# Patient Record
Sex: MALE | Race: WHITE | NOT HISPANIC OR LATINO | Employment: OTHER | URBAN - NONMETROPOLITAN AREA
[De-identification: names, ages, dates, MRNs, and addresses within clinical notes are randomized per-mention and may not be internally consistent; named-entity substitution may affect disease eponyms.]

---

## 2017-01-03 ENCOUNTER — OFFICE VISIT (OUTPATIENT)
Dept: UROLOGY | Facility: CLINIC | Age: 82
End: 2017-01-03

## 2017-01-03 VITALS
DIASTOLIC BLOOD PRESSURE: 71 MMHG | TEMPERATURE: 98 F | RESPIRATION RATE: 16 BRPM | OXYGEN SATURATION: 94 % | SYSTOLIC BLOOD PRESSURE: 134 MMHG | HEART RATE: 71 BPM

## 2017-01-03 DIAGNOSIS — N40.0 ENLARGED PROSTATE: Primary | ICD-10-CM

## 2017-01-03 DIAGNOSIS — N40.0 BENIGN PROSTATIC HYPERPLASIA WITHOUT LOWER URINARY TRACT SYMPTOMS, UNSPECIFIED MORPHOLOGY: ICD-10-CM

## 2017-01-03 LAB
BILIRUB BLD-MCNC: NEGATIVE MG/DL
CLARITY, POC: CLEAR
COLOR UR: YELLOW
GLUCOSE UR STRIP-MCNC: NEGATIVE MG/DL
KETONES UR QL: NEGATIVE
LEUKOCYTE EST, POC: NEGATIVE
NITRITE UR-MCNC: NEGATIVE MG/ML
PH UR: 6.5 [PH] (ref 5–8)
PROT UR STRIP-MCNC: NEGATIVE MG/DL
RBC # UR STRIP: NEGATIVE /UL
SP GR UR: 1.01 (ref 1–1.03)
UROBILINOGEN UR QL: NORMAL

## 2017-01-03 PROCEDURE — 99213 OFFICE O/P EST LOW 20 MIN: CPT | Performed by: UROLOGY

## 2017-01-03 PROCEDURE — 81003 URINALYSIS AUTO W/O SCOPE: CPT | Performed by: UROLOGY

## 2017-01-03 RX ORDER — TAMSULOSIN HYDROCHLORIDE 0.4 MG/1
1 CAPSULE ORAL DAILY
Qty: 90 CAPSULE | Refills: 0 | Status: SHIPPED | OUTPATIENT
Start: 2017-01-03 | End: 2017-02-22

## 2017-01-03 NOTE — MR AVS SNAPSHOT
Andrew Hernandez   1/3/2017 1:30 PM   Office Visit    Dept Phone:  [phone]   Encounter #:  [id number]    Provider:  Harsh Huerta MD   Department:  [facility] UROLOGY                Your Full Care Plan              Your Updated Medication List          This list is accurate as of: 1/3/17  2:16 PM.  Always use your most recent med list.                atorvastatin 20 MG tablet   Commonly known as:  LIPITOR   Take 1 tablet by mouth Daily.       clopidogrel 75 MG tablet   Commonly known as:  PLAVIX   Take 1 tablet by mouth Daily.       levothyroxine 50 MCG tablet   Commonly known as:  SYNTHROID, LEVOTHROID   Take 1 tablet by mouth Daily.       lisinopril 5 MG tablet   Commonly known as:  PRINIVIL,ZESTRIL   Take 1 tablet by mouth 2 (two) times a day.       multivitamin tablet tablet       raNITIdine 150 MG tablet   Commonly known as:  ZANTAC   Take 1 tablet by mouth Daily As Needed for heartburn or indigestion.       sulfamethoxazole-trimethoprim 800-160 MG per tablet   Commonly known as:  BACTRIM DS   Take 1 tablet by mouth 2 (Two) Times a Day.       tamsulosin 0.4 MG capsule 24 hr capsule   Commonly known as:  FLOMAX   Take 1 capsule by mouth Daily.       TUMS 500 MG chewable tablet   Generic drug:  calcium carbonate               We Performed the Following     POC Urinalysis Dipstick, Automated       You Were Diagnosed With        Codes Comments    Enlarged prostate    -  Primary ICD-10-CM: N40.0  ICD-9-CM: 600.00       Instructions     None    Patient Instructions History      Upcoming Appointments     Visit Type Date Time Department    NEW PATIENT 1/3/2017  1:30 PM [facility]    FOLLOW UP 3/31/2017  9:30 AM [facility] CARD [facility]    FOLLOW UP 4/25/2017 10:00 AM [facility] PC [facility]      MyChart Signup     [facility] [employer] allows you to send messages to your doctor, view your test results, renew your prescriptions, schedule appointments, and more. To  sign up, go to [employer] and click on the Sign Up Now link in the New User? box. Enter your [employer] Activation Code exactly as it appears below along with the last four digits of your Social Security Number and your Date of Birth () to complete the sign-up process. If you do not sign up before the expiration date, you must request a new code.    [employer] Activation Code: [email or web address]  Expires: 2017  5:34 AM    If you have questions, you can email [email or web address] or call [phone] to talk to our [employer] staff. Remember, [employer] is NOT to be used for urgent needs. For medical emergencies, dial 911.               Other Info from Your Visit           Your Appointments     Mar 31, 2017  9:30 AM EDT   Follow Up with Ishmael Nieves MD   [facility] CARDIOLOGY (--)    [address]  [facility] [address] [phone]           Arrive 15 minutes prior to appointment.            2017 10:00 AM EDT   Follow Up with Melvi Klein MD   [facility] PRIMARY CARE (--)    [address]  [facility] [address] [phone]           Arrive 15 minutes prior to appointment.              Allergies     Rocephin [Ceftriaxone] Allergy Hives    Amoxicillin  Rash      Reason for Visit     Benign Prostatic Hypertrophy Patient is here for a yearly fup of bph, use to see .      Vital Signs     Blood Pressure Pulse Temperature Respirations Oxygen Saturation Smoking Status    134/71 71 98 °F (36.7 °C) (Temporal Artery ) 16 94% Former Smoker      Problems and Diagnoses Noted     Enlarged prostate    -  Primary      Results     POC Urinalysis Dipstick, Automated      Component Value Standard Range & Units    Color Yellow Yellow, Straw, Dark Yellow, Lety    Clarity, UA Clear Clear    Glucose, UA Negative Negative, 1000 mg/dL (3+) mg/dL    Bilirubin Negative Negative    Ketones, UA Negative Negative    Specific Gravity   1.010 1.005 - 1.030    Blood, UA Negative Negative    pH, Urine 6.5 5.0 - 8.0    Protein, POC Negative Negative mg/dL    Urobilinogen, UA Normal Normal    Leukocytes Negative Negative    Nitrite, UA Negative Negative

## 2017-01-03 NOTE — PROGRESS NOTES
Chief Complaint  Benign Prostatic Hypertrophy (Patient is here for a yearly fup of bph, use to see .)        ERICA Hernandez is a 82 y.o. male who returns today for annual checkup and requesting that I assume his urological care.  He has been followed by Dr. Hernandez in the past with a known enlarged prostate and bladder outlet obstruction and history of kidney stones.  Currently he is voiding adequately on Flomax but is known to carry a postvoid residual of about 250 mL.  He recently had a urinary tract infection but he states this the first one in a year.    Vitals:    01/03/17 1404   BP: 134/71   Pulse: 71   Resp: 16   Temp: 98 °F (36.7 °C)   SpO2: 94%       Past Medical History  Past Medical History   Diagnosis Date   • Fracture    • Kidney infection    • Renal calculi        Past Surgical History  Past Surgical History   Procedure Laterality Date   • Carotid stent Right 03/18/2016     TRANSCATH PLACEMENT OF CAROTID ARTERY STENT   • Patella fracture surgery Left 1986   • Kidney stone surgery Right 2011     Shafron - open       Medications  has a current medication list which includes the following prescription(s): atorvastatin, calcium carbonate, clopidogrel, levothyroxine, lisinopril, multivitamin, ranitidine, sulfamethoxazole-trimethoprim, and tamsulosin.      Allergies  Allergies   Allergen Reactions   • Rocephin [Ceftriaxone] Hives   • Amoxicillin Rash       Social History  Social History     Social History Narrative    7 children - 2 daughters both live in [place]    5 sons, lives with Harriet Hernandez, son is Abhay, 3 other sons live in [place].      Other sons live in [place],        Family History  He has no family history of bladder or kidney cancer  He has no family history of kidney stones      AUA Symptom Score:      Review of Systems  Review of Systems    Physical Exam  Physical Exam   Constitutional: He is oriented to person, place, and time. He appears well-developed and  well-nourished.   Neck: Normal range of motion.   Pulmonary/Chest: Effort normal. No respiratory distress.   Abdominal: Soft. He exhibits no distension and no mass. There is no tenderness. No hernia. Hernia confirmed negative in the right inguinal area and confirmed negative in the left inguinal area.   Genitourinary: Rectum normal, testes normal and penis normal. Prostate is enlarged.   Musculoskeletal: Normal range of motion.   Lymphadenopathy: No inguinal adenopathy noted on the right or left side.   Neurological: He is alert and oriented to person, place, and time.   Skin: Skin is warm and dry.   Psychiatric: He has a normal mood and affect. His behavior is normal.   Nursing note and vitals reviewed.      Labs Recent and today in the office:  Results for orders placed or performed in visit on 01/03/17   POC Urinalysis Dipstick, Automated   Result Value Ref Range    Color Yellow Yellow, Straw, Dark Yellow, Lety    Clarity, UA Clear Clear    Glucose, UA Negative Negative, 1000 mg/dL (3+) mg/dL    Bilirubin Negative Negative    Ketones, UA Negative Negative    Specific Gravity  1.010 1.005 - 1.030    Blood, UA Negative Negative    pH, Urine 6.5 5.0 - 8.0    Protein, POC Negative Negative mg/dL    Urobilinogen, UA Normal Normal    Leukocytes Negative Negative    Nitrite, UA Negative Negative         Assessment & Plan  The patient is informed that I recommend Proscar to shrink the gland since it is 60 g and associated with a 8 ounce postvoid residual.  He declines however and so we will discontinue his Flomax and he can return in 6 months and when necessary.

## 2017-01-13 ENCOUNTER — TELEPHONE (OUTPATIENT)
Dept: CARDIOLOGY | Facility: CLINIC | Age: 82
End: 2017-01-13

## 2017-01-13 RX ORDER — LABETALOL 100 MG/1
100 TABLET, FILM COATED ORAL 3 TIMES DAILY
Qty: 90 TABLET | Refills: 5 | Status: SHIPPED | OUTPATIENT
Start: 2017-01-13 | End: 2017-03-27 | Stop reason: SDUPTHER

## 2017-01-13 RX ORDER — LISINOPRIL 20 MG/1
20 TABLET ORAL 2 TIMES DAILY
Qty: 60 TABLET | Refills: 5 | Status: SHIPPED | OUTPATIENT
Start: 2017-01-13 | End: 2017-01-16 | Stop reason: ALTCHOICE

## 2017-01-13 NOTE — TELEPHONE ENCOUNTER
Patient called in today having HTN /70's increasing throughout the day. He C/O Headache and blurred vision BP in office 200/62. Spoke with Dr. Nieves he states increase Lisinopril 10 mg BID, and add Labetalol 100mg TID and see next week. Patient states understanding and appointment given.

## 2017-01-16 RX ORDER — LISINOPRIL 10 MG/1
10 TABLET ORAL 2 TIMES DAILY
Qty: 180 TABLET | Refills: 3 | OUTPATIENT
Start: 2017-01-16 | End: 2017-05-31 | Stop reason: SINTOL

## 2017-01-16 RX ORDER — LISINOPRIL 10 MG/1
10 TABLET ORAL 2 TIMES DAILY
Qty: 180 TABLET | Refills: 3 | Status: SHIPPED | OUTPATIENT
Start: 2017-01-16 | End: 2017-01-16 | Stop reason: SDUPTHER

## 2017-01-16 RX ORDER — LISINOPRIL 10 MG/1
10 TABLET ORAL DAILY
Qty: 90 TABLET | Refills: 3 | Status: SHIPPED | OUTPATIENT
Start: 2017-01-16 | End: 2017-01-16 | Stop reason: SDUPTHER

## 2017-02-15 ENCOUNTER — OFFICE VISIT (OUTPATIENT)
Dept: CARDIOLOGY | Facility: CLINIC | Age: 82
End: 2017-02-15

## 2017-02-15 VITALS
OXYGEN SATURATION: 98 % | HEIGHT: 72 IN | SYSTOLIC BLOOD PRESSURE: 170 MMHG | BODY MASS INDEX: 22.4 KG/M2 | WEIGHT: 165.4 LBS | HEART RATE: 70 BPM | DIASTOLIC BLOOD PRESSURE: 72 MMHG | RESPIRATION RATE: 16 BRPM

## 2017-02-15 DIAGNOSIS — I10 BENIGN ESSENTIAL HYPERTENSION: Primary | ICD-10-CM

## 2017-02-15 PROCEDURE — 99213 OFFICE O/P EST LOW 20 MIN: CPT | Performed by: INTERNAL MEDICINE

## 2017-02-15 NOTE — PROGRESS NOTES
Subjective:     Encounter Date:02/15/2017      Patient ID: Andrew Hernandez is a 82 y.o. male.    Chief Complaint: Essential hypertension follow-up after medication change.  HPI  This is an 82-year-old male patient who was previously experiencing symptomatic hypertension.  We had started him on labetalol 100 mg 3 times per day and this therapy was working quite well.  He indicates that his home blood pressure recordings were consistently running between 115 mmHg and 140 mmHg.  The patient recently had his labetalol prescription refilled and noticed that even though the strength was unchanged the pill had changed from a pink pill to a white pill.  His daughter-in-law confirmed with the pharmacist that he was getting labetalol 100 mg as prescribed.  It was assumed that this was due to a different generic .  However his blood pressure control has been very poor since refilling his prescription.  He reports strict compliance.  His blood pressure at home today was 195 systolic and was 170 mmHg systolic later today in clinic.  He has no chest pain or shortness of breath.  There is no orthopnea PND or lower extremity edema.  There is no dizziness palpitations or syncope.  He has had no headaches visual changes weakness or nausea.  The remainder of his past medical history, family history and social history remains unchanged.  The following portions of the patient's history were reviewed and updated as appropriate: allergies, current medications, past family history, past medical history, past social history, past surgical history and problem  Review of Systems   Constitution: Negative for chills, diaphoresis, fever, weakness, malaise/fatigue, night sweats, weight gain and weight loss.   HENT: Negative for ear discharge, hearing loss, hoarse voice and nosebleeds.    Eyes: Negative for discharge, double vision, pain and photophobia.   Cardiovascular: Negative for chest pain, claudication, cyanosis, dyspnea on  exertion, irregular heartbeat, leg swelling, near-syncope, orthopnea, palpitations, paroxysmal nocturnal dyspnea and syncope.   Respiratory: Negative for cough, hemoptysis, shortness of breath, sputum production and wheezing.    Endocrine: Negative for cold intolerance, heat intolerance, polydipsia, polyphagia and polyuria.   Hematologic/Lymphatic: Negative for adenopathy and bleeding problem. Does not bruise/bleed easily.   Skin: Negative for color change, flushing, itching and rash.   Musculoskeletal: Negative for muscle cramps, muscle weakness, myalgias and stiffness.   Gastrointestinal: Negative for abdominal pain, diarrhea, hematemesis, hematochezia, nausea and vomiting.   Genitourinary: Negative for dysuria, frequency and nocturia.   Neurological: Negative for focal weakness, loss of balance, numbness, paresthesias and seizures.   Psychiatric/Behavioral: Negative for altered mental status, hallucinations and suicidal ideas.   Allergic/Immunologic: Negative for HIV exposure, hives and persistent infections.       Current Outpatient Prescriptions:   •  atorvastatin (LIPITOR) 20 MG tablet, Take 1 tablet by mouth Daily., Disp: 90 tablet, Rfl: 1  •  calcium carbonate (TUMS) 500 MG chewable tablet, Chew. TAKE AS DIRECTED., Disp: , Rfl:   •  clopidogrel (PLAVIX) 75 MG tablet, Take 1 tablet by mouth Daily., Disp: 90 tablet, Rfl: 3  •  labetalol (NORMODYNE) 100 MG tablet, Take 1 tablet by mouth 3 (Three) Times a Day., Disp: 90 tablet, Rfl: 5  •  levothyroxine (SYNTHROID, LEVOTHROID) 50 MCG tablet, TAKE 1 TABLET BY MOUTH ONCE DAILY, Disp: 90 tablet, Rfl: 3  •  lisinopril (PRINIVIL,ZESTRIL) 10 MG tablet, Take 1 tablet by mouth 2 (Two) Times a Day., Disp: 180 tablet, Rfl: 3  •  multivitamin (THERAGRAN) tablet tablet, Take 1 tablet by mouth daily., Disp: , Rfl:   •  ranitidine (ZANTAC) 150 MG tablet, Take 1 tablet by mouth Daily As Needed for heartburn or indigestion., Disp: 90 tablet, Rfl: 1  •  tamsulosin (FLOMAX) 0.4 MG  "capsule 24 hr capsule, TAKE 1 CAPSULE BY MOUTH ONCE DAILY, Disp: 90 capsule, Rfl: 0     Objective:     Physical Exam   Constitutional: He is oriented to person, place, and time. He appears well-developed and well-nourished.   HENT:   Head: Normocephalic and atraumatic.   Eyes: Conjunctivae are normal. No scleral icterus.   Cardiovascular: Normal rate, regular rhythm, normal heart sounds and intact distal pulses.  Exam reveals no gallop and no friction rub.    No murmur heard.  Pulmonary/Chest: Effort normal and breath sounds normal. No respiratory distress.   Abdominal: Soft. Bowel sounds are normal. There is no tenderness.   Musculoskeletal: He exhibits no edema.   Neurological: He is alert and oriented to person, place, and time.   Skin: Skin is warm and dry.   Psychiatric: He has a normal mood and affect. His behavior is normal.     Blood pressure 170/72, pulse 70, resp. rate 16, height 71.5\" (181.6 cm), weight 165 lb 6.4 oz (75 kg), SpO2 98 %.   Lab Review:       Assessment:         1. Benign essential hypertension  He continues to have labile blood pressure.  He indicates that earlier in the month his blood pressure was running in the 115 to 140 systolic range.  He recently renewed his labetalol 100 mg 3 times per day prescription.  The dosage was confirmed to be the same however the pill changed in color from light pink to a white pill.  He was confirmed with the pharmacist that he did get the right medication.  I suspect this is due to a different  of a generic medication.  The patient reports strict compliance with his medication.  However his blood pressure at home today was 195 systolic.  His blood pressure in the office today was 170 systolic.  Surprisingly he remains asymptomatic despite these extremely elevated blood pressures.  He expresses concern that his blood pressure may be too low when he gets recordings less than 120.  His diastolic blood pressures are totally inaccurate due to " locomotor brachialis and extreme vascular stiffness.  Procedures     Plan:       I have recommended empirically increasing his labetalol to  200 mg for each dosing interval if his systolic blood pressure is greater than 150.  If his blood pressure is less than 100 he will take 100 mg during that 8 hour period.  If it is consistently requiring the higher dose multiple times during the day we will simply increasing him to 200 mg 3 times per day.  He may require the use of when necessary clonidine.  Hopefully if this is due to a different potency generic form of labetalol it will take 5-7 days for this to reach steady state.  We will therefore reassess in one week.

## 2017-02-21 ENCOUNTER — OFFICE VISIT (OUTPATIENT)
Dept: CARDIOLOGY | Facility: CLINIC | Age: 82
End: 2017-02-21

## 2017-02-21 VITALS
WEIGHT: 164.4 LBS | HEIGHT: 72 IN | SYSTOLIC BLOOD PRESSURE: 150 MMHG | RESPIRATION RATE: 16 BRPM | DIASTOLIC BLOOD PRESSURE: 52 MMHG | OXYGEN SATURATION: 99 % | BODY MASS INDEX: 22.27 KG/M2 | HEART RATE: 62 BPM

## 2017-02-21 DIAGNOSIS — I65.29 CAROTID ATHEROSCLEROSIS, UNSPECIFIED LATERALITY: ICD-10-CM

## 2017-02-21 DIAGNOSIS — I63.421 CEREBRAL INFARCTION DUE TO EMBOLISM OF RIGHT ANTERIOR CEREBRAL ARTERY (HCC): ICD-10-CM

## 2017-02-21 DIAGNOSIS — I10 BENIGN ESSENTIAL HYPERTENSION: Primary | ICD-10-CM

## 2017-02-21 PROCEDURE — 99213 OFFICE O/P EST LOW 20 MIN: CPT | Performed by: INTERNAL MEDICINE

## 2017-02-21 RX ORDER — LABETALOL 100 MG/1
100 TABLET, FILM COATED ORAL 3 TIMES DAILY PRN
Qty: 120 TABLET | Refills: 11 | Status: SHIPPED | OUTPATIENT
Start: 2017-02-21 | End: 2017-03-27 | Stop reason: ALTCHOICE

## 2017-02-21 RX ORDER — LABETALOL 100 MG/1
100 TABLET, FILM COATED ORAL 3 TIMES DAILY PRN
Qty: 120 TABLET | Refills: 11 | Status: SHIPPED | OUTPATIENT
Start: 2017-02-21 | End: 2017-02-21 | Stop reason: SDUPTHER

## 2017-02-21 NOTE — PROGRESS NOTES
Subjective:     Encounter Date:02/21/2017      Patient ID: Andrew Hernandez is a 82 y.o. male.    Chief Complaint: Hypertension and dizziness  HPI  This is an 82-year-old male patient who has been having relatively labile blood pressures.  The patient was instructed at last visit to take an extra 100 mg labetalol for systolic blood pressure greater than 150.  Over the last week he has had to do that 5 times.  He did have one episode of dizziness associated with a relatively low blood pressure.  Overall his systolic blood pressures are running around 110-115 mm of mercury.  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  It is been over one year since this process wasn't assessed.  He has had no further visual changes.  There is no focal numbness weakness or paralysis.  He has had no difficulty with speech or swallowing.  He has had no issues with gait and balance issues or incoordination.  He denies chest discomfort.  There is no exertional chest arm neck jaw shoulder or back discomfort.  There is no orthopnea PND or lower extremity edema.  There is no palpitations or syncope.  The remainder of his past medical history, family history and social history remains unchanged.  The following portions of the patient's history were reviewed and updated as appropriate: allergies, current medications, past family history, past medical history, past social history, past surgical history and problem  Review of Systems   Constitution: Negative for chills, diaphoresis, fever, weakness, malaise/fatigue, weight gain and weight loss.   HENT: Negative for ear discharge, hearing loss, hoarse voice and nosebleeds.    Eyes: Negative for discharge, double vision, pain and photophobia.   Cardiovascular: Negative for chest pain, claudication, cyanosis, dyspnea on exertion, irregular heartbeat, leg swelling, near-syncope, orthopnea,  palpitations, paroxysmal nocturnal dyspnea and syncope.   Respiratory: Negative for cough, hemoptysis, shortness of breath, sputum production and wheezing.    Endocrine: Negative for cold intolerance, heat intolerance, polydipsia, polyphagia and polyuria.   Hematologic/Lymphatic: Negative for adenopathy and bleeding problem. Does not bruise/bleed easily.   Skin: Negative for color change, flushing, itching and rash.   Musculoskeletal: Negative for muscle cramps, muscle weakness, myalgias and stiffness.   Gastrointestinal: Negative for abdominal pain, diarrhea, hematemesis, hematochezia, nausea and vomiting.   Genitourinary: Negative for dysuria, frequency and nocturia.   Neurological: Positive for dizziness. Negative for focal weakness, loss of balance, numbness, paresthesias and seizures.   Psychiatric/Behavioral: Negative for altered mental status, hallucinations and suicidal ideas.   Allergic/Immunologic: Negative for HIV exposure, hives and persistent infections.       Current Outpatient Prescriptions:   •  atorvastatin (LIPITOR) 20 MG tablet, Take 1 tablet by mouth Daily., Disp: 90 tablet, Rfl: 1  •  calcium carbonate (TUMS) 500 MG chewable tablet, Chew. TAKE AS DIRECTED., Disp: , Rfl:   •  clopidogrel (PLAVIX) 75 MG tablet, Take 1 tablet by mouth Daily., Disp: 90 tablet, Rfl: 3  •  levothyroxine (SYNTHROID, LEVOTHROID) 50 MCG tablet, TAKE 1 TABLET BY MOUTH ONCE DAILY, Disp: 90 tablet, Rfl: 3  •  lisinopril (PRINIVIL,ZESTRIL) 10 MG tablet, Take 1 tablet by mouth 2 (Two) Times a Day., Disp: 180 tablet, Rfl: 3  •  multivitamin (THERAGRAN) tablet tablet, Take 1 tablet by mouth daily., Disp: , Rfl:   •  ranitidine (ZANTAC) 150 MG tablet, Take 1 tablet by mouth Daily As Needed for heartburn or indigestion., Disp: 90 tablet, Rfl: 1  •  tamsulosin (FLOMAX) 0.4 MG capsule 24 hr capsule, TAKE 1 CAPSULE BY MOUTH ONCE DAILY, Disp: 90 capsule, Rfl: 0  •  labetalol (NORMODYNE) 100 MG tablet, Take 1 tablet by mouth 3 (Three)  "Times a Day. (Patient taking differently: Take 100 mg by mouth 3 (Three) Times a Day. Patient has been instructed to take 2 tablets TID if BP is over 150 systolic), Disp: 90 tablet, Rfl: 5     Objective:     Physical Exam   Constitutional: He is oriented to person, place, and time. He appears well-developed and well-nourished.   HENT:   Head: Normocephalic and atraumatic.   Eyes: Conjunctivae are normal. No scleral icterus.   Cardiovascular: Normal rate, regular rhythm, normal heart sounds and intact distal pulses.  Exam reveals no gallop and no friction rub.    No murmur heard.  Pulses:       Carotid pulses are 2+ on the right side with bruit, and 2+ on the left side with bruit.  Pulmonary/Chest: Effort normal and breath sounds normal. No respiratory distress.   Abdominal: Soft. Bowel sounds are normal. There is no tenderness.   Musculoskeletal: He exhibits no edema.   Neurological: He is alert and oriented to person, place, and time.   Skin: Skin is warm and dry.   Psychiatric: He has a normal mood and affect. His behavior is normal.     Blood pressure 150/52, pulse 62, resp. rate 16, height 71.5\" (181.6 cm), weight 164 lb 6.4 oz (74.6 kg), SpO2 99 %.   Lab Review:       Assessment:         1. Benign essential hypertension  His blood pressure appears to be doing much better.  In the last week he has had 5 episodes of blood pressures greater than 150 mmHg which required extra dosages of labetalol.    2. Carotid atherosclerosis, unspecified laterality  The patient has had previous carotid artery stenting.  He continues to have loud bilateral carotid bruits with worsening dizziness.  - Duplex Carotid Ultrasound CAR    3. 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  - Duplex Carotid Ultrasound CAR  Procedures     Plan:       I have recommended a bilateral carotid artery duplex Doppler.  The patient may be a " candidate for enrollment in the CREST 2 trial.  For the time being we will continue using labetalol 100 mg 3 times per day in conjunction with lisinopril 10 mg twice per day.  He is instructed to continue taking an extra 100 mg labetalol as needed for systolic blood pressures greater than 150.  I am reluctant to escalate his scheduled antihypertensive medication as the patient has had an episode of dizziness with relatively low blood pressure.

## 2017-02-22 ENCOUNTER — HOSPITAL ENCOUNTER (OUTPATIENT)
Dept: CARDIOLOGY | Facility: HOSPITAL | Age: 82
Discharge: HOME | End: 2017-02-22
Attending: INTERNAL MEDICINE | Admitting: INTERNAL MEDICINE

## 2017-02-22 DIAGNOSIS — N40.0 BENIGN PROSTATIC HYPERPLASIA WITHOUT LOWER URINARY TRACT SYMPTOMS, UNSPECIFIED MORPHOLOGY: ICD-10-CM

## 2017-02-22 LAB
BH CV ECHO MEAS - BSA(HAYCOCK): 1.9 M^2
BH CV ECHO MEAS - BSA: 1.9 M^2
BH CV ECHO MEAS - BZI_BMI: 22.9 KILOGRAMS/M^2
BH CV ECHO MEAS - BZI_METRIC_HEIGHT: 180.3 CM
BH CV ECHO MEAS - BZI_METRIC_WEIGHT: 74.4 KG
BH CV XLRA MEAS LEFT DIST CCA EDV: 20.4 CM/SEC
BH CV XLRA MEAS LEFT DIST CCA PSV: 144 CM/SEC
BH CV XLRA MEAS LEFT DIST ICA EDV: 26.7 CM/SEC
BH CV XLRA MEAS LEFT DIST ICA PSV: 130 CM/SEC
BH CV XLRA MEAS LEFT ICA/CCA RATIO: 1.9
BH CV XLRA MEAS LEFT MID ICA EDV: 40.9 CM/SEC
BH CV XLRA MEAS LEFT MID ICA PSV: 198 CM/SEC
BH CV XLRA MEAS LEFT PROX CCA EDV: 51.9 CM/SEC
BH CV XLRA MEAS LEFT PROX CCA PSV: 265 CM/SEC
BH CV XLRA MEAS LEFT PROX ECA EDV: 0 CM/SEC
BH CV XLRA MEAS LEFT PROX ECA PSV: 270 CM/SEC
BH CV XLRA MEAS LEFT PROX ICA EDV: 51.9 CM/SEC
BH CV XLRA MEAS LEFT PROX ICA PSV: 270 CM/SEC
BH CV XLRA MEAS LEFT PROX SCLA EDV: 0 CM/SEC
BH CV XLRA MEAS LEFT PROX SCLA PSV: 130 CM/SEC
BH CV XLRA MEAS LEFT VERTEBRAL A EDV: 9.9 CM/SEC
BH CV XLRA MEAS LEFT VERTEBRAL A PSV: 32 CM/SEC
BH CV XLRA MEAS RIGHT DIST CCA EDV: 2.8 CM/SEC
BH CV XLRA MEAS RIGHT DIST CCA PSV: 62.3 CM/SEC
BH CV XLRA MEAS RIGHT DIST ICA EDV: 9.6 CM/SEC
BH CV XLRA MEAS RIGHT DIST ICA PSV: 49.8 CM/SEC
BH CV XLRA MEAS RIGHT ICA/CCA RATIO: 8
BH CV XLRA MEAS RIGHT MID ICA EDV: 19.2 CM/SEC
BH CV XLRA MEAS RIGHT MID ICA PSV: 115 CM/SEC
BH CV XLRA MEAS RIGHT PROX CCA EDV: 9.4 CM/SEC
BH CV XLRA MEAS RIGHT PROX CCA PSV: 71.1 CM/SEC
BH CV XLRA MEAS RIGHT PROX ECA EDV: 0 CM/SEC
BH CV XLRA MEAS RIGHT PROX ECA PSV: 92.1 CM/SEC
BH CV XLRA MEAS RIGHT PROX ICA EDV: 110 CM/SEC
BH CV XLRA MEAS RIGHT PROX ICA PSV: 496 CM/SEC
BH CV XLRA MEAS RIGHT PROX SCLA EDV: 0 CM/SEC
BH CV XLRA MEAS RIGHT PROX SCLA PSV: 208 CM/SEC
BH CV XLRA MEAS RIGHT VERTEBRAL A EDV: 22 CM/SEC
BH CV XLRA MEAS RIGHT VERTEBRAL A PSV: 127 CM/SEC

## 2017-02-22 PROCEDURE — 93880 EXTRACRANIAL BILAT STUDY: CPT

## 2017-02-22 PROCEDURE — 93880 EXTRACRANIAL BILAT STUDY: CPT | Performed by: INTERNAL MEDICINE

## 2017-02-22 RX ORDER — TAMSULOSIN HYDROCHLORIDE 0.4 MG/1
1 CAPSULE ORAL DAILY
Qty: 90 CAPSULE | Refills: 1 | Status: SHIPPED | OUTPATIENT
Start: 2017-02-22 | End: 2017-03-24 | Stop reason: SDUPTHER

## 2017-02-27 ENCOUNTER — HOSPITAL ENCOUNTER (OUTPATIENT)
Facility: HOSPITAL | Age: 82
Setting detail: HOSPITAL OUTPATIENT SURGERY
End: 2017-02-27
Attending: RADIOLOGY | Admitting: RADIOLOGY

## 2017-02-27 ENCOUNTER — OFFICE VISIT (OUTPATIENT)
Dept: NEUROSURGERY | Facility: CLINIC | Age: 82
End: 2017-02-27

## 2017-02-27 VITALS
TEMPERATURE: 97.8 F | HEIGHT: 71 IN | DIASTOLIC BLOOD PRESSURE: 58 MMHG | SYSTOLIC BLOOD PRESSURE: 130 MMHG | BODY MASS INDEX: 23.1 KG/M2 | WEIGHT: 165 LBS

## 2017-02-27 DIAGNOSIS — I65.21 CAROTID STENOSIS, ASYMPTOMATIC, RIGHT: Primary | ICD-10-CM

## 2017-02-27 DIAGNOSIS — I65.21 CAROTID STENOSIS, ASYMPTOMATIC, RIGHT: ICD-10-CM

## 2017-02-27 PROCEDURE — 99214 OFFICE O/P EST MOD 30 MIN: CPT | Performed by: RADIOLOGY

## 2017-02-27 RX ORDER — SODIUM CHLORIDE 0.9 % (FLUSH) 0.9 %
1-10 SYRINGE (ML) INJECTION AS NEEDED
Status: CANCELLED | OUTPATIENT
Start: 2017-02-27

## 2017-02-27 RX ORDER — SODIUM CHLORIDE 9 MG/ML
50 INJECTION, SOLUTION INTRAVENOUS CONTINUOUS
Status: CANCELLED | OUTPATIENT
Start: 2017-02-27

## 2017-02-27 NOTE — PROGRESS NOTES
"DOS: 2017  NAME: Andrew Hernandez   : 1934  PCP: Melvi Klein MD  Referring MD: Melvi Klein MD    Chief Complaint:    Chief Complaint   Patient presents with   • Carotid Artery Disease     recurrent right ICA stenosis        History of Present Illness:  82 y.o. male presents with known to the neuro interventional service, having undergone prior angioplasty and stent placement for a symptomatic, high-grade right carotid stenosis on 3/18/2016.  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  He has otherwise had no new or recurrent strokelike symptoms.  Specifically, no unilateral weakness or numbness, no speech changes.  He's had a carotid duplex ultrasound demonstrating recurrent, high-grade right carotid stenosis (in-stent restenosis).  He presents for consultation regarding his recurrent carotid disease.    Past Medical History:  Past Medical History   Diagnosis Date   • Fracture    • Hyperlipidemia    • Hypertension    • Kidney infection    • Renal calculi    • Stroke 2016     right retinal artery occlusion       Past Surgical History:  Past Surgical History   Procedure Laterality Date   • Patella fracture surgery Left    • Kidney stone surgery Right      Shafron - open   • Carotid stent Right 2016     Carotid Wall Stent       Review of Systems:        Review of Systems   Eyes: Positive for visual disturbance.   Neurological: Positive for dizziness and light-headedness.   All other systems reviewed and are negative.       Medications    Current Outpatient Prescriptions:   •  atorvastatin (LIPITOR) 20 MG tablet, Take 1 tablet by mouth Daily., Disp: 90 tablet, Rfl: 1  •  calcium carbonate (TUMS) 500 MG chewable tablet, Chew. TAKE AS DIRECTED., Disp: , Rfl:   •  clopidogrel (PLAVIX) 75 MG tablet, Take 1 tablet by mouth Daily., Disp: 90 tablet, Rfl: 3  •  labetalol (NORMODYNE) 100 MG tablet, Take 1 tablet by mouth 3 (Three) Times a Day As " Needed (Patient has been instructed to take 2 tablets TID if BP is over 150 systolic.)., Disp: 120 tablet, Rfl: 11  •  levothyroxine (SYNTHROID, LEVOTHROID) 50 MCG tablet, TAKE 1 TABLET BY MOUTH ONCE DAILY, Disp: 90 tablet, Rfl: 3  •  lisinopril (PRINIVIL,ZESTRIL) 10 MG tablet, Take 1 tablet by mouth 2 (Two) Times a Day., Disp: 180 tablet, Rfl: 3  •  multivitamin (THERAGRAN) tablet tablet, Take 1 tablet by mouth daily., Disp: , Rfl:   •  ranitidine (ZANTAC) 150 MG tablet, Take 1 tablet by mouth Daily As Needed for heartburn or indigestion., Disp: 90 tablet, Rfl: 1  •  tamsulosin (FLOMAX) 0.4 MG capsule 24 hr capsule, TAKE 1 CAPSULE BY MOUTH ONCE DAILY, Disp: 90 capsule, Rfl: 1    Allergies:  Allergies   Allergen Reactions   • Rocephin [Ceftriaxone] Hives   • Amoxicillin Rash       Social Hx:  Social History     Social History   • Marital status:      Spouse name: N/A   • Number of children: N/A   • Years of education: N/A     Occupational History   • retired      Social History Main Topics   • Smoking status: Former Smoker     Packs/day: 1.00     Years: 55.00     Types: Cigarettes     Start date: 1950     Quit date: 2005   • Smokeless tobacco: Former User     Types: Chew     Quit date: 2005   • Alcohol use No      Comment: quit 1985   • Drug use: No   • Sexual activity: Not on file     Other Topics Concern   • Not on file     Social History Narrative    7 children - 2 daughters both live in [place]    5 sons, lives with Abhay & Roshni Hernandez, son is Abhay, 3 other sons live in [place].      Other sons live in [place],        Family Hx:  Family History   Problem Relation Age of Onset   • Heart disease Mother    • Hyperlipidemia Mother    • Hypertension Mother    • Prostate cancer Father    • Cancer Father    • Heart attack Brother    • Heart disease Brother    • Hyperlipidemia Brother    • Hypertension Brother        Review of Imaging:  Carotid duplex from 2/22/2017 demonstrates elevated velocities within  "the proximal aspects of the right internal carotid artery stent, with elevated velocities of 496/110 cm/s.  The stent is patent.    Physical Examination:  Vitals:    02/27/17 1427   BP: 130/58   Temp: 97.8 °F (36.6 °C)        General Appearance:   Well developed, well nourished, well groomed, alert, and cooperative.  Cardiovascular: Regular rate and rhythm.  Bilateral carotid bruits.  Peripheral Vasculature: Radial and pedal pulses are equal and symmetric. No signs of distal embolization.      Neurological examination:  Neurologic Exam     Mental Status   Oriented to person, place, and time.   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    CN III, IV, VI   Extraocular motions are normal.     CN V   Facial sensation intact.     CN VII   Facial expression full, symmetric.     CN VIII   CN VIII normal.     CN IX, X   Palate: symmetric    CN XI   CN XI normal.     CN XII   CN XII normal.     Motor Exam     Strength   Strength 5/5 throughout.     Sensory Exam   Light touch normal.     Gait, Coordination, and Reflexes     Gait  Gait: normal      Diagnoses/Plan:  82-year-old male with recurrent, high-grade (greater than 90%) right internal carotid stenosis (in-stent restenosis), likely representing recurrent disease versus intimal hyperplasia.  I had a lengthy discussion with Mr. Hernandez and family regarding stroke risk and possible treatment options, given the previous \"symptomatic\" nature of the lesion, along with rapid recurrence (likely intimal hyperplasia), they wish to pursue diagnostic carotid angiography for further evaluation with probable intervention (carotid stent).  I discussed the possibility of placing a \"drug-eluting\" a stent, and they were in favor of this treatment option.  He is on chronic dual antiplatelet therapy, and we will tentatively set him up to have a carotid " angiogram with probable stent placement in the next week or so.

## 2017-03-02 ENCOUNTER — TELEPHONE (OUTPATIENT)
Dept: NEUROSURGERY | Facility: CLINIC | Age: 82
End: 2017-03-02

## 2017-03-02 ENCOUNTER — HOSPITAL ENCOUNTER (INPATIENT)
Facility: HOSPITAL | Age: 82
LOS: 3 days | Discharge: HOME | End: 2017-03-05
Attending: EMERGENCY MEDICINE | Admitting: INTERNAL MEDICINE

## 2017-03-02 ENCOUNTER — LAB (OUTPATIENT)
Dept: LAB | Facility: HOSPITAL | Age: 82
End: 2017-03-02

## 2017-03-02 DIAGNOSIS — I65.29 CAROTID STENOSIS, SYMPTOMATIC W/O INFARCT, UNSPECIFIED LATERALITY: Primary | ICD-10-CM

## 2017-03-02 DIAGNOSIS — R42 DIZZINESS: ICD-10-CM

## 2017-03-02 DIAGNOSIS — E87.1 HYPONATREMIA: ICD-10-CM

## 2017-03-02 DIAGNOSIS — I65.21 CAROTID STENOSIS, ASYMPTOMATIC, RIGHT: ICD-10-CM

## 2017-03-02 PROBLEM — E87.6 HYPOKALEMIA: Status: ACTIVE | Noted: 2017-03-02

## 2017-03-02 LAB
ALBUMIN SERPL-MCNC: 4.3 G/DL (ref 3.2–4.8)
ALBUMIN/GLOB SERPL: 1.5 G/DL (ref 1.5–2.5)
ALP SERPL-CCNC: 92 U/L (ref 25–100)
ALT SERPL W P-5'-P-CCNC: 29 U/L (ref 7–40)
ANION GAP SERPL CALCULATED.3IONS-SCNC: 3 MMOL/L (ref 3–11)
AST SERPL-CCNC: 31 U/L (ref 0–33)
BASOPHILS # BLD AUTO: 0.03 10*3/MM3 (ref 0–0.2)
BASOPHILS NFR BLD AUTO: 0.4 % (ref 0–1)
BILIRUB SERPL-MCNC: 0.4 MG/DL (ref 0.3–1.2)
BILIRUB UR QL STRIP: NEGATIVE
BUN BLD-MCNC: 18 MG/DL (ref 9–23)
BUN/CREAT SERPL: 18 (ref 7–25)
CALCIUM SPEC-SCNC: 9.4 MG/DL (ref 8.7–10.4)
CHLORIDE SERPL-SCNC: 93 MMOL/L (ref 99–109)
CLARITY UR: CLEAR
CO2 SERPL-SCNC: 29 MMOL/L (ref 20–31)
COLOR UR: YELLOW
CREAT BLD-MCNC: 1 MG/DL (ref 0.6–1.3)
DEPRECATED RDW RBC AUTO: 43.1 FL (ref 37–54)
DEPRECATED RDW RBC AUTO: 43.4 FL (ref 37–54)
EOSINOPHIL # BLD AUTO: 0.39 10*3/MM3 (ref 0.1–0.3)
EOSINOPHIL NFR BLD AUTO: 5.4 % (ref 0–3)
ERYTHROCYTE [DISTWIDTH] IN BLOOD BY AUTOMATED COUNT: 13.4 % (ref 11.5–14.5)
ERYTHROCYTE [DISTWIDTH] IN BLOOD BY AUTOMATED COUNT: 13.6 % (ref 11.3–14.5)
GFR SERPL CREATININE-BSD FRML MDRD: 72 ML/MIN/1.73
GLOBULIN UR ELPH-MCNC: 2.9 GM/DL
GLUCOSE BLD-MCNC: 94 MG/DL (ref 70–100)
GLUCOSE UR STRIP-MCNC: NEGATIVE MG/DL
HCT VFR BLD AUTO: 36.4 % (ref 38.9–50.9)
HCT VFR BLD AUTO: 36.6 % (ref 42–52)
HGB BLD-MCNC: 12.6 G/DL (ref 14–18)
HGB BLD-MCNC: 12.7 G/DL (ref 13.1–17.5)
HGB UR QL STRIP.AUTO: NEGATIVE
IMM GRANULOCYTES # BLD: 0.03 10*3/MM3 (ref 0–0.03)
IMM GRANULOCYTES NFR BLD: 0.4 % (ref 0–0.6)
KETONES UR QL STRIP: NEGATIVE
LEUKOCYTE ESTERASE UR QL STRIP.AUTO: NEGATIVE
LYMPHOCYTES # BLD AUTO: 2.2 10*3/MM3 (ref 0.6–4.8)
LYMPHOCYTES NFR BLD AUTO: 30.3 % (ref 24–44)
MCH RBC QN AUTO: 30.1 PG (ref 27–31)
MCH RBC QN AUTO: 30.4 PG (ref 27–31)
MCHC RBC AUTO-ENTMCNC: 34.4 G/DL (ref 30–37)
MCHC RBC AUTO-ENTMCNC: 34.9 G/DL (ref 32–36)
MCV RBC AUTO: 87.1 FL (ref 80–99)
MCV RBC AUTO: 87.6 FL (ref 80–94)
MONOCYTES # BLD AUTO: 0.67 10*3/MM3 (ref 0–1)
MONOCYTES NFR BLD AUTO: 9.2 % (ref 0–12)
NEUTROPHILS # BLD AUTO: 3.93 10*3/MM3 (ref 1.5–8.3)
NEUTROPHILS NFR BLD AUTO: 54.3 % (ref 41–71)
NITRITE UR QL STRIP: NEGATIVE
PH UR STRIP.AUTO: 7 [PH] (ref 5–8)
PLATELET # BLD AUTO: 231 10*3/MM3 (ref 150–450)
PLATELET # BLD AUTO: 243 10*3/MM3 (ref 130–400)
PMV BLD AUTO: 10.4 FL (ref 6–12)
PMV BLD AUTO: 9.6 FL (ref 6–12)
POTASSIUM BLD-SCNC: 4.9 MMOL/L (ref 3.5–5.5)
PROT SERPL-MCNC: 7.2 G/DL (ref 5.7–8.2)
PROT UR QL STRIP: NEGATIVE
RBC # BLD AUTO: 4.18 10*6/MM3 (ref 4.2–5.76)
RBC # BLD AUTO: 4.18 10*6/MM3 (ref 4.7–6.1)
SODIUM BLD-SCNC: 125 MMOL/L (ref 132–146)
SP GR UR STRIP: <=1.005 (ref 1–1.03)
UROBILINOGEN UR QL STRIP: NORMAL
WBC NRBC COR # BLD: 6.84 10*3/MM3 (ref 4.8–10.8)
WBC NRBC COR # BLD: 7.25 10*3/MM3 (ref 3.5–10.8)

## 2017-03-02 PROCEDURE — 81003 URINALYSIS AUTO W/O SCOPE: CPT | Performed by: PHYSICIAN ASSISTANT

## 2017-03-02 PROCEDURE — 80053 COMPREHEN METABOLIC PANEL: CPT | Performed by: RADIOLOGY

## 2017-03-02 PROCEDURE — 99223 1ST HOSP IP/OBS HIGH 75: CPT | Performed by: INTERNAL MEDICINE

## 2017-03-02 PROCEDURE — 93005 ELECTROCARDIOGRAM TRACING: CPT

## 2017-03-02 PROCEDURE — 85027 COMPLETE CBC AUTOMATED: CPT | Performed by: RADIOLOGY

## 2017-03-02 PROCEDURE — 36415 COLL VENOUS BLD VENIPUNCTURE: CPT

## 2017-03-02 PROCEDURE — 99284 EMERGENCY DEPT VISIT MOD MDM: CPT

## 2017-03-02 PROCEDURE — 85025 COMPLETE CBC W/AUTO DIFF WBC: CPT | Performed by: PHYSICIAN ASSISTANT

## 2017-03-02 RX ORDER — LISINOPRIL 10 MG/1
10 TABLET ORAL EVERY 12 HOURS SCHEDULED
Status: DISCONTINUED | OUTPATIENT
Start: 2017-03-02 | End: 2017-03-05 | Stop reason: HOSPADM

## 2017-03-02 RX ORDER — ATORVASTATIN CALCIUM 20 MG/1
20 TABLET, FILM COATED ORAL NIGHTLY
Status: DISCONTINUED | OUTPATIENT
Start: 2017-03-02 | End: 2017-03-05 | Stop reason: HOSPADM

## 2017-03-02 RX ORDER — FAMOTIDINE 20 MG/1
20 TABLET, FILM COATED ORAL 2 TIMES DAILY
Status: DISCONTINUED | OUTPATIENT
Start: 2017-03-02 | End: 2017-03-05 | Stop reason: HOSPADM

## 2017-03-02 RX ORDER — SODIUM CHLORIDE 0.9 % (FLUSH) 0.9 %
1-10 SYRINGE (ML) INJECTION AS NEEDED
Status: DISCONTINUED | OUTPATIENT
Start: 2017-03-02 | End: 2017-03-04

## 2017-03-02 RX ORDER — SODIUM CHLORIDE 0.9 % (FLUSH) 0.9 %
10 SYRINGE (ML) INJECTION AS NEEDED
Status: DISCONTINUED | OUTPATIENT
Start: 2017-03-02 | End: 2017-03-05 | Stop reason: HOSPADM

## 2017-03-02 RX ORDER — SODIUM CHLORIDE 9 MG/ML
50 INJECTION, SOLUTION INTRAVENOUS CONTINUOUS
Status: DISCONTINUED | OUTPATIENT
Start: 2017-03-02 | End: 2017-03-04

## 2017-03-02 RX ORDER — SODIUM CHLORIDE 0.9 % (FLUSH) 0.9 %
1-10 SYRINGE (ML) INJECTION AS NEEDED
Status: DISCONTINUED | OUTPATIENT
Start: 2017-03-02 | End: 2017-03-05 | Stop reason: HOSPADM

## 2017-03-02 RX ORDER — LEVOTHYROXINE SODIUM 0.05 MG/1
50 TABLET ORAL
Status: DISCONTINUED | OUTPATIENT
Start: 2017-03-03 | End: 2017-03-05 | Stop reason: HOSPADM

## 2017-03-02 RX ORDER — TAMSULOSIN HYDROCHLORIDE 0.4 MG/1
0.4 CAPSULE ORAL NIGHTLY
Status: DISCONTINUED | OUTPATIENT
Start: 2017-03-02 | End: 2017-03-05 | Stop reason: HOSPADM

## 2017-03-02 RX ORDER — CLOPIDOGREL BISULFATE 75 MG/1
75 TABLET ORAL DAILY
Status: DISCONTINUED | OUTPATIENT
Start: 2017-03-03 | End: 2017-03-05 | Stop reason: HOSPADM

## 2017-03-02 RX ADMIN — SODIUM CHLORIDE 1000 ML: 9 INJECTION, SOLUTION INTRAVENOUS at 18:07

## 2017-03-02 RX ADMIN — SODIUM CHLORIDE 50 ML/HR: 9 INJECTION, SOLUTION INTRAVENOUS at 20:30

## 2017-03-02 NOTE — TELEPHONE ENCOUNTER
Karen in [place] called regarding Mr. Hernandez's critical labs drawn this morning with a potassium of 5.6 and sodium of 125. Spoke with Dr. Mendiola regarding these labs and he recommended going to ED. Spoke with patient's daughter who is aware and will be taking him to the ED. His cardiologist is Dr. Nieves in [place] has suggested bringing him to [facility] and not [place]. Daughter is aware.

## 2017-03-02 NOTE — ED PROVIDER NOTES
"Subjective   HPI Comments: 82-year-old male sent to the emergency department for \"abnormal labs\".  The patient is scheduled to have a carotid recent stenting this coming Monday by Dr. Pino Boo. His preoperative labs resulted today and showed his potassium to be elevated at 5.6 and his sodium to be low at 125.  The patient denies any symptoms other than slight dizziness.  He has had no recent vomiting or diarrhea.  Past medical history of hypertension, carotid stents, CVA, carotid stenosis.  His PCP is Dr. Johns in [facility].      History provided by:  Patient      Review of Systems   Constitutional: Negative for chills and fever.   HENT: Negative for congestion, ear pain, nosebleeds, rhinorrhea and sore throat.    Eyes: Negative for pain, discharge and visual disturbance.   Respiratory: Negative for shortness of breath and wheezing.    Cardiovascular: Negative for chest pain, palpitations and leg swelling.   Gastrointestinal: Negative for abdominal pain, blood in stool, diarrhea, nausea and vomiting.   Endocrine: Negative.    Genitourinary: Negative for dysuria, hematuria and urgency.   Musculoskeletal: Negative for arthralgias and back pain.   Skin: Negative for pallor and rash.   Allergic/Immunologic: Negative for immunocompromised state.   Neurological: Positive for dizziness (mild). Negative for speech difficulty, weakness and headaches.   Hematological: Negative for adenopathy. Does not bruise/bleed easily.   Psychiatric/Behavioral: Negative.        Past Medical History   Diagnosis Date   • Fracture    • Hyperlipidemia    • Hypertension    • Kidney infection    • Renal calculi    • Stroke 03/2016     right retinal artery occlusion       Allergies   Allergen Reactions   • Rocephin [Ceftriaxone] Hives   • Amoxicillin Rash       Past Surgical History   Procedure Laterality Date   • Patella fracture surgery Left 1986   • Kidney stone surgery Right 2011     Shafron - open   • Carotid stent Right " 03/18/2016     Carotid Wall Stent       Family History   Problem Relation Age of Onset   • Heart disease Mother    • Hyperlipidemia Mother    • Hypertension Mother    • Prostate cancer Father    • Cancer Father    • Heart attack Brother    • Heart disease Brother    • Hyperlipidemia Brother    • Hypertension Brother        Social History     Social History   • Marital status:      Spouse name: N/A   • Number of children: N/A   • Years of education: N/A     Occupational History   • retired      Social History Main Topics   • Smoking status: Former Smoker     Packs/day: 1.00     Years: 55.00     Types: Cigarettes     Start date: 1950     Quit date: 2005   • Smokeless tobacco: Former User     Types: Chew     Quit date: 2005   • Alcohol use No      Comment: quit 1985   • Drug use: No   • Sexual activity: Not Asked     Other Topics Concern   • None     Social History Narrative    7 children - 2 daughters both live in [place]    5 sons, lives with Abhay & Roshni Hernandez, son is Abhay, 3 other sons live in [place].      Other sons live in [place],            Objective   Physical Exam   Constitutional: He is oriented to person, place, and time. He appears well-developed and well-nourished. No distress.   HENT:   Head: Normocephalic and atraumatic.   Nose: Nose normal.   Mouth/Throat: Oropharynx is clear and moist.   Eyes: EOM are normal. Pupils are equal, round, and reactive to light. Left eye exhibits no discharge. No scleral icterus.   Neck: Normal range of motion. Neck supple.   Cardiovascular: Normal rate, regular rhythm and normal heart sounds.    No murmur heard.  Pulmonary/Chest: Effort normal and breath sounds normal. No respiratory distress. He has no wheezes. He has no rales. He exhibits no tenderness.   Abdominal: Soft. Bowel sounds are normal. There is no tenderness.   Musculoskeletal: Normal range of motion. He exhibits no edema or tenderness.   Neurological: He is alert and oriented to person,  place, and time.   Skin: Skin is warm and dry. No rash noted. He is not diaphoretic.   Psychiatric: He has a normal mood and affect.   Nursing note and vitals reviewed.      Procedures         ED Course  ED Course    6:53 PM  The patient is in no distress.  He has no specific complaints other than mild dizziness.  His potassium is normal here at 4.9.  He has a moderate hyponatremia at 125.  His H&H is 12/36.  Normal urinalysis.   I think he may benefit from a bag of fluids.  The family wants admission and is hopeful he could get his carotid stents tomorrow.  I spoke with Dr. Boo about this and he advised to admit to hospitalist and give him a bag of fluids and keep NPO after midnight.  Continue his Plavix and ASA.  Will discuss with hospitalist.      Spoke with Dr. Bean, who agrees to admit to tele.    Recent Results (from the past 24 hour(s))   CBC (No diff)    Collection Time: 03/02/17  9:33 AM   Result Value Ref Range    WBC 6.84 4.80 - 10.80 10*3/mm3    RBC 4.18 (L) 4.70 - 6.10 10*6/mm3    Hemoglobin 12.6 (L) 14.0 - 18.0 g/dL    Hematocrit 36.6 (L) 42.0 - 52.0 %    MCV 87.6 80.0 - 94.0 fL    MCH 30.1 27.0 - 31.0 pg    MCHC 34.4 30.0 - 37.0 g/dL    RDW 13.4 11.5 - 14.5 %    RDW-SD 43.1 37.0 - 54.0 fl    MPV 10.4 6.0 - 12.0 fL    Platelets 243 130 - 400 10*3/mm3   Basic metabolic panel    Collection Time: 03/02/17  9:33 AM   Result Value Ref Range    Glucose 89 74 - 98 mg/dL    BUN 14 7 - 20 mg/dL    Creatinine 1.10 0.60 - 1.30 mg/dL    Sodium 125 (C) 137 - 145 mmol/L    Potassium 5.6 (C) 3.5 - 5.1 mmol/L    Chloride 89 (L) 98 - 107 mmol/L    CO2 30.0 26.0 - 30.0 mmol/L    Calcium 9.5 8.4 - 10.2 mg/dL    eGFR Non African Amer 64 >60 mL/min/1.73    BUN/Creatinine Ratio 12.7 6.3 - 21.9    Anion Gap 11.6 mmol/L   Urinalysis With / Culture If Indicated    Collection Time: 03/02/17  4:33 PM   Result Value Ref Range    Color, UA Yellow Yellow, Straw    Appearance, UA Clear Clear    pH, UA 7.0 5.0 - 8.0    Specific  Gravity, UA <=1.005 1.001 - 1.030    Glucose, UA Negative Negative    Ketones, UA Negative Negative    Bilirubin, UA Negative Negative    Blood, UA Negative Negative    Protein, UA Negative Negative    Leuk Esterase, UA Negative Negative    Nitrite, UA Negative Negative    Urobilinogen, UA 0.2 E.U./dL 0.2 - 1.0 E.U./dL   Comprehensive Metabolic Panel    Collection Time: 03/02/17  4:41 PM   Result Value Ref Range    Glucose 94 70 - 100 mg/dL    BUN 18 9 - 23 mg/dL    Creatinine 1.00 0.60 - 1.30 mg/dL    Sodium 125 (L) 132 - 146 mmol/L    Potassium 4.9 3.5 - 5.5 mmol/L    Chloride 93 (L) 99 - 109 mmol/L    CO2 29.0 20.0 - 31.0 mmol/L    Calcium 9.4 8.7 - 10.4 mg/dL    Total Protein 7.2 5.7 - 8.2 g/dL    Albumin 4.30 3.20 - 4.80 g/dL    ALT (SGPT) 29 7 - 40 U/L    AST (SGOT) 31 0 - 33 U/L    Alkaline Phosphatase 92 25 - 100 U/L    Total Bilirubin 0.4 0.3 - 1.2 mg/dL    eGFR Non African Amer 72 >60 mL/min/1.73    Globulin 2.9 gm/dL    A/G Ratio 1.5 1.5 - 2.5 g/dL    BUN/Creatinine Ratio 18.0 7.0 - 25.0    Anion Gap 3.0 3.0 - 11.0 mmol/L   CBC Auto Differential    Collection Time: 03/02/17  4:41 PM   Result Value Ref Range    WBC 7.25 3.50 - 10.80 10*3/mm3    RBC 4.18 (L) 4.20 - 5.76 10*6/mm3    Hemoglobin 12.7 (L) 13.1 - 17.5 g/dL    Hematocrit 36.4 (L) 38.9 - 50.9 %    MCV 87.1 80.0 - 99.0 fL    MCH 30.4 27.0 - 31.0 pg    MCHC 34.9 32.0 - 36.0 g/dL    RDW 13.6 11.3 - 14.5 %    RDW-SD 43.4 37.0 - 54.0 fl    MPV 9.6 6.0 - 12.0 fL    Platelets 231 150 - 450 10*3/mm3    Neutrophil % 54.3 41.0 - 71.0 %    Lymphocyte % 30.3 24.0 - 44.0 %    Monocyte % 9.2 0.0 - 12.0 %    Eosinophil % 5.4 (H) 0.0 - 3.0 %    Basophil % 0.4 0.0 - 1.0 %    Immature Grans % 0.4 0.0 - 0.6 %    Neutrophils, Absolute 3.93 1.50 - 8.30 10*3/mm3    Lymphocytes, Absolute 2.20 0.60 - 4.80 10*3/mm3    Monocytes, Absolute 0.67 0.00 - 1.00 10*3/mm3    Eosinophils, Absolute 0.39 (H) 0.10 - 0.30 10*3/mm3    Basophils, Absolute 0.03 0.00 - 0.20 10*3/mm3     "Immature Grans, Absolute 0.03 0.00 - 0.03 10*3/mm3     Note: In addition to lab results from this visit, the labs listed above may include labs taken at another facility or during a different encounter within the last 24 hours. Please correlate lab times with ED admission and discharge times for further clarification of the services performed during this visit.    No orders to display     Vitals:    03/02/17 1428 03/02/17 1600 03/02/17 1800   BP: 165/55 164/59 145/54   Pulse: 72 65 64   Resp: 18     Temp: 97.6 °F (36.4 °C)     TempSrc: Oral     SpO2: 98% 97% 95%   Weight: 165 lb (74.8 kg)     Height: 71\" (180.3 cm)       Medications   sodium chloride 0.9 % flush 10 mL (not administered)   sodium chloride 0.9 % bolus 1,000 mL (1,000 mL Intravenous New Bag 3/2/17 1807)     ECG/EMG Results (last 24 hours)     Procedure Component Value Units Date/Time    ECG 12 Lead [[id number]] Collected:  03/02/17 1431     Updated:  03/02/17 1654                      [facility]    Final diagnoses:   Carotid stenosis, symptomatic w/o infarct, unspecified laterality   Hyponatremia   Dizziness            RADHA Loera  03/02/17 1851       RADHA Loera  03/02/17 1853    "

## 2017-03-03 DIAGNOSIS — K21.9 GASTROESOPHAGEAL REFLUX DISEASE, ESOPHAGITIS PRESENCE NOT SPECIFIED: ICD-10-CM

## 2017-03-03 LAB
ANION GAP SERPL CALCULATED.3IONS-SCNC: 5 MMOL/L (ref 3–11)
BUN BLD-MCNC: 16 MG/DL (ref 9–23)
BUN/CREAT SERPL: 17.8 (ref 7–25)
CALCIUM SPEC-SCNC: 8.8 MG/DL (ref 8.7–10.4)
CHLORIDE SERPL-SCNC: 98 MMOL/L (ref 99–109)
CO2 SERPL-SCNC: 27 MMOL/L (ref 20–31)
CORTIS SERPL-MCNC: 11.8 MCG/DL
CREAT BLD-MCNC: 0.9 MG/DL (ref 0.6–1.3)
GFR SERPL CREATININE-BSD FRML MDRD: 81 ML/MIN/1.73
GLUCOSE BLD-MCNC: 83 MG/DL (ref 70–100)
MAGNESIUM SERPL-MCNC: 1.9 MG/DL (ref 1.3–2.7)
OSMOLALITY SERPL: 274 MOSM/KG (ref 275–295)
OSMOLALITY UR: 428 MOSM/KG (ref 300–1100)
PHOSPHATE SERPL-MCNC: 3.9 MG/DL (ref 2.4–5.1)
POTASSIUM BLD-SCNC: 5.1 MMOL/L (ref 3.5–5.5)
SODIUM BLD-SCNC: 129 MMOL/L (ref 132–146)
SODIUM BLD-SCNC: 130 MMOL/L (ref 132–146)

## 2017-03-03 PROCEDURE — 83735 ASSAY OF MAGNESIUM: CPT | Performed by: INTERNAL MEDICINE

## 2017-03-03 PROCEDURE — 82533 TOTAL CORTISOL: CPT | Performed by: INTERNAL MEDICINE

## 2017-03-03 PROCEDURE — 84295 ASSAY OF SERUM SODIUM: CPT | Performed by: RADIOLOGY

## 2017-03-03 PROCEDURE — 99024 POSTOP FOLLOW-UP VISIT: CPT | Performed by: RADIOLOGY

## 2017-03-03 PROCEDURE — 84100 ASSAY OF PHOSPHORUS: CPT | Performed by: INTERNAL MEDICINE

## 2017-03-03 PROCEDURE — 80048 BASIC METABOLIC PNL TOTAL CA: CPT | Performed by: INTERNAL MEDICINE

## 2017-03-03 PROCEDURE — 83935 ASSAY OF URINE OSMOLALITY: CPT | Performed by: INTERNAL MEDICINE

## 2017-03-03 PROCEDURE — 99233 SBSQ HOSP IP/OBS HIGH 50: CPT | Performed by: INTERNAL MEDICINE

## 2017-03-03 PROCEDURE — 83930 ASSAY OF BLOOD OSMOLALITY: CPT | Performed by: INTERNAL MEDICINE

## 2017-03-03 RX ORDER — RANITIDINE 150 MG/1
150 TABLET ORAL DAILY PRN
Qty: 90 TABLET | Refills: 1 | Status: SHIPPED | OUTPATIENT
Start: 2017-03-03 | End: 2017-04-25 | Stop reason: SDUPTHER

## 2017-03-03 RX ORDER — HYDRALAZINE HYDROCHLORIDE 20 MG/ML
10 INJECTION INTRAMUSCULAR; INTRAVENOUS EVERY 6 HOURS PRN
Status: DISCONTINUED | OUTPATIENT
Start: 2017-03-03 | End: 2017-03-05 | Stop reason: HOSPADM

## 2017-03-03 RX ORDER — LABETALOL 100 MG/1
100 TABLET, FILM COATED ORAL EVERY 12 HOURS SCHEDULED
Status: DISCONTINUED | OUTPATIENT
Start: 2017-03-03 | End: 2017-03-05 | Stop reason: HOSPADM

## 2017-03-03 RX ADMIN — LISINOPRIL 10 MG: 10 TABLET ORAL at 13:19

## 2017-03-03 RX ADMIN — TAMSULOSIN HYDROCHLORIDE 0.4 MG: 0.4 CAPSULE ORAL at 21:03

## 2017-03-03 RX ADMIN — CLOPIDOGREL 75 MG: 75 TABLET, FILM COATED ORAL at 13:19

## 2017-03-03 RX ADMIN — LISINOPRIL 10 MG: 10 TABLET ORAL at 21:03

## 2017-03-03 RX ADMIN — FAMOTIDINE 20 MG: 20 TABLET ORAL at 08:09

## 2017-03-03 RX ADMIN — FAMOTIDINE 20 MG: 20 TABLET ORAL at 17:05

## 2017-03-03 RX ADMIN — SODIUM CHLORIDE 50 ML/HR: 9 INJECTION, SOLUTION INTRAVENOUS at 08:16

## 2017-03-03 RX ADMIN — LABETALOL HYDROCHLORIDE 100 MG: 100 TABLET, FILM COATED ORAL at 17:24

## 2017-03-03 RX ADMIN — LEVOTHYROXINE SODIUM 50 MCG: 50 TABLET ORAL at 05:25

## 2017-03-03 RX ADMIN — ATORVASTATIN CALCIUM 20 MG: 20 TABLET, FILM COATED ORAL at 21:03

## 2017-03-03 NOTE — H&P
[facility] Medicine Services  HISTORY AND PHYSICAL    Primary Care Physician: Melvi Klein MD    Subjective     Chief Complaint:  Dizziness and abnormal labs    History of Present Illness:   Mr. Hernandez is a pleasant 82 yoM with a hx of right carotid artery stenosis, s/p stent 3/18/2016, essential hypertension, controlled, CVA with no residual deficits. Patient is scheduled for a re-stenting of hid right carotin artery by Dr. Boo, he went to get pre-op labs which were found to jarred abnormal and was thus sent to the ED. He mentions that he has been experiencing some dizziness for the past 2 days. His labs were significant for hyponatremia and hyperkalemia. He otherwise denied any n/v, no fevers or chills, no soa, no CP or palpitations.    Review of Systems   Otherwise complete ROS performed and negative except as mentioned in the HPI.    Past Medical History   Diagnosis Date   • Fracture    • Hyperlipidemia    • Hypertension    • Kidney infection    • Renal calculi    • Stroke 03/2016     right retinal artery occlusion       Past Surgical History   Procedure Laterality Date   • Patella fracture surgery Left 1986   • Kidney stone surgery Right 2011     Shafron - open   • Carotid stent Right 03/18/2016     Carotid Wall Stent       Family History   Problem Relation Age of Onset   • Heart disease Mother    • Hyperlipidemia Mother    • Hypertension Mother    • Prostate cancer Father    • Cancer Father    • Heart attack Brother    • Heart disease Brother    • Hyperlipidemia Brother    • Hypertension Brother        Social History     Social History   • Marital status:      Spouse name: N/A   • Number of children: N/A   • Years of education: N/A     Occupational History   • retired      Social History Main Topics   • Smoking status: Former Smoker     Packs/day: 1.00     Years: 55.00     Types: Cigarettes     Start date: 1950     Quit date: 2005   • Smokeless tobacco: Former User      Types: Chew     Quit date: 2005   • Alcohol use No      Comment: quit 1985   • Drug use: No   • Sexual activity: Not on file     Other Topics Concern   • Not on file     Social History Narrative    7 children - 2 daughters both live in [place]    5 sons, lives with Harriet Hernandez, son is Abhay, 3 other sons live in [place].      Other sons live in [place],        Medications:  Prescriptions Prior to Admission   Medication Sig Dispense Refill Last Dose   • atorvastatin (LIPITOR) 20 MG tablet Take 1 tablet by mouth Daily. 90 tablet 1 Taking   • calcium carbonate (TUMS) 500 MG chewable tablet Chew. TAKE AS DIRECTED.   Taking   • clopidogrel (PLAVIX) 75 MG tablet Take 1 tablet by mouth Daily. 90 tablet 3 Taking   • labetalol (NORMODYNE) 100 MG tablet Take 1 tablet by mouth 3 (Three) Times a Day As Needed (Patient has been instructed to take 2 tablets TID if BP is over 150 systolic.). 120 tablet 11    • levothyroxine (SYNTHROID, LEVOTHROID) 50 MCG tablet TAKE 1 TABLET BY MOUTH ONCE DAILY 90 tablet 3 Taking   • lisinopril (PRINIVIL,ZESTRIL) 10 MG tablet Take 1 tablet by mouth 2 (Two) Times a Day. 180 tablet 3 Taking   • multivitamin (THERAGRAN) tablet tablet Take 1 tablet by mouth daily.   Taking   • raNITIdine (ZANTAC) 150 MG tablet Take 1 tablet by mouth Daily As Needed for heartburn or indigestion. 90 tablet 1 Taking   • tamsulosin (FLOMAX) 0.4 MG capsule 24 hr capsule TAKE 1 CAPSULE BY MOUTH ONCE DAILY 90 capsule 1        Allergies:  Allergies   Allergen Reactions   • Rocephin [Ceftriaxone] Hives   • Amoxicillin Rash       Objective     Physical Exam:  Vital Signs:     Physical Exam    Temp:  [96.3 °F (35.7 °C)-97.6 °F (36.4 °C)] 96.3 °F (35.7 °C)  Heart Rate:  [64-72] 70  Resp:  [18] 18  BP: (145-169)/(54-59) 169/58     Constitutional: no acute distress, awake, alert  Eyes: PERRLA, sclerae anicteric, no conjunctival injection  Neck: supple, no thyromegaly, trachea midline  Respiratory: Clear to  auscultation bilaterally, nonlabored respirations   Cardiovascular: RRR, no murmurs, rubs, or gallops, palpable pedal pulses bilaterally  Gastrointestinal: Positive bowel sounds, soft, nontender, nondistended  Musculoskeletal: No bilateral ankle edema, no clubbing or cyanosis to bilateral lower extremities  Psychiatric: oriented x 3, appropriate affect, cooperative  Neurologic: Strength symmetric in all extremities, Cranial Nerves grossly intact to confrontation     Results Reviewed:    Results from last 7 days  Lab Units 03/02/17  1641   WBC 10*3/mm3 7.25   HEMOGLOBIN g/dL 12.7*   PLATELETS 10*3/mm3 231       Results from last 7 days  Lab Units 03/02/17  1641   SODIUM mmol/L 125*   POTASSIUM mmol/L 4.9   TOTAL CO2 mmol/L 29.0   CREATININE mg/dL 1.00   GLUCOSE mg/dL 94   CALCIUM mg/dL 9.4     I have personally reviewed and interpreted available lab data, radiology studies and ECG obtained at time of admission.     Assessment / Plan     Assessment/Problem List:   Principal Problem:    Carotid stenosis, symptomatic w/o infarct  Active Problems:    Benign essential hypertension    Gastroesophageal reflux disease    Carotid atherosclerosis    Hypothyroidism    Hypokalemia    Hyponatremia    Plan:  - Patient scheduled for right carotid stent exchange with Dr. Boo  - NPO after midnight  - continue ASA/plavix  - get a morning cortisol  - monitor electrolytes. Get bmp, mg. Phos in a.m    Code Status:Full    Admission Status: Patient will be admitted to INPATIENT status due to the need for care which can only be reasonably provided in an hospital setting such as aggressive/expedited ancillary services and/or consultation services and the necessity for IV medications, close physician monitoring and/or the possible need for procedures.  In such, I feel patient’s risk for adverse outcomes and need for care warrant INPATIENT evaluation and predict the patient’s care encounter to likely last beyond 2 midnights.     [place]  MD [place] 03/02/17 8:36 PM

## 2017-03-03 NOTE — PROGRESS NOTES
Discharge Planning Assessment  [facility]     Patient Name: Andrew Hernandez  MRN: [id number]  Today's Date: 3/3/2017    Admit Date: 3/2/2017          Discharge Needs Assessment       03/03/17 1110    Living Environment    Lives With alone    Living Arrangements apartment    Provides Primary Care For no one    Quality Of Family Relationships supportive;helpful    Able to Return to Prior Living Arrangements yes    Discharge Needs Assessment    Concerns To Be Addressed no discharge needs identified;denies needs/concerns at this time    Readmission Within The Last 30 Days no previous admission in last 30 days    Equipment Currently Used at Home none    Discharge Disposition still a patient    Discharge Contact Information if Applicable Solomon Hernandez, [phone], son            Discharge Plan       03/03/17 1111    Case Management/Social Work Plan    Plan Home    Patient/Family In Agreement With Plan yes    Additional Comments Met with patient in the room to initiate discharge planning. Patient lives in an apartment over the garage at his son's home [place]. He is independent with ADLs and does not use any medical equipment or home health. He does not anticipate any discharge needs at this time, and his family will provide his ride home. CM will continue to follow.        Discharge Placement     No information found                Demographic Summary       03/03/17 1107    Referral Information    Referral Source admission list    Reason For Consult discharge planning    Record Reviewed history and physical    Contact Information    Permission Granted to Share Information With ;family/designee   sonSolomon    Primary Care Physician Information    Name Melvi Klein            Functional Status       03/03/17 1109    Functional Status Prior    Ambulation 0-->independent    Transferring 0-->independent    Toileting 0-->independent    Bathing 0-->independent    Dressing 0-->independent    Eating  0-->independent    Communication 0-->understands/communicates without difficulty    Swallowing 0-->swallows foods/liquids without difficulty    Employment/Financial    Employment/Finance Comments Medical coverage through Medicare A & B; pays for medications out of pocket; denies issues affording meds; uses pharmacy at [facility]     None            Abuse/Neglect     None            Legal     None            Substance Abuse     None            Patient Forms     None          Emiliana Steinberg

## 2017-03-03 NOTE — PLAN OF CARE
Problem: Cardiac Surgery (Adult)  Goal: Signs and Symptoms of Listed Potential Problems Will be Absent or Manageable (Cardiac Surgery)  Outcome: Ongoing (interventions implemented as appropriate)    03/03/17 0454   Cardiac Surgery   Problems Assessed (Cardiac Surgery) all   Problems Present (Cardiac Surgery) electrolyte imbalance

## 2017-03-03 NOTE — PROGRESS NOTES
"      HOSPITALIST DAILY PROGRESS NOTE    Chief Complaint: abnormal labs    Subjective   SUBJECTIVE/OVERNIGHT EVENTS     Pt doing well this am, no complaints today.    Review of Systems:  Gen-no fevers, no chills  CV-no chest pain, no palpitations  Resp-no cough, no dyspnea  GI-no N/V/D, no abd pain    Objective   OBJECTIVE   I have reviewed the vital signs.  Visit Vitals   • /45   • Pulse 85   • Temp 98.1 °F (36.7 °C) (Tympanic)   • Resp 22   • Ht 71\" (180.3 cm)   • Wt 165 lb (74.8 kg)   • SpO2 97%   • BMI 23.01 kg/m2       Physical Exam:  Gen-no acute distress  CV-RRR, S1 S2 normal, no m/r/g  Resp-CTAB, no wheezes  Abd-soft, NT, ND, +BS  Ext-no edema  Neuro-A&Ox3, no focal deficits  Psych-appropriate mood    Results:  I have reviewed the labs, culture data, radiology results, and diagnostic studies.      Results from last 7 days  Lab Units 03/02/17  1641 03/02/17  0933   WBC 10*3/mm3 7.25 6.84   HEMOGLOBIN g/dL 12.7* 12.6*   HEMATOCRIT % 36.4* 36.6*   PLATELETS 10*3/mm3 231 243       Results from last 7 days  Lab Units 03/03/17  0514   SODIUM mmol/L 130*   POTASSIUM mmol/L 5.1   CHLORIDE mmol/L 98*   TOTAL CO2 mmol/L 27.0   BUN mg/dL 16   CREATININE mg/dL 0.90   GLUCOSE mg/dL 83   CALCIUM mg/dL 8.8       Culture Data:  Cultures:           Radiology Results:  Imaging Results (last 24 hours)     ** No results found for the last 24 hours. **          I have reviewed the medications.      Assessment/Plan   ASSESSMENT/PLAN    Principal Problem:    Carotid stenosis, symptomatic w/o infarct  Active Problems:    Benign essential hypertension    Gastroesophageal reflux disease    Carotid atherosclerosis    Hypothyroidism    Hypokalemia    Hyponatremia       Plan:  - Patient scheduled for right carotid stent exchange with Dr. Mendiola today  - NPO for procedure  - continue ASA/plavix  - am cortisol wnl  - monitor electrolytes, Na+ is improving, hyperkalemia resolved      I expect patient to be discharged in: " TBD    Ashley Mccray MD  03/03/17  11:12 AM

## 2017-03-03 NOTE — PROGRESS NOTES
"NAME: RENE MARRERO  : 1934  PCP: Melvi Klein MD  Attending MD: Ashley Mccray MD    Date of Admission:  3/2/2017  Date of Service: 3/3/2017    History of Present Illness:  82 y.o. male presents with known to the neuro interventional service, having undergone prior angioplasty and stent placement for a symptomatic, high-grade right carotid stenosis on 3/18/2016. 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 He has otherwise had no new or recurrent strokelike symptoms. Specifically, no unilateral weakness or numbness, no speech changes.     I recently saw him in the neuro interventional clinic with a carotid duplex ultrasound demonstrating recurrent, high-grade right carotid stenosis (in-stent restenosis).  While his right-sided vision loss has not substantially improved, he's otherwise had no new or recurrent stroke-like symptoms.      He was tentatively planned to have his recurrent stenosis treated next week with angioplasty and stent placement (drug-eluting stent); however, PAT testing revealed low sodium and high potassium, and thus he presented to the emergency department for further evaluation and was subsequently admitted.       Past Medical History:  Past Medical History   Diagnosis Date   • Fracture    • Hyperlipidemia    • Hypertension    • Kidney infection    • Renal calculi    • Stroke 2016     right retinal artery occlusion       Past Surgical History:  Past Surgical History   Procedure Laterality Date   • Patella fracture surgery Left    • Kidney stone surgery Right      Shafron - open   • Carotid stent Right 2016     Carotid Wall Stent         Medications  Prescriptions Prior to Admission   Medication Sig Dispense Refill Last Dose   • atorvastatin (LIPITOR) 20 MG tablet Take 1 tablet by mouth Daily. 90 tablet 1 Taking   • calcium carbonate (TUMS) 500 MG chewable tablet Chew. TAKE AS DIRECTED.   Taking   • clopidogrel " (PLAVIX) 75 MG tablet Take 1 tablet by mouth Daily. 90 tablet 3 Taking   • labetalol (NORMODYNE) 100 MG tablet Take 1 tablet by mouth 3 (Three) Times a Day As Needed (Patient has been instructed to take 2 tablets TID if BP is over 150 systolic.). 120 tablet 11    • levothyroxine (SYNTHROID, LEVOTHROID) 50 MCG tablet TAKE 1 TABLET BY MOUTH ONCE DAILY 90 tablet 3 Taking   • lisinopril (PRINIVIL,ZESTRIL) 10 MG tablet Take 1 tablet by mouth 2 (Two) Times a Day. 180 tablet 3 Taking   • multivitamin (THERAGRAN) tablet tablet Take 1 tablet by mouth daily.   Taking   • tamsulosin (FLOMAX) 0.4 MG capsule 24 hr capsule TAKE 1 CAPSULE BY MOUTH ONCE DAILY 90 capsule 1        Allergies:  Allergies   Allergen Reactions   • Rocephin [Ceftriaxone] Hives   • Amoxicillin Rash       Social Hx:  Social History     Social History   • Marital status:      Spouse name: N/A   • Number of children: N/A   • Years of education: N/A     Occupational History   • retired      Social History Main Topics   • Smoking status: Former Smoker     Packs/day: 1.00     Years: 55.00     Types: Cigarettes     Start date: 1950     Quit date: 2005   • Smokeless tobacco: Former User     Types: Chew     Quit date: 2005   • Alcohol use No      Comment: quit 1985   • Drug use: No   • Sexual activity: Not on file     Other Topics Concern   • Not on file     Social History Narrative    7 children - 2 daughters both live in [place]    5 sons, lives with Abhay & Roshni Hernandez, son is Abhay, 3 other sons live in [place].      Other sons live in [place],        Family Hx:  Family History   Problem Relation Age of Onset   • Heart disease Mother    • Hyperlipidemia Mother    • Hypertension Mother    • Prostate cancer Father    • Cancer Father    • Heart attack Brother    • Heart disease Brother    • Hyperlipidemia Brother    • Hypertension Brother        Review of Imaging:  Carotid duplex from 2/22/2017 demonstrates elevated velocities within the proximal  aspects of the right internal carotid artery stent, with elevated velocities of 496/110 cm/s. The stent is patent.       Laboratory Result:  Lab Results   Component Value Date    WBC 7.25 03/02/2017    HGB 12.7 (L) 03/02/2017    HCT 36.4 (L) 03/02/2017    MCV 87.1 03/02/2017     03/02/2017     Lab Results   Component Value Date    GLUCOSE 83 03/03/2017    CALCIUM 8.8 03/03/2017     (L) 03/03/2017    K 5.1 03/03/2017    CO2 27.0 03/03/2017    CL 98 (L) 03/03/2017    BUN 16 03/03/2017    CREATININE 0.90 03/03/2017    EGFRIFNONA 81 03/03/2017    BCR 17.8 03/03/2017    ANIONGAP 5.0 03/03/2017     Lab Results   Component Value Date    HGBA1C 5.1 03/19/2016     No results found for: CHOL  Lab Results   Component Value Date    TRIG 35 03/18/2016     Lab Results   Component Value Date    HDL 56 03/18/2016     No results found for: LDLCALC  No results found for: LDL  No results found for: HDLLDLRATIO  No components found for: CHOLHDL    Physical Examination:  Vitals:    03/03/17 1319   BP: 170/62   Pulse:    Resp:    Temp:    SpO2:         General Appearance:   Well developed, well nourished, well groomed, alert, and cooperative.  Cardiovascular: Regular rate and rhythm. No carotid bruits    Neurological examination:  Alert and oriented ×3.  Speech is normal.  He is legally blind in the right eye, but I do not appreciate any left-sided visual deficits.  Extraocular muscles are full.  No facial droop.  No pronator drift.  Strength is symmetric in upper and lower extremities.    Diagnoses/Plan:    Mr. Hernandez is a 82-year-old male with recurrent, high-grade (greater than 90%) right internal carotid stenosis (in-stent restenosis), likely representing recurrent disease versus intimal hyperplasia.  He was tentatively scheduled to undergo carotid angioplasty and stent placement (drug-eluting stent) next week, but had preadmission testing revealing low sodium and elevated potassium.  He presented to the emergency  department and thus was admitted for further evaluation.  His sodium has somewhat corrected, and his potassium is within normal limits now.  I'm hopeful that his sodium will further correct today, and we can tentatively schedule him for angioplasty/stent placement tomorrow (3/4/17).  The family wishes to have the stent done during this hospitalization, and this certainly seems reasonable.

## 2017-03-04 ENCOUNTER — APPOINTMENT (OUTPATIENT)
Dept: CARDIOLOGY | Facility: HOSPITAL | Age: 82
End: 2017-03-04
Attending: RADIOLOGY

## 2017-03-04 PROBLEM — E87.5 HYPERKALEMIA: Status: ACTIVE | Noted: 2017-03-02

## 2017-03-04 LAB
ACT BLD: 245 SECONDS (ref 82–152)
ANION GAP SERPL CALCULATED.3IONS-SCNC: 2 MMOL/L (ref 3–11)
BH CV ECHO MEAS - BSA(HAYCOCK): 1.9 M^2
BH CV ECHO MEAS - BSA: 1.9 M^2
BH CV ECHO MEAS - BZI_BMI: 23 KILOGRAMS/M^2
BH CV ECHO MEAS - BZI_METRIC_HEIGHT: 180.3 CM
BH CV ECHO MEAS - BZI_METRIC_WEIGHT: 74.8 KG
BH CV XLRA MEAS RIGHT CCA RATIO VEL: 77.9 CM/SEC
BH CV XLRA MEAS RIGHT DIST CCA EDV: 8.2 CM/SEC
BH CV XLRA MEAS RIGHT DIST CCA PSV: 77.9 CM/SEC
BH CV XLRA MEAS RIGHT DIST ICA EDV: 29.5 CM/SEC
BH CV XLRA MEAS RIGHT DIST ICA PSV: 223 CM/SEC
BH CV XLRA MEAS RIGHT ICA RATIO VEL: 166 CM/SEC
BH CV XLRA MEAS RIGHT ICA/CCA RATIO: 2.1
BH CV XLRA MEAS RIGHT MID ICA EDV: 25.5 CM/SEC
BH CV XLRA MEAS RIGHT MID ICA PSV: 166 CM/SEC
BH CV XLRA MEAS RIGHT PROX CCA EDV: 8.7 CM/SEC
BH CV XLRA MEAS RIGHT PROX CCA PSV: 120 CM/SEC
BH CV XLRA MEAS RIGHT PROX ECA PSV: 308 CM/SEC
BH CV XLRA MEAS RIGHT PROX ICA EDV: 20.6 CM/SEC
BH CV XLRA MEAS RIGHT PROX ICA PSV: 151 CM/SEC
BH CV XLRA MEAS RIGHT PROX SCLA PSV: 245 CM/SEC
BH CV XLRA MEAS RIGHT VERTEBRAL A PSV: 161 CM/SEC
BUN BLD-MCNC: 9 MG/DL (ref 9–23)
BUN/CREAT SERPL: 11.3 (ref 7–25)
CALCIUM SPEC-SCNC: 8.5 MG/DL (ref 8.7–10.4)
CHLORIDE SERPL-SCNC: 99 MMOL/L (ref 99–109)
CO2 SERPL-SCNC: 28 MMOL/L (ref 20–31)
CREAT BLD-MCNC: 0.8 MG/DL (ref 0.6–1.3)
GFR SERPL CREATININE-BSD FRML MDRD: 93 ML/MIN/1.73
GLUCOSE BLD-MCNC: 98 MG/DL (ref 70–100)
POTASSIUM BLD-SCNC: 4.4 MMOL/L (ref 3.5–5.5)
SODIUM BLD-SCNC: 129 MMOL/L (ref 132–146)
SODIUM UR-SCNC: 90 MMOL/L (ref 30–90)
TSH SERPL DL<=0.05 MIU/L-ACNC: 3.04 MIU/ML (ref 0.35–5.35)

## 2017-03-04 PROCEDURE — 99233 SBSQ HOSP IP/OBS HIGH 50: CPT | Performed by: INTERNAL MEDICINE

## 2017-03-04 PROCEDURE — 85347 COAGULATION TIME ACTIVATED: CPT

## 2017-03-04 PROCEDURE — 84443 ASSAY THYROID STIM HORMONE: CPT | Performed by: INTERNAL MEDICINE

## 2017-03-04 PROCEDURE — C1760 CLOSURE DEV, VASC: HCPCS | Performed by: RADIOLOGY

## 2017-03-04 PROCEDURE — 37215 TRANSCATH STENT CCA W/EPS: CPT | Performed by: RADIOLOGY

## 2017-03-04 PROCEDURE — C1894 INTRO/SHEATH, NON-LASER: HCPCS | Performed by: RADIOLOGY

## 2017-03-04 PROCEDURE — 25010000002 MIDAZOLAM PER 1 MG: Performed by: RADIOLOGY

## 2017-03-04 PROCEDURE — 36226 PLACE CATH VERTEBRAL ART: CPT | Performed by: RADIOLOGY

## 2017-03-04 PROCEDURE — B31GZZZ FLUOROSCOPY OF BILATERAL VERTEBRAL ARTERIES: ICD-10-PCS | Performed by: RADIOLOGY

## 2017-03-04 PROCEDURE — C1769 GUIDE WIRE: HCPCS | Performed by: RADIOLOGY

## 2017-03-04 PROCEDURE — 25010000002 HEPARIN (PORCINE) PER 1000 UNITS: Performed by: RADIOLOGY

## 2017-03-04 PROCEDURE — 037K34Z DILATION OF RIGHT INTERNAL CAROTID ARTERY WITH DRUG-ELUTING INTRALUMINAL DEVICE, PERCUTANEOUS APPROACH: ICD-10-PCS | Performed by: RADIOLOGY

## 2017-03-04 PROCEDURE — 84300 ASSAY OF URINE SODIUM: CPT | Performed by: INTERNAL MEDICINE

## 2017-03-04 PROCEDURE — C1874 STENT, COATED/COV W/DEL SYS: HCPCS | Performed by: RADIOLOGY

## 2017-03-04 PROCEDURE — C1725 CATH, TRANSLUMIN NON-LASER: HCPCS | Performed by: RADIOLOGY

## 2017-03-04 PROCEDURE — B315ZZZ FLUOROSCOPY OF BILATERAL COMMON CAROTID ARTERIES: ICD-10-PCS | Performed by: RADIOLOGY

## 2017-03-04 PROCEDURE — 80048 BASIC METABOLIC PNL TOTAL CA: CPT | Performed by: INTERNAL MEDICINE

## 2017-03-04 PROCEDURE — C1884 EMBOLIZATION PROTECT SYST: HCPCS | Performed by: RADIOLOGY

## 2017-03-04 PROCEDURE — 36225 PLACE CATH SUBCLAVIAN ART: CPT | Performed by: RADIOLOGY

## 2017-03-04 PROCEDURE — 93882 EXTRACRANIAL UNI/LTD STUDY: CPT

## 2017-03-04 PROCEDURE — 36223 PLACE CATH CAROTID/INOM ART: CPT | Performed by: RADIOLOGY

## 2017-03-04 PROCEDURE — 25010000002 PHENYLEPHRINE PER 1 ML: Performed by: RADIOLOGY

## 2017-03-04 PROCEDURE — 0 IODIXANOL PER 1 ML: Performed by: RADIOLOGY

## 2017-03-04 PROCEDURE — B312ZZZ FLUOROSCOPY OF LEFT SUBCLAVIAN ARTERY: ICD-10-PCS | Performed by: RADIOLOGY

## 2017-03-04 PROCEDURE — 25010000002 FENTANYL CITRATE (PF) 100 MCG/2ML SOLUTION: Performed by: RADIOLOGY

## 2017-03-04 RX ORDER — ACETAMINOPHEN 325 MG/1
650 TABLET ORAL EVERY 4 HOURS PRN
Status: DISCONTINUED | OUTPATIENT
Start: 2017-03-04 | End: 2017-03-05 | Stop reason: HOSPADM

## 2017-03-04 RX ORDER — LIDOCAINE HYDROCHLORIDE 10 MG/ML
INJECTION, SOLUTION INFILTRATION; PERINEURAL AS NEEDED
Status: DISCONTINUED | OUTPATIENT
Start: 2017-03-04 | End: 2017-03-04 | Stop reason: HOSPADM

## 2017-03-04 RX ORDER — IODIXANOL 320 MG/ML
INJECTION, SOLUTION INTRAVASCULAR AS NEEDED
Status: DISCONTINUED | OUTPATIENT
Start: 2017-03-04 | End: 2017-03-04 | Stop reason: HOSPADM

## 2017-03-04 RX ORDER — PHENYLEPHRINE HCL IN 0.9% NACL 0.5 MG/5ML
.5-3 SYRINGE (ML) INTRAVENOUS
Status: DISCONTINUED | OUTPATIENT
Start: 2017-03-04 | End: 2017-03-05 | Stop reason: HOSPADM

## 2017-03-04 RX ORDER — MIDAZOLAM HYDROCHLORIDE 1 MG/ML
INJECTION INTRAMUSCULAR; INTRAVENOUS AS NEEDED
Status: DISCONTINUED | OUTPATIENT
Start: 2017-03-04 | End: 2017-03-04 | Stop reason: HOSPADM

## 2017-03-04 RX ORDER — HEPARIN SODIUM 1000 [USP'U]/ML
INJECTION, SOLUTION INTRAVENOUS; SUBCUTANEOUS AS NEEDED
Status: DISCONTINUED | OUTPATIENT
Start: 2017-03-04 | End: 2017-03-04 | Stop reason: HOSPADM

## 2017-03-04 RX ORDER — HYDROCODONE BITARTRATE AND ACETAMINOPHEN 5; 325 MG/1; MG/1
1 TABLET ORAL EVERY 4 HOURS PRN
Status: DISCONTINUED | OUTPATIENT
Start: 2017-03-04 | End: 2017-03-05 | Stop reason: HOSPADM

## 2017-03-04 RX ORDER — SODIUM CHLORIDE 9 MG/ML
50 INJECTION, SOLUTION INTRAVENOUS CONTINUOUS
Status: ACTIVE | OUTPATIENT
Start: 2017-03-04 | End: 2017-03-04

## 2017-03-04 RX ORDER — CLOPIDOGREL BISULFATE 75 MG/1
TABLET ORAL AS NEEDED
Status: DISCONTINUED | OUTPATIENT
Start: 2017-03-04 | End: 2017-03-04 | Stop reason: HOSPADM

## 2017-03-04 RX ORDER — SODIUM CHLORIDE 9 MG/ML
INJECTION, SOLUTION INTRAVENOUS CONTINUOUS PRN
Status: DISCONTINUED | OUTPATIENT
Start: 2017-03-04 | End: 2017-03-04 | Stop reason: HOSPADM

## 2017-03-04 RX ORDER — ONDANSETRON 2 MG/ML
4 INJECTION INTRAMUSCULAR; INTRAVENOUS EVERY 6 HOURS PRN
Status: DISCONTINUED | OUTPATIENT
Start: 2017-03-04 | End: 2017-03-05 | Stop reason: HOSPADM

## 2017-03-04 RX ORDER — SODIUM CHLORIDE 0.9 % (FLUSH) 0.9 %
1-10 SYRINGE (ML) INJECTION AS NEEDED
Status: DISCONTINUED | OUTPATIENT
Start: 2017-03-04 | End: 2017-03-05 | Stop reason: HOSPADM

## 2017-03-04 RX ORDER — ASPIRIN 325 MG
TABLET ORAL AS NEEDED
Status: DISCONTINUED | OUTPATIENT
Start: 2017-03-04 | End: 2017-03-04 | Stop reason: HOSPADM

## 2017-03-04 RX ORDER — FENTANYL CITRATE 50 UG/ML
INJECTION, SOLUTION INTRAMUSCULAR; INTRAVENOUS AS NEEDED
Status: DISCONTINUED | OUTPATIENT
Start: 2017-03-04 | End: 2017-03-04 | Stop reason: HOSPADM

## 2017-03-04 RX ORDER — DIPHENHYDRAMINE HCL 25 MG
25 CAPSULE ORAL NIGHTLY PRN
Status: DISCONTINUED | OUTPATIENT
Start: 2017-03-04 | End: 2017-03-05 | Stop reason: HOSPADM

## 2017-03-04 RX ADMIN — Medication 5 MG: at 20:07

## 2017-03-04 RX ADMIN — LISINOPRIL 10 MG: 10 TABLET ORAL at 10:17

## 2017-03-04 RX ADMIN — FAMOTIDINE 20 MG: 20 TABLET ORAL at 10:17

## 2017-03-04 RX ADMIN — ACETAMINOPHEN 650 MG: 325 TABLET, FILM COATED ORAL at 10:17

## 2017-03-04 RX ADMIN — LABETALOL HYDROCHLORIDE 100 MG: 100 TABLET, FILM COATED ORAL at 20:07

## 2017-03-04 RX ADMIN — FAMOTIDINE 20 MG: 20 TABLET ORAL at 17:10

## 2017-03-04 RX ADMIN — LISINOPRIL 10 MG: 10 TABLET ORAL at 20:06

## 2017-03-04 RX ADMIN — NICARDIPINE HYDROCHLORIDE 2.5 MG/HR: 25 INJECTION INTRAVENOUS at 10:00

## 2017-03-04 RX ADMIN — SODIUM CHLORIDE 50 ML/HR: 9 INJECTION, SOLUTION INTRAVENOUS at 10:00

## 2017-03-04 RX ADMIN — TAMSULOSIN HYDROCHLORIDE 0.4 MG: 0.4 CAPSULE ORAL at 20:07

## 2017-03-04 RX ADMIN — ATORVASTATIN CALCIUM 20 MG: 20 TABLET, FILM COATED ORAL at 20:06

## 2017-03-04 NOTE — PLAN OF CARE
Problem: Cardiac Surgery (Adult)  Goal: Signs and Symptoms of Listed Potential Problems Will be Absent or Manageable (Cardiac Surgery)  Outcome: Ongoing (interventions implemented as appropriate)    Problem: Patient Care Overview (Adult)  Goal: Plan of Care Review  Outcome: Ongoing (interventions implemented as appropriate)    03/04/17 0350   Coping/Psychosocial Response Interventions   Plan Of Care Reviewed With patient   Patient Care Overview   Progress improving   Outcome Evaluation   Outcome Summary/Follow up Plan vital signs stable, no complaints of pain, ambulating, npo since midnight

## 2017-03-04 NOTE — BRIEF OP NOTE
CAROTID STENT  Procedure Note    Andrew Hernandez  3/2/2017 - 3/4/2017    Pre-op Diagnosis:   Carotid stenosis, asymptomatic, right [I65.21]    Post-op Diagnosis:     Post-Op Diagnosis Codes:     * Carotid stenosis, asymptomatic, right [I65.21]    Procedure/CPT® Codes:  04188  48849  73694  49463      Procedure(s):  Right Carotid Stent    Surgeon(s):  Pino Mendiola MD    Anesthesia: Sedation    Staff:   Circulator: Vilma Coombs RN  Scrub Person: Ester Morfin  Documenter: Milli Santiago    Estimated Blood Loss: <20 cc's  Urine Voided: * No values recorded between 3/4/2017  8:13 AM and 3/4/2017  9:22 AM *    Specimens:                none      Drains:    none       Findings: There is a critical (greater than 95%) restenosis of the right internal carotid artery (in-stent restenosis).  Given the previous symptomatic nature of this lesion, this was treated with angioplasty and stent placement, with placement of an 8 x 40 mm Zilver drug-eluting stent.  This resulted in restoration of a near normal caliber lumen and markedly improved perfusion to the right cerebral hemisphere.    Complications: none apparent      Pino Mendiola MD     Date: 3/4/2017  Time: 9:31 AM

## 2017-03-04 NOTE — PROGRESS NOTES
"INTENSIVIST NOTE     Hospital Day: 2      Mr. Andrew Hernandez, 82 y.o. male is followed for:   Carotid stenosis, symptomatic w/o infarct       SUBJECTIVE     82-year-old male status post repeat right carotid stent placement today.  He basically has had in stent restenosis of the right carotid.  His original stent was one year ago.  He was admitted due to a low sodium and high potassium found incidentally in PAT.      Interval history:    Had a good result per Dr. Boo note this morning.  No new complaints.  He is awake and alert and resting postprocedure.    The patient's relevant past medical, surgical and social history were reviewed and updated in Epic as appropriate.       OBJECTIVE     Vital Sign Min/Max for last 24 hours  Temp  Min: 96.5 °F (35.8 °C)  Max: 98 °F (36.7 °C)   BP  Min: 124/59  Max: 202/90   Pulse  Min: 66  Max: 93   Resp  Min: 16  Max: 18   SpO2  Min: 95 %  Max: 99 %   Flow (L/min)  Min: 2  Max: 2   No Data Recorded      Intake/Output Summary (Last 24 hours) at 03/04/17 1122  Last data filed at 03/04/17 1030   Gross per 24 hour   Intake    490 ml   Output   2450 ml   Net  -1960 ml      Flowsheet Rows         First Filed Value    Admission Height  71\" (180.3 cm) Documented at 03/02/2017 1428    Admission Weight  165 lb (74.8 kg) Documented at 03/02/2017 1428         Last 3 weights    03/02/17  1428 03/02/17  1900   Weight: 165 lb (74.8 kg) 165 lb (74.8 kg)            Objective:  General Appearance:  In no acute distress.    Vital signs: (most recent): Blood pressure 124/59, pulse 89, temperature 97.7 °F (36.5 °C), temperature source Oral, resp. rate 18, height 71\" (180.3 cm), weight 165 lb (74.8 kg), SpO2 96 %.    HEENT: Normal HEENT exam.    Lungs:  Normal respiratory rate and normal effort.  He is not in respiratory distress.  Breath sounds clear to auscultation.  No wheezes, rales or rhonchi.    Heart: Normal rate.  Regular rhythm.  S1 normal and S2 normal.  No murmur, gallop or friction rub. "   Chest: Symmetric chest wall expansion.   Abdomen: Abdomen is soft and non-distended.  Bowel sounds are normal.   There is no abdominal tenderness.   There is no mass. There is no splenomegaly. There is no hepatomegaly.   Extremities: There is no deformity or dependent edema.    Neurological: Patient is alert and oriented to person, place and time.    Pupils:  Pupils are equal, round, and reactive to light.    Skin:  Warm and dry.              I reviewed the patient's new clinical results.  I reviewed the patient's new imaging results/reports including actual images and agree with reports.       Chest X-Ray:  None    INFUSIONS    niCARdipine 5-15 mg/hr Last Rate: 2.5 mg/hr (03/04/17 1000)   phenylephrine 0.5-3 mcg/kg/min Last Rate: Stopped (03/04/17 1030)   sodium chloride 50 mL/hr Last Rate: 50 mL/hr (03/04/17 1000)         Results from last 7 days  Lab Units 03/02/17  1641 03/02/17  0933   WBC 10*3/mm3 7.25 6.84   HEMOGLOBIN g/dL 12.7* 12.6*   HEMATOCRIT % 36.4* 36.6*   PLATELETS 10*3/mm3 231 243       Results from last 7 days  Lab Units 03/04/17  0858 03/03/17  1132 03/03/17  0514 03/02/17  1641   SODIUM mmol/L 129* 129* 130* 125*   POTASSIUM mmol/L 4.4  --  5.1 4.9   CHLORIDE mmol/L 99  --  98* 93*   TOTAL CO2 mmol/L 28.0  --  27.0 29.0   BUN mg/dL 9  --  16 18   GLUCOSE mg/dL 98  --  83 94   CREATININE mg/dL 0.80  --  0.90 1.00   MAGNESIUM mg/dL  --   --  1.9  --    CALCIUM mg/dL 8.5*  --  8.8 9.4                 Mech Ventilation:      I reviewed the patient's medications.    Assessment/Plan   ASSESSMENT      Hospital Problem List     * (Principal)Carotid stenosis, symptomatic w/o infarct    Overview Signed 3/4/2017 11:21 AM by Quincy Huerta MD     In stent restenosis of right carotid artery status post repeat stenting 3/4/17 by Dr. Boo         Hyperkalemia    Hyponatremia    Benign essential hypertension    Gastroesophageal reflux disease    Carotid atherosclerosis    Hypothyroidism             Good result of repeat right carotid artery stent this a.m.  In regards to his hyponatremia and hyperkalemia, the etiology is not completely clear to me.  Cortisol is acceptable and TSH is normal.  His hyperkalemia is basically resolving on its own.  He does take an ACE inhibitor.  He does drink 5 L of water a day as he was told that this would help him from the standpoint of his BPH and that it was generally healthy.          PLAN     In regards to his electrolyte issues, basically at this point I told him not to force water.  He should drink when he is thirsty.  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  If potassium is a problem in the future could consider discontinuing his ACE inhibitor.    Discussed all the above with his daughter in detail who works for Dr. Nieves          I discussed the patient's findings and my recommendations with patient, family and nursing staff         Quincy Huerta MD  Pulmonary and Critical Care Medicine  03/04/17 11:22 AM

## 2017-03-05 VITALS
TEMPERATURE: 98.6 F | DIASTOLIC BLOOD PRESSURE: 53 MMHG | WEIGHT: 165 LBS | BODY MASS INDEX: 23.1 KG/M2 | OXYGEN SATURATION: 96 % | SYSTOLIC BLOOD PRESSURE: 115 MMHG | RESPIRATION RATE: 16 BRPM | HEIGHT: 71 IN | HEART RATE: 75 BPM

## 2017-03-05 LAB
ANION GAP SERPL CALCULATED.3IONS-SCNC: 6 MMOL/L (ref 3–11)
BASOPHILS # BLD AUTO: 0.03 10*3/MM3 (ref 0–0.2)
BASOPHILS NFR BLD AUTO: 0.5 % (ref 0–1)
BUN BLD-MCNC: 10 MG/DL (ref 9–23)
BUN/CREAT SERPL: 10 (ref 7–25)
CALCIUM SPEC-SCNC: 9.1 MG/DL (ref 8.7–10.4)
CHLORIDE SERPL-SCNC: 100 MMOL/L (ref 99–109)
CO2 SERPL-SCNC: 24 MMOL/L (ref 20–31)
CREAT BLD-MCNC: 1 MG/DL (ref 0.6–1.3)
DEPRECATED RDW RBC AUTO: 45.1 FL (ref 37–54)
EOSINOPHIL # BLD AUTO: 0.31 10*3/MM3 (ref 0.1–0.3)
EOSINOPHIL NFR BLD AUTO: 5.2 % (ref 0–3)
ERYTHROCYTE [DISTWIDTH] IN BLOOD BY AUTOMATED COUNT: 13.9 % (ref 11.3–14.5)
GFR SERPL CREATININE-BSD FRML MDRD: 72 ML/MIN/1.73
GLUCOSE BLD-MCNC: 85 MG/DL (ref 70–100)
HCT VFR BLD AUTO: 34.8 % (ref 38.9–50.9)
HGB BLD-MCNC: 12 G/DL (ref 13.1–17.5)
IMM GRANULOCYTES # BLD: 0.01 10*3/MM3 (ref 0–0.03)
IMM GRANULOCYTES NFR BLD: 0.2 % (ref 0–0.6)
LYMPHOCYTES # BLD AUTO: 1.41 10*3/MM3 (ref 0.6–4.8)
LYMPHOCYTES NFR BLD AUTO: 23.9 % (ref 24–44)
MCH RBC QN AUTO: 30.4 PG (ref 27–31)
MCHC RBC AUTO-ENTMCNC: 34.5 G/DL (ref 32–36)
MCV RBC AUTO: 88.1 FL (ref 80–99)
MONOCYTES # BLD AUTO: 0.68 10*3/MM3 (ref 0–1)
MONOCYTES NFR BLD AUTO: 11.5 % (ref 0–12)
NEUTROPHILS # BLD AUTO: 3.47 10*3/MM3 (ref 1.5–8.3)
NEUTROPHILS NFR BLD AUTO: 58.7 % (ref 41–71)
PLATELET # BLD AUTO: 223 10*3/MM3 (ref 150–450)
PMV BLD AUTO: 9.6 FL (ref 6–12)
POTASSIUM BLD-SCNC: 4.6 MMOL/L (ref 3.5–5.5)
RBC # BLD AUTO: 3.95 10*6/MM3 (ref 4.2–5.76)
SODIUM BLD-SCNC: 130 MMOL/L (ref 132–146)
TSH SERPL DL<=0.05 MIU/L-ACNC: 3.33 MIU/ML (ref 0.35–5.35)
WBC NRBC COR # BLD: 5.91 10*3/MM3 (ref 3.5–10.8)

## 2017-03-05 PROCEDURE — 80048 BASIC METABOLIC PNL TOTAL CA: CPT | Performed by: RADIOLOGY

## 2017-03-05 PROCEDURE — 99024 POSTOP FOLLOW-UP VISIT: CPT | Performed by: RADIOLOGY

## 2017-03-05 PROCEDURE — 84443 ASSAY THYROID STIM HORMONE: CPT | Performed by: INTERNAL MEDICINE

## 2017-03-05 PROCEDURE — 85025 COMPLETE CBC W/AUTO DIFF WBC: CPT | Performed by: RADIOLOGY

## 2017-03-05 RX ORDER — ASPIRIN 81 MG/1
81 TABLET, CHEWABLE ORAL DAILY
Qty: 30 TABLET | Refills: 5 | Status: SHIPPED | OUTPATIENT
Start: 2017-03-05 | End: 2017-04-25

## 2017-03-05 RX ADMIN — LEVOTHYROXINE SODIUM 50 MCG: 50 TABLET ORAL at 05:31

## 2017-03-05 RX ADMIN — FAMOTIDINE 20 MG: 20 TABLET ORAL at 08:19

## 2017-03-05 RX ADMIN — LABETALOL HYDROCHLORIDE 100 MG: 100 TABLET, FILM COATED ORAL at 08:58

## 2017-03-05 RX ADMIN — CLOPIDOGREL 75 MG: 75 TABLET, FILM COATED ORAL at 08:19

## 2017-03-05 RX ADMIN — LISINOPRIL 10 MG: 10 TABLET ORAL at 08:19

## 2017-03-05 NOTE — PLAN OF CARE
Problem: Cardiac Surgery (Adult)  Goal: Signs and Symptoms of Listed Potential Problems Will be Absent or Manageable (Cardiac Surgery)  Outcome: Ongoing (interventions implemented as appropriate)    Problem: Patient Care Overview (Adult)  Goal: Plan of Care Review  Outcome: Ongoing (interventions implemented as appropriate)

## 2017-03-05 NOTE — DISCHARGE SUMMARY
"PATIENT:  RENE MARRERO  YOB: 1934  VISIT ID:  [id number]  PRIMARY CARE:  Melvi Klein MD  ADMITTING PHYSICIAN:  Quincy Huerta, *    DATE OF ADMISSION:  3/2/2017  DATE OF DISCHARGE:  03/05/17      ADMITTING DIAGNOSIS:  1.  Hyponatremia  2.  Hypernatremia  3.  Right carotid stenosis  4.  Dyslipidemia  5.  Hypertension    DISCHARGE DIAGNOSIS:  1.  Hyponatremia  2.  Hypernatremia  3.  Right carotid stenosis  4.  Dyslipidemia  5.  Hypertension    PROCEDURES:  1.  Carotid angiography with angioplasty/stent placement (with EPD) of recurrent, high-grade right carotid stenosis.  2.  Carotid duplex    BRIEF HOSPITAL COURSE:  82 y.o. male presents with known to the neuro interventional service, having undergone prior angioplasty and stent placement for a symptomatic, high-grade right carotid stenosis on 3/18/2016. He presented in 2016 with right-sided visual loss and retinal artery embolism secondary to the carotid stenosis.  He underwent intervention and March 2016 with angioplasty and stent placement, restoring a near normal caliber lumen and resulting in improved perfusion to the right cerebral hemisphere.  He did very well following his procedure, and had no new or recurrent strokelike symptoms.       I recently saw him in the neuro interventional clinic with a carotid duplex ultrasound demonstrating recurrent, high-grade right carotid stenosis (in-stent restenosis). While his right-sided vision loss has not substantially improved, he's otherwise had no new or recurrent stroke-like symptoms.      He was tentatively planned to have his recurrent stenosis treated next week with angioplasty and stent placement (drug-eluting stent); however, PAT testing revealed low sodium and high potassium, and thus he presented to the emergency department for further evaluation and was subsequently admitted.    His potassium corrected itself with hydration, and his sodium did \"respond\" to IV fluids (normal " "saline).  His sodium came up from 125-130 at the time of discharge.  I suspect that this is his \"baseline\" sodium.  He remained asymptomatic throughout his hospitalization, but as he was already scheduled undergo angioplasty and stent placement next week, the family very much wished to pursue treatment while he was already admitted.  Subsequently, he underwent angioplasty and stent placement for his recurrent, high-grade right internal carotid stenosis on 3/4/2017 with placement of an 8 x 40 mm Zilver drug-eluting stent.  This resulted in restoration of near normal caliber lumen within the right internal carotid artery and markedly improved perfusion to the right cerebral hemisphere.  Carotid duplex following the procedure demonstrated markedly improved velocities within the right internal carotid artery with a widely patent stent.  Peak systolic velocities were 223 cm/s at the distal margins of the stent (was 4 96 cm/s).  He did have significant progression of atherosclerotic disease involving the left vertebral artery origin noted, but the right vertebral artery is widely patent and \"codominant\" in nature, and thus this will be managed medically.      He made an uneventful recovery in the intensive care unit from his carotid stent, and was at his neurologic \"baseline\" at the time of discharge.  Strength was symmetric in the upper and lower extremities.  He was ambulating without assistance.  Speech was clear.  Tolerating good by mouth intake.    Lab Results   Component Value Date    WBC 5.91 03/05/2017    HGB 12.0 (L) 03/05/2017    HCT 34.8 (L) 03/05/2017    MCV 88.1 03/05/2017     03/05/2017     Lab Results   Component Value Date    GLUCOSE 85 03/05/2017    CALCIUM 9.1 03/05/2017     (L) 03/05/2017    K 4.6 03/05/2017    CO2 24.0 03/05/2017     03/05/2017    BUN 10 03/05/2017    CREATININE 1.00 03/05/2017    EGFRIFNONA 72 03/05/2017    BCR 10.0 03/05/2017    ANIONGAP 6.0 03/05/2017 "         DISCHARGE MEDICATIONS:   Andrew Hernandez   Home Medication Instructions VIOLA:[id number]    Printed on:03/05/17 1010   Medication Information                      aspirin 81 MG chewable tablet  Chew 1 tablet Daily.             atorvastatin (LIPITOR) 20 MG tablet  Take 1 tablet by mouth Daily.             calcium carbonate (TUMS) 500 MG chewable tablet  Chew. TAKE AS DIRECTED.             clopidogrel (PLAVIX) 75 MG tablet  Take 1 tablet by mouth Daily.             labetalol (NORMODYNE) 100 MG tablet  Take 1 tablet by mouth 3 (Three) Times a Day As Needed (Patient has been instructed to take 2 tablets TID if BP is over 150 systolic.).             levothyroxine (SYNTHROID, LEVOTHROID) 50 MCG tablet  TAKE 1 TABLET BY MOUTH ONCE DAILY             lisinopril (PRINIVIL,ZESTRIL) 10 MG tablet  Take 1 tablet by mouth 2 (Two) Times a Day.             multivitamin (THERAGRAN) tablet tablet  Take 1 tablet by mouth daily.             raNITIdine (ZANTAC) 150 MG tablet  Take 1 tablet by mouth Daily As Needed for heartburn or indigestion.             tamsulosin (FLOMAX) 0.4 MG capsule 24 hr capsule  TAKE 1 CAPSULE BY MOUTH ONCE DAILY                   ACTIVITY:  No strenuous activity or heavy lifting for 5-7 days.    DIET:  Cardiac diet    FOLLOW UP:    Follow-up Information     Follow up with Pino Mendiola MD Follow up in 3 week(s).    Specialty:  Radiology    Contact information:    [address]   [address]  [facility] [address] [phone]          Follow up with Melvi Klein MD .    Specialty:  Family Medicine    Contact information:    [address]  [facility] [address] [phone]

## 2017-03-06 ENCOUNTER — TRANSITIONAL CARE MANAGEMENT TELEPHONE ENCOUNTER (OUTPATIENT)
Dept: INTERNAL MEDICINE | Facility: CLINIC | Age: 82
End: 2017-03-06

## 2017-03-06 NOTE — OUTREACH NOTE
[facility] call completed.  Please refer to TCM call flowsheet for call documentation.  The patient declined [facility] hospital follow up with PCP at this time as he is going to follow up with Dr. Mendiola 3/27/17.

## 2017-03-15 LAB
ANION GAP SERPL CALCULATED.3IONS-SCNC: 11.6 MMOL/L
BUN BLD-MCNC: 14 MG/DL (ref 7–20)
BUN/CREAT SERPL: 12.7 (ref 6.3–21.9)
CALCIUM SPEC-SCNC: 9.5 MG/DL (ref 8.4–10.2)
CHLORIDE SERPL-SCNC: 89 MMOL/L (ref 98–107)
CO2 SERPL-SCNC: 30 MMOL/L (ref 26–30)
CREAT BLD-MCNC: 1.1 MG/DL (ref 0.6–1.3)
GFR SERPL CREATININE-BSD FRML MDRD: 64 ML/MIN/1.73
GLUCOSE BLD-MCNC: 89 MG/DL (ref 74–98)
POTASSIUM BLD-SCNC: 5.6 MMOL/L (ref 3.5–5.1)
SODIUM BLD-SCNC: 125 MMOL/L (ref 137–145)

## 2017-03-24 DIAGNOSIS — N40.0 BENIGN PROSTATIC HYPERPLASIA WITHOUT LOWER URINARY TRACT SYMPTOMS, UNSPECIFIED MORPHOLOGY: ICD-10-CM

## 2017-03-24 RX ORDER — TAMSULOSIN HYDROCHLORIDE 0.4 MG/1
1 CAPSULE ORAL DAILY
Qty: 90 CAPSULE | Refills: 3 | Status: SHIPPED | OUTPATIENT
Start: 2017-03-24 | End: 2018-02-21 | Stop reason: SDUPTHER

## 2017-03-27 ENCOUNTER — OFFICE VISIT (OUTPATIENT)
Dept: NEUROSURGERY | Facility: CLINIC | Age: 82
End: 2017-03-27

## 2017-03-27 VITALS
SYSTOLIC BLOOD PRESSURE: 120 MMHG | HEIGHT: 71 IN | BODY MASS INDEX: 22.68 KG/M2 | TEMPERATURE: 97.8 F | WEIGHT: 162 LBS | DIASTOLIC BLOOD PRESSURE: 60 MMHG

## 2017-03-27 DIAGNOSIS — I65.21 CAROTID STENOSIS, ASYMPTOMATIC, RIGHT: Primary | ICD-10-CM

## 2017-03-27 PROCEDURE — 99024 POSTOP FOLLOW-UP VISIT: CPT | Performed by: RADIOLOGY

## 2017-03-27 RX ORDER — LABETALOL 100 MG/1
100 TABLET, FILM COATED ORAL 3 TIMES DAILY
Qty: 180 TABLET | Refills: 3 | Status: SHIPPED | OUTPATIENT
Start: 2017-03-27 | End: 2017-10-23 | Stop reason: SDUPTHER

## 2017-03-27 RX ORDER — CLOPIDOGREL BISULFATE 75 MG/1
75 TABLET ORAL DAILY
Qty: 90 TABLET | Refills: 5 | Status: SHIPPED | OUTPATIENT
Start: 2017-03-27 | End: 2017-11-08 | Stop reason: SDUPTHER

## 2017-03-27 RX ORDER — ATORVASTATIN CALCIUM 20 MG/1
20 TABLET, FILM COATED ORAL NIGHTLY
Qty: 90 TABLET | Refills: 5 | Status: SHIPPED | OUTPATIENT
Start: 2017-03-27 | End: 2017-11-08 | Stop reason: SDUPTHER

## 2017-03-27 NOTE — PROGRESS NOTES
NAME: RENE MARRERO   DOS: 3/27/2017  : 1934  PCP: Melvi Klein MD    Chief Complaint:    Chief Complaint   Patient presents with   • Carotid Artery Disease     s/p right ICA stent 3/4/17       History of Present Illness:  82 y.o. male presents with known to the neuro interventional service, having undergone prior angioplasty and stent placement for a symptomatic, high-grade right carotid stenosis on 3/18/2016. 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 He underwent intervention and 2016 with angioplasty and stent placement, restoring a near normal caliber lumen and resulting in improved perfusion to the right cerebral hemisphere. He did very well following his procedure, and had no new or recurrent strokelike symptoms.       I recently saw him in the neuro interventional clinic with a carotid duplex ultrasound demonstrating recurrent, high-grade right carotid stenosis (in-stent restenosis). While his right-sided vision loss has not substantially improved, he's otherwise had no new or recurrent stroke-like symptoms.       He was tentatively planned to have his recurrent stenosis treated electively with angioplasty and stent placement (drug-eluting stent); however, PAT testing revealed low sodium and high potassium, and thus he presented to the emergency department for further evaluation and was subsequently admitted.  His electrolytes were treated, and he underwent angioplasty and stent placement for his recurrent, high-grade right internal carotid stenosis on 3/4/2017 with placement of an 8 x 40 mm Zilver drug-eluting stent. This resulted in restoration of near normal caliber lumen within the right internal carotid artery and markedly improved perfusion to the right cerebral hemisphere. Carotid duplex following the procedure demonstrated markedly improved velocities within the right internal carotid artery with a widely patent  "stent. Peak systolic velocities were 223 cm/s at the distal margins of the stent (was 496 cm/s). He did have significant progression of atherosclerotic disease involving the left vertebral artery origin noted, but the right vertebral artery is widely patent and \"codominant\" in nature, and thus this is being managed medically.      He made an uneventful recovery in the intensive care unit from his carotid stent and was discharged home at his neurologic baseline.  He presents today for routine follow-up following his stent placement.  He's done very well following his procedure, with no new or recurrent stroke-like symptoms.  Specifically, no unilateral weakness or numbness, no speech changes.  Baseline visual deficits in the right eye from prior \"stroke\" are unchanged.    Past Medical History:  Past Medical History:   Diagnosis Date   • Carotid stenosis     s/p right ICA stent x 2   • Fracture    • Hyperlipidemia    • Hypertension    • Kidney infection    • Renal calculi    • Stroke 03/2016    right retinal artery occlusion       Past Surgical History:  Past Surgical History:   Procedure Laterality Date   • CAROTID STENT Right 03/18/2016    Carotid Wall Stent   • CAROTID STENT Right 03/04/2017    Zilver BANDAR for recurrent right ICA stenosis   • KIDNEY STONE SURGERY Right 2011    [facility] - open   • PATELLA FRACTURE SURGERY Left 1986   • CT TCAT IV STENT CRV CRTD ART EMBOLIC PROTECJ Right 3/4/2017    Placement of Zilver BANDAR right ICA 3/4/17       Review of Systems:        Review of Systems   Constitutional: Negative for activity change, appetite change, chills, diaphoresis, fatigue, fever and unexpected weight change.   HENT: Negative for congestion, dental problem, drooling, ear discharge, ear pain, facial swelling, hearing loss, mouth sores, nosebleeds, postnasal drip, rhinorrhea, sinus pressure, sneezing, sore throat, tinnitus, trouble swallowing and voice change.    Eyes: Negative for photophobia, pain, discharge, " redness, itching and visual disturbance.   Respiratory: Negative for apnea, cough, choking, chest tightness, shortness of breath, wheezing and stridor.    Cardiovascular: Negative for chest pain, palpitations and leg swelling.   Gastrointestinal: Negative for abdominal distention, abdominal pain, anal bleeding, blood in stool, constipation, diarrhea, nausea, rectal pain and vomiting.   Endocrine: Negative for cold intolerance, heat intolerance, polydipsia, polyphagia and polyuria.   Genitourinary: Negative for decreased urine volume, difficulty urinating, dysuria, enuresis, flank pain, frequency, genital sores, hematuria and urgency.   Musculoskeletal: Negative for arthralgias, back pain, gait problem, joint swelling, myalgias, neck pain and neck stiffness.   Skin: Negative for color change, pallor, rash and wound.   Allergic/Immunologic: Negative for environmental allergies, food allergies and immunocompromised state.   Neurological: Negative for dizziness, tremors, seizures, syncope, facial asymmetry, speech difficulty, weakness, light-headedness, numbness and headaches.   Hematological: Negative for adenopathy. Does not bruise/bleed easily.   Psychiatric/Behavioral: Negative for agitation, behavioral problems, confusion, decreased concentration, dysphoric mood, hallucinations, self-injury, sleep disturbance and suicidal ideas. The patient is not nervous/anxious and is not hyperactive.         Medications    Current Outpatient Prescriptions:   •  aspirin 81 MG chewable tablet, Chew 1 tablet Daily., Disp: 30 tablet, Rfl: 5  •  atorvastatin (LIPITOR) 20 MG tablet, Take 1 tablet by mouth Daily., Disp: 90 tablet, Rfl: 1  •  atorvastatin (LIPITOR) 20 MG tablet, Take 1 tablet by mouth Daily., Disp: 90 tablet, Rfl: 3  •  calcium carbonate (TUMS) 500 MG chewable tablet, Chew. TAKE AS DIRECTED., Disp: , Rfl:   •  clopidogrel (PLAVIX) 75 MG tablet, Take 1 tablet by mouth Daily., Disp: 90 tablet, Rfl: 3  •  labetalol  (NORMODYNE) 100 MG tablet, Take 2 tablets by mouth three times a day if BP is over 150 systolic., Disp: 180 tablet, Rfl: 3  •  levothyroxine (SYNTHROID, LEVOTHROID) 50 MCG tablet, TAKE 1 TABLET BY MOUTH ONCE DAILY, Disp: 90 tablet, Rfl: 3  •  lisinopril (PRINIVIL,ZESTRIL) 10 MG tablet, Take 1 tablet by mouth 2 (Two) Times a Day., Disp: 180 tablet, Rfl: 3  •  multivitamin (THERAGRAN) tablet tablet, Take 1 tablet by mouth daily., Disp: , Rfl:   •  raNITIdine (ZANTAC) 150 MG tablet, Take 1 tablet by mouth Daily As Needed for heartburn or indigestion., Disp: 90 tablet, Rfl: 1  •  tamsulosin (FLOMAX) 0.4 MG capsule 24 hr capsule, TAKE 1 CAPSULE BY MOUTH ONCE DAILY, Disp: 90 capsule, Rfl: 3    Allergies:  Allergies   Allergen Reactions   • Rocephin [Ceftriaxone] Hives   • Amoxicillin Rash       Social Hx:  Social History   Substance Use Topics   • Smoking status: Former Smoker     Packs/day: 1.00     Years: 55.00     Types: Cigarettes     Start date: 1950     Quit date: 2005   • Smokeless tobacco: Former User     Types: Chew     Quit date: 2005   • Alcohol use No      Comment: quit 1985       Family Hx:  Family History   Problem Relation Age of Onset   • Heart disease Mother    • Hyperlipidemia Mother    • Hypertension Mother    • Prostate cancer Father    • Cancer Father    • Heart attack Brother    • Heart disease Brother    • Hyperlipidemia Brother    • Hypertension Brother        Review of Imaging:  Catheter angiogram from 3/4/2017 was reviewed along with its corresponding radiologic report.  Again noted is a high-grade (greater than 90%) stenosis within the proximal right internal carotid artery, at the site of prior stent placement (in-stent restenosis).  This responded well to angioplasty and stent placement with placement of a 8 x 40 mm Zilver drug-eluting stent, restoring a near normal caliber lumen and resulting in markedly improved perfusion to the right cerebral hemisphere.      Physical Examination:  Vitals:  "   03/27/17 1607   BP: 120/60   Temp: 97.8 °F (36.6 °C)        General Appearance:   Well developed, well nourished, well groomed, alert, and cooperative.  Cardiovascular: Regular rate and rhythm. No carotid bruits      Neurological examination:  Neurologic Exam     Mental Status   Oriented to person, place, and time.   Speech: speech is normal   Level of consciousness: alert    Cranial Nerves     CN II   Visual acuity: (Baseline diminished vision in the right eye from prior retinal artery embolism)    CN III, IV, VI   Pupils are equal, round, and reactive to light.  Extraocular motions are normal.     CN V   Facial sensation intact.     CN VII   Facial expression full, symmetric.     CN VIII   CN VIII normal.     CN IX, X   CN IX normal.   CN X normal.     CN XI   CN XI normal.     CN XII   CN XII normal.     Motor Exam     Strength   Strength 5/5 throughout.     Sensory Exam   Light touch normal.     Gait, Coordination, and Reflexes     Gait  Gait: normal      Diagnoses/Plan:    Mr. Hernandez is a 83 y.o. male known to the neuro interventional service, having initially been treated for a symptomatic, high-grade right internal carotid stenosis on 3/18/2016 with placement of bare metal stent.  He did very well following this procedure, but had carotid duplex demonstrating recurrent, high-grade stenosis (in-stent restenosis), and thus he had placement of a Zilver drug-eluting stent on 3/4/2017 restoring a near normal caliber lumen.  He's made an uneventful recovery following his procedure with no new neurologic deficit or complicating feature.  I do think it would be prudent for him to continue Plavix antiplatelet regimen indefinitely, not only for his right carotid stents, but for additional carotid/vertebral disease.    Given his fairly rapid recurrence following \"bare metal\" stent placement, I do plan on seeing him back in 2 months time with a carotid duplex to ensure stability and exclude any progression of disease " and might necessitate further treatment/intervention.  We will also obtain baseline labs, as he had prior issues with electrolytes imbalance.

## 2017-04-21 ENCOUNTER — OFFICE VISIT (OUTPATIENT)
Dept: CARDIOLOGY | Facility: CLINIC | Age: 82
End: 2017-04-21

## 2017-04-21 VITALS
WEIGHT: 161.2 LBS | RESPIRATION RATE: 16 BRPM | DIASTOLIC BLOOD PRESSURE: 50 MMHG | OXYGEN SATURATION: 99 % | HEIGHT: 71 IN | BODY MASS INDEX: 22.57 KG/M2 | SYSTOLIC BLOOD PRESSURE: 120 MMHG | HEART RATE: 70 BPM

## 2017-04-21 DIAGNOSIS — I10 BENIGN ESSENTIAL HYPERTENSION: Primary | ICD-10-CM

## 2017-04-21 DIAGNOSIS — I65.23 CAROTID ATHEROSCLEROSIS, BILATERAL: ICD-10-CM

## 2017-04-21 PROCEDURE — 99213 OFFICE O/P EST LOW 20 MIN: CPT | Performed by: INTERNAL MEDICINE

## 2017-04-21 NOTE — PROGRESS NOTES
Subjective:     Encounter Date:04/21/2017      Patient ID: Andrew Hernandez is a 83 y.o. male.    Chief Complaint: shortness of breath  HPI  This is an 83-year-old male patient who was recently discovered to have severe diffuse in-stent restenosis involving his right internal carotid artery which had previously been stented for a critical stenosis and amaurosis fugax.  The patient has now subsequently undergone restenting of the right internal carotid artery utilizing overlapping drug-eluting stents.  He has had no stroke or TIA symptoms.  Prior to this procedure the patient was experiencing accelerated hypertension.  His blood pressure has improved significantly after carotid artery revascularization.  He is now only requiring lisinopril for blood pressure control.  He is no longer using the labetalol.  He has no chest discomfort.  He has shortness of breath at the extremes of physical activity.  There is no orthopnea PND or lower extremity edema.  He has no dizziness palpitations or syncope.  The remainder of his past medical history, family history and social history remains unchanged compared to his last visit.  The following portions of the patient's history were reviewed and updated as appropriate: allergies, current medications, past family history, past medical history, past social history, past surgical history and problem  Review of Systems   Constitution: Negative for chills, diaphoresis, fever, weakness, malaise/fatigue, night sweats, weight gain and weight loss.   HENT: Negative for ear discharge, hearing loss, hoarse voice and nosebleeds.    Eyes: Negative for discharge, double vision, pain and photophobia.   Cardiovascular: Negative for chest pain, claudication, cyanosis, dyspnea on exertion, irregular heartbeat, leg swelling, near-syncope, orthopnea, palpitations, paroxysmal nocturnal dyspnea and syncope.   Respiratory: Negative for cough, hemoptysis, shortness of breath, sputum production and  wheezing.    Endocrine: Negative for cold intolerance, heat intolerance, polydipsia, polyphagia and polyuria.   Hematologic/Lymphatic: Negative for adenopathy and bleeding problem. Does not bruise/bleed easily.   Skin: Negative for color change, flushing, itching and rash.   Musculoskeletal: Negative for muscle cramps, muscle weakness, myalgias and stiffness.   Gastrointestinal: Negative for abdominal pain, diarrhea, hematemesis, hematochezia, nausea and vomiting.   Genitourinary: Negative for dysuria, frequency and nocturia.   Neurological: Negative for dizziness, focal weakness, loss of balance, numbness, paresthesias and seizures.   Psychiatric/Behavioral: Negative for altered mental status, hallucinations and suicidal ideas.   Allergic/Immunologic: Negative for HIV exposure, hives and persistent infections.       Current Outpatient Prescriptions:   •  aspirin 81 MG chewable tablet, Chew 1 tablet Daily., Disp: 30 tablet, Rfl: 5  •  atorvastatin (LIPITOR) 20 MG tablet, Take 1 tablet by mouth Daily., Disp: 90 tablet, Rfl: 1  •  atorvastatin (LIPITOR) 20 MG tablet, Take 1 tablet by mouth Every Night., Disp: 90 tablet, Rfl: 5  •  calcium carbonate (TUMS) 500 MG chewable tablet, Chew. TAKE AS DIRECTED., Disp: , Rfl:   •  clopidogrel (PLAVIX) 75 MG tablet, Take 1 tablet by mouth Daily., Disp: 90 tablet, Rfl: 5  •  labetalol (NORMODYNE) 100 MG tablet, Take 2 tablets by mouth three times a day if BP is over 150 systolic., Disp: 180 tablet, Rfl: 3  •  levothyroxine (SYNTHROID, LEVOTHROID) 50 MCG tablet, TAKE 1 TABLET BY MOUTH ONCE DAILY, Disp: 90 tablet, Rfl: 3  •  lisinopril (PRINIVIL,ZESTRIL) 10 MG tablet, Take 1 tablet by mouth 2 (Two) Times a Day., Disp: 180 tablet, Rfl: 3  •  multivitamin (THERAGRAN) tablet tablet, Take 1 tablet by mouth daily., Disp: , Rfl:   •  raNITIdine (ZANTAC) 150 MG tablet, Take 1 tablet by mouth Daily As Needed for heartburn or indigestion., Disp: 90 tablet, Rfl: 1  •  tamsulosin (FLOMAX) 0.4  "MG capsule 24 hr capsule, TAKE 1 CAPSULE BY MOUTH ONCE DAILY, Disp: 90 capsule, Rfl: 3     Objective:     Physical Exam   Constitutional: He is oriented to person, place, and time. He appears well-developed and well-nourished.   HENT:   Head: Normocephalic and atraumatic.   Eyes: Conjunctivae are normal. No scleral icterus.   Cardiovascular: Normal rate, regular rhythm, normal heart sounds and intact distal pulses.  Exam reveals no gallop and no friction rub.    No murmur heard.  Pulses:       Carotid pulses are 2+ on the right side, and 2+ on the left side with bruit.       Radial pulses are 2+ on the right side, and 2+ on the left side.   Pulmonary/Chest: Effort normal and breath sounds normal. No respiratory distress.   Abdominal: Soft. Bowel sounds are normal. There is no tenderness.   Musculoskeletal: He exhibits no edema.   Neurological: He is alert and oriented to person, place, and time.   Skin: Skin is warm and dry.   Psychiatric: He has a normal mood and affect. His behavior is normal.     Blood pressure 120/50, pulse 70, resp. rate 16, height 71\" (180.3 cm), weight 161 lb 3.2 oz (73.1 kg), SpO2 99 %.   Lab Review:       Assessment:         1. Benign essential hypertension   his blood pressure control has been excellent after re-stenting of his right carotid artery.    2. Carotid atherosclerosis, bilateral   he had developed severe in-stent restenosis involving his right internal carotid artery stent.  This was treated with 2 overlapping drug-eluting stents by Dr. Mendiola.  He is doing well without signs or symptoms of recurrent TIA or stroke  Procedures     Plan:        the patient is to follow-up with the interventional radiologist  Later this month.  No additional cardiovascular testing is indicted.  He is instructed to continue taking  His blood pressure medication as he is currently doing so.         "

## 2017-04-25 ENCOUNTER — OFFICE VISIT (OUTPATIENT)
Dept: INTERNAL MEDICINE | Facility: CLINIC | Age: 82
End: 2017-04-25

## 2017-04-25 VITALS
OXYGEN SATURATION: 99 % | WEIGHT: 161.2 LBS | SYSTOLIC BLOOD PRESSURE: 130 MMHG | HEIGHT: 71 IN | RESPIRATION RATE: 12 BRPM | DIASTOLIC BLOOD PRESSURE: 60 MMHG | HEART RATE: 65 BPM | BODY MASS INDEX: 22.57 KG/M2

## 2017-04-25 DIAGNOSIS — K21.9 GASTROESOPHAGEAL REFLUX DISEASE, ESOPHAGITIS PRESENCE NOT SPECIFIED: ICD-10-CM

## 2017-04-25 DIAGNOSIS — E03.8 OTHER SPECIFIED HYPOTHYROIDISM: ICD-10-CM

## 2017-04-25 DIAGNOSIS — E78.00 HYPERCHOLESTEROLEMIA: Primary | ICD-10-CM

## 2017-04-25 PROCEDURE — 99213 OFFICE O/P EST LOW 20 MIN: CPT | Performed by: FAMILY MEDICINE

## 2017-04-25 RX ORDER — LEVOTHYROXINE SODIUM 0.05 MG/1
50 TABLET ORAL DAILY
Qty: 90 TABLET | Refills: 3 | Status: SHIPPED | OUTPATIENT
Start: 2017-04-25 | End: 2017-10-26 | Stop reason: SDUPTHER

## 2017-04-25 RX ORDER — RANITIDINE 150 MG/1
150 TABLET ORAL DAILY PRN
Qty: 90 TABLET | Refills: 1 | Status: SHIPPED | OUTPATIENT
Start: 2017-04-25 | End: 2017-10-26 | Stop reason: SDUPTHER

## 2017-05-22 ENCOUNTER — LAB (OUTPATIENT)
Dept: LAB | Facility: HOSPITAL | Age: 82
End: 2017-05-22

## 2017-05-22 ENCOUNTER — HOSPITAL ENCOUNTER (OUTPATIENT)
Dept: CARDIOLOGY | Facility: HOSPITAL | Age: 82
Discharge: HOME | End: 2017-05-22
Attending: RADIOLOGY | Admitting: RADIOLOGY

## 2017-05-22 ENCOUNTER — APPOINTMENT (OUTPATIENT)
Dept: CARDIOLOGY | Facility: HOSPITAL | Age: 82
End: 2017-05-22
Attending: RADIOLOGY

## 2017-05-22 ENCOUNTER — OFFICE VISIT (OUTPATIENT)
Dept: NEUROSURGERY | Facility: CLINIC | Age: 82
End: 2017-05-22

## 2017-05-22 VITALS
TEMPERATURE: 96.6 F | WEIGHT: 162 LBS | SYSTOLIC BLOOD PRESSURE: 138 MMHG | HEIGHT: 71 IN | DIASTOLIC BLOOD PRESSURE: 58 MMHG | BODY MASS INDEX: 22.68 KG/M2

## 2017-05-22 VITALS — BODY MASS INDEX: 22.54 KG/M2 | HEIGHT: 71 IN | WEIGHT: 161 LBS

## 2017-05-22 DIAGNOSIS — I65.21 CAROTID STENOSIS, ASYMPTOMATIC, RIGHT: Primary | ICD-10-CM

## 2017-05-22 DIAGNOSIS — I65.21 CAROTID STENOSIS, ASYMPTOMATIC, RIGHT: ICD-10-CM

## 2017-05-22 DIAGNOSIS — E78.00 HYPERCHOLESTEROLEMIA: ICD-10-CM

## 2017-05-22 LAB
ANION GAP SERPL CALCULATED.3IONS-SCNC: 4 MMOL/L (ref 3–11)
ARTICHOKE IGE QN: 51 MG/DL (ref 0–130)
BASOPHILS # BLD AUTO: 0.02 10*3/MM3 (ref 0–0.2)
BASOPHILS NFR BLD AUTO: 0.3 % (ref 0–1)
BUN BLD-MCNC: 15 MG/DL (ref 9–23)
BUN/CREAT SERPL: 15 (ref 7–25)
CALCIUM SPEC-SCNC: 9.9 MG/DL (ref 8.7–10.4)
CHLORIDE SERPL-SCNC: 97 MMOL/L (ref 99–109)
CHOLEST SERPL-MCNC: 131 MG/DL (ref 0–200)
CO2 SERPL-SCNC: 28 MMOL/L (ref 20–31)
CREAT BLD-MCNC: 1 MG/DL (ref 0.6–1.3)
DEPRECATED RDW RBC AUTO: 45.7 FL (ref 37–54)
EOSINOPHIL # BLD AUTO: 0.21 10*3/MM3 (ref 0.1–0.3)
EOSINOPHIL NFR BLD AUTO: 3.4 % (ref 0–3)
ERYTHROCYTE [DISTWIDTH] IN BLOOD BY AUTOMATED COUNT: 13.8 % (ref 11.3–14.5)
GFR SERPL CREATININE-BSD FRML MDRD: 71 ML/MIN/1.73
GLUCOSE BLD-MCNC: 95 MG/DL (ref 70–100)
HCT VFR BLD AUTO: 34.8 % (ref 38.9–50.9)
HDLC SERPL-MCNC: 69 MG/DL (ref 40–60)
HGB BLD-MCNC: 11.7 G/DL (ref 13.1–17.5)
IMM GRANULOCYTES # BLD: 0.01 10*3/MM3 (ref 0–0.03)
IMM GRANULOCYTES NFR BLD: 0.2 % (ref 0–0.6)
LYMPHOCYTES # BLD AUTO: 1.37 10*3/MM3 (ref 0.6–4.8)
LYMPHOCYTES NFR BLD AUTO: 22.2 % (ref 24–44)
MCH RBC QN AUTO: 30.6 PG (ref 27–31)
MCHC RBC AUTO-ENTMCNC: 33.6 G/DL (ref 32–36)
MCV RBC AUTO: 91.1 FL (ref 80–99)
MONOCYTES # BLD AUTO: 0.58 10*3/MM3 (ref 0–1)
MONOCYTES NFR BLD AUTO: 9.4 % (ref 0–12)
NEUTROPHILS # BLD AUTO: 3.99 10*3/MM3 (ref 1.5–8.3)
NEUTROPHILS NFR BLD AUTO: 64.5 % (ref 41–71)
PLATELET # BLD AUTO: 229 10*3/MM3 (ref 150–450)
PMV BLD AUTO: 10 FL (ref 6–12)
POTASSIUM BLD-SCNC: 4.9 MMOL/L (ref 3.5–5.5)
RBC # BLD AUTO: 3.82 10*6/MM3 (ref 4.2–5.76)
SODIUM BLD-SCNC: 129 MMOL/L (ref 132–146)
TRIGL SERPL-MCNC: 52 MG/DL (ref 0–150)
WBC NRBC COR # BLD: 6.18 10*3/MM3 (ref 3.5–10.8)

## 2017-05-22 PROCEDURE — 99024 POSTOP FOLLOW-UP VISIT: CPT | Performed by: RADIOLOGY

## 2017-05-22 PROCEDURE — 85025 COMPLETE CBC W/AUTO DIFF WBC: CPT | Performed by: RADIOLOGY

## 2017-05-22 PROCEDURE — 36415 COLL VENOUS BLD VENIPUNCTURE: CPT

## 2017-05-22 PROCEDURE — 80061 LIPID PANEL: CPT | Performed by: RADIOLOGY

## 2017-05-22 PROCEDURE — 93880 EXTRACRANIAL BILAT STUDY: CPT | Performed by: INTERNAL MEDICINE

## 2017-05-22 PROCEDURE — 93880 EXTRACRANIAL BILAT STUDY: CPT

## 2017-05-22 PROCEDURE — 80048 BASIC METABOLIC PNL TOTAL CA: CPT | Performed by: RADIOLOGY

## 2017-05-23 LAB
BH CV ECHO MEAS - BSA(HAYCOCK): 1.9 M^2
BH CV ECHO MEAS - BSA: 1.9 M^2
BH CV ECHO MEAS - BZI_BMI: 22.5 KILOGRAMS/M^2
BH CV ECHO MEAS - BZI_METRIC_HEIGHT: 180.3 CM
BH CV ECHO MEAS - BZI_METRIC_WEIGHT: 73 KG
BH CV XLRA MEAS LEFT DIST CCA EDV: 14 CM/SEC
BH CV XLRA MEAS LEFT DIST CCA PSV: 121 CM/SEC
BH CV XLRA MEAS LEFT DIST ICA EDV: 27.5 CM/SEC
BH CV XLRA MEAS LEFT DIST ICA PSV: 127 CM/SEC
BH CV XLRA MEAS LEFT ICA/CCA RATIO: 1.9
BH CV XLRA MEAS LEFT MID ICA EDV: 17.3 CM/SEC
BH CV XLRA MEAS LEFT MID ICA PSV: 146 CM/SEC
BH CV XLRA MEAS LEFT PROX CCA EDV: 16.6 CM/SEC
BH CV XLRA MEAS LEFT PROX CCA PSV: 107 CM/SEC
BH CV XLRA MEAS LEFT PROX ECA EDV: 0 CM/SEC
BH CV XLRA MEAS LEFT PROX ECA PSV: 277 CM/SEC
BH CV XLRA MEAS LEFT PROX ICA EDV: 36.1 CM/SEC
BH CV XLRA MEAS LEFT PROX ICA PSV: 226 CM/SEC
BH CV XLRA MEAS LEFT PROX SCLA EDV: 0 CM/SEC
BH CV XLRA MEAS LEFT PROX SCLA PSV: 136 CM/SEC
BH CV XLRA MEAS LEFT VERTEBRAL A EDV: 9.3 CM/SEC
BH CV XLRA MEAS LEFT VERTEBRAL A PSV: 43.2 CM/SEC
BH CV XLRA MEAS RIGHT DIST CCA EDV: 14.3 CM/SEC
BH CV XLRA MEAS RIGHT DIST CCA PSV: 83.3 CM/SEC
BH CV XLRA MEAS RIGHT DIST ICA EDV: 26.2 CM/SEC
BH CV XLRA MEAS RIGHT DIST ICA PSV: 148 CM/SEC
BH CV XLRA MEAS RIGHT ICA/CCA RATIO: 2
BH CV XLRA MEAS RIGHT MID ICA EDV: 26.2 CM/SEC
BH CV XLRA MEAS RIGHT MID ICA PSV: 161 CM/SEC
BH CV XLRA MEAS RIGHT PROX CCA EDV: 17.5 CM/SEC
BH CV XLRA MEAS RIGHT PROX CCA PSV: 103 CM/SEC
BH CV XLRA MEAS RIGHT PROX ECA EDV: 0 CM/SEC
BH CV XLRA MEAS RIGHT PROX ECA PSV: 68.1 CM/SEC
BH CV XLRA MEAS RIGHT PROX ICA EDV: 35.8 CM/SEC
BH CV XLRA MEAS RIGHT PROX ICA PSV: 170 CM/SEC
BH CV XLRA MEAS RIGHT PROX SCLA EDV: 0 CM/SEC
BH CV XLRA MEAS RIGHT PROX SCLA PSV: 230 CM/SEC
BH CV XLRA MEAS RIGHT VERTEBRAL A EDV: 10.5 CM/SEC
BH CV XLRA MEAS RIGHT VERTEBRAL A PSV: 96.9 CM/SEC

## 2017-05-31 ENCOUNTER — CONSULT (OUTPATIENT)
Dept: CARDIOLOGY | Facility: CLINIC | Age: 82
End: 2017-05-31

## 2017-05-31 VITALS
SYSTOLIC BLOOD PRESSURE: 160 MMHG | DIASTOLIC BLOOD PRESSURE: 68 MMHG | HEART RATE: 64 BPM | HEIGHT: 71 IN | BODY MASS INDEX: 22.76 KG/M2 | WEIGHT: 162.6 LBS

## 2017-05-31 DIAGNOSIS — I25.10 CVD (CARDIOVASCULAR DISEASE): ICD-10-CM

## 2017-05-31 DIAGNOSIS — R60.9 EDEMA, UNSPECIFIED TYPE: ICD-10-CM

## 2017-05-31 DIAGNOSIS — I10 ESSENTIAL HYPERTENSION: Primary | ICD-10-CM

## 2017-05-31 DIAGNOSIS — E78.2 MIXED HYPERLIPIDEMIA: ICD-10-CM

## 2017-05-31 PROCEDURE — 99214 OFFICE O/P EST MOD 30 MIN: CPT | Performed by: INTERNAL MEDICINE

## 2017-05-31 RX ORDER — AMLODIPINE BESYLATE 5 MG/1
5 TABLET ORAL DAILY
Qty: 30 TABLET | Refills: 11 | Status: SHIPPED | OUTPATIENT
Start: 2017-05-31 | End: 2017-10-13 | Stop reason: SDUPTHER

## 2017-06-09 ENCOUNTER — LAB (OUTPATIENT)
Dept: LAB | Facility: HOSPITAL | Age: 82
End: 2017-06-09

## 2017-06-09 DIAGNOSIS — R60.9 EDEMA, UNSPECIFIED TYPE: ICD-10-CM

## 2017-06-09 DIAGNOSIS — I10 ESSENTIAL HYPERTENSION: ICD-10-CM

## 2017-06-09 LAB
ANION GAP SERPL CALCULATED.3IONS-SCNC: 5 MMOL/L (ref 3–11)
BNP SERPL-MCNC: 80 PG/ML (ref 0–100)
BUN BLD-MCNC: 12 MG/DL (ref 9–23)
BUN/CREAT SERPL: 12 (ref 7–25)
CALCIUM SPEC-SCNC: 9.8 MG/DL (ref 8.7–10.4)
CHLORIDE SERPL-SCNC: 96 MMOL/L (ref 99–109)
CO2 SERPL-SCNC: 30 MMOL/L (ref 20–31)
CREAT BLD-MCNC: 1 MG/DL (ref 0.6–1.3)
GFR SERPL CREATININE-BSD FRML MDRD: 71 ML/MIN/1.73
GLUCOSE BLD-MCNC: 101 MG/DL (ref 70–100)
POTASSIUM BLD-SCNC: 4.9 MMOL/L (ref 3.5–5.5)
SODIUM BLD-SCNC: 131 MMOL/L (ref 132–146)

## 2017-06-09 PROCEDURE — 83880 ASSAY OF NATRIURETIC PEPTIDE: CPT | Performed by: INTERNAL MEDICINE

## 2017-06-09 PROCEDURE — 80048 BASIC METABOLIC PNL TOTAL CA: CPT | Performed by: INTERNAL MEDICINE

## 2017-06-09 PROCEDURE — 36415 COLL VENOUS BLD VENIPUNCTURE: CPT

## 2017-06-26 ENCOUNTER — OFFICE VISIT (OUTPATIENT)
Dept: CARDIOLOGY | Facility: CLINIC | Age: 82
End: 2017-06-26

## 2017-06-26 VITALS
OXYGEN SATURATION: 98 % | HEART RATE: 68 BPM | DIASTOLIC BLOOD PRESSURE: 68 MMHG | BODY MASS INDEX: 22.68 KG/M2 | WEIGHT: 162 LBS | HEIGHT: 71 IN | SYSTOLIC BLOOD PRESSURE: 170 MMHG

## 2017-06-26 DIAGNOSIS — I65.23 BILATERAL CAROTID ARTERY STENOSIS: ICD-10-CM

## 2017-06-26 DIAGNOSIS — I10 ESSENTIAL HYPERTENSION: Primary | ICD-10-CM

## 2017-06-26 DIAGNOSIS — I25.10 CORONARY ARTERY DISEASE INVOLVING NATIVE CORONARY ARTERY OF NATIVE HEART WITHOUT ANGINA PECTORIS: ICD-10-CM

## 2017-06-26 PROCEDURE — 99213 OFFICE O/P EST LOW 20 MIN: CPT | Performed by: INTERNAL MEDICINE

## 2017-06-26 RX ORDER — LABETALOL 100 MG/1
100 TABLET, FILM COATED ORAL 3 TIMES DAILY
COMMUNITY
End: 2017-09-02 | Stop reason: SDUPTHER

## 2017-06-26 NOTE — PROGRESS NOTES
[facility]  Consultation H&P  Andrew Hernandez  1934  [phone]    VISIT DATE:  05/31/2017    PCP: Melvi Klein MD  [address] [place], [place] [id number]    IDENTIFICATION: A 83 y.o. 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   3/17 PRITESH restented- Given   Residual left nonobstructive internal carotid artery stenosis 50-69%    Hypothyroidism    Gastroesophageal reflux disease    Hypercholesterolemia   5/17 131/52/69/51    Hyponatremia  3-5/17 125-129    Allergies  Allergies   Allergen Reactions   • Rocephin [Ceftriaxone] Hives   • Amoxicillin Rash       Current Medications    Current Outpatient Prescriptions:   •  amLODIPine (NORVASC) 5 MG tablet, Take 1 tablet by mouth Daily., Disp: 30 tablet, Rfl: 11  •  atorvastatin (LIPITOR) 20 MG tablet, Take 1 tablet by mouth Every Night., Disp: 90 tablet, Rfl: 5  •  calcium carbonate (TUMS) 500 MG chewable tablet, Chew. TAKE AS DIRECTED., Disp: , Rfl:   •  clopidogrel (PLAVIX) 75 MG tablet, Take 1 tablet by mouth Daily., Disp: 90 tablet, Rfl: 5  •  labetalol (NORMODYNE) 100 MG tablet, Take 100 mg by mouth 3 (Three) Times a Day., Disp: , Rfl:   •  levothyroxine (SYNTHROID, LEVOTHROID) 50 MCG tablet, TAKE 1 TABLET BY MOUTH ONCE DAILY, Disp: 90 tablet, Rfl: 3  •  multivitamin (THERAGRAN) tablet tablet, Take 1 tablet by mouth daily., Disp: , Rfl:   •  raNITIdine (ZANTAC) 150 MG tablet, Take 1 tablet by mouth Daily As Needed for Heartburn or Indigestion., Disp: 90 tablet, Rfl: 1  •  tamsulosin (FLOMAX) 0.4 MG capsule 24 hr capsule, TAKE 1 CAPSULE BY MOUTH ONCE DAILY, Disp: 90  "capsule, Rfl: 3     History of Present Illness   HPI    Pt denies any chest pain, dyspnea at rest, dyspnea on exertion, orthopnea, PND, palpitations, lower extremity edema, or claudication. Pt denies history of CHF, DVT, PE, MI, CVA, TIA, or rheumatic fever. Pt denies family history of sudden cardiac death, MI, or aortic aneurysms.   He has improvement in his labile bps with adjustment off acei and addition of amlodipine. Some headaches w high sbp, interestingly notices his hand veins are prominent when his sbp is 110 or less.      ROS  ROS    SOCIAL HX  Social History     Social History   • Marital status:      Spouse name: N/A   • Number of children: N/A   • Years of education: N/A     Occupational History   • retired      Social History Main Topics   • Smoking status: Former Smoker     Packs/day: 1.00     Years: 55.00     Types: Cigarettes     Start date: 1950     Quit date: 2005   • Smokeless tobacco: Former User     Types: Chew     Quit date: 2005   • Alcohol use No      Comment: quit 1985   • Drug use: No   • Sexual activity: Not on file     Other Topics Concern   • Not on file     Social History Narrative    7 children - 2 daughters both live in [place]    5 sons, lives with Harriet Hernandez, son is Abhay, 3 other sons live in [place].      Other sons live in [place],        FAMILY HX  Family History   Problem Relation Age of Onset   • Heart disease Mother    • Hyperlipidemia Mother    • Hypertension Mother    • Prostate cancer Father    • Cancer Father    • Heart attack Brother    • Heart disease Brother    • Hyperlipidemia Brother    • Hypertension Brother        Vitals:    06/26/17 1502   BP: 170/68   BP Location: Left arm   Patient Position: Sitting   Pulse: 68   SpO2: 98%   Weight: 162 lb (73.5 kg)   Height: 71\" (180.3 cm)       PHYSICAL EXAMINATION:  Physical Exam   Constitutional: He appears well-developed and well-nourished.   Neck: Normal range of motion. Neck supple. No " hepatojugular reflux and no JVD present. Carotid bruit is not present. No tracheal deviation present. No thyromegaly present.   Cardiovascular: Normal rate, regular rhythm, S1 normal, S2 normal and intact distal pulses.  PMI is not displaced.  Exam reveals no gallop, no distant heart sounds, no friction rub, no midsystolic click and no opening snap.    Murmur heard.  Pulses:       Carotid pulses are on the right side with bruit, and on the left side with bruit.       Radial pulses are 2+ on the right side, and 2+ on the left side.        Dorsalis pedis pulses are 2+ on the right side, and 2+ on the left side.        Posterior tibial pulses are 2+ on the right side, and 2+ on the left side.   Pulmonary/Chest: Effort normal and breath sounds normal. He has no wheezes. He has no rales.   Abdominal: Soft. Bowel sounds are normal. He exhibits no mass. There is no tenderness. There is no guarding.       Diagnostic Data:  Procedures  Lab Results   Component Value Date    CHLPL 214 (H) 03/18/2016    TRIG 52 05/22/2017    HDL 69 (H) 05/22/2017    LDLDIRECT 51 05/22/2017     Lab Results   Component Value Date    GLUCOSE 101 (H) 06/09/2017    BUN 12 06/09/2017    CREATININE 1.00 06/09/2017     (L) 06/09/2017    K 4.9 06/09/2017    CL 96 (L) 06/09/2017    CO2 30.0 06/09/2017     Lab Results   Component Value Date    HGBA1C 5.1 03/19/2016     Lab Results   Component Value Date    WBC 6.18 05/22/2017    HGB 11.7 (L) 05/22/2017    HCT 34.8 (L) 05/22/2017     05/22/2017       ASSESSMENT:   Diagnosis Plan   1. Essential hypertension     2. Coronary artery disease involving native coronary artery of native heart without angina pectoris     3. Bilateral carotid artery stenosis           PLAN:     hyponatremia  Some improvement w dc of lisinopril.     initiate additional amlodipine 5 w sbp above 150.  Hl on statin    cvd asx post PRITESH restenting    Oziel Mcnair MD, [facility]

## 2017-07-03 ENCOUNTER — OFFICE VISIT (OUTPATIENT)
Dept: UROLOGY | Facility: CLINIC | Age: 82
End: 2017-07-03

## 2017-07-03 VITALS
DIASTOLIC BLOOD PRESSURE: 65 MMHG | WEIGHT: 162 LBS | SYSTOLIC BLOOD PRESSURE: 117 MMHG | HEART RATE: 67 BPM | TEMPERATURE: 98.4 F | BODY MASS INDEX: 22.68 KG/M2 | HEIGHT: 71 IN | OXYGEN SATURATION: 95 %

## 2017-07-03 DIAGNOSIS — N40.0 ENLARGED PROSTATE: Primary | ICD-10-CM

## 2017-07-03 PROCEDURE — 99213 OFFICE O/P EST LOW 20 MIN: CPT | Performed by: UROLOGY

## 2017-07-03 PROCEDURE — 81003 URINALYSIS AUTO W/O SCOPE: CPT | Performed by: UROLOGY

## 2017-07-03 NOTE — PROGRESS NOTES
Chief Complaint  Benign Prostatic Hypertrophy (6 months fup for enlarged prostate.)        ERICA Hernandez is a 83 y.o. male who returns today for follow-up of his known markedly enlarged prostate voiding okay as long as he takes his daily Flomax.  I suggested before that we put him on Proscar to shrink the gland and avoid problems in the future but he declines.  He states he's already taking a lot of medication.  He has a history of kidney stones but no symptoms recently.    Vitals:    07/03/17 1347   BP: 117/65   Pulse: 67   Temp: 98.4 °F (36.9 °C)   SpO2: 95%       Past Medical History  Past Medical History:   Diagnosis Date   • Carotid stenosis     s/p right ICA stent x 2   • Fracture    • Hyperlipidemia    • Hypertension    • Kidney infection    • Renal calculi    • Stroke 03/2016    right retinal artery occlusion       Past Surgical History  Past Surgical History:   Procedure Laterality Date   • CAROTID STENT Right 03/18/2016    Carotid Wall Stent   • CAROTID STENT Right 03/04/2017    Zilver BANDAR for recurrent right ICA stenosis   • KIDNEY STONE SURGERY Right 2011    Shafron - open   • PATELLA FRACTURE SURGERY Left 1986   • [place] TCAT IV STENT CRV CRTD ART EMBOLIC PROTECJ Right 3/4/2017    Placement of Zilver BANDAR right ICA 3/4/17       Medications  has a current medication list which includes the following prescription(s): amlodipine, atorvastatin, calcium carbonate, clopidogrel, labetalol, labetalol, levothyroxine, multivitamin, ranitidine, and tamsulosin.      Allergies  Allergies   Allergen Reactions   • Rocephin [Ceftriaxone] Hives   • Amoxicillin Rash       Social History  Social History     Social History Narrative    7 children - 2 daughters both live in [place]    5 sons, lives with Harriet Hernandez, son is Abhay, 3 other sons live in [place].      Other sons live in [place],        Family History  He has no family history of bladder or kidney cancer  He has no family history of kidney stones      AUA  Symptom Score:      Review of Systems  Review of Systems    Physical Exam  Physical Exam   Constitutional: He is oriented to person, place, and time. He appears well-developed and well-nourished.   HENT:   Head: Normocephalic and atraumatic.   Neck: Normal range of motion.   Pulmonary/Chest: Effort normal. No respiratory distress.   Abdominal: Soft. He exhibits no distension and no mass. There is no tenderness. No hernia.   Genitourinary: Rectum normal. Prostate is enlarged.   Musculoskeletal: Normal range of motion.   Lymphadenopathy:     He has no cervical adenopathy.   Neurological: He is alert and oriented to person, place, and time.   Skin: Skin is warm and dry.   Psychiatric: He has a normal mood and affect. His behavior is normal.   Vitals reviewed.      Labs Recent and today in the office:  Results for orders placed or performed in visit on 07/03/17   POC Urinalysis Dipstick, Automated   Result Value Ref Range    Color Yellow Yellow, Straw, Dark Yellow, Lety    Clarity, UA Clear Clear    Glucose, UA Negative Negative, 1000 mg/dL (3+) mg/dL    Bilirubin Negative Negative    Ketones, UA Negative Negative    Specific Gravity  1.015 1.005 - 1.030    Blood, UA Negative Negative    pH, Urine 6.5 5.0 - 8.0    Protein, POC Negative Negative mg/dL    Urobilinogen, UA Normal Normal    Leukocytes Negative Negative    Nitrite, UA Negative Negative         Assessment & Plan  Prostate is huge on digital rectal exam but no suspicious changes.  He declines my suggestion of Proscar again so I do suggested he return on an annual basis and keep taking his daily Flomax.

## 2017-09-05 RX ORDER — LABETALOL 100 MG/1
TABLET, FILM COATED ORAL
Qty: 180 TABLET | Refills: 3 | Status: SHIPPED | OUTPATIENT
Start: 2017-09-05 | End: 2018-04-24 | Stop reason: SDUPTHER

## 2017-10-13 RX ORDER — AMLODIPINE BESYLATE 5 MG/1
5 TABLET ORAL 2 TIMES DAILY
Qty: 60 TABLET | Refills: 11 | Status: SHIPPED | OUTPATIENT
Start: 2017-10-13 | End: 2018-04-24 | Stop reason: SDUPTHER

## 2017-10-18 ENCOUNTER — APPOINTMENT (OUTPATIENT)
Dept: CARDIOLOGY | Facility: HOSPITAL | Age: 82
End: 2017-10-18
Attending: RADIOLOGY

## 2017-10-23 ENCOUNTER — HOSPITAL ENCOUNTER (OUTPATIENT)
Dept: CARDIOLOGY | Facility: HOSPITAL | Age: 82
Discharge: HOME | End: 2017-10-23
Attending: RADIOLOGY | Admitting: RADIOLOGY

## 2017-10-23 VITALS — BODY MASS INDEX: 22.68 KG/M2 | HEIGHT: 71 IN | WEIGHT: 162 LBS

## 2017-10-23 DIAGNOSIS — I65.21 CAROTID STENOSIS, ASYMPTOMATIC, RIGHT: ICD-10-CM

## 2017-10-23 LAB
BH CV ECHO MEAS - BSA(HAYCOCK): 1.9 M^2
BH CV ECHO MEAS - BSA: 1.9 M^2
BH CV ECHO MEAS - BZI_BMI: 22.6 KILOGRAMS/M^2
BH CV ECHO MEAS - BZI_METRIC_HEIGHT: 180.3 CM
BH CV ECHO MEAS - BZI_METRIC_WEIGHT: 73.5 KG
BH CV XLRA MEAS LEFT CCA RATIO VEL: 141 CM/SEC
BH CV XLRA MEAS LEFT DIST CCA EDV: 16.8 CM/SEC
BH CV XLRA MEAS LEFT DIST CCA PSV: 141 CM/SEC
BH CV XLRA MEAS LEFT DIST ICA EDV: 22.6 CM/SEC
BH CV XLRA MEAS LEFT DIST ICA PSV: 99.2 CM/SEC
BH CV XLRA MEAS LEFT ICA RATIO VEL: 264 CM/SEC
BH CV XLRA MEAS LEFT ICA/CCA RATIO: 1.9
BH CV XLRA MEAS LEFT MID ICA EDV: 21.4 CM/SEC
BH CV XLRA MEAS LEFT MID ICA PSV: 121 CM/SEC
BH CV XLRA MEAS LEFT PROX CCA EDV: 13.5 CM/SEC
BH CV XLRA MEAS LEFT PROX CCA PSV: 129 CM/SEC
BH CV XLRA MEAS LEFT PROX ECA PSV: 244 CM/SEC
BH CV XLRA MEAS LEFT PROX ICA EDV: 44 CM/SEC
BH CV XLRA MEAS LEFT PROX ICA PSV: 264 CM/SEC
BH CV XLRA MEAS LEFT PROX SCLA PSV: 193 CM/SEC
BH CV XLRA MEAS LEFT VERTEBRAL A PSV: 31.8 CM/SEC
BH CV XLRA MEAS RIGHT CCA RATIO VEL: 94.3 CM/SEC
BH CV XLRA MEAS RIGHT DIST CCA EDV: 20.4 CM/SEC
BH CV XLRA MEAS RIGHT DIST CCA PSV: 94.3 CM/SEC
BH CV XLRA MEAS RIGHT DIST ICA EDV: 26.4 CM/SEC
BH CV XLRA MEAS RIGHT DIST ICA PSV: 148 CM/SEC
BH CV XLRA MEAS RIGHT ICA RATIO VEL: 171 CM/SEC
BH CV XLRA MEAS RIGHT ICA/CCA RATIO: 1.8
BH CV XLRA MEAS RIGHT MID ICA EDV: 32.7 CM/SEC
BH CV XLRA MEAS RIGHT MID ICA PSV: 163 CM/SEC
BH CV XLRA MEAS RIGHT PROX CCA EDV: 18.7 CM/SEC
BH CV XLRA MEAS RIGHT PROX CCA PSV: 100 CM/SEC
BH CV XLRA MEAS RIGHT PROX ECA PSV: 88 CM/SEC
BH CV XLRA MEAS RIGHT PROX ICA EDV: 28.9 CM/SEC
BH CV XLRA MEAS RIGHT PROX ICA PSV: 171 CM/SEC
BH CV XLRA MEAS RIGHT PROX SCLA PSV: 278 CM/SEC
BH CV XLRA MEAS RIGHT VERTEBRAL A PSV: 93.5 CM/SEC
LEFT ARM BP: NORMAL MMHG

## 2017-10-23 PROCEDURE — 93880 EXTRACRANIAL BILAT STUDY: CPT | Performed by: INTERNAL MEDICINE

## 2017-10-23 PROCEDURE — 93880 EXTRACRANIAL BILAT STUDY: CPT

## 2017-10-23 RX ORDER — LABETALOL 100 MG/1
100 TABLET, FILM COATED ORAL 3 TIMES DAILY
Qty: 180 TABLET | Refills: 3 | Status: SHIPPED | OUTPATIENT
Start: 2017-10-23 | End: 2018-01-11

## 2017-10-26 ENCOUNTER — OFFICE VISIT (OUTPATIENT)
Dept: INTERNAL MEDICINE | Facility: CLINIC | Age: 82
End: 2017-10-26

## 2017-10-26 VITALS
OXYGEN SATURATION: 98 % | HEART RATE: 69 BPM | HEIGHT: 71 IN | SYSTOLIC BLOOD PRESSURE: 110 MMHG | DIASTOLIC BLOOD PRESSURE: 70 MMHG | BODY MASS INDEX: 23.44 KG/M2 | WEIGHT: 167.4 LBS | RESPIRATION RATE: 12 BRPM

## 2017-10-26 DIAGNOSIS — D64.9 NORMOCYTIC ANEMIA: ICD-10-CM

## 2017-10-26 DIAGNOSIS — I63.421 CEREBRAL INFARCTION DUE TO EMBOLISM OF RIGHT ANTERIOR CEREBRAL ARTERY (HCC): ICD-10-CM

## 2017-10-26 DIAGNOSIS — I34.0 MITRAL VALVE INSUFFICIENCY, UNSPECIFIED ETIOLOGY: ICD-10-CM

## 2017-10-26 DIAGNOSIS — E87.1 HYPONATREMIA: ICD-10-CM

## 2017-10-26 DIAGNOSIS — E03.8 OTHER SPECIFIED HYPOTHYROIDISM: ICD-10-CM

## 2017-10-26 DIAGNOSIS — I10 BENIGN ESSENTIAL HYPERTENSION: Primary | ICD-10-CM

## 2017-10-26 DIAGNOSIS — E55.9 VITAMIN D DEFICIENCY: ICD-10-CM

## 2017-10-26 DIAGNOSIS — K21.9 GASTROESOPHAGEAL REFLUX DISEASE, ESOPHAGITIS PRESENCE NOT SPECIFIED: ICD-10-CM

## 2017-10-26 DIAGNOSIS — N40.0 BENIGN PROSTATIC HYPERPLASIA WITHOUT LOWER URINARY TRACT SYMPTOMS: ICD-10-CM

## 2017-10-26 DIAGNOSIS — E87.5 HYPERKALEMIA: ICD-10-CM

## 2017-10-26 DIAGNOSIS — I35.1 NONRHEUMATIC AORTIC VALVE INSUFFICIENCY: ICD-10-CM

## 2017-10-26 DIAGNOSIS — H34.11 CENTRAL RETINAL ARTERY OCCLUSION OF RIGHT EYE: ICD-10-CM

## 2017-10-26 DIAGNOSIS — K21.9 GASTROESOPHAGEAL REFLUX DISEASE WITHOUT ESOPHAGITIS: ICD-10-CM

## 2017-10-26 DIAGNOSIS — E78.00 HYPERCHOLESTEROLEMIA: ICD-10-CM

## 2017-10-26 LAB
25(OH)D3+25(OH)D2 SERPL-MCNC: 60.4 NG/ML
ALBUMIN SERPL-MCNC: 4.3 G/DL (ref 3.5–5)
ALBUMIN/GLOB SERPL: 1.7 G/DL (ref 1–2)
ALP SERPL-CCNC: 83 U/L (ref 38–126)
ALT SERPL-CCNC: 34 U/L (ref 13–69)
AST SERPL-CCNC: 28 U/L (ref 15–46)
BILIRUB SERPL-MCNC: 0.5 MG/DL (ref 0.2–1.3)
BUN SERPL-MCNC: 19 MG/DL (ref 7–20)
BUN/CREAT SERPL: 19 (ref 6.3–21.9)
CALCIUM SERPL-MCNC: 9.8 MG/DL (ref 8.4–10.2)
CHLORIDE SERPL-SCNC: 96 MMOL/L (ref 98–107)
CO2 SERPL-SCNC: 27 MMOL/L (ref 26–30)
CREAT SERPL-MCNC: 1 MG/DL (ref 0.6–1.3)
ERYTHROCYTE [DISTWIDTH] IN BLOOD BY AUTOMATED COUNT: 13 % (ref 11.5–14.5)
GFR SERPLBLD CREATININE-BSD FMLA CKD-EPI: 71 ML/MIN/1.73
GFR SERPLBLD CREATININE-BSD FMLA CKD-EPI: 86 ML/MIN/1.73
GLOBULIN SER CALC-MCNC: 2.6 GM/DL
GLUCOSE SERPL-MCNC: 91 MG/DL (ref 74–98)
HCT VFR BLD AUTO: 38.6 % (ref 42–52)
HGB BLD-MCNC: 13.2 G/DL (ref 14–18)
IRON SATN MFR SERPL: 23 % (ref 11–46)
IRON SERPL-MCNC: 82 MCG/DL (ref 37–181)
MCH RBC QN AUTO: 30.2 PG (ref 27–31)
MCHC RBC AUTO-ENTMCNC: 34.2 G/DL (ref 30–37)
MCV RBC AUTO: 88.3 FL (ref 80–94)
PLATELET # BLD AUTO: 213 10*3/MM3 (ref 130–400)
POTASSIUM SERPL-SCNC: 5 MMOL/L (ref 3.5–5.1)
PROT SERPL-MCNC: 6.9 G/DL (ref 6.3–8.2)
RBC # BLD AUTO: 4.37 10*6/MM3 (ref 4.7–6.1)
SODIUM SERPL-SCNC: 134 MMOL/L (ref 137–145)
TIBC SERPL-MCNC: 351 MCG/DL (ref 261–497)
UIBC SERPL-MCNC: 269 MCG/DL
VIT B12 SERPL-MCNC: 681 PG/ML (ref 239–931)
WBC # BLD AUTO: 7.34 10*3/MM3 (ref 4.8–10.8)

## 2017-10-26 PROCEDURE — G0439 PPPS, SUBSEQ VISIT: HCPCS | Performed by: FAMILY MEDICINE

## 2017-10-26 RX ORDER — RANITIDINE 150 MG/1
150 TABLET ORAL DAILY PRN
Qty: 90 TABLET | Refills: 1 | Status: SHIPPED | OUTPATIENT
Start: 2017-10-26 | End: 2018-10-29 | Stop reason: SDUPTHER

## 2017-10-26 RX ORDER — LEVOTHYROXINE SODIUM 0.05 MG/1
50 TABLET ORAL DAILY
Qty: 90 TABLET | Refills: 3 | Status: SHIPPED | OUTPATIENT
Start: 2017-10-26 | End: 2019-01-03

## 2017-11-08 ENCOUNTER — OFFICE VISIT (OUTPATIENT)
Dept: NEUROSURGERY | Facility: CLINIC | Age: 82
End: 2017-11-08

## 2017-11-08 VITALS
DIASTOLIC BLOOD PRESSURE: 60 MMHG | TEMPERATURE: 97.8 F | HEIGHT: 71 IN | WEIGHT: 167 LBS | BODY MASS INDEX: 23.38 KG/M2 | SYSTOLIC BLOOD PRESSURE: 120 MMHG

## 2017-11-08 DIAGNOSIS — I65.23 CAROTID STENOSIS, BILATERAL: Primary | ICD-10-CM

## 2017-11-08 DIAGNOSIS — I65.21 CAROTID STENOSIS, ASYMPTOMATIC, RIGHT: ICD-10-CM

## 2017-11-08 PROCEDURE — 99213 OFFICE O/P EST LOW 20 MIN: CPT | Performed by: RADIOLOGY

## 2017-11-08 RX ORDER — ATORVASTATIN CALCIUM 20 MG/1
20 TABLET, FILM COATED ORAL NIGHTLY
Qty: 90 TABLET | Refills: 5 | Status: SHIPPED | OUTPATIENT
Start: 2017-11-08 | End: 2019-01-03

## 2017-11-08 RX ORDER — CLOPIDOGREL BISULFATE 75 MG/1
75 TABLET ORAL DAILY
Qty: 90 TABLET | Refills: 5 | Status: SHIPPED | OUTPATIENT
Start: 2017-11-08 | End: 2018-11-26 | Stop reason: SDUPTHER

## 2017-11-08 NOTE — PROGRESS NOTES
NAME: RENE MARRERO   DOS: 2017  : 1934  PCP: Melvi Klein MD    Chief Complaint:    Chief Complaint   Patient presents with   • Carotid Stenosis, Right     s/p Zilver Carotid stent placement 3/4/17       History of Present Illness:  83 y.o. male known to the neuro interventional service, having undergone prior angioplasty and stent placement for a symptomatic, high-grade right carotid stenosis on 3/18/2016. 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 He underwent intervention and 2016 with angioplasty and stent placement, restoring a near normal caliber lumen and resulting in improved perfusion to the right cerebral hemisphere. He did very well following his procedure, and had no new or recurrent strokelike symptoms.       Upon routine follow-up in the neuro interventional clinic, carotid duplex ultrasound demonstrated a recurrent, high-grade right carotid stenosis (in-stent restenosis).  Given the symptomatic nature and rapid recurrence of disease, he was tentatively planned to have his recurrent stenosis treated electively with angioplasty and stent placement (drug-eluting stent); however, PAT testing revealed low sodium and high potassium, and thus he presented to the emergency department for further evaluation and was subsequently admitted. His electrolytes were treated, and he underwent angioplasty and stent placement for his recurrent, high-grade right internal carotid stenosis on 3/4/2017 with placement of an 8 x 40 mm Zilver drug-eluting stent. This resulted in restoration of near normal caliber lumen within the right internal carotid artery and markedly improved perfusion to the right cerebral hemisphere. Carotid duplex following the procedure demonstrated markedly improved velocities within the right internal carotid artery with a widely patent stent.  He did have significant progression of atherosclerotic disease  "involving the left vertebral artery origin noted, but the right vertebral artery is widely patent and \"codominant\" in nature, and thus this is being managed medically.     He has continued his do very well since I've seen him last.  While his vision loss in his right has not significantly improved, he has no new or recurrent strokelike symptoms.  Specifically, no new vision changes, no unilateral weakness or numbness.  He's tolerating Plavix antiplatelet regimen and statin therapy very well.        Past Medical History:  Past Medical History:   Diagnosis Date   • Carotid stenosis     s/p right ICA stent x 2   • Fracture    • Hyperlipidemia    • Hypertension    • Kidney infection    • Renal calculi    • Stroke 03/2016    right retinal artery occlusion       Past Surgical History:  Past Surgical History:   Procedure Laterality Date   • CAROTID STENT Right 03/18/2016    Carotid Wall Stent   • CAROTID STENT Right 03/04/2017    Zilver BANDAR for recurrent right ICA stenosis   • KIDNEY STONE SURGERY Right 2011    Shafron - open   • PATELLA FRACTURE SURGERY Left 1986   • [place] TCAT IV STENT CRV CRTD ART EMBOLIC PROTECJ Right 3/4/2017    Placement of Zilver BANDAR right ICA 3/4/17       Review of Systems:        Review of Systems   Constitutional: Negative for activity change, appetite change, chills, diaphoresis, fatigue, fever and unexpected weight change.   HENT: Negative for congestion, dental problem, drooling, ear discharge, ear pain, facial swelling, hearing loss, mouth sores, nosebleeds, postnasal drip, rhinorrhea, sinus pressure, sneezing, sore throat, tinnitus, trouble swallowing and voice change.    Eyes: Negative for photophobia, pain, discharge, redness, itching and visual disturbance.   Respiratory: Positive for shortness of breath. Negative for apnea, cough, choking, chest tightness, wheezing and stridor.    Cardiovascular: Negative for chest pain, palpitations and leg swelling.   Gastrointestinal: Negative for " abdominal distention, abdominal pain, anal bleeding, blood in stool, constipation, diarrhea, nausea, rectal pain and vomiting.   Endocrine: Negative for cold intolerance, heat intolerance, polydipsia, polyphagia and polyuria.   Genitourinary: Negative for decreased urine volume, difficulty urinating, dysuria, enuresis, flank pain, frequency, genital sores, hematuria and urgency.   Musculoskeletal: Negative for arthralgias, back pain, gait problem, joint swelling, myalgias, neck pain and neck stiffness.   Skin: Negative for color change, pallor, rash and wound.   Allergic/Immunologic: Negative for environmental allergies, food allergies and immunocompromised state.   Neurological: Negative for dizziness, tremors, seizures, syncope, facial asymmetry, speech difficulty, weakness, light-headedness, numbness and headaches.   Hematological: Negative for adenopathy. Does not bruise/bleed easily.   Psychiatric/Behavioral: Negative for agitation, behavioral problems, confusion, decreased concentration, dysphoric mood, hallucinations, self-injury, sleep disturbance and suicidal ideas. The patient is not nervous/anxious and is not hyperactive.    All other systems reviewed and are negative.       Medications    Current Outpatient Prescriptions:   •  amLODIPine (NORVASC) 5 MG tablet, Take 1 tablet by mouth 2 (Two) Times a Day. Only take evening dose if systolic blood pressure is over 150., Disp: 60 tablet, Rfl: 11  •  atorvastatin (LIPITOR) 20 MG tablet, Take 1 tablet by mouth Every Night., Disp: 90 tablet, Rfl: 5  •  calcium carbonate (TUMS) 500 MG chewable tablet, Chew. TAKE AS DIRECTED., Disp: , Rfl:   •  clopidogrel (PLAVIX) 75 MG tablet, Take 1 tablet by mouth Daily., Disp: 90 tablet, Rfl: 5  •  labetalol (NORMODYNE) 100 MG tablet, Take 2 tablets by mouth three times a day if BP is over 150 systolic., Disp: 180 tablet, Rfl: 3  •  labetalol (NORMODYNE) 100 MG tablet, Take 2 tablets by mouth three times a day if BP is over  150 systolic., Disp: 180 tablet, Rfl: 3  •  levothyroxine (SYNTHROID, LEVOTHROID) 50 MCG tablet, TAKE 1 TABLET BY MOUTH ONCE DAILY, Disp: 90 tablet, Rfl: 3  •  multivitamin (THERAGRAN) tablet tablet, Take 1 tablet by mouth daily., Disp: , Rfl:   •  raNITIdine (ZANTAC) 150 MG tablet, Take 1 tablet by mouth Daily As Needed for Heartburn or Indigestion., Disp: 90 tablet, Rfl: 1  •  tamsulosin (FLOMAX) 0.4 MG capsule 24 hr capsule, TAKE 1 CAPSULE BY MOUTH ONCE DAILY, Disp: 90 capsule, Rfl: 3    Allergies:  Allergies   Allergen Reactions   • Rocephin [Ceftriaxone] Hives   • Amoxicillin Rash       Social Hx:  Social History   Substance Use Topics   • Smoking status: Former Smoker     Packs/day: 1.00     Years: 55.00     Types: Cigarettes     Start date: 1950     Quit date: 2005   • Smokeless tobacco: Former User     Types: Chew     Quit date: 2005   • Alcohol use No      Comment: quit 1985       Family Hx:  Family History   Problem Relation Age of Onset   • Heart disease Mother    • Hyperlipidemia Mother    • Hypertension Mother    • Prostate cancer Father    • Cancer Father    • Heart attack Brother    • Heart disease Brother    • Hyperlipidemia Brother    • Hypertension Brother        Review of Imaging:  Carotid duplex from 10/23/2017 at [facility] was reviewed along with its corresponding radiologic report.  Comparison is made to multiple prior studies of [facility], the most recent being on 5/22/2017.  The right carotid stent remains widely patent, without evidence of recurrent hemodynamically significant stenosis.  Peak velocities within the right carotid vasculature are 171/29 cm/s (was 170/36 cm/s).  Atherosclerotic plaque at the left carotid bifurcation appears stable, with peak velocities in the left internal carotid artery of 264/44 cm/s (was 226/36 cm/s), this correlated to an approximately 50% (or less) stenosis on prior catheter angiogram from 2016.  Antegrade flow is present  within the bilateral vertebral arteries.    Physical Examination:  Vitals:    11/08/17 1214   BP: 120/60   Temp:         General Appearance:   Well developed, well nourished, well groomed, alert, and cooperative.  Cardiovascular: Regular rate and rhythm. No carotid bruits      Neurological examination:  Neurologic Exam     Mental Status   Oriented to person, place, and time.   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     CN V   Facial sensation intact.     CN VII   Facial expression full, symmetric.     CN VIII   CN VIII normal.     CN IX, X   CN IX normal.     CN XI   CN XI normal.     CN XII   CN XII normal.     Motor Exam     Strength   Strength 5/5 throughout.     Sensory Exam   Light touch normal.     Gait, Coordination, and Reflexes     Gait  Gait: normal      Diagnoses/Plan:    Mr. Hernandez is a 83 y.o. male known to the neuro interventional service, having initially been treated for a symptomatic, high-grade right internal carotid stenosis on 3/18/2016 with placement of bare metal stent. He did very well following this procedure, but had carotid duplex demonstrating recurrent, high-grade stenosis (in-stent restenosis), and thus he had placement of a Zilver drug-eluting stent on 3/4/2017 restoring a near normal caliber lumen. He's made an uneventful recovery following his procedure with no new neurologic deficit or complicating feature.   rotted duplex on 10/23/2017 demonstrate a widely patent right carotid stent, without recurrent hemodynamically significant stenosis.  Additional plaque appears essentially stable within the remainder of his cervical vasculature.   I do think it would be prudent for him to continue Plavix antiplatelet regimen indefinitely, not only for his right carotid stents, but for  additional carotid/vertebral disease.      I plan on seeing him back in 12 months time with a carotid duplex, to ensure stability and exclude any progression of disease and might necessitate further treatment/intervention.

## 2018-01-11 ENCOUNTER — OFFICE VISIT (OUTPATIENT)
Dept: CARDIOLOGY | Facility: CLINIC | Age: 83
End: 2018-01-11

## 2018-01-11 VITALS
SYSTOLIC BLOOD PRESSURE: 142 MMHG | HEART RATE: 76 BPM | HEIGHT: 71 IN | WEIGHT: 170.2 LBS | BODY MASS INDEX: 23.83 KG/M2 | DIASTOLIC BLOOD PRESSURE: 58 MMHG

## 2018-01-11 DIAGNOSIS — I67.9 CVD (CEREBROVASCULAR DISEASE): ICD-10-CM

## 2018-01-11 DIAGNOSIS — I10 ESSENTIAL HYPERTENSION: Primary | ICD-10-CM

## 2018-01-11 DIAGNOSIS — E78.2 MIXED HYPERLIPIDEMIA: ICD-10-CM

## 2018-01-11 PROCEDURE — 99213 OFFICE O/P EST LOW 20 MIN: CPT | Performed by: INTERNAL MEDICINE

## 2018-01-11 NOTE — PROGRESS NOTES
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   3/17 PRITESH restented- Given   Residual left nonobstructive internal carotid artery stenosis 50-69%    Hypothyroidism    Gastroesophageal reflux disease    Hypercholesterolemia   5/17 131/52/69/51    Hyponatremia  10/17 134    Allergies  Allergies   Allergen Reactions   • Rocephin [Ceftriaxone] Hives   • Amoxicillin Rash       Current Medications    Current Outpatient Prescriptions:   •  amLODIPine (NORVASC) 5 MG tablet, Take 1 tablet by mouth 2 (Two) Times a Day. Only take evening dose if systolic blood pressure is over 150., Disp: 60 tablet, Rfl: 11  •  atorvastatin (LIPITOR) 20 MG tablet, Take 1 tablet by mouth Every Night., Disp: 90 tablet, Rfl: 5  •  calcium carbonate (TUMS) 500 MG chewable tablet, Chew. TAKE AS DIRECTED., Disp: , Rfl:   •  clopidogrel (PLAVIX) 75 MG tablet, Take 1 tablet by mouth Daily., Disp: 90 tablet, Rfl: 5  •  labetalol (NORMODYNE) 100 MG tablet, Take 2 tablets by mouth three times a day if BP is over 150 systolic., Disp: 180 tablet, Rfl: 3  •  levothyroxine (SYNTHROID, LEVOTHROID) 50 MCG tablet, TAKE 1 TABLET BY MOUTH ONCE DAILY, Disp: 90 tablet, Rfl: 3  •  multivitamin (THERAGRAN) tablet tablet, Take 1 tablet by mouth daily., Disp: , Rfl:   •  raNITIdine (ZANTAC) 150 MG tablet, Take 1 tablet by mouth Daily As Needed for  "Heartburn or Indigestion., Disp: 90 tablet, Rfl: 1  •  tamsulosin (FLOMAX) 0.4 MG capsule 24 hr capsule, TAKE 1 CAPSULE BY MOUTH ONCE DAILY, Disp: 90 capsule, Rfl: 3     History of Present Illness   HPI    Pt denies any chest pain, dyspnea at rest, dyspnea on exertion, orthopnea, PND, palpitations, lower extremity edema, or claudication. Pt denies history of CHF, DVT, PE, MI, CVA, TIA, or rheumatic fever. Pt denies family history of sudden cardiac death, MI, or aortic aneurysms.   He has improvement in his labile bps with adjustment off acei and addition of amlodipine. Has been avoiding high sodium soups given these spike his BP.    ROS  ROS    SOCIAL HX  Social History     Social History   • Marital status:      Spouse name: N/A   • Number of children: N/A   • Years of education: N/A     Occupational History   • retired      Social History Main Topics   • Smoking status: Former Smoker     Packs/day: 1.00     Years: 55.00     Types: Cigarettes     Start date: 1950     Quit date: 2005   • Smokeless tobacco: Former User     Types: Chew     Quit date: 2005   • Alcohol use No      Comment: quit 1985   • Drug use: No   • Sexual activity: Defer     Other Topics Concern   • Not on file     Social History Narrative    7 children - 2 daughters both live in [place]    5 sons, lives with Harriet Hernandez, son is Abhay, 3 other sons live in [place].      Other sons live in [place],        FAMILY HX  Family History   Problem Relation Age of Onset   • Heart disease Mother    • Hyperlipidemia Mother    • Hypertension Mother    • Prostate cancer Father    • Cancer Father    • Heart attack Brother    • Heart disease Brother    • Hyperlipidemia Brother    • Hypertension Brother        Vitals:    01/11/18 1013   BP: 142/58   BP Location: Right arm   Patient Position: Sitting   Pulse: 76   Weight: 77.2 kg (170 lb 3.2 oz)   Height: 180.3 cm (71\")       PHYSICAL EXAMINATION:  Physical Exam   Constitutional: He appears " well-developed and well-nourished.   Neck: Normal range of motion. Neck supple. No hepatojugular reflux and no JVD present. Carotid bruit is not present. No tracheal deviation present. No thyromegaly present.   Cardiovascular: Normal rate, regular rhythm, S1 normal, S2 normal and intact distal pulses.  PMI is not displaced.  Exam reveals no gallop, no distant heart sounds, no friction rub, no midsystolic click and no opening snap.    Murmur heard.  Pulses:       Carotid pulses are on the right side with bruit, and on the left side with bruit.       Radial pulses are 2+ on the right side, and 2+ on the left side.        Dorsalis pedis pulses are 2+ on the right side, and 2+ on the left side.        Posterior tibial pulses are 2+ on the right side, and 2+ on the left side.   Pulmonary/Chest: Effort normal and breath sounds normal. He has no wheezes. He has no rales.   Abdominal: Soft. Bowel sounds are normal. He exhibits no mass. There is no tenderness. There is no guarding.       Diagnostic Data:  Procedures  Lab Results   Component Value Date    CHLPL 214 (H) 03/18/2016    TRIG 52 05/22/2017    HDL 69 (H) 05/22/2017    LDLDIRECT 51 05/22/2017     Lab Results   Component Value Date    GLUCOSE 101 (H) 06/09/2017    BUN 19 10/26/2017    CREATININE 1.00 10/26/2017     (L) 10/26/2017    K 5.0 10/26/2017    CL 96 (L) 10/26/2017    CO2 27.0 10/26/2017     Lab Results   Component Value Date    HGBA1C 5.1 03/19/2016     Lab Results   Component Value Date    WBC 7.34 10/26/2017    HGB 13.2 (L) 10/26/2017    HCT 38.6 (L) 10/26/2017     10/26/2017       ASSESSMENT:   Diagnosis Plan   1. Essential hypertension     2. Mixed hyperlipidemia     3. CVD (cerebrovascular disease)           PLAN:         initiated additional amlodipine 5 w sbp above 150 with good response    Hl on statin    cvd asx post PRITESH restenting    Oziel Mcnair MD, [facility]

## 2018-02-21 DIAGNOSIS — N40.0 BENIGN PROSTATIC HYPERPLASIA WITHOUT LOWER URINARY TRACT SYMPTOMS: ICD-10-CM

## 2018-02-22 RX ORDER — TAMSULOSIN HYDROCHLORIDE 0.4 MG/1
CAPSULE ORAL
Qty: 90 CAPSULE | Refills: 3 | Status: SHIPPED | OUTPATIENT
Start: 2018-02-22 | End: 2019-03-13 | Stop reason: SDUPTHER

## 2018-04-05 ENCOUNTER — OFFICE VISIT (OUTPATIENT)
Dept: INTERNAL MEDICINE | Facility: CLINIC | Age: 83
End: 2018-04-05

## 2018-04-05 VITALS
OXYGEN SATURATION: 98 % | SYSTOLIC BLOOD PRESSURE: 140 MMHG | DIASTOLIC BLOOD PRESSURE: 52 MMHG | HEART RATE: 80 BPM | TEMPERATURE: 97.7 F

## 2018-04-05 DIAGNOSIS — N30.00 ACUTE CYSTITIS WITHOUT HEMATURIA: Primary | ICD-10-CM

## 2018-04-05 LAB
BILIRUB BLD-MCNC: NEGATIVE MG/DL
CLARITY, POC: ABNORMAL
COLOR UR: YELLOW
GLUCOSE UR STRIP-MCNC: NEGATIVE MG/DL
KETONES UR QL: NEGATIVE
LEUKOCYTE EST, POC: ABNORMAL
NITRITE UR-MCNC: NEGATIVE MG/ML
PH UR: 8 [PH] (ref 5–8)
PROT UR STRIP-MCNC: ABNORMAL MG/DL
RBC # UR STRIP: ABNORMAL /UL
SP GR UR: 1.01 (ref 1–1.03)
UROBILINOGEN UR QL: NORMAL

## 2018-04-05 PROCEDURE — 81003 URINALYSIS AUTO W/O SCOPE: CPT | Performed by: INTERNAL MEDICINE

## 2018-04-05 PROCEDURE — 99213 OFFICE O/P EST LOW 20 MIN: CPT | Performed by: INTERNAL MEDICINE

## 2018-04-05 RX ORDER — ONDANSETRON 4 MG/1
4 TABLET, ORALLY DISINTEGRATING ORAL EVERY 8 HOURS PRN
Qty: 10 TABLET | Refills: 0 | Status: SHIPPED | OUTPATIENT
Start: 2018-04-05 | End: 2018-10-29

## 2018-04-05 RX ORDER — SULFAMETHOXAZOLE AND TRIMETHOPRIM 800; 160 MG/1; MG/1
1 TABLET ORAL 2 TIMES DAILY WITH MEALS
Qty: 14 TABLET | Refills: 0 | Status: SHIPPED | OUTPATIENT
Start: 2018-04-05 | End: 2018-04-24

## 2018-04-05 NOTE — PROGRESS NOTES
Chief Complaint   Patient presents with   • Urinary Tract Infection     Pt states he's been chilling, aching, burning with urinartion, and trouble with urination X last couple of days.     Subjective   Andrew Hernandez is a 84 y.o. male.     Urinary Tract Infection    This is a new problem. The current episode started in the past 7 days. The problem occurs every urination. The problem has been gradually worsening. The quality of the pain is described as burning. The pain is at a severity of 5/10. The pain is moderate. There has been no fever. There is a history of pyelonephritis. Associated symptoms include chills, frequency, hesitancy, nausea and urgency. Pertinent negatives include no flank pain, hematuria, sweats or vomiting. He has tried acetaminophen for the symptoms. The treatment provided mild relief. His past medical history is significant for kidney stones. has had two UTI since kidney stone in 2011        The following portions of the patient's history were reviewed and updated as appropriate: allergies, current medications, past family history, past medical history, past social history, past surgical history and problem list.    Review of Systems   Constitutional: Positive for chills.   Gastrointestinal: Positive for nausea. Negative for vomiting.   Genitourinary: Positive for frequency, hesitancy and urgency. Negative for flank pain and hematuria.       Objective   /52   Pulse 80   Temp 97.7 °F (36.5 °C)   SpO2 98%   There is no height or weight on file to calculate BMI.  Physical Exam   Constitutional: He is oriented to person, place, and time. He appears well-developed and well-nourished.   HENT:   Head: Normocephalic and atraumatic.   Eyes: EOM are normal. Pupils are equal, round, and reactive to light.   Neck: Normal range of motion. Neck supple.   Cardiovascular: Normal rate, regular rhythm and normal heart sounds.    No murmur heard.  Pulmonary/Chest: Effort normal and breath sounds normal.  No respiratory distress.   Abdominal: Soft. Bowel sounds are normal. He exhibits no distension and no mass. There is no rebound and no guarding.   Mild suprapubic tenderness, no CVA tenderness   Neurological: He is alert and oriented to person, place, and time. No cranial nerve deficit.   Psychiatric: He has a normal mood and affect. Judgment normal.   Nursing note and vitals reviewed.      Assessment/Plan   Andrew Hernandez is here today and the following problems have been addressed:      Andrew was seen today for urinary tract infection.    Diagnoses and all orders for this visit:    Acute cystitis without hematuria    Other orders  -     sulfamethoxazole-trimethoprim (BACTRIM DS,SEPTRA DS) 800-160 MG per tablet; Take 1 tablet by mouth 2 (Two) Times a Day With Meals.  -     ondansetron ODT (ZOFRAN-ODT) 4 MG disintegrating tablet; Take 1 tablet by mouth Every 8 (Eight) Hours As Needed for Nausea or Vomiting.    UA with 500 LE, 250 blood, UC sent  Given bactrim DS BID for one week  Increase fluids and rest  See PCP as scheduled and have repeat UA then later this month    RTC if sxs worsen or persist    Please note that portions of this note were completed with a voice recognition program.  Efforts were made to edit dictation, but occasionally words are mistranscribed.

## 2018-04-09 LAB
BACTERIA UR CULT: ABNORMAL
BACTERIA UR CULT: ABNORMAL
OTHER ANTIBIOTIC SUSC ISLT: ABNORMAL

## 2018-04-24 ENCOUNTER — OFFICE VISIT (OUTPATIENT)
Dept: INTERNAL MEDICINE | Facility: CLINIC | Age: 83
End: 2018-04-24

## 2018-04-24 VITALS
SYSTOLIC BLOOD PRESSURE: 138 MMHG | HEIGHT: 71 IN | HEART RATE: 72 BPM | TEMPERATURE: 98.2 F | BODY MASS INDEX: 24.13 KG/M2 | DIASTOLIC BLOOD PRESSURE: 60 MMHG | WEIGHT: 172.38 LBS | OXYGEN SATURATION: 98 %

## 2018-04-24 DIAGNOSIS — I10 BENIGN ESSENTIAL HYPERTENSION: Primary | ICD-10-CM

## 2018-04-24 DIAGNOSIS — D64.9 NORMOCYTIC ANEMIA: ICD-10-CM

## 2018-04-24 DIAGNOSIS — R39.14 BENIGN PROSTATIC HYPERPLASIA WITH INCOMPLETE BLADDER EMPTYING: ICD-10-CM

## 2018-04-24 DIAGNOSIS — E03.4 HYPOTHYROIDISM DUE TO ACQUIRED ATROPHY OF THYROID: ICD-10-CM

## 2018-04-24 DIAGNOSIS — N40.1 BENIGN PROSTATIC HYPERPLASIA WITH INCOMPLETE BLADDER EMPTYING: ICD-10-CM

## 2018-04-24 PROCEDURE — 99214 OFFICE O/P EST MOD 30 MIN: CPT | Performed by: FAMILY MEDICINE

## 2018-04-24 RX ORDER — LABETALOL 100 MG/1
100 TABLET, FILM COATED ORAL 3 TIMES DAILY
Qty: 180 TABLET | Refills: 1 | Status: SHIPPED | OUTPATIENT
Start: 2018-04-24 | End: 2018-06-27 | Stop reason: SDUPTHER

## 2018-04-24 RX ORDER — AMLODIPINE BESYLATE 5 MG/1
TABLET ORAL
Qty: 180 TABLET | Refills: 1 | Status: SHIPPED | OUTPATIENT
Start: 2018-04-24 | End: 2018-11-26 | Stop reason: SDUPTHER

## 2018-04-25 LAB
APPEARANCE UR: CLEAR
BACTERIA #/AREA URNS HPF: ABNORMAL /HPF
BILIRUB UR QL STRIP: NEGATIVE
BUN SERPL-MCNC: 15 MG/DL (ref 7–20)
BUN/CREAT SERPL: 16.7 (ref 6.3–21.9)
CALCIUM SERPL-MCNC: 9.7 MG/DL (ref 8.4–10.2)
CHLORIDE SERPL-SCNC: 92 MMOL/L (ref 98–107)
CO2 SERPL-SCNC: 28 MMOL/L (ref 26–30)
COLOR UR: YELLOW
CREAT SERPL-MCNC: 0.9 MG/DL (ref 0.6–1.3)
EPI CELLS #/AREA URNS HPF: ABNORMAL /HPF
ERYTHROCYTE [DISTWIDTH] IN BLOOD BY AUTOMATED COUNT: 13.5 % (ref 11.5–14.5)
GFR SERPLBLD CREATININE-BSD FMLA CKD-EPI: 80 ML/MIN/1.73
GFR SERPLBLD CREATININE-BSD FMLA CKD-EPI: 97 ML/MIN/1.73
GLUCOSE SERPL-MCNC: 98 MG/DL (ref 74–98)
GLUCOSE UR QL: NEGATIVE
HCT VFR BLD AUTO: 38.8 % (ref 42–52)
HGB BLD-MCNC: 13 G/DL (ref 14–18)
HGB UR QL STRIP: NEGATIVE
KETONES UR QL STRIP: NEGATIVE
LEUKOCYTE ESTERASE UR QL STRIP: (no result)
MCH RBC QN AUTO: 29.7 PG (ref 27–31)
MCHC RBC AUTO-ENTMCNC: 33.5 G/DL (ref 30–37)
MCV RBC AUTO: 88.6 FL (ref 80–94)
NITRITE UR QL STRIP: NEGATIVE
PH UR STRIP: 7.5 [PH] (ref 5–8)
PHOSPHATE SERPL-MCNC: 3.9 MG/DL (ref 2.5–4.5)
PLATELET # BLD AUTO: 292 10*3/MM3 (ref 130–400)
POTASSIUM SERPL-SCNC: 5.2 MMOL/L (ref 3.5–5.1)
PROT UR QL STRIP: NEGATIVE
RBC # BLD AUTO: 4.38 10*6/MM3 (ref 4.7–6.1)
RBC #/AREA URNS HPF: ABNORMAL /HPF
SODIUM SERPL-SCNC: 132 MMOL/L (ref 137–145)
SP GR UR: 1.02 (ref 1–1.03)
T4 SERPL-MCNC: 9.6 UG/DL (ref 4.5–12)
TSH SERPL DL<=0.005 MIU/L-ACNC: 4.04 MIU/ML (ref 0.47–4.68)
UROBILINOGEN UR STRIP-MCNC: (no result) MG/DL
WBC # BLD AUTO: 6.65 10*3/MM3 (ref 4.8–10.8)
WBC #/AREA URNS HPF: ABNORMAL /HPF

## 2018-06-27 DIAGNOSIS — I10 BENIGN ESSENTIAL HYPERTENSION: ICD-10-CM

## 2018-06-27 RX ORDER — LABETALOL 100 MG/1
100 TABLET, FILM COATED ORAL 3 TIMES DAILY
Qty: 180 TABLET | Refills: 1 | Status: SHIPPED | OUTPATIENT
Start: 2018-06-27 | End: 2018-07-16 | Stop reason: SDUPTHER

## 2018-07-03 ENCOUNTER — OFFICE VISIT (OUTPATIENT)
Dept: UROLOGY | Facility: CLINIC | Age: 83
End: 2018-07-03

## 2018-07-03 VITALS
SYSTOLIC BLOOD PRESSURE: 110 MMHG | HEART RATE: 72 BPM | OXYGEN SATURATION: 97 % | BODY MASS INDEX: 24.08 KG/M2 | HEIGHT: 71 IN | WEIGHT: 172 LBS | DIASTOLIC BLOOD PRESSURE: 70 MMHG | TEMPERATURE: 98.1 F

## 2018-07-03 DIAGNOSIS — N40.0 BENIGN PROSTATIC HYPERPLASIA WITHOUT LOWER URINARY TRACT SYMPTOMS: Primary | ICD-10-CM

## 2018-07-03 DIAGNOSIS — N40.0 ENLARGED PROSTATE: ICD-10-CM

## 2018-07-03 DIAGNOSIS — N47.6 BALANOPOSTHITIS: ICD-10-CM

## 2018-07-03 LAB
BILIRUB BLD-MCNC: NEGATIVE MG/DL
CLARITY, POC: CLEAR
COLOR UR: YELLOW
GLUCOSE UR STRIP-MCNC: NEGATIVE MG/DL
KETONES UR QL: NEGATIVE
LEUKOCYTE EST, POC: ABNORMAL
NITRITE UR-MCNC: NEGATIVE MG/ML
PH UR: 7 [PH] (ref 5–8)
PROT UR STRIP-MCNC: ABNORMAL MG/DL
RBC # UR STRIP: NEGATIVE /UL
SP GR UR: 1.01 (ref 1–1.03)
UROBILINOGEN UR QL: NORMAL

## 2018-07-03 PROCEDURE — 81003 URINALYSIS AUTO W/O SCOPE: CPT | Performed by: UROLOGY

## 2018-07-03 PROCEDURE — 99213 OFFICE O/P EST LOW 20 MIN: CPT | Performed by: UROLOGY

## 2018-07-03 NOTE — PROGRESS NOTES
Chief Complaint  Benign Prostatic Hypertrophy (yearly fup.)        ERICA Hernandez is a 84 y.o. male who returns today for annual checkup with a known markedly enlarged prostate but little difficulty voiding on Flomax.  I suggested Proscar for several years that might shrink the gland but he's always declined stating he doesn't like to take pills.  He supposedly has had one urinary tract infection during the past year and there are white cells in his urine today.    Vitals:    07/03/18 0908   BP: 110/70   Pulse: 72   Temp: 98.1 °F (36.7 °C)   SpO2: 97%       Past Medical History  Past Medical History:   Diagnosis Date   • Carotid stenosis     s/p right ICA stent x 2   • Fracture    • Hyperlipidemia    • Hypertension    • Kidney infection    • Renal calculi    • Stroke (CMS/[facility]) 03/2016    right retinal artery occlusion       Past Surgical History  Past Surgical History:   Procedure Laterality Date   • CAROTID STENT Right 03/18/2016    Carotid Wall Stent   • CAROTID STENT Right 03/04/2017    Zilver BANDAR for recurrent right ICA stenosis   • KIDNEY STONE SURGERY Right 2011    Shafron - open   • PATELLA FRACTURE SURGERY Left 1986   • GA TCAT IV STENT CRV CRTD ART EMBOLIC PROTECJ Right 3/4/2017    Placement of Zilver BANDAR right ICA 3/4/17       Medications  has a current medication list which includes the following prescription(s): amlodipine, atorvastatin, calcium carbonate, clopidogrel, labetalol, levothyroxine, multivitamin, ondansetron odt, ranitidine, and tamsulosin.      Allergies  Allergies   Allergen Reactions   • Rocephin [Ceftriaxone] Hives   • Amoxicillin Rash       Social History  Social History     Social History Narrative    7 children - 2 daughters both live in [place]    5 sons, lives with Harriet Hernandez, son is Abhay, 3 other sons live in [place].      Other sons live in [place],        Family History  He has no family history of bladder or kidney cancer  He has no family history of kidney  stones      AUA Symptom Score:      Review of Systems  Review of Systems    Physical Exam  Physical Exam   Constitutional: He is oriented to person, place, and time. He appears well-developed and well-nourished.   HENT:   Head: Normocephalic and atraumatic.   Neck: Normal range of motion.   Pulmonary/Chest: Effort normal. No respiratory distress.   Abdominal: Soft. He exhibits no distension and no mass. There is no tenderness. No hernia.   Genitourinary: Rectum normal and testes normal.       Uncircumcised. Phimosis present. Discharge found.         Musculoskeletal: Normal range of motion.   Lymphadenopathy:     He has no cervical adenopathy.   Neurological: He is alert and oriented to person, place, and time.   Skin: Skin is warm and dry.   Psychiatric: He has a normal mood and affect. His behavior is normal.   Vitals reviewed.      Labs Recent and today in the office:  Results for orders placed or performed in visit on 07/03/18   POC Urinalysis Dipstick, Automated   Result Value Ref Range    Color Yellow Yellow, Straw, Dark Yellow, Lety    Clarity, UA Clear Clear    Specific Gravity  1.015 1.005 - 1.030    pH, Urine 7.0 5.0 - 8.0    Leukocytes Large (3+) (A) Negative    Nitrite, UA Negative Negative    Protein, POC Trace (A) Negative mg/dL    Glucose, UA Negative Negative, 1000 mg/dL (3+) mg/dL    Ketones, UA Negative Negative    Urobilinogen, UA Normal Normal    Bilirubin Negative Negative    Blood, UA Negative Negative         Assessment & Plan  #1 BPH with outlet obstruction: He's currently voiding adequately on Flomax and declines my offer of Proscar    #2 history of UTI: There are white cells in the urine today but he has an active low-grade balanitis and he declines offer of topical cream and/or antibiotics.  He states he can always tell when he is really infected with dysuria.    #3 phimosis/balanitis: He declines offer of Mycolog cream and dorsal slit.

## 2018-07-16 ENCOUNTER — OFFICE VISIT (OUTPATIENT)
Dept: CARDIOLOGY | Facility: CLINIC | Age: 83
End: 2018-07-16

## 2018-07-16 VITALS
HEIGHT: 71 IN | SYSTOLIC BLOOD PRESSURE: 110 MMHG | WEIGHT: 170.4 LBS | BODY MASS INDEX: 23.85 KG/M2 | HEART RATE: 67 BPM | DIASTOLIC BLOOD PRESSURE: 68 MMHG

## 2018-07-16 DIAGNOSIS — I67.9 CVD (CEREBROVASCULAR DISEASE): Primary | ICD-10-CM

## 2018-07-16 DIAGNOSIS — I10 BENIGN ESSENTIAL HYPERTENSION: ICD-10-CM

## 2018-07-16 DIAGNOSIS — E78.2 MIXED HYPERLIPIDEMIA: ICD-10-CM

## 2018-07-16 PROCEDURE — 99213 OFFICE O/P EST LOW 20 MIN: CPT | Performed by: INTERNAL MEDICINE

## 2018-07-16 RX ORDER — LABETALOL 100 MG/1
100 TABLET, FILM COATED ORAL 3 TIMES DAILY
Qty: 180 TABLET | Refills: 1 | Status: SHIPPED | OUTPATIENT
Start: 2018-07-16 | End: 2018-11-26 | Stop reason: SDUPTHER

## 2018-07-16 NOTE — PROGRESS NOTES
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   3/17 PRITESH restented- Given   Residual left nonobstructive internal carotid artery stenosis 50-69%    Hypothyroidism    Gastroesophageal reflux disease    Hypercholesterolemia   5/17 131/52/69/51    Hyponatremia  10/17 134    Allergies  Allergies   Allergen Reactions   • Rocephin [Ceftriaxone] Hives   • Amoxicillin Rash       Current Medications    Current Outpatient Prescriptions:   •  amLODIPine (NORVASC) 5 MG tablet, Take 1 tablet by mouth 2 (Two) Times a Day. Only take evening dose if systolic blood pressure is over 150., Disp: 180 tablet, Rfl: 1  •  atorvastatin (LIPITOR) 20 MG tablet, Take 1 tablet by mouth Every Night., Disp: 90 tablet, Rfl: 5  •  calcium carbonate (TUMS) 500 MG chewable tablet, Chew. TAKE AS DIRECTED., Disp: , Rfl:   •  clopidogrel (PLAVIX) 75 MG tablet, Take 1 tablet by mouth Daily., Disp: 90 tablet, Rfl: 5  •  labetalol (NORMODYNE) 100 MG tablet, Take 1 tablet by mouth 3 (Three) Times a Day., Disp: 180 tablet, Rfl: 1  •  levothyroxine (SYNTHROID, LEVOTHROID) 50 MCG tablet, TAKE 1 TABLET BY MOUTH ONCE DAILY, Disp: 90 tablet, Rfl: 3  •  multivitamin (THERAGRAN) tablet tablet, Take 1 tablet by mouth daily., Disp: , Rfl:   •  ondansetron ODT (ZOFRAN-ODT) 4 MG disintegrating tablet, Take 1 tablet by mouth Every 8 (Eight) Hours As Needed for Nausea or  Vomiting., Disp: 10 tablet, Rfl: 0  •  raNITIdine (ZANTAC) 150 MG tablet, Take 1 tablet by mouth Daily As Needed for Heartburn or Indigestion., Disp: 90 tablet, Rfl: 1  •  tamsulosin (FLOMAX) 0.4 MG capsule 24 hr capsule, TAKE 1 CAPSULE BY MOUTH ONCE DAILY, Disp: 90 capsule, Rfl: 3     History of Present Illness   HPI    Pt denies any chest pain, dyspnea at rest, dyspnea on exertion, orthopnea, PND, palpitations, lower extremity edema, or claudication. Pt denies history of CHF, DVT, PE, MI, CVA, TIA, or rheumatic fever.   He has improvement in his labile bps with adjustment off acei and addition of amlodipine. Has been avoiding high sodium soups given these spike his BP.  Lost his son this summer to lung Ca    ROS  ROS    SOCIAL HX  Social History     Social History   • Marital status:      Spouse name: N/A   • Number of children: N/A   • Years of education: N/A     Occupational History   • retired      Social History Main Topics   • Smoking status: Former Smoker     Packs/day: 1.00     Years: 55.00     Types: Cigarettes     Start date: 1950     Quit date: 2005   • Smokeless tobacco: Former User     Types: Chew     Quit date: 2005   • Alcohol use No      Comment: quit 1985   • Drug use: No   • Sexual activity: Defer     Other Topics Concern   • Not on file     Social History Narrative    7 children - 2 daughters both live in [place]    5 sons, lives with Harriet Hernandez, son is Abhay, 3 other sons live in [place].      Other sons live in [place],        FAMILY HX  Family History   Problem Relation Age of Onset   • Heart disease Mother    • Hyperlipidemia Mother    • Hypertension Mother    • Prostate cancer Father    • Cancer Father    • Heart attack Brother    • Heart disease Brother    • Hyperlipidemia Brother    • Hypertension Brother        Vitals:    07/16/18 1029   BP: 110/68   BP Location: Right arm   Patient Position: Sitting   Pulse: 67   Weight: 77.3 kg (170 lb 6.4 oz)   Height: 180.3 cm  "(71\")       PHYSICAL EXAMINATION:  Physical Exam   Constitutional: He appears well-developed and well-nourished.   Neck: Normal range of motion. Neck supple. No hepatojugular reflux and no JVD present. Carotid bruit is not present. No tracheal deviation present. No thyromegaly present.   Cardiovascular: Normal rate, regular rhythm, S1 normal, S2 normal and intact distal pulses.  PMI is not displaced.  Exam reveals no gallop, no distant heart sounds, no friction rub, no midsystolic click and no opening snap.    Murmur heard.  Pulses:       Carotid pulses are on the right side with bruit, and on the left side with bruit.       Radial pulses are 2+ on the right side, and 2+ on the left side.        Dorsalis pedis pulses are 2+ on the right side, and 2+ on the left side.        Posterior tibial pulses are 2+ on the right side, and 2+ on the left side.   Pulmonary/Chest: Effort normal and breath sounds normal. He has no wheezes. He has no rales.   Abdominal: Soft. Bowel sounds are normal. He exhibits no mass. There is no tenderness. There is no guarding.       Diagnostic Data:  Procedures  Lab Results   Component Value Date    CHLPL 214 (H) 03/18/2016    TRIG 52 05/22/2017    HDL 69 (H) 05/22/2017     Lab Results   Component Value Date    GLUCOSE 101 (H) 06/09/2017    BUN 15 04/24/2018    CREATININE 0.90 04/24/2018     (L) 04/24/2018    K 5.2 (H) 04/24/2018    CL 92 (L) 04/24/2018    CO2 28.0 04/24/2018     Lab Results   Component Value Date    HGBA1C 5.1 03/19/2016     Lab Results   Component Value Date    WBC 6.65 04/24/2018    HGB 13.0 (L) 04/24/2018    HCT 38.8 (L) 04/24/2018     04/24/2018       ASSESSMENT:   Diagnosis Plan   1. CVD (cerebrovascular disease)  Duplex Carotid Ultrasound CAR   2. Benign essential hypertension  labetalol (NORMODYNE) 100 MG tablet    Duplex Carotid Ultrasound CAR   3. Mixed hyperlipidemia           PLAN:         initiated additional amlodipine 5 w sbp above 150 with good " response    Hl on statin    cvd asx post PRITESH restenting for CUS this fall.    Oziel Mcnair MD, FACC

## 2018-07-24 DIAGNOSIS — I65.29 CAROTID ATHEROSCLEROSIS, UNSPECIFIED LATERALITY: Primary | ICD-10-CM

## 2018-08-02 DIAGNOSIS — I65.23 BILATERAL CAROTID ARTERY STENOSIS: Primary | ICD-10-CM

## 2018-08-31 ENCOUNTER — TELEPHONE (OUTPATIENT)
Dept: INTERNAL MEDICINE | Facility: CLINIC | Age: 83
End: 2018-08-31

## 2018-08-31 RX ORDER — IBUPROFEN 200 MG
200 TABLET ORAL EVERY 6 HOURS PRN
COMMUNITY
End: 2018-10-29

## 2018-08-31 NOTE — TELEPHONE ENCOUNTER
Patients daughter in law called and wanted to know if he could see Dr. Vermeesch.(Former [facility] patient)  He seen Dr. Vermeesch in April. I told her Dr. Vermeesch is not accepting new patients at this time but she wanted me to ask since she seen him once before.

## 2018-09-04 ENCOUNTER — HOSPITAL ENCOUNTER (OUTPATIENT)
Facility: HOSPITAL | Age: 83
Setting detail: HOSPITAL OUTPATIENT SURGERY
Discharge: HOME | End: 2018-09-04
Attending: OPHTHALMOLOGY | Admitting: OPHTHALMOLOGY

## 2018-09-04 ENCOUNTER — ANESTHESIA EVENT (OUTPATIENT)
Dept: PERIOP | Facility: HOSPITAL | Age: 83
End: 2018-09-04

## 2018-09-04 ENCOUNTER — ANESTHESIA (OUTPATIENT)
Dept: PERIOP | Facility: HOSPITAL | Age: 83
End: 2018-09-04

## 2018-09-04 VITALS
RESPIRATION RATE: 18 BRPM | SYSTOLIC BLOOD PRESSURE: 160 MMHG | TEMPERATURE: 98.1 F | BODY MASS INDEX: 21.67 KG/M2 | DIASTOLIC BLOOD PRESSURE: 60 MMHG | OXYGEN SATURATION: 95 % | WEIGHT: 160 LBS | HEIGHT: 72 IN | HEART RATE: 73 BPM

## 2018-09-04 PROBLEM — H21.81 TAMSULOSIN-ASSOCIATED FLOPPY IRIS: Status: RESOLVED | Noted: 2018-09-04 | Resolved: 2018-09-04

## 2018-09-04 PROBLEM — T44.6X5A TAMSULOSIN-ASSOCIATED FLOPPY IRIS: Status: RESOLVED | Noted: 2018-09-04 | Resolved: 2018-09-04

## 2018-09-04 PROBLEM — H26.9 CATARACT, LEFT EYE: Status: RESOLVED | Noted: 2018-09-04 | Resolved: 2018-09-04

## 2018-09-04 PROBLEM — H26.9 CATARACT, LEFT EYE: Status: ACTIVE | Noted: 2018-09-04

## 2018-09-04 PROBLEM — T44.6X5A TAMSULOSIN-ASSOCIATED FLOPPY IRIS: Status: ACTIVE | Noted: 2018-09-04

## 2018-09-04 PROBLEM — H21.81 TAMSULOSIN-ASSOCIATED FLOPPY IRIS: Status: ACTIVE | Noted: 2018-09-04

## 2018-09-04 PROCEDURE — 25010000002 EPINEPHRINE PER 0.1 MG: Performed by: OPHTHALMOLOGY

## 2018-09-04 PROCEDURE — V2632 POST CHMBR INTRAOCULAR LENS: HCPCS | Performed by: OPHTHALMOLOGY

## 2018-09-04 PROCEDURE — 25010000002 MIDAZOLAM PER 1 MG: Performed by: NURSE ANESTHETIST, CERTIFIED REGISTERED

## 2018-09-04 DEVICE — LENS ACRYSOF IQ 6X13MM SN60WF 20.5: Type: IMPLANTABLE DEVICE | Site: POSTERIOR CHAMBER | Status: FUNCTIONAL

## 2018-09-04 RX ORDER — PREDNISOLONE ACETATE 10 MG/ML
SUSPENSION/ DROPS OPHTHALMIC AS NEEDED
Status: DISCONTINUED | OUTPATIENT
Start: 2018-09-04 | End: 2018-09-04 | Stop reason: HOSPADM

## 2018-09-04 RX ORDER — TETRACAINE HYDROCHLORIDE 5 MG/ML
1 SOLUTION OPHTHALMIC SEE ADMIN INSTRUCTIONS
Status: COMPLETED | OUTPATIENT
Start: 2018-09-04 | End: 2018-09-04

## 2018-09-04 RX ORDER — TETRACAINE HYDROCHLORIDE 5 MG/ML
SOLUTION OPHTHALMIC AS NEEDED
Status: DISCONTINUED | OUTPATIENT
Start: 2018-09-04 | End: 2018-09-04 | Stop reason: HOSPADM

## 2018-09-04 RX ORDER — CYCLOPENTOLATE HYDROCHLORIDE 20 MG/ML
1 SOLUTION/ DROPS OPHTHALMIC
Status: COMPLETED | OUTPATIENT
Start: 2018-09-04 | End: 2018-09-04

## 2018-09-04 RX ORDER — PHENYLEPHRINE HYDROCHLORIDE 100 MG/ML
1 SOLUTION/ DROPS OPHTHALMIC
Status: COMPLETED | OUTPATIENT
Start: 2018-09-04 | End: 2018-09-04

## 2018-09-04 RX ORDER — SODIUM CHLORIDE, SODIUM LACTATE, POTASSIUM CHLORIDE, CALCIUM CHLORIDE 600; 310; 30; 20 MG/100ML; MG/100ML; MG/100ML; MG/100ML
1000 INJECTION, SOLUTION INTRAVENOUS CONTINUOUS
Status: DISCONTINUED | OUTPATIENT
Start: 2018-09-04 | End: 2018-09-04 | Stop reason: HOSPADM

## 2018-09-04 RX ORDER — PREDNISOLONE ACETATE 10 MG/ML
1 SUSPENSION/ DROPS OPHTHALMIC
Status: DISCONTINUED | OUTPATIENT
Start: 2018-09-04 | End: 2018-09-04 | Stop reason: HOSPADM

## 2018-09-04 RX ORDER — ENALAPRILAT 2.5 MG/2ML
1.25 INJECTION INTRAVENOUS ONCE AS NEEDED
Status: DISCONTINUED | OUTPATIENT
Start: 2018-09-04 | End: 2018-09-04 | Stop reason: HOSPADM

## 2018-09-04 RX ORDER — LIDOCAINE HYDROCHLORIDE 10 MG/ML
INJECTION, SOLUTION EPIDURAL; INFILTRATION; INTRACAUDAL; PERINEURAL AS NEEDED
Status: DISCONTINUED | OUTPATIENT
Start: 2018-09-04 | End: 2018-09-04 | Stop reason: HOSPADM

## 2018-09-04 RX ORDER — PILOCARPINE HYDROCHLORIDE 10 MG/ML
SOLUTION/ DROPS OPHTHALMIC AS NEEDED
Status: DISCONTINUED | OUTPATIENT
Start: 2018-09-04 | End: 2018-09-04 | Stop reason: HOSPADM

## 2018-09-04 RX ORDER — SODIUM CHLORIDE 0.9 % (FLUSH) 0.9 %
3 SYRINGE (ML) INJECTION AS NEEDED
Status: DISCONTINUED | OUTPATIENT
Start: 2018-09-04 | End: 2018-09-04 | Stop reason: HOSPADM

## 2018-09-04 RX ORDER — BALANCED SALT SOLUTION 6.4; .75; .48; .3; 3.9; 1.7 MG/ML; MG/ML; MG/ML; MG/ML; MG/ML; MG/ML
SOLUTION OPHTHALMIC AS NEEDED
Status: DISCONTINUED | OUTPATIENT
Start: 2018-09-04 | End: 2018-09-04 | Stop reason: HOSPADM

## 2018-09-04 RX ORDER — MIDAZOLAM HYDROCHLORIDE 1 MG/ML
INJECTION INTRAMUSCULAR; INTRAVENOUS AS NEEDED
Status: DISCONTINUED | OUTPATIENT
Start: 2018-09-04 | End: 2018-09-04 | Stop reason: SURG

## 2018-09-04 RX ORDER — SODIUM CHLORIDE 0.9 % (FLUSH) 0.9 %
1-10 SYRINGE (ML) INJECTION AS NEEDED
Status: DISCONTINUED | OUTPATIENT
Start: 2018-09-04 | End: 2018-09-04 | Stop reason: HOSPADM

## 2018-09-04 RX ADMIN — PHENYLEPHRINE HYDROCHLORIDE 1 DROP: 100 SOLUTION/ DROPS OPHTHALMIC at 10:40

## 2018-09-04 RX ADMIN — CYCLOPENTOLATE HYDROCHLORIDE 1 DROP: 20 SOLUTION/ DROPS OPHTHALMIC at 10:35

## 2018-09-04 RX ADMIN — TETRACAINE HYDROCHLORIDE 1 DROP: 5 SOLUTION OPHTHALMIC at 10:33

## 2018-09-04 RX ADMIN — PHENYLEPHRINE HYDROCHLORIDE 1 DROP: 100 SOLUTION/ DROPS OPHTHALMIC at 10:35

## 2018-09-04 RX ADMIN — MIDAZOLAM HYDROCHLORIDE 0.25 MG: 1 INJECTION, SOLUTION INTRAMUSCULAR; INTRAVENOUS at 11:48

## 2018-09-04 RX ADMIN — TETRACAINE HYDROCHLORIDE 1 DROP: 5 SOLUTION OPHTHALMIC at 10:34

## 2018-09-04 RX ADMIN — CYCLOPENTOLATE HYDROCHLORIDE 1 DROP: 20 SOLUTION/ DROPS OPHTHALMIC at 10:40

## 2018-09-04 RX ADMIN — MIDAZOLAM HYDROCHLORIDE 0.5 MG: 1 INJECTION, SOLUTION INTRAMUSCULAR; INTRAVENOUS at 11:30

## 2018-09-04 RX ADMIN — CYCLOPENTOLATE HYDROCHLORIDE 1 DROP: 20 SOLUTION/ DROPS OPHTHALMIC at 10:45

## 2018-09-04 RX ADMIN — PHENYLEPHRINE HYDROCHLORIDE 1 DROP: 100 SOLUTION/ DROPS OPHTHALMIC at 10:45

## 2018-09-04 RX ADMIN — SODIUM CHLORIDE, POTASSIUM CHLORIDE, SODIUM LACTATE AND CALCIUM CHLORIDE 1000 ML: 600; 310; 30; 20 INJECTION, SOLUTION INTRAVENOUS at 10:42

## 2018-09-04 NOTE — ANESTHESIA PREPROCEDURE EVALUATION
Anesthesia Evaluation     Patient summary reviewed and Nursing notes reviewed   NPO Solid Status: > 8 hours  NPO Liquid Status: > 8 hours           Airway   Mallampati: II  TM distance: >3 FB  Neck ROM: full  No difficulty expected  Dental    (+) lower dentures and upper dentures    Pulmonary - normal exam   (+) a smoker Former,   Cardiovascular - normal exam  Exercise tolerance: good (4-7 METS)    ECG reviewed  PT is on anticoagulation therapy  Patient on routine beta blocker and Beta blocker given within 24 hours of surgery    (+) hypertension well controlled 2 medications or greater, valvular problems/murmurs MR, CAD, PVD, hyperlipidemia,  carotid artery disease right carotid    ROS comment: plavix    Neuro/Psych  (+) CVA,     GI/Hepatic/Renal/Endo    (+)  GERD well controlled,      Musculoskeletal     Abdominal  - normal exam   Substance History   (+) alcohol use,      OB/GYN negative ob/gyn ROS         Other   (+) arthritis                     Anesthesia Plan    ASA 3     MAC   (Pt advised that intravenous sedation will be used as primary anesthetic technique, along with topical anesthesia. Every effort will be made to make sure patient is comfortable.     The patient was told that they may experience recall for the procedure. Pt verbalized understanding and agrees to plan of care.)  intravenous induction   Anesthetic plan, all risks, benefits, and alternatives have been provided, discussed and informed consent has been obtained with: patient.

## 2018-09-04 NOTE — OP NOTE
OPERATIVE NOTE    DATE OF PROCEDURE: 9/4/2018  PATIENT NAME: Andrew Hernandez  PATIENT MRN: [id number]  YOB: 1934     PREOPERATIVE DIAGNOSIS: Left nuclear sclerotic cataract.     POSTOPERATIVE DIAGNOSIS: Left nuclear sclerotic cataract.     PROCEDURE PERFORMED: Phacoemulsification with implantation of a 20.5 diopter foldable posterior chamber intraocular lens with use of a Malyugin ring, Left eye.     SURGEON: Paola Welch MD      INDICATIONS: The patient is an 84 y.o. male with a Left nuclear sclerotic cataract with a preoperative visual acuity of 20/80. The patient desires better vision and is brought to the operating room at this time for elective cataract extraction with planned IOL.      DESCRIPTION OF PROCEDURE: The patient was brought to the operating room in the fasting state. Once all the vital signs were established to be within normal limits and supplemental oxygen was given, the area of the operative eye was prepped and draped in the usual fashion. A wire lid speculum was inserted into the cul-de-sac. Additional topical anesthetic drops were instilled. A fornix-based conjunctival flap was performed from the 11-o'clock to 1-o'clock position. A stab incision was made in the peripheral cornea with the 0.8mm stab blade. Next 0.3 mL of 1% unpreserved Xylocaine was injected in the anterior chamber. The anterior chamber was then entered with a 2.4 mm keratome blade and then under viscoelastic, a Malyugin ring was placed without difficulty.  Under additional viscoelastic,  a capsulotomy was performed using the disposable cystotome in a continuous curvilinear fashion. The anterior capsule was then removed and the phacoemulsification handpiece was then introduced into the anterior segment and the nucleus was emulsified without difficulty. The CDE was 8.46. Once this was accomplished, the irrigation and aspiration handpiece was then used to aspirate the residual cortex. The posterior  capsule was cleaned of any residual material and then polished with the irrigation and aspiration handpiece and the viscoelastic was used to inflate the capsular bag. The Manny AcrySof implant was then loaded into the microcartridge.  The injector was then introduced into the capsular bag and the implant was slowly implanted without difficulty. The lens was then rotated to the appropriate axis.  The Malyugin ring was then carefully removed and the viscoelastic was aspirated.  The conjunctiva was then repositioned. Topical Betoptic-S, pilocarpine, and Pred-Forte drops were applied. Polysporin ointment was then instilled. There were no anesthetic or surgical difficulties. The patient tolerated the procedure very well and was returned to same day surgery in satisfactory condition.       Implant Name Type Inv. Item Serial No.  Lot No. LRB No. Used   LENS ACRYSOF IQ [id number] 20.5 - [id number] - [id number] Implant LENS ACRYSOF IQ [id number] 20.5 [id number] MANNY   Left 1           Paola Welch MD  9/4/2018

## 2018-09-04 NOTE — ANESTHESIA POSTPROCEDURE EVALUATION
Patient: Andrew Hernandez    Procedure Summary     Date:  09/04/18 Room / Location:  [facility] OR 1 /  [facility]    Anesthesia Start:  1129 Anesthesia Stop:  1205    Procedure:  CATARACT PHACO EXTRACTION WITH INTRAOCULAR LENS IMPLANT LEFT, COMPLICATED WITH MALYUGIAN RING (Left Eye) Diagnosis:       Age-related nuclear cataract of left eye      (Age-related nuclear cataract of right eye [H25.11])    Surgeon:  Paola Welch MD Provider:  Felipe Lamb CRNA    Anesthesia Type:  MAC ASA Status:  3          Anesthesia Type: MAC  Last vitals  BP   160/60 (09/04/18 1240)   Temp   98.1 °F (36.7 °C) (09/04/18 1240)   Pulse   73 (09/04/18 1240)   Resp   18 (09/04/18 1240)     SpO2   95 % (09/04/18 1240)     Post Anesthesia Care and Evaluation    Patient location during evaluation: PHASE II  Patient participation: complete - patient participated  Level of consciousness: awake and alert  Pain score: 0  Pain management: adequate  Airway patency: patent  Anesthetic complications: No anesthetic complications  PONV Status: none  Cardiovascular status: acceptable  Respiratory status: acceptable  Hydration status: acceptable

## 2018-10-29 ENCOUNTER — OFFICE VISIT (OUTPATIENT)
Dept: INTERNAL MEDICINE | Facility: CLINIC | Age: 83
End: 2018-10-29

## 2018-10-29 VITALS
HEART RATE: 70 BPM | HEIGHT: 72 IN | SYSTOLIC BLOOD PRESSURE: 128 MMHG | WEIGHT: 171 LBS | OXYGEN SATURATION: 98 % | TEMPERATURE: 97.5 F | BODY MASS INDEX: 23.16 KG/M2 | DIASTOLIC BLOOD PRESSURE: 58 MMHG

## 2018-10-29 DIAGNOSIS — I10 BENIGN ESSENTIAL HYPERTENSION: ICD-10-CM

## 2018-10-29 DIAGNOSIS — K21.9 GASTROESOPHAGEAL REFLUX DISEASE, ESOPHAGITIS PRESENCE NOT SPECIFIED: ICD-10-CM

## 2018-10-29 DIAGNOSIS — R39.14 BENIGN PROSTATIC HYPERPLASIA WITH INCOMPLETE BLADDER EMPTYING: ICD-10-CM

## 2018-10-29 DIAGNOSIS — N40.1 BENIGN PROSTATIC HYPERPLASIA WITH INCOMPLETE BLADDER EMPTYING: ICD-10-CM

## 2018-10-29 DIAGNOSIS — K21.9 GASTROESOPHAGEAL REFLUX DISEASE WITHOUT ESOPHAGITIS: ICD-10-CM

## 2018-10-29 DIAGNOSIS — Z00.00 ENCOUNTER FOR MEDICARE ANNUAL WELLNESS EXAM: Primary | ICD-10-CM

## 2018-10-29 DIAGNOSIS — E03.4 HYPOTHYROIDISM DUE TO ACQUIRED ATROPHY OF THYROID: ICD-10-CM

## 2018-10-29 PROCEDURE — G0439 PPPS, SUBSEQ VISIT: HCPCS | Performed by: INTERNAL MEDICINE

## 2018-10-29 RX ORDER — RANITIDINE 150 MG/1
150 TABLET ORAL DAILY PRN
Qty: 90 TABLET | Refills: 1 | Status: SHIPPED | OUTPATIENT
Start: 2018-10-29 | End: 2019-11-05

## 2018-10-29 NOTE — PROGRESS NOTES
QUICK REFERENCE INFORMATION:  The ABCs of the Annual Wellness Visit    Subsequent Medicare Wellness Visit    HEALTH RISK ASSESSMENT    1934    Recent Hospitalizations:  No hospitalization(s) within the last year..        Current Medical Providers:  Patient Care Team:  Vermeesch, Marilyn K, MD as PCP - General (Internal Medicine)  Melvi Klein MD as PCP - Family Medicine  Breeding, Ishmael HENSLEY MD as PCP - Claims Attributed  Dirk De Leon MD as Consulting Physician (Ophthalmology)  Oziel Mcnair MD as Consulting Physician (Cardiology)  Harsh Huerta MD as Consulting Physician (Urology)        Smoking Status:  History   Smoking Status   • Former Smoker   • Packs/day: 1.00   • Years: 55.00   • Types: Cigarettes   • Start date: 1950   • Quit date: 2005   Smokeless Tobacco   • Former User   • Types: Chew   • Quit date: 2005       Alcohol Consumption:  History   Alcohol Use   • Yes     Comment: quit 1985       Depression Screen:   PHQ-2/PHQ-9 Depression Screening 10/29/2018   Little interest or pleasure in doing things 0   Feeling down, depressed, or hopeless 0   Trouble falling or staying asleep, or sleeping too much -   Feeling tired or having little energy -   Poor appetite or overeating -   Feeling bad about yourself - or that you are a failure or have let yourself or your family down -   Trouble concentrating on things, such as reading the newspaper or watching television -   Moving or speaking so slowly that other people could have noticed. Or the opposite - being so fidgety or restless that you have been moving around a lot more than usual -   Thoughts that you would be better off dead, or of hurting yourself in some way -   Total Score 0   If you checked off any problems, how difficult have these problems made it for you to do your work, take care of things at home, or get along with other people? -       Health Habits and Functional and Cognitive Screening:  Functional & Cognitive Status 10/29/2018    Do you have difficulty preparing food and eating? No   Do you have difficulty bathing yourself, getting dressed or grooming yourself? No   Do you have difficulty using the toilet? No   Do you have difficulty moving around from place to place? No   Do you have trouble with steps or getting out of a bed or a chair? No   In the past year have you fallen or experienced a near fall? No   Current Diet Well Balanced Diet   Dental Exam Not up to date   Eye Exam Up to date   Exercise (times per week) 5 times per week   Current Exercise Activities Include Walking   Do you need help using the phone?  No   Are you deaf or do you have serious difficulty hearing?  No   Do you need help with transportation? Yes   Do you need help shopping? No   Do you need help preparing meals?  No   Do you need help with housework?  No   Do you need help with laundry? No   Do you need help taking your medications? No   Do you need help managing money? No   Do you ever drive or ride in a car without wearing a seat belt? No   Have you felt unusual stress, anger or loneliness in the last month? No   Who do you live with? Alone   If you need help, do you have trouble finding someone available to you? No   Do you have difficulty concentrating, remembering or making decisions? No           Does the patient have evidence of cognitive impairment? No    Aspirin use counseling: On clopidrogel as an alternative (due to ASA contraindication)      Recent Lab Results:  CMP:  Lab Results   Component Value Date    GLU 98 04/24/2018    BUN 15 04/24/2018    CREATININE 0.90 04/24/2018    EGFRIFNONA 80 04/24/2018    EGFRIFAFRI 97 04/24/2018    BCR 16.7 04/24/2018     (L) 04/24/2018    K 5.2 (H) 04/24/2018    CO2 28.0 04/24/2018    CALCIUM 9.7 04/24/2018    PROTENTOTREF 6.9 10/26/2017    ALBUMIN 4.30 10/26/2017    LABGLOBREF 2.6 10/26/2017    LABIL2 1.7 10/26/2017    BILITOT 0.5 10/26/2017    ALKPHOS 83 10/26/2017    AST 28 10/26/2017    ALT 34 10/26/2017      Lipid Panel:  Lab Results   Component Value Date    CHOL 131 05/22/2017    TRIG 52 05/22/2017    HDL 69 (H) 05/22/2017     HbA1c:  Lab Results   Component Value Date    HGBA1C 5.1 03/19/2016       Visual Acuity:  No exam data present    Age-appropriate Screening Schedule:  Refer to the list below for future screening recommendations based on patient's age, sex and/or medical conditions. Orders for these recommended tests are listed in the plan section. The patient has been provided with a written plan.    Health Maintenance   Topic Date Due   • LIPID PANEL  05/22/2018   • INFLUENZA VACCINE  Addressed   • PNEUMOCOCCAL VACCINES (65+ LOW/MEDIUM RISK)  Completed   • TDAP/TD VACCINES  Excluded   • ZOSTER VACCINE  Excluded        Subjective   History of Present Illness    Andrew Hernandez is a 84 y.o. male who presents for an Subsequent Wellness Visit.  Past medical history of hypertension, carotid stenosis, hyperlipidemia, CVA involving central retinal artery of right eye, mitral regurgitation, GERD, hypothyroidism, BPH.  His home BP is less than 160-60, if over 150 he takes an extra norvasc, he does this once or twice a week.  GERD is controlled with zantac prn, uses TUMs several days a week prn.  Takes thyroid med by itself every AM.  He may awaken once to urinate. He sees Dr Huerta.     The following portions of the patient's history were reviewed and updated as appropriate: allergies, current medications, past family history, past medical history, past social history, past surgical history and problem list.    Outpatient Medications Prior to Visit   Medication Sig Dispense Refill   • amLODIPine (NORVASC) 5 MG tablet Take 1 tablet by mouth 2 (Two) Times a Day. Only take evening dose if systolic blood pressure is over 150. 180 tablet 1   • atorvastatin (LIPITOR) 20 MG tablet Take 1 tablet by mouth Every Night. 90 tablet 5   • calcium carbonate (TUMS) 500 MG chewable tablet Chew. TAKE AS DIRECTED.     • clopidogrel  (PLAVIX) 75 MG tablet Take 1 tablet by mouth Daily. 90 tablet 5   • labetalol (NORMODYNE) 100 MG tablet Take 1 tablet by mouth 3 (Three) Times a Day. 180 tablet 1   • levothyroxine (SYNTHROID, LEVOTHROID) 50 MCG tablet TAKE 1 TABLET BY MOUTH ONCE DAILY 90 tablet 3   • moxifloxacin (VIGAMOX) 0.5 % ophthalmic solution Instill 1 drop into left eye three times a day as directed for 7 days 3 mL 1   • multivitamin (THERAGRAN) tablet tablet Take 1 tablet by mouth daily.     • raNITIdine (ZANTAC) 150 MG tablet Take 1 tablet by mouth Daily As Needed for Heartburn or Indigestion. 90 tablet 1   • tamsulosin (FLOMAX) 0.4 MG capsule 24 hr capsule TAKE 1 CAPSULE BY MOUTH ONCE DAILY 90 capsule 3   • atropine 1 % ophthalmic solution Instill 1 drop in left eye three times a day as directed 2 mL 1   • ibuprofen (ADVIL,MOTRIN) 200 MG tablet Take 200 mg by mouth Every 6 (Six) Hours As Needed for Mild Pain .     • ondansetron ODT (ZOFRAN-ODT) 4 MG disintegrating tablet Take 1 tablet by mouth Every 8 (Eight) Hours As Needed for Nausea or Vomiting. 10 tablet 0     No facility-administered medications prior to visit.        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    Review of Systems   Constitutional: Negative.     HENT: Negative.    Eyes: Negative.    Respiratory: Negative.    Cardiovascular: Negative.    Gastrointestinal: Negative.    Endocrine: Negative.    Genitourinary: Negative.    Musculoskeletal: Positive for neck pain.   Skin: Negative.    Allergic/Immunologic: Positive for environmental allergies.   Neurological: Positive for dizziness.   Hematological: Bruises/bleeds easily.   Psychiatric/Behavioral: Negative.        Compared to one year ago, the patient feels his physical health is the same.  Compared to one year ago, the patient feels his mental health is the same.    Objective     Physical Exam   Constitutional: He is oriented to person, place, and time. He appears well-developed and well-nourished.   Very pleasant elderly gentleman in no apparent distress   HENT:   Head: Normocephalic and atraumatic.   Right Ear: External ear normal.   Left Ear: External ear normal.   Mouth/Throat: Oropharynx is clear and moist.   Eyes: Pupils are equal, round, and reactive to light. Conjunctivae and EOM are normal.   Neck: Normal range of motion. Neck supple. No thyromegaly present.   Cardiovascular: Normal rate, regular rhythm, normal heart sounds and intact distal pulses.    No murmur heard.  Faint right carotid bruit noted   Pulmonary/Chest: Effort normal and breath sounds normal. No respiratory distress. He has no wheezes.   Abdominal: Soft. Bowel sounds are normal. He exhibits no distension. There is no tenderness.   Musculoskeletal: Normal range of motion. He exhibits edema.   +1 edema in right lower extremity at ankle   Lymphadenopathy:     He has no cervical adenopathy.   Neurological: He is alert and oriented to person, place, and time. No cranial nerve deficit. Coordination normal.   Psychiatric: He has a normal mood and affect. His behavior is normal. Judgment and thought content normal.   Nursing note and vitals reviewed.      Vitals:    10/29/18 0839   BP: 128/58   Pulse: 70   Temp: 97.5 °F (36.4 °C)   SpO2: 98%  "  Weight: 77.6 kg (171 lb)   Height: 182.9 cm (72\")   PainSc: 0-No pain       Patient's Body mass index is 23.19 kg/m². BMI is within normal parameters. No follow-up required.      Assessment/Plan   Patient Self-Management and Personalized Health Advice  The patient has been provided with information about: the relationship between weight and GERD, designing advance directives and supplements and preventive services including:   · Advance directive, Diabetes screening, see lab orders, Exercise counseling provided, he declines flu, hep A and shingles vaccines.    Visit Diagnoses:  No diagnosis found.    No orders of the defined types were placed in this encounter.      Outpatient Encounter Prescriptions as of 10/29/2018   Medication Sig Dispense Refill   • Acetaminophen (TYLENOL PO) Take  by mouth.     • amLODIPine (NORVASC) 5 MG tablet Take 1 tablet by mouth 2 (Two) Times a Day. Only take evening dose if systolic blood pressure is over 150. 180 tablet 1   • atorvastatin (LIPITOR) 20 MG tablet Take 1 tablet by mouth Every Night. 90 tablet 5   • calcium carbonate (TUMS) 500 MG chewable tablet Chew. TAKE AS DIRECTED.     • clopidogrel (PLAVIX) 75 MG tablet Take 1 tablet by mouth Daily. 90 tablet 5   • labetalol (NORMODYNE) 100 MG tablet Take 1 tablet by mouth 3 (Three) Times a Day. 180 tablet 1   • levothyroxine (SYNTHROID, LEVOTHROID) 50 MCG tablet TAKE 1 TABLET BY MOUTH ONCE DAILY 90 tablet 3   • moxifloxacin (VIGAMOX) 0.5 % ophthalmic solution Instill 1 drop into left eye three times a day as directed for 7 days 3 mL 1   • multivitamin (THERAGRAN) tablet tablet Take 1 tablet by mouth daily.     • raNITIdine (ZANTAC) 150 MG tablet Take 1 tablet by mouth Daily As Needed for Heartburn or Indigestion. 90 tablet 1   • tamsulosin (FLOMAX) 0.4 MG capsule 24 hr capsule TAKE 1 CAPSULE BY MOUTH ONCE DAILY 90 capsule 3   • [DISCONTINUED] atropine 1 % ophthalmic solution Instill 1 drop in left eye three times a day as directed " 2 mL 1   • [DISCONTINUED] ibuprofen (ADVIL,MOTRIN) 200 MG tablet Take 200 mg by mouth Every 6 (Six) Hours As Needed for Mild Pain .     • [DISCONTINUED] ondansetron ODT (ZOFRAN-ODT) 4 MG disintegrating tablet Take 1 tablet by mouth Every 8 (Eight) Hours As Needed for Nausea or Vomiting. 10 tablet 0     No facility-administered encounter medications on file as of 10/29/2018.        Reviewed use of high risk medication in the elderly: yes  Reviewed for potential of harmful drug interactions in the elderly: not applicable     Encourage patient to discuss taking aspirin in conjunction with Plavix due to history of CVA and current right carotid bruit with his neurosurgeon Dr. Boo  Advance directives info given  Vit D 1000 u daily  Labs as noted  He declines all vaccines  No medication changes  Encourage patient to take Zantac daily to help prevent overuse of Tums  He has right side carotid bruit, he will see Dr Mendiola and have US next mo    RTC 6 mo     Follow Up:  No Follow-up on file.     An After Visit Summary and PPPS with all of these plans were given to the patient.

## 2018-11-01 ENCOUNTER — APPOINTMENT (OUTPATIENT)
Dept: LAB | Facility: HOSPITAL | Age: 83
End: 2018-11-01

## 2018-11-01 DIAGNOSIS — D64.9 ANEMIA, UNSPECIFIED TYPE: Primary | ICD-10-CM

## 2018-11-01 LAB
ALBUMIN SERPL-MCNC: 4.6 G/DL (ref 3.5–5)
ALBUMIN/GLOB SERPL: 1.8 G/DL (ref 1–2)
ALP SERPL-CCNC: 71 U/L (ref 38–126)
ALT SERPL W P-5'-P-CCNC: 37 U/L (ref 13–69)
ANION GAP SERPL CALCULATED.3IONS-SCNC: 13.8 MMOL/L (ref 10–20)
AST SERPL-CCNC: 35 U/L (ref 15–46)
BASOPHILS # BLD AUTO: 0.04 10*3/MM3 (ref 0–0.2)
BASOPHILS NFR BLD AUTO: 0.7 % (ref 0–2.5)
BILIRUB SERPL-MCNC: 0.5 MG/DL (ref 0.2–1.3)
BUN BLD-MCNC: 15 MG/DL (ref 7–20)
BUN/CREAT SERPL: 15 (ref 6.3–21.9)
CALCIUM SPEC-SCNC: 9.3 MG/DL (ref 8.4–10.2)
CHLORIDE SERPL-SCNC: 93 MMOL/L (ref 98–107)
CHOLEST SERPL-MCNC: 129 MG/DL (ref 0–199)
CO2 SERPL-SCNC: 28 MMOL/L (ref 26–30)
CREAT BLD-MCNC: 1 MG/DL (ref 0.6–1.3)
DEPRECATED RDW RBC AUTO: 42.3 FL (ref 37–54)
EOSINOPHIL # BLD AUTO: 0.18 10*3/MM3 (ref 0–0.7)
EOSINOPHIL NFR BLD AUTO: 3.3 % (ref 0–7)
ERYTHROCYTE [DISTWIDTH] IN BLOOD BY AUTOMATED COUNT: 12.9 % (ref 11.5–14.5)
GFR SERPL CREATININE-BSD FRML MDRD: 71 ML/MIN/1.73
GLOBULIN UR ELPH-MCNC: 2.6 GM/DL
GLUCOSE BLD-MCNC: 102 MG/DL (ref 74–98)
HCT VFR BLD AUTO: 37.1 % (ref 42–52)
HDLC SERPL QL: 1.65
HDLC SERPL-MCNC: 78 MG/DL (ref 40–60)
HGB BLD-MCNC: 12.8 G/DL (ref 14–18)
IMM GRANULOCYTES # BLD: 0.01 10*3/MM3 (ref 0–0.06)
IMM GRANULOCYTES NFR BLD: 0.2 % (ref 0–0.6)
LDLC SERPL CALC-MCNC: 37 MG/DL (ref 0–99)
LYMPHOCYTES # BLD AUTO: 1.45 10*3/MM3 (ref 0.6–3.4)
LYMPHOCYTES NFR BLD AUTO: 26.8 % (ref 10–50)
MCH RBC QN AUTO: 30.7 PG (ref 27–31)
MCHC RBC AUTO-ENTMCNC: 34.5 G/DL (ref 30–37)
MCV RBC AUTO: 89 FL (ref 80–94)
MONOCYTES # BLD AUTO: 0.59 10*3/MM3 (ref 0–0.9)
MONOCYTES NFR BLD AUTO: 10.9 % (ref 0–12)
NEUTROPHILS # BLD AUTO: 3.14 10*3/MM3 (ref 2–6.9)
NEUTROPHILS NFR BLD AUTO: 58.1 % (ref 37–80)
NRBC BLD MANUAL-RTO: 0 /100 WBC (ref 0–0)
PLATELET # BLD AUTO: 221 10*3/MM3 (ref 130–400)
PMV BLD AUTO: 9.5 FL (ref 6–12)
POTASSIUM BLD-SCNC: 4.8 MMOL/L (ref 3.5–5.1)
PROT SERPL-MCNC: 7.2 G/DL (ref 6.3–8.2)
RBC # BLD AUTO: 4.17 10*6/MM3 (ref 4.7–6.1)
SODIUM BLD-SCNC: 130 MMOL/L (ref 137–145)
TRIGL SERPL-MCNC: 72 MG/DL
VLDLC SERPL-MCNC: 14.4 MG/DL
WBC NRBC COR # BLD: 5.41 10*3/MM3 (ref 4.8–10.8)

## 2018-11-01 PROCEDURE — 80053 COMPREHEN METABOLIC PANEL: CPT | Performed by: INTERNAL MEDICINE

## 2018-11-01 PROCEDURE — 85025 COMPLETE CBC W/AUTO DIFF WBC: CPT | Performed by: INTERNAL MEDICINE

## 2018-11-01 PROCEDURE — 80061 LIPID PANEL: CPT | Performed by: INTERNAL MEDICINE

## 2018-11-01 PROCEDURE — 36415 COLL VENOUS BLD VENIPUNCTURE: CPT | Performed by: INTERNAL MEDICINE

## 2018-11-19 ENCOUNTER — HOSPITAL ENCOUNTER (OUTPATIENT)
Dept: CARDIOLOGY | Facility: HOSPITAL | Age: 83
Discharge: HOME | End: 2018-11-19
Attending: INTERNAL MEDICINE | Admitting: INTERNAL MEDICINE

## 2018-11-19 ENCOUNTER — OFFICE VISIT (OUTPATIENT)
Dept: CARDIOLOGY | Facility: CLINIC | Age: 83
End: 2018-11-19

## 2018-11-19 VITALS
DIASTOLIC BLOOD PRESSURE: 58 MMHG | BODY MASS INDEX: 23.77 KG/M2 | SYSTOLIC BLOOD PRESSURE: 130 MMHG | WEIGHT: 169.75 LBS | HEIGHT: 71 IN

## 2018-11-19 DIAGNOSIS — I10 ESSENTIAL HYPERTENSION: Primary | ICD-10-CM

## 2018-11-19 DIAGNOSIS — I65.23 BILATERAL CAROTID ARTERY STENOSIS: ICD-10-CM

## 2018-11-19 DIAGNOSIS — I67.9 CVD (CEREBROVASCULAR DISEASE): ICD-10-CM

## 2018-11-19 DIAGNOSIS — E78.2 MIXED HYPERLIPIDEMIA: ICD-10-CM

## 2018-11-19 LAB
BH CV ECHO MEAS - BSA(HAYCOCK): 2 M^2
BH CV ECHO MEAS - BSA: 2 M^2
BH CV ECHO MEAS - BZI_BMI: 23.7 KILOGRAMS/M^2
BH CV ECHO MEAS - BZI_METRIC_HEIGHT: 180.3 CM
BH CV ECHO MEAS - BZI_METRIC_WEIGHT: 77.1 KG
BH CV MID RIGHT ICA HIDDEN LRR: 1 CM
BH CV RIGHT CCA HIDDEN LRR: 1 CM/S
BH CV XLRA MEAS LEFT DIST CCA EDV: 13.3 CM/SEC
BH CV XLRA MEAS LEFT DIST CCA PSV: 128 CM/SEC
BH CV XLRA MEAS LEFT DIST ICA EDV: 22.3 CM/SEC
BH CV XLRA MEAS LEFT DIST ICA PSV: 103 CM/SEC
BH CV XLRA MEAS LEFT ICA/CCA RATIO: 1
BH CV XLRA MEAS LEFT MID CCA EDV: 10.5 CM/SEC
BH CV XLRA MEAS LEFT MID CCA PSV: 110 CM/SEC
BH CV XLRA MEAS LEFT MID ICA EDV: 18.9 CM/SEC
BH CV XLRA MEAS LEFT MID ICA PSV: 121 CM/SEC
BH CV XLRA MEAS LEFT PROX CCA EDV: 11.2 CM/SEC
BH CV XLRA MEAS LEFT PROX CCA PSV: 114 CM/SEC
BH CV XLRA MEAS LEFT PROX ECA EDV: 0 CM/SEC
BH CV XLRA MEAS LEFT PROX ECA PSV: 292 CM/SEC
BH CV XLRA MEAS LEFT PROX ICA EDV: 21.8 CM/SEC
BH CV XLRA MEAS LEFT PROX ICA PSV: 133 CM/SEC
BH CV XLRA MEAS LEFT PROX SCLA EDV: 0 CM/SEC
BH CV XLRA MEAS LEFT PROX SCLA PSV: 133 CM/SEC
BH CV XLRA MEAS LEFT VERTEBRAL A EDV: 7.5 CM/SEC
BH CV XLRA MEAS LEFT VERTEBRAL A PSV: 30.4 CM/SEC
BH CV XLRA MEAS RIGHT DIST CCA EDV: 11.3 CM/SEC
BH CV XLRA MEAS RIGHT DIST CCA PSV: 95.3 CM/SEC
BH CV XLRA MEAS RIGHT DIST ICA EDV: 18.9 CM/SEC
BH CV XLRA MEAS RIGHT DIST ICA PSV: 131 CM/SEC
BH CV XLRA MEAS RIGHT ICA/CCA RATIO: 2
BH CV XLRA MEAS RIGHT MID CCA EDV: 10.9 CM/SEC
BH CV XLRA MEAS RIGHT MID CCA PSV: 86 CM/SEC
BH CV XLRA MEAS RIGHT MID ICA EDV: 24.7 CM/SEC
BH CV XLRA MEAS RIGHT MID ICA PSV: 193 CM/SEC
BH CV XLRA MEAS RIGHT PROX CCA EDV: 11.9 CM/SEC
BH CV XLRA MEAS RIGHT PROX CCA PSV: 101 CM/SEC
BH CV XLRA MEAS RIGHT PROX ECA EDV: 0 CM/SEC
BH CV XLRA MEAS RIGHT PROX ECA PSV: 72.2 CM/SEC
BH CV XLRA MEAS RIGHT PROX ICA EDV: 28.1 CM/SEC
BH CV XLRA MEAS RIGHT PROX ICA PSV: 162 CM/SEC
BH CV XLRA MEAS RIGHT PROX SCLA EDV: 0 CM/SEC
BH CV XLRA MEAS RIGHT PROX SCLA PSV: 117 CM/SEC
BH CV XLRA MEAS RIGHT VERTEBRAL A EDV: 13.3 CM/SEC
BH CV XLRA MEAS RIGHT VERTEBRAL A PSV: 101 CM/SEC
BH CVPROX RIGHT ICA HIDDEN LRR: 1 CM

## 2018-11-19 PROCEDURE — 99214 OFFICE O/P EST MOD 30 MIN: CPT | Performed by: INTERNAL MEDICINE

## 2018-11-19 PROCEDURE — 93880 EXTRACRANIAL BILAT STUDY: CPT | Performed by: INTERNAL MEDICINE

## 2018-11-19 PROCEDURE — 93880 EXTRACRANIAL BILAT STUDY: CPT

## 2018-11-19 NOTE — PROGRESS NOTES
[facility] at [facility]  Consultation H&P  Andrew Hernandez  1934  [phone]    VISIT DATE:  11/19/2018      PCP: Vermeesch, Marilyn K, MD  [address] [place], [place] [address]    IDENTIFICATION: A 84 y.o. 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   3/17 PRITESH restented- Given  11/18 CUS PRITESH patent   Residual left nonobstructive internal carotid artery stenosis 50-69%    Hypothyroidism    Gastroesophageal reflux disease    Hypercholesterolemia   5/17 131/52/69/51    Hyponatremia  10/17 134, 11/18 130    Allergies  Allergies   Allergen Reactions   • Amoxicillin Rash   • Rocephin [Ceftriaxone] Hives       Current Medications    Current Outpatient Medications:   •  Acetaminophen (TYLENOL PO), Take  by mouth., Disp: , Rfl:   •  amLODIPine (NORVASC) 5 MG tablet, Take 1 tablet by mouth 2 (Two) Times a Day. Only take evening dose if systolic blood pressure is over 150., Disp: 180 tablet, Rfl: 1  •  atorvastatin (LIPITOR) 20 MG tablet, Take 1 tablet by mouth Every Night., Disp: 90 tablet, Rfl: 5  •  calcium carbonate (TUMS) 500 MG chewable tablet, Chew. TAKE AS DIRECTED., Disp: , Rfl:   •  clopidogrel (PLAVIX) 75 MG tablet, Take 1 tablet by mouth Daily., Disp: 90 tablet, Rfl: 5  •  labetalol (NORMODYNE) 100 MG tablet, Take 1 tablet by mouth 3 (Three) Times a Day., Disp: 180 tablet, Rfl: 1  •  levothyroxine (SYNTHROID, LEVOTHROID) 50 MCG tablet, TAKE 1 TABLET BY MOUTH ONCE DAILY, Disp: 90 tablet, Rfl: 3  •  moxifloxacin (VIGAMOX) 0.5 % ophthalmic solution, Instill 1 drop into left eye three times a day as directed for 7 days, Disp: 3 mL, Rfl: 1  •  multivitamin (THERAGRAN) tablet tablet,  Take 1 tablet by mouth daily., Disp: , Rfl:   •  raNITIdine (ZANTAC) 150 MG tablet, Take 1 tablet by mouth Daily As Needed for Heartburn or Indigestion., Disp: 90 tablet, Rfl: 1  •  tamsulosin (FLOMAX) 0.4 MG capsule 24 hr capsule, TAKE 1 CAPSULE BY MOUTH ONCE DAILY, Disp: 90 capsule, Rfl: 3     History of Present Illness   HPI    Pt denies any chest pain, dyspnea at rest, dyspnea on exertion, orthopnea, PND, palpitations, lower extremity edema, or claudication. Pt denies history of CHF, DVT, PE, MI, CVA, TIA, or rheumatic fever.   He has improvement in his labile bps with adjustment off acei and addition of amlodipine. Has been avoiding high sodium soups given these spike his BP.  Lost his son last summer to lung Ca    ROS  ROS    SOCIAL HX  Social History     Socioeconomic History   • Marital status:      Spouse name: Not on file   • Number of children: Not on file   • Years of education: Not on file   • Highest education level: Not on file   Social Needs   • Financial resource strain: Not on file   • Food insecurity - worry: Not on file   • Food insecurity - inability: Not on file   • Transportation needs - medical: Not on file   • Transportation needs - non-medical: Not on file   Occupational History   • Occupation: retired   Tobacco Use   • Smoking status: Former Smoker     Packs/day: 1.00     Years: 55.00     Pack years: 55.00     Types: Cigarettes     Start date:      Last attempt to quit:      Years since quittin.8   • Smokeless tobacco: Former User     Types: Chew     Quit date:    Substance and Sexual Activity   • Alcohol use: Yes     Comment: quit    • Drug use: No   • Sexual activity: Defer   Other Topics Concern   • Not on file   Social History Narrative    7 children - 2 daughters both live in [place]    5 sons, lives with Abhay & Roshni Hernandez, son is Abhay, 3 other sons live in [place].      Other sons live in [place],        FAMILY HX  Family History   Problem  Relation Age of Onset   • Heart disease Mother    • Hyperlipidemia Mother    • Hypertension Mother    • Prostate cancer Father    • Cancer Father    • Heart attack Brother    • Heart disease Brother    • Hyperlipidemia Brother    • Hypertension Brother        134/78 78    PHYSICAL EXAMINATION:  Physical Exam   Constitutional: He appears well-developed and well-nourished.   Neck: Normal range of motion. Neck supple. No hepatojugular reflux and no JVD present. Carotid bruit is not present. No tracheal deviation present. No thyromegaly present.   Cardiovascular: Normal rate, regular rhythm, S1 normal, S2 normal and intact distal pulses. PMI is not displaced. Exam reveals no gallop, no distant heart sounds, no friction rub, no midsystolic click and no opening snap.   Murmur heard.  Pulses:       Carotid pulses are on the right side with bruit, and on the left side with bruit.       Radial pulses are 2+ on the right side, and 2+ on the left side.        Dorsalis pedis pulses are 2+ on the right side, and 2+ on the left side.        Posterior tibial pulses are 2+ on the right side, and 2+ on the left side.   Pulmonary/Chest: Effort normal and breath sounds normal. He has no wheezes. He has no rales.   Abdominal: Soft. Bowel sounds are normal. He exhibits no mass. There is no tenderness. There is no guarding.       Diagnostic Data:  Procedures  Lab Results   Component Value Date    CHLPL 214 (H) 03/18/2016    TRIG 72 11/01/2018    HDL 78 (H) 11/01/2018     Lab Results   Component Value Date    GLUCOSE 102 (H) 11/01/2018    BUN 15 11/01/2018    CREATININE 1.00 11/01/2018     (L) 11/01/2018    K 4.8 11/01/2018    CL 93 (L) 11/01/2018    CO2 28.0 11/01/2018     Lab Results   Component Value Date    HGBA1C 5.1 03/19/2016     Lab Results   Component Value Date    WBC 5.41 11/01/2018    HGB 12.8 (L) 11/01/2018    HCT 37.1 (L) 11/01/2018     11/01/2018       ASSESSMENT:   Diagnosis Plan   1. Essential hypertension      2. Mixed hyperlipidemia     3. CVD (cerebrovascular disease)           PLAN:         initiated additional amlodipine 5 w sbp above 150 with good response    Hl on statin    cvd asx post PRITESH restenting  CUS today acceptable.    Oziel Mcnair MD, FACC

## 2018-11-20 ENCOUNTER — TELEPHONE (OUTPATIENT)
Dept: NEUROSURGERY | Facility: CLINIC | Age: 83
End: 2018-11-20

## 2018-11-20 NOTE — TELEPHONE ENCOUNTER
Pt's daughter in law called and cancelled his appointment for tomorrow.  He had his ultrasound as planned yesterday and also saw Dr. Mcnair.  During that visit they discussed the ultrasound results.  Dr. Mcnair, per family member, said that his carotids are stable and there is not yet enough plaque to be concerned with.  They would like to wait for another appointment with Dr. Mendiola until the patient needs to have the procedure but will be monitored by Tabby until then.    Please ask Dr. Mendiola if this is ok.

## 2018-11-26 DIAGNOSIS — I65.21 CAROTID STENOSIS, ASYMPTOMATIC, RIGHT: ICD-10-CM

## 2018-11-26 DIAGNOSIS — I10 BENIGN ESSENTIAL HYPERTENSION: ICD-10-CM

## 2018-11-26 RX ORDER — LABETALOL 100 MG/1
100 TABLET, FILM COATED ORAL 3 TIMES DAILY
Qty: 270 TABLET | Refills: 3 | Status: SHIPPED | OUTPATIENT
Start: 2018-11-26 | End: 2019-12-09 | Stop reason: SDUPTHER

## 2018-11-26 RX ORDER — NITROGLYCERIN 0.4 MG/1
TABLET SUBLINGUAL
Qty: 100 TABLET | Refills: 11 | Status: SHIPPED | OUTPATIENT
Start: 2018-11-26 | End: 2019-08-21 | Stop reason: SDUPTHER

## 2018-11-26 RX ORDER — CLOPIDOGREL BISULFATE 75 MG/1
75 TABLET ORAL DAILY
Qty: 90 TABLET | Refills: 5 | Status: SHIPPED | OUTPATIENT
Start: 2018-11-26 | End: 2020-01-02 | Stop reason: SDUPTHER

## 2018-11-26 RX ORDER — AMLODIPINE BESYLATE 5 MG/1
TABLET ORAL
Qty: 180 TABLET | Refills: 3 | Status: SHIPPED | OUTPATIENT
Start: 2018-11-26 | End: 2019-12-09

## 2019-01-03 DIAGNOSIS — I65.21 CAROTID STENOSIS, ASYMPTOMATIC, RIGHT: ICD-10-CM

## 2019-01-03 DIAGNOSIS — E03.8 OTHER SPECIFIED HYPOTHYROIDISM: ICD-10-CM

## 2019-01-03 RX ORDER — LEVOTHYROXINE SODIUM 0.05 MG/1
50 TABLET ORAL DAILY
Qty: 90 TABLET | Refills: 3 | Status: SHIPPED | OUTPATIENT
Start: 2019-01-03 | End: 2019-11-05 | Stop reason: SDUPTHER

## 2019-01-03 RX ORDER — ATORVASTATIN CALCIUM 20 MG/1
20 TABLET, FILM COATED ORAL NIGHTLY
Qty: 90 TABLET | Refills: 5 | Status: SHIPPED | OUTPATIENT
Start: 2019-01-03 | End: 2020-03-27

## 2019-01-03 NOTE — TELEPHONE ENCOUNTER
Provider:  Maury  Caller: kendall  Time of call:  n/a   Phone #:  n/a  Last visit:  11/8/17   Next visit:    none    Reason for call:         Automated refill request.

## 2019-03-13 DIAGNOSIS — N40.0 BENIGN PROSTATIC HYPERPLASIA WITHOUT LOWER URINARY TRACT SYMPTOMS: ICD-10-CM

## 2019-03-13 RX ORDER — TAMSULOSIN HYDROCHLORIDE 0.4 MG/1
CAPSULE ORAL
Qty: 90 CAPSULE | Refills: 3 | Status: CANCELLED | OUTPATIENT
Start: 2019-03-13

## 2019-03-13 RX ORDER — TAMSULOSIN HYDROCHLORIDE 0.4 MG/1
CAPSULE ORAL
Qty: 90 CAPSULE | Refills: 3 | Status: SHIPPED | OUTPATIENT
Start: 2019-03-13 | End: 2020-03-09 | Stop reason: SDUPTHER

## 2019-04-29 ENCOUNTER — OFFICE VISIT (OUTPATIENT)
Dept: INTERNAL MEDICINE | Facility: CLINIC | Age: 84
End: 2019-04-29

## 2019-04-29 VITALS
OXYGEN SATURATION: 98 % | HEART RATE: 70 BPM | WEIGHT: 167 LBS | TEMPERATURE: 97.6 F | SYSTOLIC BLOOD PRESSURE: 120 MMHG | BODY MASS INDEX: 23.38 KG/M2 | DIASTOLIC BLOOD PRESSURE: 70 MMHG | HEIGHT: 71 IN

## 2019-04-29 DIAGNOSIS — E03.4 HYPOTHYROIDISM DUE TO ACQUIRED ATROPHY OF THYROID: ICD-10-CM

## 2019-04-29 DIAGNOSIS — E78.00 HYPERCHOLESTEROLEMIA: ICD-10-CM

## 2019-04-29 DIAGNOSIS — I10 BENIGN ESSENTIAL HYPERTENSION: Primary | ICD-10-CM

## 2019-04-29 DIAGNOSIS — K21.9 GASTROESOPHAGEAL REFLUX DISEASE WITHOUT ESOPHAGITIS: ICD-10-CM

## 2019-04-29 LAB
BASOPHILS # BLD AUTO: 0.04 10*3/MM3 (ref 0–0.2)
BASOPHILS NFR BLD AUTO: 0.7 % (ref 0–1.5)
EOSINOPHIL # BLD AUTO: 0.36 10*3/MM3 (ref 0–0.4)
EOSINOPHIL NFR BLD AUTO: 6.2 % (ref 0.3–6.2)
ERYTHROCYTE [DISTWIDTH] IN BLOOD BY AUTOMATED COUNT: 13.2 % (ref 12.3–15.4)
HCT VFR BLD AUTO: 37.8 % (ref 37.5–51)
HGB BLD-MCNC: 13.2 G/DL (ref 13–17.7)
IMM GRANULOCYTES # BLD AUTO: 0.02 10*3/MM3 (ref 0–0.05)
IMM GRANULOCYTES NFR BLD AUTO: 0.3 % (ref 0–0.5)
LYMPHOCYTES # BLD AUTO: 1.4 10*3/MM3 (ref 0.7–3.1)
LYMPHOCYTES NFR BLD AUTO: 24.2 % (ref 19.6–45.3)
MCH RBC QN AUTO: 30.6 PG (ref 26.6–33)
MCHC RBC AUTO-ENTMCNC: 34.9 G/DL (ref 31.5–35.7)
MCV RBC AUTO: 87.7 FL (ref 79–97)
MONOCYTES # BLD AUTO: 0.65 10*3/MM3 (ref 0.1–0.9)
MONOCYTES NFR BLD AUTO: 11.2 % (ref 5–12)
NEUTROPHILS # BLD AUTO: 3.32 10*3/MM3 (ref 1.7–7)
NEUTROPHILS NFR BLD AUTO: 57.4 % (ref 42.7–76)
NRBC BLD AUTO-RTO: 0 /100 WBC (ref 0–0.2)
PLATELET # BLD AUTO: 227 10*3/MM3 (ref 140–450)
RBC # BLD AUTO: 4.31 10*6/MM3 (ref 4.14–5.8)
T4 FREE SERPL-MCNC: 1.42 NG/DL (ref 0.78–2.19)
TSH SERPL DL<=0.005 MIU/L-ACNC: 3.44 MIU/ML (ref 0.47–4.68)
WBC # BLD AUTO: 5.79 10*3/MM3 (ref 3.4–10.8)

## 2019-04-29 PROCEDURE — 99214 OFFICE O/P EST MOD 30 MIN: CPT | Performed by: INTERNAL MEDICINE

## 2019-04-29 NOTE — PROGRESS NOTES
Chief Complaint   Patient presents with   • Hypertension     Subjective   Andrew Hernandez is a 85 y.o. male.     Here today for follow up of hypertension, carotid stenosis, hyperlipidemia, CVA involving central retinal artery of right eye, mitral regurgitation, GERD, hypothyroidism, BPH.  HTN/HLD/CVA- his BP is well controlled today.  His home reads are this same.  No CP, SOA edema or palpitations.  He goes up and down stairs to his apartment several times a day and carries up a case of water twice a week.  He mows 3 acres of grass a week.  He still drives  GERD-  He takes zantac a few times a month, but he takes TUMS almost daily  Hypothyroid- he takes thyroid med an hour before all other meds  BPH- he awakens 1-2 times a night to urinate.  No problems with urination, remains on flomax  HCM- he did not get Hep A or shingles vaccine, does not want these, did get pneumovax series  He has no complaints today         The following portions of the patient's history were reviewed and updated as appropriate: allergies, current medications, past family history, past medical history, past social history, past surgical history and problem list.    Review of Systems   Constitutional: Negative for activity change, appetite change and unexpected weight change.   HENT: Negative for trouble swallowing and voice change.    Eyes: Negative for visual disturbance.   Respiratory: Negative for shortness of breath.    Cardiovascular: Negative for chest pain, palpitations and leg swelling.   Gastrointestinal: Negative for abdominal pain.   Endocrine: Negative for cold intolerance and heat intolerance.   Musculoskeletal: Negative for arthralgias.   Neurological: Negative for headaches.   Psychiatric/Behavioral: Negative for dysphoric mood and sleep disturbance. The patient is not nervous/anxious.    All other systems reviewed and are negative.      Objective   /70 Comment: Manual  Pulse 70   Temp 97.6 °F (36.4 °C) (Temporal)   Ht  "180 cm (70.87\")   Wt 75.8 kg (167 lb)   SpO2 98%   BMI 23.38 kg/m²   Body mass index is 23.38 kg/m².  Physical Exam   Constitutional: He is oriented to person, place, and time. He appears well-developed and well-nourished. No distress.   Very pleasant gentleman who appears younger than his stated age, no distress today   HENT:   Head: Normocephalic and atraumatic.   Right Ear: External ear normal.   Left Ear: External ear normal.   Mouth/Throat: Oropharynx is clear and moist.   Eyes: Conjunctivae and EOM are normal. Pupils are equal, round, and reactive to light.   Neck: Normal range of motion. Neck supple. No thyromegaly present.   Cardiovascular: Normal rate, regular rhythm and intact distal pulses.   Murmur heard.  Bilateral carotid bruits  Systolic murmur grade 1/6 heard over precordium   Pulmonary/Chest: Effort normal and breath sounds normal. No respiratory distress. He has no wheezes.   Abdominal: Soft. Bowel sounds are normal. He exhibits no distension. There is no tenderness.   Musculoskeletal: Normal range of motion. He exhibits no edema.   Lymphadenopathy:     He has no cervical adenopathy.   Neurological: He is alert and oriented to person, place, and time. No cranial nerve deficit. Coordination normal.   Psychiatric: He has a normal mood and affect. His behavior is normal. Judgment and thought content normal.   Nursing note and vitals reviewed.      Assessment/Plan   Andrew Hernandez is here today and the following problems have been addressed:      Andrew was seen today for hypertension.    Diagnoses and all orders for this visit:    Benign essential hypertension  -     CBC & Differential    Gastroesophageal reflux disease without esophagitis    Hypothyroidism due to acquired atrophy of thyroid  -     T4, Free  -     TSH    Hypercholesterolemia        Follow heart healthy/low salt diet  Avoid processed foods  Monitor blood pressure as discussed  Exercise as tolerated up to 150 minutes per week  Take " all medications as prescribed  Labs as noted  Pt declines to check stools for blood  Recommend regular zantac daily, he prefers to use TUMS  Continue current synthroid dose, adjust if needed    Return in about 6 months (around 10/29/2019) for Next scheduled follow up.      Marilyn K. Vermeesch, MD      Please note that portions of this note were completed with a voice recognition program.  Efforts were made to edit dictation, but occasionally words are mistranscribed.

## 2019-07-05 ENCOUNTER — OFFICE VISIT (OUTPATIENT)
Dept: UROLOGY | Facility: CLINIC | Age: 84
End: 2019-07-05

## 2019-07-05 VITALS
BODY MASS INDEX: 23.38 KG/M2 | OXYGEN SATURATION: 98 % | HEART RATE: 78 BPM | WEIGHT: 167 LBS | TEMPERATURE: 97.9 F | HEIGHT: 71 IN | DIASTOLIC BLOOD PRESSURE: 70 MMHG | SYSTOLIC BLOOD PRESSURE: 140 MMHG

## 2019-07-05 DIAGNOSIS — N47.6 BALANOPOSTHITIS: ICD-10-CM

## 2019-07-05 DIAGNOSIS — N40.0 BENIGN PROSTATIC HYPERPLASIA WITHOUT LOWER URINARY TRACT SYMPTOMS: Primary | ICD-10-CM

## 2019-07-05 LAB
BILIRUB BLD-MCNC: NEGATIVE MG/DL
CLARITY, POC: CLEAR
COLOR UR: YELLOW
GLUCOSE UR STRIP-MCNC: NEGATIVE MG/DL
KETONES UR QL: NEGATIVE
LEUKOCYTE EST, POC: NEGATIVE
NITRITE UR-MCNC: NEGATIVE MG/ML
PH UR: 7.5 [PH] (ref 5–8)
PROT UR STRIP-MCNC: NEGATIVE MG/DL
RBC # UR STRIP: NEGATIVE /UL
SP GR UR: 1.01 (ref 1–1.03)
UROBILINOGEN UR QL: NORMAL

## 2019-07-05 PROCEDURE — 81003 URINALYSIS AUTO W/O SCOPE: CPT | Performed by: UROLOGY

## 2019-07-05 PROCEDURE — 99213 OFFICE O/P EST LOW 20 MIN: CPT | Performed by: UROLOGY

## 2019-07-05 NOTE — PROGRESS NOTES
Chief Complaint  Benign Prostatic Hypertrophy (Yearly)        ERICA Hernandez is a 85 y.o. male who returns today for annual checkup denying any problems voiding on Flomax.  He has had occasional urinary tract infection but he has a tight phimosis and chronic balanitis and frequently a contaminated urine sample.  He states he can always tell when he has 1 by the dysuria and has not had it recently.  He denies my offer of Mycolog cream for the foreskin and dorsal slit circumcision.    Vitals:    07/05/19 0905   BP: 140/70   Pulse: 78   Temp: 97.9 °F (36.6 °C)   SpO2: 98%       Past Medical History  Past Medical History:   Diagnosis Date   • Arthritis    • Carotid stenosis     s/p right ICA stent x 2   • Coronary artery disease    • Disease of thyroid gland    • Enlarged prostate    • Fracture    • Full dentures    • GERD (gastroesophageal reflux disease)    • Hyperlipidemia    • Hypertension    • Kidney infection    • Renal calculi    • Stroke (CMS/[facility]) 03/2016    right retinal artery occlusion   • Wears glasses        Past Surgical History  Past Surgical History:   Procedure Laterality Date   • CAROTID ENDARTERECTOMY      X2-3/17, 9/17   • CAROTID STENT Right 03/18/2016    Carotid Wall Stent   • CAROTID STENT Right 03/04/2017    Zilver BANDAR for recurrent right ICA stenosis   • CATARACT EXTRACTION W/ INTRAOCULAR LENS IMPLANT Left 9/4/2018    Procedure: CATARACT PHACO EXTRACTION WITH INTRAOCULAR LENS IMPLANT LEFT, COMPLICATED WITH MALYUGIAN RING;  Surgeon: Paola Welch MD;  Location: [facility];  Service: Ophthalmology   • KIDNEY STONE SURGERY Right 2011    Shafron - open   • PATELLA FRACTURE SURGERY Left 1986   • SC TCAT IV STENT CRV CRTD ART EMBOLIC PROTECJ Right 3/4/2017    Placement of Zilver BANDAR right ICA 3/4/17       Medications  has a current medication list which includes the following prescription(s): acetaminophen, amlodipine, atorvastatin, calcium carbonate, clopidogrel, labetalol, levothyroxine,  multivitamin, nitroglycerin, ranitidine, and tamsulosin.      Allergies  Allergies   Allergen Reactions   • Amoxicillin Rash   • Rocephin [Ceftriaxone] Hives       Social History  Social History     Social History Narrative    7 children - 2 daughters both live in [place]    5 sons, lives with Abhay & Roshni Hernandez, son is Abhay, 3 other sons live in [place].      Other sons live in [place],        Family History  He has no family history of bladder or kidney cancer  He has no family history of kidney stones      AUA Symptom Score:      Review of Systems  Review of Systems   Constitutional: Negative.    Genitourinary: Negative.    All other systems reviewed and are negative.      Physical Exam  Physical Exam    Labs Recent and today in the office:  Results for orders placed or performed in visit on 04/29/19   T4, Free   Result Value Ref Range    Free T4 1.42 0.78 - 2.19 ng/dL   TSH   Result Value Ref Range    TSH 3.440 0.470 - 4.680 mIU/mL   CBC & Differential   Result Value Ref Range    WBC 5.79 3.40 - 10.80 10*3/mm3    RBC 4.31 4.14 - 5.80 10*6/mm3    Hemoglobin 13.2 13.0 - 17.7 g/dL    Hematocrit 37.8 37.5 - 51.0 %    MCV 87.7 79.0 - 97.0 fL    MCH 30.6 26.6 - 33.0 pg    MCHC 34.9 31.5 - 35.7 g/dL    RDW 13.2 12.3 - 15.4 %    Platelets 227 140 - 450 10*3/mm3    Neutrophil Rel % 57.4 42.7 - 76.0 %    Lymphocyte Rel % 24.2 19.6 - 45.3 %    Monocyte Rel % 11.2 5.0 - 12.0 %    Eosinophil Rel % 6.2 0.3 - 6.2 %    Basophil Rel % 0.7 0.0 - 1.5 %    Neutrophils Absolute 3.32 1.70 - 7.00 10*3/mm3    Lymphocytes Absolute 1.40 0.70 - 3.10 10*3/mm3    Monocytes Absolute 0.65 0.10 - 0.90 10*3/mm3    Eosinophils Absolute 0.36 0.00 - 0.40 10*3/mm3    Basophils Absolute 0.04 0.00 - 0.20 10*3/mm3    Immature Granulocyte Rel % 0.3 0.0 - 0.5 %    Immature Grans Absolute 0.02 0.00 - 0.05 10*3/mm3    nRBC 0.0 0.0 - 0.2 /100 WBC         Assessment & Plan  BPH with outlet obstruction: Currently voiding adequately on  Flomax    Phimosis with balanitis: Declines offer of topical cream and dorsal slit circumcision but will let us know if it become symptomatic.

## 2019-08-20 NOTE — PROGRESS NOTES
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   3/17 PRITESH restented- Given  11/18 CUS PRITESH patent   Residual left nonobstructive internal carotid artery stenosis 50-69%    Hypothyroidism    Gastroesophageal reflux disease    Hypercholesterolemia  11/18 129/72/78/37L    Hyponatremia  10/17 134, 11/18 130        Chief Complaint   Patient presents with   • Hypertension     follow up   • Carotid stenosis       Allergies  Allergies   Allergen Reactions   • Amoxicillin Rash   • Rocephin [Ceftriaxone] Hives       Current Medications    Current Outpatient Medications:   •  Acetaminophen (TYLENOL PO), Take  by mouth., Disp: , Rfl:   •  amLODIPine (NORVASC) 5 MG tablet, Take 1 tablet by mouth 2 (Two) Times a Day. Only take evening dose if systolic blood pressure is over 150., Disp: 180 tablet, Rfl: 3  •  atorvastatin (LIPITOR) 20 MG tablet, Take 1 tablet by mouth Every Night., Disp: 90 tablet, Rfl: 5  •  calcium carbonate (TUMS) 500 MG chewable tablet, Chew. TAKE AS DIRECTED., Disp: , Rfl:   •  clopidogrel (PLAVIX) 75 MG tablet, Take 1 tablet by mouth Daily., Disp: 90 tablet, Rfl: 5  •  labetalol (NORMODYNE) 100 MG tablet, Take 1 tablet by mouth 3 (Three) Times a Day., Disp: 270 tablet, Rfl: 3  •  levothyroxine (SYNTHROID, LEVOTHROID) 50 MCG tablet, TAKE 1 TABLET BY MOUTH ONCE DAILY, Disp: 90 tablet, Rfl: 3  •  multivitamin (THERAGRAN) tablet tablet, Take 1 tablet by mouth daily., Disp: , Rfl:   •  raNITIdine (ZANTAC) 150 MG tablet, Take 1 tablet by mouth Daily As Needed  "for Heartburn or Indigestion., Disp: 90 tablet, Rfl: 1  •  tamsulosin (FLOMAX) 0.4 MG capsule 24 hr capsule, TAKE 1 CAPSULE BY MOUTH ONCE DAILY, Disp: 90 capsule, Rfl: 3  •  nitroglycerin (NITROSTAT) 0.4 MG SL tablet, Place 1 tablet under the tongue Every 5 (Five) Minutes As Needed for Chest Pain for up to 3 doses. Take no more than 3 doses in 15 minutes., Disp: 100 tablet, Rfl: 11      History of Present Illness   Andrew Hernandez is a 85 y.o. year old male here for follow up.  He is accompanied by his daughter-in-law.  He brings his blood pressure diary with him with some lability of systolics of 108 up to 170.  Typically on average it appears 140 range.  He remains active with no overt regular exercise regimen.  He has had no chest tightness shortness of breath or presyncope.  He does note that occasionally he would have nocturnal leg cramping.  He has had no TIA symptomatology.  He thinks that he is scheduled to have a lab work later this year.        OBJECTIVE:  Vitals:    08/21/19 0907 08/21/19 0908   BP: 138/56 164/60   BP Location: Right arm Right arm   Patient Position: Sitting Sitting   Pulse: 64    Weight: 74.8 kg (164 lb 12.8 oz)    Height: 180.3 cm (71\")      Physical Exam   Constitutional: He appears well-developed and well-nourished.   Neck: Normal range of motion. Neck supple. No hepatojugular reflux and no JVD present. Carotid bruit is not present. No tracheal deviation present. No thyromegaly present.   Cardiovascular: Normal rate, regular rhythm, S1 normal, S2 normal, intact distal pulses and normal pulses. PMI is not displaced. Exam reveals no gallop, no distant heart sounds, no friction rub, no midsystolic click and no opening snap.   No murmur heard.  Pulses:       Carotid pulses are on the right side with bruit, and on the left side with bruit.       Radial pulses are 2+ on the right side, and 2+ on the left side.        Dorsalis pedis pulses are 2+ on the right side, and 2+ on the left side.     "    Posterior tibial pulses are 2+ on the right side, and 2+ on the left side.   Pulmonary/Chest: Effort normal and breath sounds normal. He has no wheezes. He has no rales.   Abdominal: Soft. Bowel sounds are normal. He exhibits no mass. There is no tenderness. There is no guarding.       Diagnostic Data:  Procedures      ASSESSMENT:   Diagnosis Plan   1. Essential hypertension     2. CVD (cerebrovascular disease)     3. Mixed hyperlipidemia         PLAN:  Hypertension labile and complex regimen of tamsulosin amlodipine with as needed additional dosing labetalol modestly controlled will allow some permissive hypertension to avoid presyncope.  He will remain with as needed dosing of amlodipine at night in addition to his morning dose he had taken his 3 times with hypotension thereafter.    Cerebrovascular disease for follow-up carotid duplex this following spring post carotid stenting    Dyslipidemia controlled on statin therapy        Vermeesch, Marilyn K, MD, thank you for referring Mr. Hernandez for evaluation.  I have forwarded my electronically generated recommendations to you for review.  Please do not hesitate to call with any questions.      Oziel Mcnair MD, [facility]

## 2019-08-21 ENCOUNTER — OFFICE VISIT (OUTPATIENT)
Dept: CARDIOLOGY | Facility: CLINIC | Age: 84
End: 2019-08-21

## 2019-08-21 VITALS
HEART RATE: 64 BPM | SYSTOLIC BLOOD PRESSURE: 164 MMHG | DIASTOLIC BLOOD PRESSURE: 60 MMHG | WEIGHT: 164.8 LBS | BODY MASS INDEX: 23.07 KG/M2 | HEIGHT: 71 IN

## 2019-08-21 DIAGNOSIS — I10 ESSENTIAL HYPERTENSION: Primary | ICD-10-CM

## 2019-08-21 DIAGNOSIS — E78.2 MIXED HYPERLIPIDEMIA: ICD-10-CM

## 2019-08-21 DIAGNOSIS — I65.9 OCCLUSION AND STENOSIS OF PRECEREBRAL ARTERY: ICD-10-CM

## 2019-08-21 DIAGNOSIS — I67.9 CVD (CEREBROVASCULAR DISEASE): ICD-10-CM

## 2019-08-21 PROCEDURE — 99214 OFFICE O/P EST MOD 30 MIN: CPT | Performed by: INTERNAL MEDICINE

## 2019-08-21 RX ORDER — NITROGLYCERIN 0.4 MG/1
TABLET SUBLINGUAL
Qty: 100 TABLET | Refills: 11 | Status: SHIPPED | OUTPATIENT
Start: 2019-08-21 | End: 2021-01-01

## 2019-11-05 ENCOUNTER — OFFICE VISIT (OUTPATIENT)
Dept: INTERNAL MEDICINE | Facility: CLINIC | Age: 84
End: 2019-11-05

## 2019-11-05 VITALS
BODY MASS INDEX: 23.1 KG/M2 | OXYGEN SATURATION: 98 % | DIASTOLIC BLOOD PRESSURE: 62 MMHG | HEART RATE: 72 BPM | HEIGHT: 71 IN | SYSTOLIC BLOOD PRESSURE: 138 MMHG | WEIGHT: 165 LBS | TEMPERATURE: 97.9 F

## 2019-11-05 DIAGNOSIS — E87.1 HYPONATREMIA: ICD-10-CM

## 2019-11-05 DIAGNOSIS — E03.8 OTHER SPECIFIED HYPOTHYROIDISM: ICD-10-CM

## 2019-11-05 DIAGNOSIS — R39.14 BENIGN PROSTATIC HYPERPLASIA WITH INCOMPLETE BLADDER EMPTYING: ICD-10-CM

## 2019-11-05 DIAGNOSIS — I65.23 ATHEROSCLEROSIS OF BOTH CAROTID ARTERIES: ICD-10-CM

## 2019-11-05 DIAGNOSIS — N40.1 BENIGN PROSTATIC HYPERPLASIA WITH INCOMPLETE BLADDER EMPTYING: ICD-10-CM

## 2019-11-05 DIAGNOSIS — E03.4 HYPOTHYROIDISM DUE TO ACQUIRED ATROPHY OF THYROID: ICD-10-CM

## 2019-11-05 DIAGNOSIS — E78.00 HYPERCHOLESTEROLEMIA: ICD-10-CM

## 2019-11-05 DIAGNOSIS — I10 BENIGN ESSENTIAL HYPERTENSION: Primary | ICD-10-CM

## 2019-11-05 PROCEDURE — 99214 OFFICE O/P EST MOD 30 MIN: CPT | Performed by: INTERNAL MEDICINE

## 2019-11-05 RX ORDER — LEVOTHYROXINE SODIUM 0.05 MG/1
50 TABLET ORAL DAILY
Qty: 90 TABLET | Refills: 3 | Status: SHIPPED | OUTPATIENT
Start: 2019-11-05 | End: 2021-01-04 | Stop reason: SDUPTHER

## 2019-11-05 NOTE — PROGRESS NOTES
Chief Complaint   Patient presents with   • Follow-up     6 months for HTN, hypothyroidism, and GERD. Pt is currently takes Zantac.      Subjective   Andrew Hernandez is a 85 y.o. male.     Here today for follow up of hypertension, carotid stenosis, hyperlipidemia, CVA involving central retinal artery of right eye, mitral regurgitation, GERD, hypothyroidism, BPH.  HTN/HLD/CVA- his BP is well controlled today.  No CP, SOA edema or palpitations. His home BP runs a little higher if he eats salt, but on avg 130s/65.  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  If BP is higher than 150 in PM he takes a second dose of amlodipine, this may occur once a mo or less.   GERD-  He takes zantac on occasion for GERD, but he says TUMS works just as well and he uses this more often.  He will use TUMS since zantac is off the market  Hypothyroid- he takes thyroid med an hour before all other meds.  He denies any problems with voice change, constipation, depression or difficulty swallowing  BPH- he awakens 1-2 times a night to urinate.  No problems with urination, remains on flomax  HCM- he did not get Hep A or shingles vaccine, does not want these, did get pneumovax series.  He declines flu shot today.   He is doing well and has no complaints today.          The following portions of the patient's history were reviewed and updated as appropriate: allergies, current medications, past family history, past medical history, past social history, past surgical history and problem list.    Review of Systems   Constitutional: Negative for activity change, appetite change and unexpected weight change.   HENT: Negative for trouble swallowing and voice change.    Eyes: Negative for visual disturbance.        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    Cardiovascular: Negative for chest pain, palpitations and leg swelling.   Gastrointestinal: Negative for abdominal pain and  "constipation.   Endocrine: Negative for cold intolerance and heat intolerance.   Neurological: Negative for headaches.   Psychiatric/Behavioral: Negative for dysphoric mood and sleep disturbance.       Objective   /62   Pulse 72   Temp 97.9 °F (36.6 °C)   Ht 180.3 cm (71\")   Wt 74.8 kg (165 lb)   SpO2 98%   BMI 23.01 kg/m²   Body mass index is 23.01 kg/m².  Physical Exam   Constitutional: He is oriented to person, place, and time. He appears well-developed and well-nourished. No distress.   Very pleasant elderly man, appears stated age, NAD   HENT:   Head: Normocephalic and atraumatic.   Right Ear: External ear normal.   Left Ear: External ear normal.   Mouth/Throat: Oropharynx is clear and moist.   Moderate wax in canals, TMs normal   Eyes: Conjunctivae and EOM are normal. Pupils are equal, round, and reactive to light.   Neck: Normal range of motion. Neck supple. No thyromegaly present.   Cardiovascular: Normal rate, regular rhythm, normal heart sounds and intact distal pulses.   No murmur heard.  Faint right carotid bruit   Pulmonary/Chest: Effort normal and breath sounds normal. No respiratory distress. He has no wheezes.   Abdominal: Soft. Bowel sounds are normal. He exhibits no distension. There is no tenderness.   Musculoskeletal: Normal range of motion. He exhibits no edema.   Lymphadenopathy:     He has no cervical adenopathy.   Neurological: He is alert and oriented to person, place, and time. No cranial nerve deficit. Coordination normal.   Psychiatric: He has a normal mood and affect. His behavior is normal. Judgment and thought content normal.   Nursing note and vitals reviewed.      Assessment/Plan   Andrew Hernandez is here today and the following problems have been addressed:      Andrew was seen today for follow-up.    Diagnoses and all orders for this visit:    Benign essential hypertension  -     CBC & Differential  -     Comprehensive Metabolic Panel    Other specified hypothyroidism  -  "    levothyroxine (SYNTHROID, LEVOTHROID) 50 MCG tablet; Take 1 tablet by mouth Daily.    Hypercholesterolemia  -     Comprehensive Metabolic Panel  -     Lipid Panel With / Chol / HDL Ratio    Atherosclerosis of both carotid arteries    Hypothyroidism due to acquired atrophy of thyroid    Benign prostatic hyperplasia with incomplete bladder emptying    Hyponatremia      Labs as noted  Follow heart healthy/low salt diet  Avoid processed foods  Monitor blood pressure as discussed  Exercise as tolerated   Take all medications as prescribed  Stop zantac, use TUMS prn for GERD  Continue flomax for BPH  Continue current dose of levothyroxine  Will check US of carotids on next office visit-I note this was ordered in August per Dr Mcnair but not done yet?  Pt declines flu vaccine    Return in about 6 months (around 5/5/2020) for Medicare Wellness.      Marilyn K. Vermeesch, MD      Please note that portions of this note were completed with a voice recognition program.  Efforts were made to edit dictation, but occasionally words are mistranscribed.

## 2019-11-06 LAB
ALBUMIN SERPL-MCNC: 4.8 G/DL (ref 3.5–5.2)
ALBUMIN/GLOB SERPL: 2.4 G/DL
ALP SERPL-CCNC: 86 U/L (ref 39–117)
ALT SERPL-CCNC: 17 U/L (ref 1–41)
AST SERPL-CCNC: 24 U/L (ref 1–40)
BASOPHILS # BLD AUTO: 0.05 10*3/MM3 (ref 0–0.2)
BASOPHILS NFR BLD AUTO: 0.9 % (ref 0–1.5)
BILIRUB SERPL-MCNC: 0.5 MG/DL (ref 0.2–1.2)
BUN SERPL-MCNC: 13 MG/DL (ref 8–23)
BUN/CREAT SERPL: 13.1 (ref 7–25)
CALCIUM SERPL-MCNC: 9.4 MG/DL (ref 8.6–10.5)
CHLORIDE SERPL-SCNC: 88 MMOL/L (ref 98–107)
CHOLEST SERPL-MCNC: 151 MG/DL (ref 0–200)
CHOLEST/HDLC SERPL: 1.8 {RATIO}
CO2 SERPL-SCNC: 28.2 MMOL/L (ref 22–29)
CREAT SERPL-MCNC: 0.99 MG/DL (ref 0.76–1.27)
EOSINOPHIL # BLD AUTO: 0.31 10*3/MM3 (ref 0–0.4)
EOSINOPHIL NFR BLD AUTO: 5.7 % (ref 0.3–6.2)
ERYTHROCYTE [DISTWIDTH] IN BLOOD BY AUTOMATED COUNT: 12.9 % (ref 12.3–15.4)
GLOBULIN SER CALC-MCNC: 2 GM/DL
GLUCOSE SERPL-MCNC: 99 MG/DL (ref 65–99)
HCT VFR BLD AUTO: 37 % (ref 37.5–51)
HDLC SERPL-MCNC: 84 MG/DL (ref 40–60)
HGB BLD-MCNC: 13 G/DL (ref 13–17.7)
IMM GRANULOCYTES # BLD AUTO: 0.03 10*3/MM3 (ref 0–0.05)
IMM GRANULOCYTES NFR BLD AUTO: 0.6 % (ref 0–0.5)
LDLC SERPL CALC-MCNC: 57 MG/DL (ref 0–100)
LYMPHOCYTES # BLD AUTO: 1.55 10*3/MM3 (ref 0.7–3.1)
LYMPHOCYTES NFR BLD AUTO: 28.5 % (ref 19.6–45.3)
MCH RBC QN AUTO: 30.8 PG (ref 26.6–33)
MCHC RBC AUTO-ENTMCNC: 35.1 G/DL (ref 31.5–35.7)
MCV RBC AUTO: 87.7 FL (ref 79–97)
MONOCYTES # BLD AUTO: 0.64 10*3/MM3 (ref 0.1–0.9)
MONOCYTES NFR BLD AUTO: 11.8 % (ref 5–12)
NEUTROPHILS # BLD AUTO: 2.86 10*3/MM3 (ref 1.7–7)
NEUTROPHILS NFR BLD AUTO: 52.5 % (ref 42.7–76)
NRBC BLD AUTO-RTO: 0 /100 WBC (ref 0–0.2)
PLATELET # BLD AUTO: 233 10*3/MM3 (ref 140–450)
POTASSIUM SERPL-SCNC: 5.1 MMOL/L (ref 3.5–5.2)
PROT SERPL-MCNC: 6.8 G/DL (ref 6–8.5)
RBC # BLD AUTO: 4.22 10*6/MM3 (ref 4.14–5.8)
SODIUM SERPL-SCNC: 128 MMOL/L (ref 136–145)
TRIGL SERPL-MCNC: 51 MG/DL (ref 0–150)
VLDLC SERPL CALC-MCNC: 10.2 MG/DL
WBC # BLD AUTO: 5.44 10*3/MM3 (ref 3.4–10.8)

## 2019-11-07 NOTE — PROGRESS NOTES
Please call patient with lab results.  His cholesterol panel is excellent with a high HDL which is protective for his heart.  Kidney and liver function are normal, however his sodium and chloride remain low.  He is not on any medications that should cause this.  I would like him to eat a regular diet and he can salt his foods as much as he would like.  His complete blood count reveals borderline anemia.  Please tell him to eat plenty of dark leafy green vegetables and occasional lean red meats or liver to help with his iron levels.

## 2019-12-09 DIAGNOSIS — I10 BENIGN ESSENTIAL HYPERTENSION: ICD-10-CM

## 2019-12-09 RX ORDER — AMLODIPINE BESYLATE 5 MG/1
TABLET ORAL
Qty: 180 TABLET | Refills: 3 | Status: SHIPPED | OUTPATIENT
Start: 2019-12-09 | End: 2020-08-10 | Stop reason: DRUGHIGH

## 2019-12-09 RX ORDER — AMLODIPINE BESYLATE 5 MG/1
TABLET ORAL
Qty: 180 TABLET | Refills: 3 | Status: SHIPPED | OUTPATIENT
Start: 2019-12-09 | End: 2019-12-09 | Stop reason: SDUPTHER

## 2019-12-09 RX ORDER — LABETALOL 100 MG/1
100 TABLET, FILM COATED ORAL 3 TIMES DAILY
Qty: 270 TABLET | Refills: 3 | Status: SHIPPED | OUTPATIENT
Start: 2019-12-09 | End: 2020-08-10 | Stop reason: DRUGHIGH

## 2020-01-02 DIAGNOSIS — I65.21 CAROTID STENOSIS, ASYMPTOMATIC, RIGHT: ICD-10-CM

## 2020-01-02 RX ORDER — CLOPIDOGREL BISULFATE 75 MG/1
75 TABLET ORAL DAILY
Qty: 90 TABLET | Refills: 3 | Status: SHIPPED | OUTPATIENT
Start: 2020-01-02 | End: 2020-08-10 | Stop reason: DRUGHIGH

## 2020-03-09 DIAGNOSIS — N40.0 BENIGN PROSTATIC HYPERPLASIA WITHOUT LOWER URINARY TRACT SYMPTOMS: ICD-10-CM

## 2020-03-09 RX ORDER — TAMSULOSIN HYDROCHLORIDE 0.4 MG/1
1 CAPSULE ORAL DAILY
Qty: 90 CAPSULE | Refills: 3 | Status: SHIPPED | OUTPATIENT
Start: 2020-03-09 | End: 2020-07-06 | Stop reason: SDUPTHER

## 2020-03-27 DIAGNOSIS — I65.21 CAROTID STENOSIS, ASYMPTOMATIC, RIGHT: ICD-10-CM

## 2020-03-27 RX ORDER — ATORVASTATIN CALCIUM 20 MG/1
20 TABLET, FILM COATED ORAL NIGHTLY
Qty: 90 TABLET | Refills: 5 | Status: SHIPPED | OUTPATIENT
Start: 2020-03-27 | End: 2020-05-11

## 2020-03-30 DIAGNOSIS — I65.21 CAROTID STENOSIS, ASYMPTOMATIC, RIGHT: ICD-10-CM

## 2020-03-30 RX ORDER — ATORVASTATIN CALCIUM 20 MG/1
20 TABLET, FILM COATED ORAL NIGHTLY
Qty: 90 TABLET | Refills: 5 | Status: SHIPPED | OUTPATIENT
Start: 2020-03-30 | End: 2021-06-25

## 2020-05-05 ENCOUNTER — DOCUMENTATION (OUTPATIENT)
Dept: CARDIOLOGY | Facility: CLINIC | Age: 85
End: 2020-05-05

## 2020-05-11 ENCOUNTER — HOSPITAL ENCOUNTER (OUTPATIENT)
Dept: CARDIOLOGY | Facility: HOSPITAL | Age: 85
Discharge: HOME | End: 2020-05-11
Admitting: INTERNAL MEDICINE

## 2020-05-11 ENCOUNTER — OFFICE VISIT (OUTPATIENT)
Dept: CARDIOLOGY | Facility: CLINIC | Age: 85
End: 2020-05-11

## 2020-05-11 VITALS
BODY MASS INDEX: 24.08 KG/M2 | WEIGHT: 172 LBS | SYSTOLIC BLOOD PRESSURE: 146 MMHG | HEART RATE: 78 BPM | HEIGHT: 71 IN | OXYGEN SATURATION: 97 % | DIASTOLIC BLOOD PRESSURE: 60 MMHG

## 2020-05-11 DIAGNOSIS — I65.9 OCCLUSION AND STENOSIS OF PRECEREBRAL ARTERY: ICD-10-CM

## 2020-05-11 DIAGNOSIS — R55 SYNCOPE AND COLLAPSE: ICD-10-CM

## 2020-05-11 DIAGNOSIS — I67.9 CVD (CEREBROVASCULAR DISEASE): ICD-10-CM

## 2020-05-11 DIAGNOSIS — R94.39 ABNORMAL STRESS ECHO: ICD-10-CM

## 2020-05-11 DIAGNOSIS — E78.2 MIXED HYPERLIPIDEMIA: ICD-10-CM

## 2020-05-11 DIAGNOSIS — R00.2 PALPITATIONS: Primary | ICD-10-CM

## 2020-05-11 DIAGNOSIS — R94.31 ABNORMAL ECG: Primary | ICD-10-CM

## 2020-05-11 DIAGNOSIS — I65.21 SYMPTOMATIC STENOSIS OF RIGHT CAROTID ARTERY WITHOUT INFARCTION: ICD-10-CM

## 2020-05-11 DIAGNOSIS — I34.0 MITRAL VALVE INSUFFICIENCY, UNSPECIFIED ETIOLOGY: ICD-10-CM

## 2020-05-11 DIAGNOSIS — I10 ESSENTIAL HYPERTENSION: ICD-10-CM

## 2020-05-11 LAB
BH CV MID RIGHT ICA HIDDEN LRR: 1 CM
BH CV RIGHT CCA HIDDEN LRR: 1 CM/S
BH CV VAS CAROTID RIGHT DISTAL STENT EDV: 20 CM/S
BH CV VAS CAROTID RIGHT DISTAL STENT PSV: 106 CM/S
BH CV VAS CAROTID RIGHT MID STENT EDV: 122 CM/S
BH CV VAS CAROTID RIGHT MID STENT PSV: 164 CM/S
BH CV VAS CAROTID RIGHT STENT NATIVE VESSEL PROXIMAL EDV: 12 CM/S
BH CV VAS CAROTID RIGHT STENT NATIVE VESSEL PROXIMAL PS: 70 CM/S
BH CV XLRA MEAS LEFT DIST CCA EDV: 0 CM/SEC
BH CV XLRA MEAS LEFT DIST CCA PSV: 174 CM/SEC
BH CV XLRA MEAS LEFT DIST ICA EDV: 19.1 CM/SEC
BH CV XLRA MEAS LEFT DIST ICA PSV: 99.9 CM/SEC
BH CV XLRA MEAS LEFT ICA/CCA RATIO: 1.1
BH CV XLRA MEAS LEFT MID CCA EDV: 13.4 CM/SEC
BH CV XLRA MEAS LEFT MID CCA PSV: 120 CM/SEC
BH CV XLRA MEAS LEFT MID ICA EDV: 19.1 CM/SEC
BH CV XLRA MEAS LEFT MID ICA PSV: 110 CM/SEC
BH CV XLRA MEAS LEFT PROX CCA EDV: 0 CM/SEC
BH CV XLRA MEAS LEFT PROX CCA PSV: 109 CM/SEC
BH CV XLRA MEAS LEFT PROX ECA EDV: 0 CM/SEC
BH CV XLRA MEAS LEFT PROX ECA PSV: 148 CM/SEC
BH CV XLRA MEAS LEFT PROX ICA EDV: 16.8 CM/SEC
BH CV XLRA MEAS LEFT PROX ICA PSV: 189 CM/SEC
BH CV XLRA MEAS LEFT PROX SCLA EDV: 0 CM/SEC
BH CV XLRA MEAS LEFT PROX SCLA PSV: 126 CM/SEC
BH CV XLRA MEAS LEFT VERTEBRAL A EDV: 8.3 CM/SEC
BH CV XLRA MEAS LEFT VERTEBRAL A PSV: 29.1 CM/SEC
BH CV XLRA MEAS RIGHT DIST CCA EDV: 12.1 CM/SEC
BH CV XLRA MEAS RIGHT DIST CCA PSV: 69.5 CM/SEC
BH CV XLRA MEAS RIGHT DIST ICA EDV: 20.2 CM/SEC
BH CV XLRA MEAS RIGHT DIST ICA PSV: 106 CM/SEC
BH CV XLRA MEAS RIGHT ICA/CCA RATIO: 2.3
BH CV XLRA MEAS RIGHT MID CCA EDV: 14.1 CM/SEC
BH CV XLRA MEAS RIGHT MID CCA PSV: 81.7 CM/SEC
BH CV XLRA MEAS RIGHT MID ICA EDV: 22.4 CM/SEC
BH CV XLRA MEAS RIGHT MID ICA PSV: 164 CM/SEC
BH CV XLRA MEAS RIGHT PROX CCA EDV: 11 CM/SEC
BH CV XLRA MEAS RIGHT PROX CCA PSV: 80.9 CM/SEC
BH CV XLRA MEAS RIGHT PROX ECA EDV: 0 CM/SEC
BH CV XLRA MEAS RIGHT PROX ECA PSV: 91.9 CM/SEC
BH CV XLRA MEAS RIGHT PROX ICA EDV: 21.3 CM/SEC
BH CV XLRA MEAS RIGHT PROX ICA PSV: 161 CM/SEC
BH CV XLRA MEAS RIGHT PROX SCLA EDV: 0 CM/SEC
BH CV XLRA MEAS RIGHT PROX SCLA PSV: 108 CM/SEC
BH CV XLRA MEAS RIGHT VERTEBRAL A EDV: 0 CM/SEC
BH CV XLRA MEAS RIGHT VERTEBRAL A PSV: 71.5 CM/SEC
BH CVPROX RIGHT ICA HIDDEN LRR: 1 CM

## 2020-05-11 PROCEDURE — 99214 OFFICE O/P EST MOD 30 MIN: CPT | Performed by: INTERNAL MEDICINE

## 2020-05-11 PROCEDURE — 93880 EXTRACRANIAL BILAT STUDY: CPT

## 2020-05-11 PROCEDURE — 93880 EXTRACRANIAL BILAT STUDY: CPT | Performed by: INTERNAL MEDICINE

## 2020-05-11 NOTE — PROGRESS NOTES
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   3/17 PRITESH restented- Given  11/18 CUS PRITESH patent   Residual left nonobstructive internal carotid artery stenosis 50-69%    Hypothyroidism    Gastroesophageal reflux disease    Hypercholesterolemia  11/18 129/72/78/37L    Hyponatremia  10/17 134, 11/18 130        Chief Complaint   Patient presents with   • Hypertension       Allergies  Allergies   Allergen Reactions   • Amoxicillin Rash   • Rocephin [Ceftriaxone] Hives       Current Medications    Current Outpatient Medications:   •  Acetaminophen (TYLENOL PO), Take  by mouth As Needed., Disp: , Rfl:   •  amLODIPine (NORVASC) 5 MG tablet, Take 1 tablet by mouth 2 (Two) Times a Day. Only take evening dose if systolic blood pressure is over 150. (Patient taking differently: Daily. Take 1 tablet by mouth 2 (Two) Times a Day. Only take evening dose if systolic blood pressure is over 150.), Disp: 180 tablet, Rfl: 3  •  atorvastatin (LIPITOR) 20 MG tablet, Take 1 tablet by mouth Every Night., Disp: 90 tablet, Rfl: 5  •  calcium carbonate (TUMS) 500 MG chewable tablet, Chew. TAKE AS DIRECTED., Disp: , Rfl:   •  clopidogrel (PLAVIX) 75 MG tablet, Take 1 tablet by mouth Daily., Disp: 90 tablet, Rfl: 3  •  labetalol (NORMODYNE) 100 MG tablet, Take 1 tablet by mouth 3 (Three) Times a Day., Disp: 270 tablet, Rfl: 3  •  levothyroxine (SYNTHROID, LEVOTHROID) 50 MCG tablet, Take 1 tablet by mouth Daily., Disp: 90 tablet, Rfl: 3  •  multivitamin (THERAGRAN) tablet tablet, Take 1  "tablet by mouth daily., Disp: , Rfl:   •  nitroglycerin (NITROSTAT) 0.4 MG SL tablet, Place 1 tablet under the tongue Every 5 (Five) Minutes As Needed for Chest Pain for up to 3 doses. Take no more than 3 doses in 15 minutes., Disp: 100 tablet, Rfl: 11  •  Omeprazole (PRILOSEC PO), Take 1 tablet by mouth Daily., Disp: , Rfl:   •  tamsulosin (FLOMAX) 0.4 MG capsule 24 hr capsule, Take 1 capsule by mouth Daily., Disp: 90 capsule, Rfl: 3      History of Present Illness   Andrew Hernandez is a 86 y.o. year old male here for follow up.  He is accompanied by his daughter-in-law.  He brings his blood pressure diary with him with some lability of systolics of 108 up to 170.  Unfortunately is been having some nocturnal tachypalpitations and states it is irregular.  He states that he cannot necessarily tell that his heart is going faster out of rhythm.  He has been compliant with his medications. he has had no TIA symptomatology        OBJECTIVE:  Vitals:    05/11/20 0921   BP: 146/60   BP Location: Left arm   Patient Position: Sitting   Pulse: 78   SpO2: 97%   Weight: 78 kg (172 lb)   Height: 180.3 cm (71\")     Physical Exam   Constitutional: He appears well-developed and well-nourished.   Neck: Normal range of motion. Neck supple. No hepatojugular reflux and no JVD present. Carotid bruit is not present. No tracheal deviation present. No thyromegaly present.   Cardiovascular: Normal rate, regular rhythm, S1 normal, S2 normal, intact distal pulses and normal pulses. PMI is not displaced. Exam reveals no gallop, no distant heart sounds, no friction rub, no midsystolic click and no opening snap.   No murmur heard.  Pulses:       Carotid pulses are on the right side with bruit, and on the left side with bruit.       Radial pulses are 2+ on the right side, and 2+ on the left side.        Dorsalis pedis pulses are 2+ on the right side, and 2+ on the left side.        Posterior tibial pulses are 2+ on the right side, and 2+ on the " left side.   Pulmonary/Chest: Effort normal and breath sounds normal. He has no wheezes. He has no rales.   Abdominal: Soft. Bowel sounds are normal. He exhibits no mass. There is no tenderness. There is no guarding.       Diagnostic Data:  Procedures      ASSESSMENT:   Diagnosis Plan   1. Palpitations     2. Essential hypertension     3. CVD (cerebrovascular disease)     4. Mixed hyperlipidemia         PLAN:  Palpitations with potentially nocturnal atrial fibrillation E patch will be obtained  Will consider [id number] echocardiogram is been 3 years since his prior with mild LV dysfunction and moderate mitral regurgitation    Hypertension labile and complex regimen     Cerebrovascular disease for follow-up carotid duplex day without significant change post carotid stenting    Dyslipidemia controlled on statin therapy        Vermeesch, Marilyn K, MD, thank you for referring Mr. Hernandez for evaluation.  I have forwarded my electronically generated recommendations to you for review.  Please do not hesitate to call with any questions.      Oziel Mcnair MD, FACC

## 2020-06-04 ENCOUNTER — TELEPHONE (OUTPATIENT)
Dept: CARDIOLOGY | Facility: CLINIC | Age: 85
End: 2020-06-04

## 2020-07-02 ENCOUNTER — OFFICE VISIT (OUTPATIENT)
Dept: INTERNAL MEDICINE | Facility: CLINIC | Age: 85
End: 2020-07-02

## 2020-07-02 VITALS
WEIGHT: 169 LBS | HEART RATE: 77 BPM | SYSTOLIC BLOOD PRESSURE: 140 MMHG | BODY MASS INDEX: 23.66 KG/M2 | DIASTOLIC BLOOD PRESSURE: 68 MMHG | TEMPERATURE: 97.3 F | OXYGEN SATURATION: 96 % | HEIGHT: 71 IN

## 2020-07-02 DIAGNOSIS — E87.1 HYPONATREMIA: ICD-10-CM

## 2020-07-02 DIAGNOSIS — K21.9 GASTROESOPHAGEAL REFLUX DISEASE WITHOUT ESOPHAGITIS: ICD-10-CM

## 2020-07-02 DIAGNOSIS — E03.4 HYPOTHYROIDISM DUE TO ACQUIRED ATROPHY OF THYROID: ICD-10-CM

## 2020-07-02 DIAGNOSIS — Z00.00 ENCOUNTER FOR MEDICARE ANNUAL WELLNESS EXAM: Primary | ICD-10-CM

## 2020-07-02 DIAGNOSIS — I10 BENIGN ESSENTIAL HYPERTENSION: ICD-10-CM

## 2020-07-02 DIAGNOSIS — E78.00 HYPERCHOLESTEROLEMIA: ICD-10-CM

## 2020-07-02 LAB
ALBUMIN SERPL-MCNC: 4.5 G/DL (ref 3.5–5.2)
ALBUMIN/GLOB SERPL: 2 G/DL
ALP SERPL-CCNC: 76 U/L (ref 39–117)
ALT SERPL-CCNC: 23 U/L (ref 1–41)
AST SERPL-CCNC: 32 U/L (ref 1–40)
BASOPHILS # BLD AUTO: 0.05 10*3/MM3 (ref 0–0.2)
BASOPHILS NFR BLD AUTO: 0.7 % (ref 0–1.5)
BILIRUB SERPL-MCNC: 0.5 MG/DL (ref 0.2–1.2)
BUN SERPL-MCNC: 15 MG/DL (ref 8–23)
BUN/CREAT SERPL: 13.2 (ref 7–25)
CALCIUM SERPL-MCNC: 9.1 MG/DL (ref 8.6–10.5)
CHLORIDE SERPL-SCNC: 93 MMOL/L (ref 98–107)
CO2 SERPL-SCNC: 25.6 MMOL/L (ref 22–29)
CREAT SERPL-MCNC: 1.14 MG/DL (ref 0.76–1.27)
EOSINOPHIL # BLD AUTO: 0.45 10*3/MM3 (ref 0–0.4)
EOSINOPHIL NFR BLD AUTO: 6.6 % (ref 0.3–6.2)
ERYTHROCYTE [DISTWIDTH] IN BLOOD BY AUTOMATED COUNT: 13.1 % (ref 12.3–15.4)
GLOBULIN SER CALC-MCNC: 2.2 GM/DL
GLUCOSE SERPL-MCNC: 109 MG/DL (ref 65–99)
HCT VFR BLD AUTO: 38 % (ref 37.5–51)
HGB BLD-MCNC: 13.2 G/DL (ref 13–17.7)
IMM GRANULOCYTES # BLD AUTO: 0.02 10*3/MM3 (ref 0–0.05)
IMM GRANULOCYTES NFR BLD AUTO: 0.3 % (ref 0–0.5)
LYMPHOCYTES # BLD AUTO: 1.46 10*3/MM3 (ref 0.7–3.1)
LYMPHOCYTES NFR BLD AUTO: 21.4 % (ref 19.6–45.3)
MCH RBC QN AUTO: 30 PG (ref 26.6–33)
MCHC RBC AUTO-ENTMCNC: 34.7 G/DL (ref 31.5–35.7)
MCV RBC AUTO: 86.4 FL (ref 79–97)
MONOCYTES # BLD AUTO: 0.7 10*3/MM3 (ref 0.1–0.9)
MONOCYTES NFR BLD AUTO: 10.2 % (ref 5–12)
NEUTROPHILS # BLD AUTO: 4.15 10*3/MM3 (ref 1.7–7)
NEUTROPHILS NFR BLD AUTO: 60.8 % (ref 42.7–76)
NRBC BLD AUTO-RTO: 0 /100 WBC (ref 0–0.2)
PLATELET # BLD AUTO: 224 10*3/MM3 (ref 140–450)
POTASSIUM SERPL-SCNC: 4.8 MMOL/L (ref 3.5–5.2)
PROT SERPL-MCNC: 6.7 G/DL (ref 6–8.5)
RBC # BLD AUTO: 4.4 10*6/MM3 (ref 4.14–5.8)
SODIUM SERPL-SCNC: 128 MMOL/L (ref 136–145)
T4 FREE SERPL-MCNC: 1.49 NG/DL (ref 0.93–1.7)
TSH SERPL DL<=0.005 MIU/L-ACNC: 4.52 UIU/ML (ref 0.27–4.2)
WBC # BLD AUTO: 6.83 10*3/MM3 (ref 3.4–10.8)

## 2020-07-02 PROCEDURE — G0439 PPPS, SUBSEQ VISIT: HCPCS | Performed by: INTERNAL MEDICINE

## 2020-07-02 RX ORDER — ASPIRIN 81 MG/1
81 TABLET ORAL DAILY
Qty: 30 TABLET | Refills: 0
Start: 2020-07-02 | End: 2021-01-01 | Stop reason: ALTCHOICE

## 2020-07-02 NOTE — PROGRESS NOTES
The ABCs of the Annual Wellness Visit  Subsequent Medicare Wellness Visit    Chief Complaint   Patient presents with   • Medicare Wellness-subsequent       Subjective   History of Present Illness:  Andrew Hernandez is a 86 y.o. male who presents for a Subsequent Medicare Wellness Visit.  PMH of HTN, HLD, CVA, carotid stenosis, GERD, hypothyroidism, BPH.  Since our last visit he had an episode of palpitations and needed a monitor.  It showed episode of Afib, he discussed with Dr Mcnair and decided against eliquis.  He is on plavix only.  He is checking his BP and HR, BP runs 120-140/60s with HR 60-70s, rare up to 90s.  He libby any CP, SOA or edema.  He has some BEJARANO.  He is taking omeprazole OTC and has no GERD.  He takes his thyroid med daily before all other meds.  He awakes a few times a night to urinate and is able to go back to sleep, sees Dr Huerta next week.     HEALTH RISK ASSESSMENT    Recent Hospitalizations:  No hospitalization(s) within the last year.    Current Medical Providers:  Patient Care Team:  Vermeesch, Marilyn K, MD as PCP - General (Internal Medicine)  Oziel Mcnair MD as PCP - Claims Attributed  Dirk De Leon MD as Consulting Physician (Ophthalmology)  Oziel Mcnair MD as Consulting Physician (Cardiology)  Harsh Huerta MD as Consulting Physician (Urology)    Smoking Status:  Social History     Tobacco Use   Smoking Status Former Smoker   • Packs/day: 1.00   • Years: 55.00   • Pack years: 55.00   • Types: Cigarettes   • Start date: 1950   • Last attempt to quit: 2005   • Years since quitting: 15.5   Smokeless Tobacco Former User   • Types: Chew   • Quit date: 2005       Alcohol Consumption:  Social History     Substance and Sexual Activity   Alcohol Use Yes    Comment: quit 1985       Depression Screen:   PHQ-2/PHQ-9 Depression Screening 7/2/2020   Little interest or pleasure in doing things 0   Feeling down, depressed, or hopeless 0   Trouble falling or staying asleep, or sleeping  too much -   Feeling tired or having little energy -   Poor appetite or overeating -   Feeling bad about yourself - or that you are a failure or have let yourself or your family down -   Trouble concentrating on things, such as reading the newspaper or watching television -   Moving or speaking so slowly that other people could have noticed. Or the opposite - being so fidgety or restless that you have been moving around a lot more than usual -   Thoughts that you would be better off dead, or of hurting yourself in some way -   Total Score 0   If you checked off any problems, how difficult have these problems made it for you to do your work, take care of things at home, or get along with other people? -       Fall Risk Screen:  DAWSNO Fall Risk Assessment has not been completed.    Health Habits and Functional and Cognitive Screening:  Functional & Cognitive Status 7/2/2020   Do you have difficulty preparing food and eating? No   Do you have difficulty bathing yourself, getting dressed or grooming yourself? No   Do you have difficulty using the toilet? No   Do you have difficulty moving around from place to place? No   Do you have trouble with steps or getting out of a bed or a chair? No   Current Diet Well Balanced Diet   Dental Exam Not up to date   Eye Exam Up to date   Exercise (times per week) 0 times per week   Current Exercise Activities Include None   Do you need help using the phone?  No   Are you deaf or do you have serious difficulty hearing?  No   Do you need help with transportation? No   Do you need help shopping? -   Do you need help preparing meals?  No   Do you need help with housework?  No   Do you need help with laundry? No   Do you need help taking your medications? No   Do you need help managing money? No   Do you ever drive or ride in a car without wearing a seat belt? No   Have you felt unusual stress, anger or loneliness in the last month? No   Who do you live with? Alone   If you need help,  do you have trouble finding someone available to you? No   Do you have difficulty concentrating, remembering or making decisions? No         Does the patient have evidence of cognitive impairment? No    Asprin use counseling:Taking ASA appropriately as indicated    Age-appropriate Screening Schedule:  Refer to the list below for future screening recommendations based on patient's age, sex and/or medical conditions. Orders for these recommended tests are listed in the plan section. The patient has been provided with a written plan.    Health Maintenance   Topic Date Due   • INFLUENZA VACCINE  08/01/2020   • LIPID PANEL  11/06/2020   • TDAP/TD VACCINES  Discontinued   • ZOSTER VACCINE  Discontinued          The following portions of the patient's history were reviewed and updated as appropriate: allergies, current medications, past family history, past medical history, past social history, past surgical history and problem list.    Outpatient Medications Prior to Visit   Medication Sig Dispense Refill   • Acetaminophen (TYLENOL PO) Take  by mouth As Needed.     • amLODIPine (NORVASC) 5 MG tablet Take 1 tablet by mouth 2 (Two) Times a Day. Only take evening dose if systolic blood pressure is over 150. (Patient taking differently: Daily. Take 1 tablet by mouth 2 (Two) Times a Day. Only take evening dose if systolic blood pressure is over 150.) 180 tablet 3   • atorvastatin (LIPITOR) 20 MG tablet Take 1 tablet by mouth Every Night. 90 tablet 5   • calcium carbonate (TUMS) 500 MG chewable tablet Chew. TAKE AS DIRECTED.     • clopidogrel (PLAVIX) 75 MG tablet Take 1 tablet by mouth Daily. 90 tablet 3   • labetalol (NORMODYNE) 100 MG tablet Take 1 tablet by mouth 3 (Three) Times a Day. 270 tablet 3   • levothyroxine (SYNTHROID, LEVOTHROID) 50 MCG tablet Take 1 tablet by mouth Daily. 90 tablet 3   • multivitamin (THERAGRAN) tablet tablet Take 1 tablet by mouth daily.     • nitroglycerin (NITROSTAT) 0.4 MG SL tablet Place 1  tablet under the tongue Every 5 (Five) Minutes As Needed for Chest Pain for up to 3 doses. Take no more than 3 doses in 15 minutes. 100 tablet 11   • Omeprazole (PRILOSEC PO) Take 1 tablet by mouth Daily.     • tamsulosin (FLOMAX) 0.4 MG capsule 24 hr capsule Take 1 capsule by mouth Daily. 90 capsule 3     No facility-administered medications prior to visit.        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 Patient does not have an advance directive, declines further assistance. He has a POA, they know his wishes.      Review of Systems   Constitutional: Negative.    HENT: Negative.    Eyes: Negative.    Respiratory: Negative.    Cardiovascular: Positive for palpitations.   Gastrointestinal:        Occasional GERD   Endocrine: Positive for heat intolerance.   Genitourinary: Negative.    Musculoskeletal: Negative.    Skin: Negative.    Allergic/Immunologic: Negative.    Neurological: Negative.    Hematological: Bruises/bleeds easily.   Psychiatric/Behavioral: Negative.        Compared to one year ago, the patient feels his physical health is the same.  Compared to one year ago, the patient feels his mental health is the same.    Reviewed chart for potential of high risk medication in the elderly: yes  Reviewed chart for potential of harmful drug  "interactions in the elderly:yes    Objective         Vitals:    07/02/20 0848   BP: 140/68   Pulse: 77   Temp: 97.3 °F (36.3 °C)   SpO2: 96%   Weight: 76.7 kg (169 lb)   Height: 180.3 cm (71\")   PainSc: 0-No pain       Body mass index is 23.57 kg/m².  Discussed the patient's BMI with him. The BMI is in the acceptable range.    Physical Exam   Constitutional: He is oriented to person, place, and time. He appears well-developed and well-nourished. No distress.   Very pleasant elderly male, wearing a mask, NAD today   HENT:   Head: Normocephalic and atraumatic.   Right Ear: External ear normal.   Left Ear: External ear normal.   TMs normal, no wax in canals   Eyes: Pupils are equal, round, and reactive to light. Conjunctivae and EOM are normal. Right eye exhibits no discharge. Left eye exhibits no discharge.   Arcus senelis noted   Neck: Normal range of motion. Neck supple. No thyromegaly present.   Cardiovascular: Normal rate, regular rhythm, normal heart sounds and intact distal pulses.   No murmur heard.  Bilateral carotid bruits R>L   Pulmonary/Chest: Effort normal and breath sounds normal. No respiratory distress. He has no wheezes.   Abdominal: Soft. Bowel sounds are normal. He exhibits no distension. There is no tenderness.   Musculoskeletal: Normal range of motion. He exhibits no edema.   Lymphadenopathy:     He has no cervical adenopathy.   Neurological: He is alert and oriented to person, place, and time. No cranial nerve deficit. Coordination normal.   Skin: Skin is warm and dry. No rash noted.   Psychiatric: He has a normal mood and affect. His behavior is normal. Judgment and thought content normal.   Nursing note and vitals reviewed.            Assessment/Plan   Medicare Risks and Personalized Health Plan  CMS Preventative Services Quick Reference  Advance Directive Discussion  Cardiovascular risk  Diabetic Lab Screening   Inactivity/Sedentary    The above risks/problems have been discussed with the " patient.  Pertinent information has been shared with the patient in the After Visit Summary.  Follow up plans and orders are seen below in the Assessment/Plan Section.    Diagnoses and all orders for this visit:    1. Encounter for Medicare annual wellness exam (Primary)  -     CBC & Differential  -     Comprehensive Metabolic Panel  -     TSH  -     T4, Free    2. Hyponatremia  -     Comprehensive Metabolic Panel    3. Hypothyroidism due to acquired atrophy of thyroid  -     TSH  -     T4, Free    4. Benign essential hypertension  -     CBC & Differential  -     Comprehensive Metabolic Panel    5. Hypercholesterolemia  -     Comprehensive Metabolic Panel    6. Gastroesophageal reflux disease without esophagitis  -     CBC & Differential    Other orders  -     aspirin (aspirin) 81 MG EC tablet; Take 1 tablet by mouth Daily.  Dispense: 30 tablet; Refill: 0    Follow heart healthy/low salt diet  Avoid processed foods  Monitor blood pressure as discussed  Exercise as tolerated up to 30 minutes 5 days per week  Take all medications as prescribed  Labs as noted  A copy of POA was placed in chart today  Continue OTC omeprazole, GERD is controlled overall  He is on asa and plavix for Afib, declined eliquis per cardiologist  Check TFTs and adjust med if needed  He declines all vaccines    Follow Up:  Return in about 6 months (around 1/2/2021) for Next scheduled follow up.     An After Visit Summary and PPPS were given to the patient.

## 2020-07-06 ENCOUNTER — OFFICE VISIT (OUTPATIENT)
Dept: UROLOGY | Facility: CLINIC | Age: 85
End: 2020-07-06

## 2020-07-06 VITALS
RESPIRATION RATE: 16 BRPM | HEART RATE: 69 BPM | TEMPERATURE: 98.2 F | OXYGEN SATURATION: 98 % | DIASTOLIC BLOOD PRESSURE: 82 MMHG | SYSTOLIC BLOOD PRESSURE: 124 MMHG

## 2020-07-06 DIAGNOSIS — N40.1 BENIGN PROSTATIC HYPERPLASIA WITH LOWER URINARY TRACT SYMPTOMS, SYMPTOM DETAILS UNSPECIFIED: Primary | ICD-10-CM

## 2020-07-06 DIAGNOSIS — N40.0 BENIGN PROSTATIC HYPERPLASIA WITHOUT LOWER URINARY TRACT SYMPTOMS: ICD-10-CM

## 2020-07-06 PROCEDURE — 99213 OFFICE O/P EST LOW 20 MIN: CPT | Performed by: UROLOGY

## 2020-07-06 PROCEDURE — 81003 URINALYSIS AUTO W/O SCOPE: CPT | Performed by: UROLOGY

## 2020-07-06 RX ORDER — TAMSULOSIN HYDROCHLORIDE 0.4 MG/1
1 CAPSULE ORAL DAILY
Qty: 90 CAPSULE | Refills: 3 | Status: SHIPPED | OUTPATIENT
Start: 2020-07-06 | End: 2021-07-06 | Stop reason: SDUPTHER

## 2020-07-06 NOTE — PROGRESS NOTES
Chief Complaint  Benign Prostatic Hypertrophy        ERICA Hernandez is a 86 y.o. male who returns today for an annual checkup generally doing well.  He is known to have an enlarged prostate with mild symptoms of outlet obstruction but with no progression in his symptoms for several years on Flomax.  He does have phimosis that precludes retraction of the foreskin and has the occasional urinary tract infection.  He has had no symptoms recently and his urine today is clear.  He is a candidate for palliative therapy only at this point and is asymptomatic.  He declines my offer of a digital rectal exam or genital exam today.    Vitals:    07/06/20 0902   BP: 124/82   Pulse: 69   Resp: 16   Temp: 98.2 °F (36.8 °C)   SpO2: 98%       Past Medical History  Past Medical History:   Diagnosis Date   • Arthritis    • Carotid stenosis     s/p right ICA stent x 2   • Coronary artery disease    • Disease of thyroid gland    • Enlarged prostate    • Fracture    • Full dentures    • GERD (gastroesophageal reflux disease)    • Hyperlipidemia    • Hypertension    • Kidney infection    • Renal calculi    • Stroke (CMS/HCC) 03/2016    right retinal artery occlusion   • Wears glasses        Past Surgical History  Past Surgical History:   Procedure Laterality Date   • CAROTID ENDARTERECTOMY      X2-3/17, 9/17   • CAROTID STENT Right 03/18/2016    Carotid Wall Stent   • CAROTID STENT Right 03/04/2017    Zilver BANDAR for recurrent right ICA stenosis   • CATARACT EXTRACTION W/ INTRAOCULAR LENS IMPLANT Left 9/4/2018    Procedure: CATARACT PHACO EXTRACTION WITH INTRAOCULAR LENS IMPLANT LEFT, COMPLICATED WITH MALYUGIAN RING;  Surgeon: Paola Welch MD;  Location: [facility];  Service: Ophthalmology   • KIDNEY STONE SURGERY Right 2011    Shafron - open   • PATELLA FRACTURE SURGERY Left 1986   • FL TCAT IV STENT CRV CRTD ART EMBOLIC PROTECJ Right 3/4/2017    Placement of Zilver BANDAR right ICA 3/4/17       Medications  has a current medication  list which includes the following prescription(s): acetaminophen, amlodipine, aspirin, atorvastatin, calcium carbonate, clopidogrel, labetalol, levothyroxine, multivitamin, omeprazole, tamsulosin, and nitroglycerin.      Allergies  Allergies   Allergen Reactions   • Amoxicillin Rash   • Rocephin [Ceftriaxone] Hives       Social History  Social History     Socioeconomic History   • Marital status:      Spouse name: Not on file   • Number of children: Not on file   • Years of education: Not on file   • Highest education level: Not on file   Occupational History   • Occupation: retired   Tobacco Use   • Smoking status: Former Smoker     Packs/day: 1.00     Years: 55.00     Pack years: 55.00     Types: Cigarettes     Start date: 1950     Last attempt to quit: 2005     Years since quitting: 15.5   • Smokeless tobacco: Former User     Types: Chew     Quit date: 2005   Substance and Sexual Activity   • Alcohol use: Yes     Comment: quit 1985   • Drug use: No   • Sexual activity: Defer   Social History Narrative    7 children - 2 daughters both live in [place]    5 sons, lives with Abhay & Roshni Hernandez, son is Abhay, 3 other sons live in [place].      Other sons live in [place],        Family History  He has no family history of bladder or kidney cancer  He has no family history of kidney stones      AUA Symptom Score:      Review of Systems  Review of Systems   Constitutional: Negative for activity change, appetite change, chills, fatigue, fever, unexpected weight gain and unexpected weight loss.   Respiratory: Negative for apnea, cough, chest tightness, shortness of breath, wheezing and stridor.    Cardiovascular: Negative for chest pain, palpitations and leg swelling.   Gastrointestinal: Negative for abdominal distention, abdominal pain, anal bleeding, blood in stool, constipation, diarrhea, nausea, rectal pain, vomiting, GERD and indigestion.   Genitourinary: Negative for decreased libido, decreased urine  volume, difficulty urinating, discharge, dysuria, flank pain, frequency, genital sores, hematuria, nocturia, penile pain, erectile dysfunction, penile swelling, scrotal swelling, testicular pain, urgency and urinary incontinence.   Musculoskeletal: Negative for back pain and joint swelling.   Neurological: Negative for tremors, seizures, speech difficulty, weakness and numbness.   Psychiatric/Behavioral: Negative for agitation, decreased concentration, sleep disturbance, depressed mood and stress. The patient is not nervous/anxious.        Physical Exam  Physical Exam   Constitutional: He is oriented to person, place, and time. He appears well-developed and well-nourished.   HENT:   Head: Normocephalic and atraumatic.   Neck: Normal range of motion.   Pulmonary/Chest: Effort normal. No respiratory distress.   Abdominal: Soft. He exhibits no distension and no mass. There is no tenderness. No hernia.   Musculoskeletal: Normal range of motion.   Lymphadenopathy:     He has no cervical adenopathy.   Neurological: He is alert and oriented to person, place, and time.   Skin: Skin is warm and dry.   Psychiatric: He has a normal mood and affect. His behavior is normal.   Vitals reviewed.      Labs Recent and today in the office:  Results for orders placed or performed in visit on 07/06/20   POC Urinalysis Dipstick, Automated   Result Value Ref Range    Color Yellow Yellow, Straw, Dark Yellow, Lety    Clarity, UA Clear Clear    Specific Gravity  1.010 1.005 - 1.030    pH, Urine 7.5 5.0 - 8.0    Leukocytes Negative Negative    Nitrite, UA Negative Negative    Protein, POC Negative Negative mg/dL    Glucose, UA Negative Negative, 1000 mg/dL (3+) mg/dL    Ketones, UA Negative Negative    Urobilinogen, UA Normal Normal    Bilirubin Negative Negative    Blood, UA Negative Negative         Assessment & Plan  BPH with outlet obstruction: Currently asymptomatic on Flomax and is a candidate for palliative therapy only.  He will  return on an annual basis and as needed.

## 2020-07-13 NOTE — PROGRESS NOTES
Please call patient with lab results.  Complete blood count reveals elevated eosinophils consistent with allergies.  His blood sugar is slightly elevated and I would like him to avoid excessive amounts of sweets.  His sodium and chloride remain low at as they have been in the past, it is okay if he would like to salt his foods to help this problem.  His thyroid functions reveal a normal free T4 but slightly elevated TSH.  Please tell him to make sure he is taking his medication with water only 1/2-hour before other medications and food.  At this time there is no need to adjust his medication.

## 2020-08-10 ENCOUNTER — OFFICE VISIT (OUTPATIENT)
Dept: CARDIOLOGY | Facility: CLINIC | Age: 85
End: 2020-08-10

## 2020-08-10 VITALS
HEART RATE: 121 BPM | BODY MASS INDEX: 23.1 KG/M2 | OXYGEN SATURATION: 97 % | WEIGHT: 165 LBS | HEIGHT: 71 IN | DIASTOLIC BLOOD PRESSURE: 68 MMHG | SYSTOLIC BLOOD PRESSURE: 140 MMHG

## 2020-08-10 DIAGNOSIS — I48.0 PAROXYSMAL ATRIAL FIBRILLATION (HCC): Primary | ICD-10-CM

## 2020-08-10 DIAGNOSIS — I10 BENIGN ESSENTIAL HYPERTENSION: ICD-10-CM

## 2020-08-10 DIAGNOSIS — I10 ESSENTIAL HYPERTENSION: ICD-10-CM

## 2020-08-10 DIAGNOSIS — E78.2 MIXED HYPERLIPIDEMIA: ICD-10-CM

## 2020-08-10 DIAGNOSIS — I67.9 CVD (CEREBROVASCULAR DISEASE): ICD-10-CM

## 2020-08-10 PROCEDURE — 93000 ELECTROCARDIOGRAM COMPLETE: CPT | Performed by: INTERNAL MEDICINE

## 2020-08-10 PROCEDURE — 99214 OFFICE O/P EST MOD 30 MIN: CPT | Performed by: INTERNAL MEDICINE

## 2020-08-10 RX ORDER — AMLODIPINE BESYLATE 2.5 MG/1
2.5 TABLET ORAL DAILY
Qty: 90 TABLET | Refills: 3 | Status: SHIPPED | OUTPATIENT
Start: 2020-08-10 | End: 2020-09-21 | Stop reason: SDUPTHER

## 2020-08-10 RX ORDER — LABETALOL 200 MG/1
200 TABLET, FILM COATED ORAL 3 TIMES DAILY
Qty: 270 TABLET | Refills: 3 | Status: SHIPPED | OUTPATIENT
Start: 2020-08-10 | End: 2021-03-22 | Stop reason: SDUPTHER

## 2020-08-10 NOTE — PROGRESS NOTES
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   3/17 PRITESH restented- Given  11/18 CUS PRITESH patent   Residual left nonobstructive internal carotid artery stenosis 50-69%    Hypothyroidism    Gastroesophageal reflux disease    Hypercholesterolemia  11/18 129/72/78/37L    Hyponatremia  10/17 134, 11/18 130        Chief Complaint   Patient presents with   • Palpitations   • Hypertension       Allergies  Allergies   Allergen Reactions   • Amoxicillin Rash   • Rocephin [Ceftriaxone] Hives       Current Medications    Current Outpatient Medications:   •  Acetaminophen (TYLENOL PO), Take  by mouth As Needed., Disp: , Rfl:   •  amLODIPine (NORVASC) 5 MG tablet, Take 1 tablet by mouth 2 (Two) Times a Day. Only take evening dose if systolic blood pressure is over 150. (Patient taking differently: Daily. Take 1 tablet by mouth 2 (Two) Times a Day. Only take evening dose if systolic blood pressure is over 150.), Disp: 180 tablet, Rfl: 3  •  aspirin (aspirin) 81 MG EC tablet, Take 1 tablet by mouth Daily., Disp: 30 tablet, Rfl: 0  •  atorvastatin (LIPITOR) 20 MG tablet, Take 1 tablet by mouth Every Night., Disp: 90 tablet, Rfl: 5  •  calcium carbonate (TUMS) 500 MG chewable tablet, Chew. TAKE AS DIRECTED., Disp: , Rfl:   •  clopidogrel (PLAVIX) 75 MG tablet, Take 1 tablet by mouth Daily., Disp: 90 tablet, Rfl: 3  •  labetalol (NORMODYNE) 100 MG tablet, Take 1 tablet by mouth 3 (Three) Times a Day. (Patient taking differently: Take 100 mg  "by mouth 3 (Three) Times a Day. Daughter gives extra tablet when patient is in afib.), Disp: 270 tablet, Rfl: 3  •  levothyroxine (SYNTHROID, LEVOTHROID) 50 MCG tablet, Take 1 tablet by mouth Daily., Disp: 90 tablet, Rfl: 3  •  multivitamin (THERAGRAN) tablet tablet, Take 1 tablet by mouth daily., Disp: , Rfl:   •  nitroglycerin (NITROSTAT) 0.4 MG SL tablet, Place 1 tablet under the tongue Every 5 (Five) Minutes As Needed for Chest Pain for up to 3 doses. Take no more than 3 doses in 15 minutes., Disp: 100 tablet, Rfl: 11  •  Omeprazole (PRILOSEC PO), Take 1 tablet by mouth Daily., Disp: , Rfl:   •  tamsulosin (FLOMAX) 0.4 MG capsule 24 hr capsule, Take 1 capsule by mouth Daily., Disp: 90 capsule, Rfl: 3      History of Present Illness   Andrew Hernandez is a 86 y.o. year old male here for follow up.  States that he feels like his heart went out of rhythm approximate 930 this morning.  Typically will take an additional dose of labetalol and this resolves in short order.  He has had some mild bruising with activity      OBJECTIVE:  Vitals:    08/10/20 1048   BP: 140/68   BP Location: Right arm   Patient Position: Sitting   Pulse: (!) 121   SpO2: 97%   Weight: 74.8 kg (165 lb)   Height: 180.3 cm (71\")     Physical Exam   Constitutional: He appears well-developed and well-nourished.   Neck: Normal range of motion. Neck supple. No hepatojugular reflux and no JVD present. Carotid bruit is not present. No tracheal deviation present. No thyromegaly present.   Cardiovascular: S1 normal, S2 normal, intact distal pulses and normal pulses. An irregularly irregular rhythm present. Tachycardia present. PMI is not displaced. Exam reveals no gallop, no distant heart sounds, no friction rub, no midsystolic click and no opening snap.   No murmur heard.  Pulses:       Carotid pulses are on the right side with bruit, and on the left side with bruit.       Radial pulses are 2+ on the right side, and 2+ on the left side.        Dorsalis " pedis pulses are 2+ on the right side, and 2+ on the left side.        Posterior tibial pulses are 2+ on the right side, and 2+ on the left side.   Pulmonary/Chest: Effort normal and breath sounds normal. He has no wheezes. He has no rales.   Abdominal: Soft. Bowel sounds are normal. He exhibits no mass. There is no tenderness. There is no guarding.       Diagnostic Data:    ECG 12 Lead  Date/Time: 8/10/2020 12:02 PM  Performed by: Oziel Mcnair MD  Authorized by: Oziel Mcnair MD   Comparison: compared with previous ECG from 6/17/2020  Rhythm: atrial fibrillation  BPM: 121    Clinical impression: abnormal EKG              ASSESSMENT:   Diagnosis Plan   1. Paroxysmal atrial fibrillation (CMS/HCC)     2. Essential hypertension     3. Mixed hyperlipidemia     4. CVD (cerebrovascular disease)         PLAN:  Atrial fibrillation commit to labetalol 200 3 times daily decrease amlodipine to 2.5 daily and exchange Plavix for Eliquis 5 twice daily    Hypertension labile and complex regimen     Cerebrovascular disease for follow-up carotid duplex day without significant change post carotid stenting    Dyslipidemia controlled on statin therapy    Chronic hyponatremia        Vermeesch, Marilyn K, MD, thank you for referring Mr. Hernandez for evaluation.  I have forwarded my electronically generated recommendations to you for review.  Please do not hesitate to call with any questions.      Oziel Mcnair MD, [facility]

## 2020-09-21 ENCOUNTER — OFFICE VISIT (OUTPATIENT)
Dept: CARDIOLOGY | Facility: CLINIC | Age: 85
End: 2020-09-21

## 2020-09-21 VITALS
OXYGEN SATURATION: 98 % | HEART RATE: 75 BPM | BODY MASS INDEX: 23.38 KG/M2 | SYSTOLIC BLOOD PRESSURE: 154 MMHG | DIASTOLIC BLOOD PRESSURE: 62 MMHG | WEIGHT: 167 LBS | HEIGHT: 71 IN

## 2020-09-21 DIAGNOSIS — I10 ESSENTIAL HYPERTENSION: ICD-10-CM

## 2020-09-21 DIAGNOSIS — G45.9 TIA (TRANSIENT ISCHEMIC ATTACK): ICD-10-CM

## 2020-09-21 DIAGNOSIS — I48.91 ATRIAL FIBRILLATION WITH RVR (HCC): Primary | ICD-10-CM

## 2020-09-21 PROCEDURE — 99214 OFFICE O/P EST MOD 30 MIN: CPT | Performed by: INTERNAL MEDICINE

## 2020-09-21 RX ORDER — AMLODIPINE BESYLATE 2.5 MG/1
2.5 TABLET ORAL 2 TIMES DAILY PRN
Qty: 180 TABLET | Refills: 3 | Status: SHIPPED | OUTPATIENT
Start: 2020-09-21 | End: 2021-01-01 | Stop reason: HOSPADM

## 2020-09-21 NOTE — PROGRESS NOTES
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   3/17 PRITESH restented- Given  5/20 CUS PRITESH patent   Residual left nonobstructive internal carotid artery stenosis 50-69%    Hypothyroidism    Gastroesophageal reflux disease    Hypercholesterolemia  11/18 129/72/78/37L    Hyponatremia  10/17 134, 11/18 130        Chief Complaint   Patient presents with   • Atrial Fibrillation       Allergies  Allergies   Allergen Reactions   • Amoxicillin Rash   • Rocephin [Ceftriaxone] Hives       Current Medications    Current Outpatient Medications:   •  Acetaminophen (TYLENOL PO), Take  by mouth As Needed., Disp: , Rfl:   •  apixaban (ELIQUIS) 5 MG tablet tablet, Take 1 tablet by mouth Every 12 (Twelve) Hours., Disp: 60 tablet, Rfl: 11  •  aspirin (aspirin) 81 MG EC tablet, Take 1 tablet by mouth Daily., Disp: 30 tablet, Rfl: 0  •  atorvastatin (LIPITOR) 20 MG tablet, Take 1 tablet by mouth Every Night., Disp: 90 tablet, Rfl: 5  •  calcium carbonate (TUMS) 500 MG chewable tablet, Chew. TAKE AS DIRECTED., Disp: , Rfl:   •  labetalol (NORMODYNE) 200 MG tablet, Take 1 tablet by mouth 3 (Three) Times a Day., Disp: 270 tablet, Rfl: 3  •  levothyroxine (SYNTHROID, LEVOTHROID) 50 MCG tablet, Take 1 tablet by mouth Daily., Disp: 90 tablet, Rfl: 3  •  multivitamin (THERAGRAN) tablet tablet, Take 1 tablet by mouth daily., Disp: , Rfl:   •  nitroglycerin (NITROSTAT) 0.4 MG SL tablet, Place 1 tablet under the tongue Every 5 (Five) Minutes As Needed for  "Chest Pain for up to 3 doses. Take no more than 3 doses in 15 minutes., Disp: 100 tablet, Rfl: 11  •  Omeprazole (PRILOSEC PO), Take 1 tablet by mouth Daily., Disp: , Rfl:   •  tamsulosin (FLOMAX) 0.4 MG capsule 24 hr capsule, Take 1 capsule by mouth Daily., Disp: 90 capsule, Rfl: 3  •  amLODIPine (NORVASC) 2.5 MG tablet, Take 1 tablet by mouth 2 (Two) Times a Day As Needed (take 1 pill by mouth daily and take an extra tab as needed for high BP)., Disp: 180 tablet, Rfl: 3      History of Present Illness   Andrew Hernandez is a 86 y.o. year old male here for follow up.   No issues with chest discomfort blood pressures been well controlled on current regimen no TIA symptoms    OBJECTIVE:  Vitals:    09/21/20 0930   BP: 154/62   BP Location: Right arm   Patient Position: Sitting   Pulse: 75   SpO2: 98%   Weight: 75.8 kg (167 lb)   Height: 180.3 cm (71\")     Physical Exam   Constitutional: He appears well-developed and well-nourished.   Neck: Normal range of motion. Neck supple. No hepatojugular reflux and no JVD present. Carotid bruit is not present. No tracheal deviation present. No thyromegaly present.   Cardiovascular: S1 normal, S2 normal, intact distal pulses and normal pulses. An irregularly irregular rhythm present. Tachycardia present. PMI is not displaced. Exam reveals no gallop, no distant heart sounds, no friction rub, no midsystolic click and no opening snap.   No murmur heard.  Pulses:       Carotid pulses are on the right side with bruit and on the left side with bruit.       Radial pulses are 2+ on the right side and 2+ on the left side.        Dorsalis pedis pulses are 2+ on the right side and 2+ on the left side.        Posterior tibial pulses are 2+ on the right side and 2+ on the left side.   Pulmonary/Chest: Effort normal and breath sounds normal. He has no wheezes. He has no rales.   Abdominal: Soft. Bowel sounds are normal. He exhibits no mass. There is no abdominal tenderness. There is no guarding. "       Diagnostic Data:  Procedures      ASSESSMENT:   Diagnosis Plan   1. Atrial fibrillation with RVR (CMS/HCC)     2. Essential hypertension     3. TIA (transient ischemic attack)         PLAN:  Atrial fibrillation commit to labetalol 200 3 times daily decrease amlodipine to 2.5 daily and exchange Plavix for Eliquis 5 twice daily    Hypertension labile and complex regimen     Cerebrovascular disease for follow-up carotid duplex day without significant change post carotid stenting    Dyslipidemia controlled on statin therapy    Chronic hyponatremia        Vermeesch, Marilyn K, MD, thank you for referring Mr. Hernandez for evaluation.  I have forwarded my electronically generated recommendations to you for review.  Please do not hesitate to call with any questions.      Oziel Mcnair MD, [facility]

## 2021-01-01 ENCOUNTER — LAB (OUTPATIENT)
Dept: LAB | Facility: HOSPITAL | Age: 86
End: 2021-01-01

## 2021-01-01 ENCOUNTER — DOCUMENTATION (OUTPATIENT)
Dept: CARDIOLOGY | Facility: CLINIC | Age: 86
End: 2021-01-01

## 2021-01-01 ENCOUNTER — OFFICE VISIT (OUTPATIENT)
Dept: INTERNAL MEDICINE | Facility: CLINIC | Age: 86
End: 2021-01-01

## 2021-01-01 ENCOUNTER — HOSPITAL ENCOUNTER (INPATIENT)
Facility: HOSPITAL | Age: 86
LOS: 9 days | Discharge: HOME HEALTH | End: 2021-10-16
Attending: EMERGENCY MEDICINE | Admitting: FAMILY MEDICINE

## 2021-01-01 ENCOUNTER — TELEPHONE (OUTPATIENT)
Dept: OTHER | Facility: HOSPITAL | Age: 86
End: 2021-01-01

## 2021-01-01 ENCOUNTER — PATIENT MESSAGE (OUTPATIENT)
Dept: INTERNAL MEDICINE | Facility: CLINIC | Age: 86
End: 2021-01-01

## 2021-01-01 ENCOUNTER — TELEMEDICINE (OUTPATIENT)
Dept: INTERNAL MEDICINE | Facility: CLINIC | Age: 86
End: 2021-01-01

## 2021-01-01 ENCOUNTER — PATIENT OUTREACH (OUTPATIENT)
Dept: CASE MANAGEMENT | Facility: OTHER | Age: 86
End: 2021-01-01

## 2021-01-01 ENCOUNTER — TRANSITIONAL CARE MANAGEMENT TELEPHONE ENCOUNTER (OUTPATIENT)
Dept: CALL CENTER | Facility: HOSPITAL | Age: 86
End: 2021-01-01

## 2021-01-01 ENCOUNTER — APPOINTMENT (OUTPATIENT)
Dept: GENERAL RADIOLOGY | Facility: HOSPITAL | Age: 86
End: 2021-01-01

## 2021-01-01 ENCOUNTER — CLINICAL SUPPORT (OUTPATIENT)
Dept: INTERNAL MEDICINE | Facility: CLINIC | Age: 86
End: 2021-01-01

## 2021-01-01 ENCOUNTER — READMISSION MANAGEMENT (OUTPATIENT)
Dept: CALL CENTER | Facility: HOSPITAL | Age: 86
End: 2021-01-01

## 2021-01-01 ENCOUNTER — TRANSCRIBE ORDERS (OUTPATIENT)
Dept: LAB | Facility: HOSPITAL | Age: 86
End: 2021-01-01

## 2021-01-01 ENCOUNTER — TELEPHONE (OUTPATIENT)
Dept: CARDIOLOGY | Facility: CLINIC | Age: 86
End: 2021-01-01

## 2021-01-01 ENCOUNTER — HOSPITAL ENCOUNTER (INPATIENT)
Facility: HOSPITAL | Age: 86
LOS: 6 days | Discharge: HOME | End: 2021-09-26
Attending: EMERGENCY MEDICINE | Admitting: STUDENT IN AN ORGANIZED HEALTH CARE EDUCATION/TRAINING PROGRAM

## 2021-01-01 ENCOUNTER — OFFICE VISIT (OUTPATIENT)
Dept: CARDIOLOGY | Facility: CLINIC | Age: 86
End: 2021-01-01

## 2021-01-01 ENCOUNTER — LAB REQUISITION (OUTPATIENT)
Dept: LAB | Facility: HOSPITAL | Age: 86
End: 2021-01-01

## 2021-01-01 ENCOUNTER — APPOINTMENT (OUTPATIENT)
Dept: CT IMAGING | Facility: HOSPITAL | Age: 86
End: 2021-01-01

## 2021-01-01 ENCOUNTER — HOSPITAL ENCOUNTER (OUTPATIENT)
Facility: HOSPITAL | Age: 86
Discharge: HOME | End: 2021-12-14
Attending: STUDENT IN AN ORGANIZED HEALTH CARE EDUCATION/TRAINING PROGRAM | Admitting: STUDENT IN AN ORGANIZED HEALTH CARE EDUCATION/TRAINING PROGRAM

## 2021-01-01 ENCOUNTER — TELEPHONE (OUTPATIENT)
Dept: INTERNAL MEDICINE | Facility: CLINIC | Age: 86
End: 2021-01-01

## 2021-01-01 ENCOUNTER — APPOINTMENT (OUTPATIENT)
Dept: CARDIOLOGY | Facility: HOSPITAL | Age: 86
End: 2021-01-01

## 2021-01-01 ENCOUNTER — HOSPITAL ENCOUNTER (INPATIENT)
Facility: HOSPITAL | Age: 86
LOS: 3 days | Discharge: HOME | End: 2021-09-05
Attending: EMERGENCY MEDICINE | Admitting: STUDENT IN AN ORGANIZED HEALTH CARE EDUCATION/TRAINING PROGRAM

## 2021-01-01 ENCOUNTER — TELEPHONE (OUTPATIENT)
Dept: TELEMETRY | Facility: HOSPITAL | Age: 86
End: 2021-01-01

## 2021-01-01 ENCOUNTER — HOSPITAL ENCOUNTER (EMERGENCY)
Facility: HOSPITAL | Age: 86
Discharge: HOME | End: 2021-11-12
Attending: EMERGENCY MEDICINE | Admitting: EMERGENCY MEDICINE

## 2021-01-01 ENCOUNTER — HOSPITAL ENCOUNTER (INPATIENT)
Facility: HOSPITAL | Age: 86
LOS: 4 days | Discharge: ANOTHER HOSPITAL | End: 2021-12-13
Attending: EMERGENCY MEDICINE | Admitting: STUDENT IN AN ORGANIZED HEALTH CARE EDUCATION/TRAINING PROGRAM

## 2021-01-01 VITALS
TEMPERATURE: 97.3 F | SYSTOLIC BLOOD PRESSURE: 112 MMHG | HEIGHT: 71 IN | HEART RATE: 70 BPM | WEIGHT: 145.31 LBS | BODY MASS INDEX: 20.34 KG/M2 | OXYGEN SATURATION: 99 % | DIASTOLIC BLOOD PRESSURE: 52 MMHG

## 2021-01-01 VITALS
TEMPERATURE: 97 F | OXYGEN SATURATION: 94 % | SYSTOLIC BLOOD PRESSURE: 122 MMHG | DIASTOLIC BLOOD PRESSURE: 60 MMHG | HEART RATE: 78 BPM

## 2021-01-01 VITALS
DIASTOLIC BLOOD PRESSURE: 46 MMHG | OXYGEN SATURATION: 95 % | SYSTOLIC BLOOD PRESSURE: 122 MMHG | HEIGHT: 71 IN | RESPIRATION RATE: 18 BRPM | WEIGHT: 162.7 LBS | HEART RATE: 67 BPM | TEMPERATURE: 98 F | BODY MASS INDEX: 22.78 KG/M2

## 2021-01-01 VITALS
BODY MASS INDEX: 19.72 KG/M2 | OXYGEN SATURATION: 98 % | HEIGHT: 71 IN | DIASTOLIC BLOOD PRESSURE: 51 MMHG | SYSTOLIC BLOOD PRESSURE: 137 MMHG | WEIGHT: 140.87 LBS | RESPIRATION RATE: 14 BRPM | HEART RATE: 92 BPM | TEMPERATURE: 97.9 F

## 2021-01-01 VITALS
RESPIRATION RATE: 18 BRPM | SYSTOLIC BLOOD PRESSURE: 125 MMHG | WEIGHT: 145.94 LBS | BODY MASS INDEX: 20.43 KG/M2 | TEMPERATURE: 97.8 F | DIASTOLIC BLOOD PRESSURE: 53 MMHG | HEART RATE: 81 BPM | OXYGEN SATURATION: 99 % | HEIGHT: 71 IN

## 2021-01-01 VITALS
WEIGHT: 161.38 LBS | OXYGEN SATURATION: 94 % | DIASTOLIC BLOOD PRESSURE: 36 MMHG | RESPIRATION RATE: 18 BRPM | BODY MASS INDEX: 22.59 KG/M2 | SYSTOLIC BLOOD PRESSURE: 127 MMHG | HEIGHT: 71 IN | TEMPERATURE: 97.8 F | HEART RATE: 67 BPM

## 2021-01-01 VITALS
WEIGHT: 163 LBS | DIASTOLIC BLOOD PRESSURE: 58 MMHG | HEIGHT: 71 IN | OXYGEN SATURATION: 98 % | SYSTOLIC BLOOD PRESSURE: 120 MMHG | HEART RATE: 73 BPM | BODY MASS INDEX: 22.82 KG/M2

## 2021-01-01 VITALS
SYSTOLIC BLOOD PRESSURE: 98 MMHG | HEIGHT: 71 IN | DIASTOLIC BLOOD PRESSURE: 58 MMHG | WEIGHT: 153 LBS | BODY MASS INDEX: 21.42 KG/M2 | OXYGEN SATURATION: 95 % | HEART RATE: 75 BPM

## 2021-01-01 VITALS
TEMPERATURE: 98.1 F | DIASTOLIC BLOOD PRESSURE: 50 MMHG | SYSTOLIC BLOOD PRESSURE: 131 MMHG | OXYGEN SATURATION: 93 % | HEART RATE: 56 BPM

## 2021-01-01 VITALS
BODY MASS INDEX: 22.86 KG/M2 | OXYGEN SATURATION: 98 % | DIASTOLIC BLOOD PRESSURE: 60 MMHG | SYSTOLIC BLOOD PRESSURE: 118 MMHG | WEIGHT: 163.3 LBS | HEIGHT: 71 IN | HEART RATE: 74 BPM

## 2021-01-01 VITALS
WEIGHT: 146 LBS | HEART RATE: 75 BPM | DIASTOLIC BLOOD PRESSURE: 50 MMHG | SYSTOLIC BLOOD PRESSURE: 150 MMHG | OXYGEN SATURATION: 100 % | BODY MASS INDEX: 20.44 KG/M2 | HEIGHT: 71 IN | TEMPERATURE: 97.9 F | RESPIRATION RATE: 18 BRPM

## 2021-01-01 VITALS
HEART RATE: 70 BPM | OXYGEN SATURATION: 90 % | TEMPERATURE: 100.7 F | DIASTOLIC BLOOD PRESSURE: 40 MMHG | SYSTOLIC BLOOD PRESSURE: 102 MMHG

## 2021-01-01 VITALS
RESPIRATION RATE: 20 BRPM | OXYGEN SATURATION: 98 % | BODY MASS INDEX: 22.96 KG/M2 | SYSTOLIC BLOOD PRESSURE: 130 MMHG | WEIGHT: 164.02 LBS | DIASTOLIC BLOOD PRESSURE: 53 MMHG | HEIGHT: 71 IN | TEMPERATURE: 97.9 F | HEART RATE: 71 BPM

## 2021-01-01 VITALS
SYSTOLIC BLOOD PRESSURE: 132 MMHG | TEMPERATURE: 97 F | BODY MASS INDEX: 22.44 KG/M2 | OXYGEN SATURATION: 92 % | WEIGHT: 160.25 LBS | HEIGHT: 71 IN | DIASTOLIC BLOOD PRESSURE: 80 MMHG | HEART RATE: 77 BPM

## 2021-01-01 VITALS
HEIGHT: 71 IN | WEIGHT: 152 LBS | HEART RATE: 78 BPM | BODY MASS INDEX: 21.28 KG/M2 | TEMPERATURE: 97.3 F | OXYGEN SATURATION: 95 % | SYSTOLIC BLOOD PRESSURE: 102 MMHG | DIASTOLIC BLOOD PRESSURE: 52 MMHG

## 2021-01-01 DIAGNOSIS — E87.1 HYPONATREMIA: Primary | ICD-10-CM

## 2021-01-01 DIAGNOSIS — R39.9 URINARY SYMPTOM OR SIGN: Primary | ICD-10-CM

## 2021-01-01 DIAGNOSIS — E87.1 HYPONATREMIA: ICD-10-CM

## 2021-01-01 DIAGNOSIS — R00.1 SYMPTOMATIC BRADYCARDIA: Primary | ICD-10-CM

## 2021-01-01 DIAGNOSIS — I50.9 ACUTE ON CHRONIC CONGESTIVE HEART FAILURE, UNSPECIFIED HEART FAILURE TYPE (HCC): Primary | ICD-10-CM

## 2021-01-01 DIAGNOSIS — Z78.9 IMPAIRED MOBILITY AND ADLS: ICD-10-CM

## 2021-01-01 DIAGNOSIS — K21.9 GASTROESOPHAGEAL REFLUX DISEASE WITHOUT ESOPHAGITIS: Chronic | ICD-10-CM

## 2021-01-01 DIAGNOSIS — E78.2 MIXED HYPERLIPIDEMIA: ICD-10-CM

## 2021-01-01 DIAGNOSIS — E03.9 ACQUIRED HYPOTHYROIDISM: Chronic | ICD-10-CM

## 2021-01-01 DIAGNOSIS — E78.00 HYPERCHOLESTEROLEMIA: ICD-10-CM

## 2021-01-01 DIAGNOSIS — I11.0 HYPERTENSIVE HEART DISEASE WITH HEART FAILURE (HCC): ICD-10-CM

## 2021-01-01 DIAGNOSIS — I25.10 CORONARY ARTERY DISEASE INVOLVING NATIVE CORONARY ARTERY OF NATIVE HEART WITHOUT ANGINA PECTORIS: Primary | ICD-10-CM

## 2021-01-01 DIAGNOSIS — I50.33 ACUTE ON CHRONIC DIASTOLIC (CONGESTIVE) HEART FAILURE (HCC): Primary | ICD-10-CM

## 2021-01-01 DIAGNOSIS — I10 ESSENTIAL HYPERTENSION: ICD-10-CM

## 2021-01-01 DIAGNOSIS — R31.9 URINARY TRACT INFECTION WITH HEMATURIA, SITE UNSPECIFIED: ICD-10-CM

## 2021-01-01 DIAGNOSIS — N30.00 ACUTE CYSTITIS WITHOUT HEMATURIA: ICD-10-CM

## 2021-01-01 DIAGNOSIS — I48.20 CHRONIC ATRIAL FIBRILLATION WITH RVR (HCC): ICD-10-CM

## 2021-01-01 DIAGNOSIS — I10 BENIGN ESSENTIAL HYPERTENSION: Primary | ICD-10-CM

## 2021-01-01 DIAGNOSIS — R53.1 WEAKNESS: ICD-10-CM

## 2021-01-01 DIAGNOSIS — Z20.822 COVID-19 RULED OUT: ICD-10-CM

## 2021-01-01 DIAGNOSIS — D64.9 ANEMIA, UNSPECIFIED TYPE: ICD-10-CM

## 2021-01-01 DIAGNOSIS — R39.9 URINARY TRACT INFECTION SYMPTOMS: ICD-10-CM

## 2021-01-01 DIAGNOSIS — I65.29 CAROTID ATHEROSCLEROSIS, UNSPECIFIED LATERALITY: ICD-10-CM

## 2021-01-01 DIAGNOSIS — E87.70 HYPERVOLEMIA, UNSPECIFIED HYPERVOLEMIA TYPE: ICD-10-CM

## 2021-01-01 DIAGNOSIS — R39.9 URINARY TRACT INFECTION SYMPTOMS: Primary | ICD-10-CM

## 2021-01-01 DIAGNOSIS — Z20.822 COVID-19 RULED OUT: Primary | ICD-10-CM

## 2021-01-01 DIAGNOSIS — E87.70 EDEMA DUE TO HYPERVOLEMIA: Primary | ICD-10-CM

## 2021-01-01 DIAGNOSIS — R05.9 COUGH IN ADULT PATIENT: Primary | ICD-10-CM

## 2021-01-01 DIAGNOSIS — J90 CHRONIC BILATERAL PLEURAL EFFUSIONS: ICD-10-CM

## 2021-01-01 DIAGNOSIS — R11.2 NAUSEA AND VOMITING, INTRACTABILITY OF VOMITING NOT SPECIFIED, UNSPECIFIED VOMITING TYPE: ICD-10-CM

## 2021-01-01 DIAGNOSIS — N39.0 URINARY TRACT INFECTION WITH HEMATURIA, SITE UNSPECIFIED: ICD-10-CM

## 2021-01-01 DIAGNOSIS — Z74.09 IMPAIRED MOBILITY AND ADLS: ICD-10-CM

## 2021-01-01 DIAGNOSIS — J96.21 ACUTE AND CHRONIC RESPIRATORY FAILURE WITH HYPOXIA (HCC): ICD-10-CM

## 2021-01-01 DIAGNOSIS — U07.1 COVID-19: ICD-10-CM

## 2021-01-01 DIAGNOSIS — I50.22 CHRONIC SYSTOLIC CONGESTIVE HEART FAILURE (HCC): Primary | ICD-10-CM

## 2021-01-01 DIAGNOSIS — U07.1 COVID-19 VIRUS DETECTED: ICD-10-CM

## 2021-01-01 DIAGNOSIS — J90 BILATERAL PLEURAL EFFUSION: ICD-10-CM

## 2021-01-01 DIAGNOSIS — R82.90 ABNORMAL URINE: Primary | ICD-10-CM

## 2021-01-01 DIAGNOSIS — I10 PRIMARY HYPERTENSION: ICD-10-CM

## 2021-01-01 DIAGNOSIS — R53.81 PHYSICAL DECONDITIONING: ICD-10-CM

## 2021-01-01 DIAGNOSIS — R35.0 URINARY FREQUENCY: Primary | ICD-10-CM

## 2021-01-01 DIAGNOSIS — I50.33 ACUTE ON CHRONIC DIASTOLIC (CONGESTIVE) HEART FAILURE (HCC): ICD-10-CM

## 2021-01-01 DIAGNOSIS — I48.0 PAROXYSMAL ATRIAL FIBRILLATION (HCC): ICD-10-CM

## 2021-01-01 DIAGNOSIS — I25.10 CORONARY ARTERY DISEASE INVOLVING NATIVE HEART, ANGINA PRESENCE UNSPECIFIED, UNSPECIFIED VESSEL OR LESION TYPE: Primary | ICD-10-CM

## 2021-01-01 DIAGNOSIS — I50.43 ACUTE ON CHRONIC COMBINED SYSTOLIC AND DIASTOLIC CONGESTIVE HEART FAILURE (HCC): ICD-10-CM

## 2021-01-01 DIAGNOSIS — I50.43 ACUTE ON CHRONIC COMBINED SYSTOLIC AND DIASTOLIC CONGESTIVE HEART FAILURE (HCC): Primary | ICD-10-CM

## 2021-01-01 LAB
A-A DO2: 143.2 MMHG
ABO GROUP BLD: NORMAL
ALBUMIN SERPL-MCNC: 2.5 G/DL (ref 3.5–5.2)
ALBUMIN SERPL-MCNC: 2.7 G/DL (ref 3.5–5.2)
ALBUMIN SERPL-MCNC: 2.8 G/DL (ref 3.5–5.2)
ALBUMIN SERPL-MCNC: 2.8 G/DL (ref 3.5–5.2)
ALBUMIN SERPL-MCNC: 2.8 G/DL (ref 3.6–4.6)
ALBUMIN SERPL-MCNC: 2.9 G/DL (ref 3.5–5.2)
ALBUMIN SERPL-MCNC: 3 G/DL (ref 3.5–5.2)
ALBUMIN SERPL-MCNC: 3.2 G/DL (ref 3.5–5.2)
ALBUMIN SERPL-MCNC: 3.3 G/DL (ref 3.5–5.2)
ALBUMIN SERPL-MCNC: 3.5 G/DL (ref 3.5–5.2)
ALBUMIN SERPL-MCNC: 3.6 G/DL (ref 3.5–5.2)
ALBUMIN SERPL-MCNC: 3.8 G/DL (ref 3.5–5.2)
ALBUMIN/GLOB SERPL: 0.8 G/DL
ALBUMIN/GLOB SERPL: 1.2 G/DL
ALBUMIN/GLOB SERPL: 1.2 {RATIO} (ref 1.2–2.2)
ALBUMIN/GLOB SERPL: 1.3 G/DL
ALBUMIN/GLOB SERPL: 1.4 G/DL
ALBUMIN/GLOB SERPL: 1.5 G/DL
ALBUMIN/GLOB SERPL: 1.5 G/DL
ALBUMIN/GLOB SERPL: 1.7 G/DL
ALBUMIN/GLOB SERPL: 2.2 G/DL
ALP SERPL-CCNC: 110 U/L (ref 39–117)
ALP SERPL-CCNC: 111 IU/L (ref 44–121)
ALP SERPL-CCNC: 152 U/L (ref 39–117)
ALP SERPL-CCNC: 83 U/L (ref 39–117)
ALP SERPL-CCNC: 88 U/L (ref 39–117)
ALP SERPL-CCNC: 89 U/L (ref 39–117)
ALP SERPL-CCNC: 90 U/L (ref 39–117)
ALP SERPL-CCNC: 91 U/L (ref 39–117)
ALP SERPL-CCNC: 91 U/L (ref 39–117)
ALP SERPL-CCNC: 92 U/L (ref 39–117)
ALP SERPL-CCNC: 92 U/L (ref 39–117)
ALP SERPL-CCNC: 95 U/L (ref 39–117)
ALT SERPL W P-5'-P-CCNC: 10 U/L (ref 1–41)
ALT SERPL W P-5'-P-CCNC: 14 U/L (ref 1–41)
ALT SERPL W P-5'-P-CCNC: 17 U/L (ref 1–41)
ALT SERPL W P-5'-P-CCNC: 23 U/L (ref 1–41)
ALT SERPL W P-5'-P-CCNC: 43 U/L (ref 1–41)
ALT SERPL W P-5'-P-CCNC: 44 U/L (ref 1–41)
ALT SERPL W P-5'-P-CCNC: 72 U/L (ref 1–41)
ALT SERPL W P-5'-P-CCNC: 76 U/L (ref 1–41)
ALT SERPL W P-5'-P-CCNC: 8 U/L (ref 1–41)
ALT SERPL-CCNC: 16 U/L (ref 1–41)
ALT SERPL-CCNC: 40 IU/L (ref 0–44)
ALT SERPL-CCNC: 55 U/L (ref 1–41)
ANION GAP SERPL CALCULATED.3IONS-SCNC: 10 MMOL/L (ref 5–15)
ANION GAP SERPL CALCULATED.3IONS-SCNC: 10.3 MMOL/L (ref 5–15)
ANION GAP SERPL CALCULATED.3IONS-SCNC: 10.5 MMOL/L (ref 5–15)
ANION GAP SERPL CALCULATED.3IONS-SCNC: 10.6 MMOL/L (ref 5–15)
ANION GAP SERPL CALCULATED.3IONS-SCNC: 11.1 MMOL/L (ref 5–15)
ANION GAP SERPL CALCULATED.3IONS-SCNC: 11.2 MMOL/L (ref 5–15)
ANION GAP SERPL CALCULATED.3IONS-SCNC: 11.5 MMOL/L (ref 5–15)
ANION GAP SERPL CALCULATED.3IONS-SCNC: 13.8 MMOL/L (ref 5–15)
ANION GAP SERPL CALCULATED.3IONS-SCNC: 6.7 MMOL/L (ref 5–15)
ANION GAP SERPL CALCULATED.3IONS-SCNC: 7 MMOL/L (ref 5–15)
ANION GAP SERPL CALCULATED.3IONS-SCNC: 7.1 MMOL/L (ref 5–15)
ANION GAP SERPL CALCULATED.3IONS-SCNC: 7.3 MMOL/L (ref 5–15)
ANION GAP SERPL CALCULATED.3IONS-SCNC: 8.2 MMOL/L (ref 5–15)
ANION GAP SERPL CALCULATED.3IONS-SCNC: 8.3 MMOL/L (ref 5–15)
ANION GAP SERPL CALCULATED.3IONS-SCNC: 8.5 MMOL/L (ref 5–15)
ANION GAP SERPL CALCULATED.3IONS-SCNC: 8.7 MMOL/L (ref 5–15)
ANION GAP SERPL CALCULATED.3IONS-SCNC: 8.8 MMOL/L (ref 5–15)
ANION GAP SERPL CALCULATED.3IONS-SCNC: 8.8 MMOL/L (ref 5–15)
ANION GAP SERPL CALCULATED.3IONS-SCNC: 9.1 MMOL/L (ref 5–15)
ANION GAP SERPL CALCULATED.3IONS-SCNC: 9.2 MMOL/L (ref 5–15)
ANION GAP SERPL CALCULATED.3IONS-SCNC: 9.4 MMOL/L (ref 5–15)
ANION GAP SERPL CALCULATED.3IONS-SCNC: 9.6 MMOL/L (ref 5–15)
ANION GAP SERPL CALCULATED.3IONS-SCNC: 9.8 MMOL/L (ref 5–15)
ANION GAP SERPL CALCULATED.3IONS-SCNC: 9.8 MMOL/L (ref 5–15)
APTT PPP: 59.2 SECONDS (ref 24.5–37.2)
ARTERIAL PATENCY WRIST A: ABNORMAL
AST SERPL-CCNC: 15 U/L (ref 1–40)
AST SERPL-CCNC: 15 U/L (ref 1–40)
AST SERPL-CCNC: 17 U/L (ref 1–40)
AST SERPL-CCNC: 17 U/L (ref 1–40)
AST SERPL-CCNC: 20 U/L (ref 1–40)
AST SERPL-CCNC: 21 U/L (ref 1–40)
AST SERPL-CCNC: 31 IU/L (ref 0–40)
AST SERPL-CCNC: 31 U/L (ref 1–40)
AST SERPL-CCNC: 35 U/L (ref 1–40)
AST SERPL-CCNC: 38 U/L (ref 1–40)
AST SERPL-CCNC: 42 U/L (ref 1–40)
AST SERPL-CCNC: 49 U/L (ref 1–40)
ATMOSPHERIC PRESS: 737 MMHG
B PARAPERT DNA SPEC QL NAA+PROBE: NOT DETECTED
B PERT DNA SPEC QL NAA+PROBE: NOT DETECTED
BACTERIA SPEC AEROBE CULT: ABNORMAL
BACTERIA SPEC AEROBE CULT: NORMAL
BACTERIA SPEC AEROBE CULT: NORMAL
BACTERIA UR CULT: NORMAL
BACTERIA UR QL AUTO: ABNORMAL /HPF
BASE EXCESS BLDA CALC-SCNC: 0 MMOL/L (ref 0–2)
BASOPHILS # BLD AUTO: 0.01 10*3/MM3 (ref 0–0.2)
BASOPHILS # BLD AUTO: 0.01 10*3/MM3 (ref 0–0.2)
BASOPHILS # BLD AUTO: 0.02 10*3/MM3 (ref 0–0.2)
BASOPHILS # BLD AUTO: 0.03 10*3/MM3 (ref 0–0.2)
BASOPHILS # BLD AUTO: 0.04 10*3/MM3 (ref 0–0.2)
BASOPHILS # BLD AUTO: 0.05 10*3/MM3 (ref 0–0.2)
BASOPHILS # BLD AUTO: 0.06 10*3/MM3 (ref 0–0.2)
BASOPHILS # BLD AUTO: 0.06 10*3/MM3 (ref 0–0.2)
BASOPHILS # BLD AUTO: 0.07 10*3/MM3 (ref 0–0.2)
BASOPHILS # BLD AUTO: 0.1 X10E3/UL (ref 0–0.2)
BASOPHILS # BLD AUTO: 0.1 X10E3/UL (ref 0–0.2)
BASOPHILS NFR BLD AUTO: 0.1 % (ref 0–1.5)
BASOPHILS NFR BLD AUTO: 0.2 % (ref 0–1.5)
BASOPHILS NFR BLD AUTO: 0.2 % (ref 0–1.5)
BASOPHILS NFR BLD AUTO: 0.3 % (ref 0–1.5)
BASOPHILS NFR BLD AUTO: 0.3 % (ref 0–1.5)
BASOPHILS NFR BLD AUTO: 0.4 % (ref 0–1.5)
BASOPHILS NFR BLD AUTO: 0.5 % (ref 0–1.5)
BASOPHILS NFR BLD AUTO: 0.5 % (ref 0–1.5)
BASOPHILS NFR BLD AUTO: 0.6 % (ref 0–1.5)
BASOPHILS NFR BLD AUTO: 0.7 % (ref 0–1.5)
BASOPHILS NFR BLD AUTO: 0.8 % (ref 0–1.5)
BASOPHILS NFR BLD AUTO: 1 %
BASOPHILS NFR BLD AUTO: 1 %
BASOPHILS NFR BLD AUTO: 1 % (ref 0–1.5)
BASOPHILS NFR BLD AUTO: 1.2 % (ref 0–1.5)
BDY SITE: ABNORMAL
BH BB BLOOD EXPIRATION DATE: NORMAL
BH BB BLOOD TYPE BARCODE: 5100
BH BB DISPENSE STATUS: NORMAL
BH BB PRODUCT CODE: NORMAL
BH BB UNIT NUMBER: NORMAL
BH CV ECHO MEAS - % IVS THICK: 13.6 %
BH CV ECHO MEAS - % IVS THICK: 21.4 %
BH CV ECHO MEAS - % LVPW THICK: 28 %
BH CV ECHO MEAS - % LVPW THICK: 44 %
BH CV ECHO MEAS - AI DEC SLOPE: 172.5 CM/SEC^2
BH CV ECHO MEAS - AI DEC SLOPE: 382 CM/SEC^2
BH CV ECHO MEAS - AI MAX PG: 29.1 MMHG
BH CV ECHO MEAS - AI MAX PG: 33.6 MMHG
BH CV ECHO MEAS - AI MAX VEL: 269.5 CM/SEC
BH CV ECHO MEAS - AI MAX VEL: 290 CM/SEC
BH CV ECHO MEAS - AI P1/2T: 222.4 MSEC
BH CV ECHO MEAS - AI P1/2T: 457.6 MSEC
BH CV ECHO MEAS - AO MAX PG (FULL): 0.92 MMHG
BH CV ECHO MEAS - AO MAX PG (FULL): 1.7 MMHG
BH CV ECHO MEAS - AO MAX PG: 3 MMHG
BH CV ECHO MEAS - AO MAX PG: 5 MMHG
BH CV ECHO MEAS - AO MEAN PG (FULL): 1 MMHG
BH CV ECHO MEAS - AO MEAN PG (FULL): 1 MMHG
BH CV ECHO MEAS - AO MEAN PG: 2 MMHG
BH CV ECHO MEAS - AO MEAN PG: 2 MMHG
BH CV ECHO MEAS - AO ROOT AREA (BSA CORRECTED): 1.6
BH CV ECHO MEAS - AO ROOT AREA (BSA CORRECTED): 1.8
BH CV ECHO MEAS - AO ROOT AREA: 7.3 CM^2
BH CV ECHO MEAS - AO ROOT AREA: 9.7 CM^2
BH CV ECHO MEAS - AO ROOT DIAM: 3.1 CM
BH CV ECHO MEAS - AO ROOT DIAM: 3.5 CM
BH CV ECHO MEAS - AO V2 MAX: 114 CM/SEC
BH CV ECHO MEAS - AO V2 MAX: 88.5 CM/SEC
BH CV ECHO MEAS - AO V2 MEAN: 57.1 CM/SEC
BH CV ECHO MEAS - AO V2 MEAN: 66.1 CM/SEC
BH CV ECHO MEAS - AO V2 VTI: 18.4 CM
BH CV ECHO MEAS - AO V2 VTI: 19.1 CM
BH CV ECHO MEAS - AVA(I,A): 3.1 CM^2
BH CV ECHO MEAS - AVA(I,A): 3.6 CM^2
BH CV ECHO MEAS - AVA(I,D): 3.1 CM^2
BH CV ECHO MEAS - AVA(I,D): 3.6 CM^2
BH CV ECHO MEAS - AVA(V,A): 2.8 CM^2
BH CV ECHO MEAS - AVA(V,A): 2.9 CM^2
BH CV ECHO MEAS - AVA(V,D): 2.8 CM^2
BH CV ECHO MEAS - AVA(V,D): 2.9 CM^2
BH CV ECHO MEAS - BSA(HAYCOCK): 1.9 M^2
BH CV ECHO MEAS - BSA(HAYCOCK): 2 M^2
BH CV ECHO MEAS - BSA: 1.9 M^2
BH CV ECHO MEAS - BSA: 2 M^2
BH CV ECHO MEAS - BZI_BMI: 21.9 KILOGRAMS/M^2
BH CV ECHO MEAS - BZI_BMI: 23.6 KILOGRAMS/M^2
BH CV ECHO MEAS - BZI_METRIC_HEIGHT: 180.3 CM
BH CV ECHO MEAS - BZI_METRIC_HEIGHT: 180.3 CM
BH CV ECHO MEAS - BZI_METRIC_WEIGHT: 71.2 KG
BH CV ECHO MEAS - BZI_METRIC_WEIGHT: 76.7 KG
BH CV ECHO MEAS - EDV(CUBED): 135.8 ML
BH CV ECHO MEAS - EDV(CUBED): 149.7 ML
BH CV ECHO MEAS - EDV(MOD-SP2): 141 ML
BH CV ECHO MEAS - EDV(MOD-SP2): 163 ML
BH CV ECHO MEAS - EDV(MOD-SP4): 103 ML
BH CV ECHO MEAS - EDV(MOD-SP4): 97.4 ML
BH CV ECHO MEAS - EDV(TEICH): 126.1 ML
BH CV ECHO MEAS - EDV(TEICH): 135.9 ML
BH CV ECHO MEAS - EF(CUBED): 59.9 %
BH CV ECHO MEAS - EF(CUBED): 66.4 %
BH CV ECHO MEAS - EF(MOD-BP): 43.3 %
BH CV ECHO MEAS - EF(MOD-BP): 43.6 %
BH CV ECHO MEAS - EF(MOD-SP2): 38 %
BH CV ECHO MEAS - EF(MOD-SP2): 52.5 %
BH CV ECHO MEAS - EF(MOD-SP4): 40 %
BH CV ECHO MEAS - EF(MOD-SP4): 49.6 %
BH CV ECHO MEAS - EF(TEICH): 51.2 %
BH CV ECHO MEAS - EF(TEICH): 57.5 %
BH CV ECHO MEAS - ESV(CUBED): 50.2 ML
BH CV ECHO MEAS - ESV(CUBED): 54.4 ML
BH CV ECHO MEAS - ESV(MOD-SP2): 77.4 ML
BH CV ECHO MEAS - ESV(MOD-SP2): 87.4 ML
BH CV ECHO MEAS - ESV(MOD-SP4): 49.1 ML
BH CV ECHO MEAS - ESV(MOD-SP4): 61.8 ML
BH CV ECHO MEAS - ESV(TEICH): 57.8 ML
BH CV ECHO MEAS - ESV(TEICH): 61.6 ML
BH CV ECHO MEAS - FS: 26.3 %
BH CV ECHO MEAS - FS: 30.5 %
BH CV ECHO MEAS - IVS/LVPW: 0.82
BH CV ECHO MEAS - IVS/LVPW: 0.98
BH CV ECHO MEAS - IVSD: 0.88 CM
BH CV ECHO MEAS - IVSD: 0.98 CM
BH CV ECHO MEAS - IVSS: 1 CM
BH CV ECHO MEAS - IVSS: 1.2 CM
BH CV ECHO MEAS - LA DIMENSION: 3.3 CM
BH CV ECHO MEAS - LA DIMENSION: 3.9 CM
BH CV ECHO MEAS - LA/AO: 0.94
BH CV ECHO MEAS - LA/AO: 1.3
BH CV ECHO MEAS - LAD MAJOR: 4 CM
BH CV ECHO MEAS - LAD MAJOR: 4.3 CM
BH CV ECHO MEAS - LAT PEAK E' VEL: 10.4 CM/SEC
BH CV ECHO MEAS - LAT PEAK E' VEL: 6.2 CM/SEC
BH CV ECHO MEAS - LATERAL E/E' RATIO: 10.7
BH CV ECHO MEAS - LATERAL E/E' RATIO: 17.9
BH CV ECHO MEAS - LV DIASTOLIC VOL/BSA (35-75): 51.2 ML/M^2
BH CV ECHO MEAS - LV DIASTOLIC VOL/BSA (35-75): 52.5 ML/M^2
BH CV ECHO MEAS - LV MASS(C)D: 187.9 GRAMS
BH CV ECHO MEAS - LV MASS(C)D: 194.4 GRAMS
BH CV ECHO MEAS - LV MASS(C)DI: 102.2 GRAMS/M^2
BH CV ECHO MEAS - LV MASS(C)DI: 95.7 GRAMS/M^2
BH CV ECHO MEAS - LV MASS(C)S: 144 GRAMS
BH CV ECHO MEAS - LV MASS(C)S: 175.5 GRAMS
BH CV ECHO MEAS - LV MASS(C)SI: 75.7 GRAMS/M^2
BH CV ECHO MEAS - LV MASS(C)SI: 89.4 GRAMS/M^2
BH CV ECHO MEAS - LV MAX PG: 2.1 MMHG
BH CV ECHO MEAS - LV MAX PG: 3.3 MMHG
BH CV ECHO MEAS - LV MEAN PG: 1 MMHG
BH CV ECHO MEAS - LV MEAN PG: 1 MMHG
BH CV ECHO MEAS - LV SYSTOLIC VOL/BSA (12-30): 25.8 ML/M^2
BH CV ECHO MEAS - LV SYSTOLIC VOL/BSA (12-30): 31.5 ML/M^2
BH CV ECHO MEAS - LV V1 MAX: 72.1 CM/SEC
BH CV ECHO MEAS - LV V1 MAX: 91 CM/SEC
BH CV ECHO MEAS - LV V1 MEAN: 48.2 CM/SEC
BH CV ECHO MEAS - LV V1 MEAN: 52.5 CM/SEC
BH CV ECHO MEAS - LV V1 VTI: 16.7 CM
BH CV ECHO MEAS - LV V1 VTI: 18.6 CM
BH CV ECHO MEAS - LVIDD: 5.1 CM
BH CV ECHO MEAS - LVIDD: 5.3 CM
BH CV ECHO MEAS - LVIDS: 3.7 CM
BH CV ECHO MEAS - LVIDS: 3.8 CM
BH CV ECHO MEAS - LVLD AP2: 7.9 CM
BH CV ECHO MEAS - LVLD AP2: 8.4 CM
BH CV ECHO MEAS - LVLD AP4: 7.1 CM
BH CV ECHO MEAS - LVLD AP4: 7.5 CM
BH CV ECHO MEAS - LVLS AP2: 7.3 CM
BH CV ECHO MEAS - LVLS AP2: 7.8 CM
BH CV ECHO MEAS - LVLS AP4: 6.2 CM
BH CV ECHO MEAS - LVLS AP4: 6.3 CM
BH CV ECHO MEAS - LVOT AREA (M): 3.4 CM^2
BH CV ECHO MEAS - LVOT AREA (M): 3.7 CM^2
BH CV ECHO MEAS - LVOT AREA: 3.4 CM^2
BH CV ECHO MEAS - LVOT AREA: 3.7 CM^2
BH CV ECHO MEAS - LVOT DIAM: 2.1 CM
BH CV ECHO MEAS - LVOT DIAM: 2.2 CM
BH CV ECHO MEAS - LVPWD: 1 CM
BH CV ECHO MEAS - LVPWD: 1.1 CM
BH CV ECHO MEAS - LVPWS: 1.4 CM
BH CV ECHO MEAS - LVPWS: 1.4 CM
BH CV ECHO MEAS - MED PEAK E' VEL: 5.8 CM/SEC
BH CV ECHO MEAS - MED PEAK E' VEL: 6.9 CM/SEC
BH CV ECHO MEAS - MEDIAL E/E' RATIO: 16.2
BH CV ECHO MEAS - MEDIAL E/E' RATIO: 19.2
BH CV ECHO MEAS - MV A MAX VEL: 60 CM/SEC
BH CV ECHO MEAS - MV A MAX VEL: 77.1 CM/SEC
BH CV ECHO MEAS - MV DEC TIME: 0.2 SEC
BH CV ECHO MEAS - MV DEC TIME: 0.22 SEC
BH CV ECHO MEAS - MV E MAX VEL: 111 CM/SEC
BH CV ECHO MEAS - MV E MAX VEL: 111 CM/SEC
BH CV ECHO MEAS - MV E/A: 1.4
BH CV ECHO MEAS - MV E/A: 1.9
BH CV ECHO MEAS - MV MAX PG: 2.8 MMHG
BH CV ECHO MEAS - MV MAX PG: 3.8 MMHG
BH CV ECHO MEAS - MV MEAN PG: 1 MMHG
BH CV ECHO MEAS - MV MEAN PG: 1.5 MMHG
BH CV ECHO MEAS - MV V2 MAX: 84 CM/SEC
BH CV ECHO MEAS - MV V2 MAX: 96.1 CM/SEC
BH CV ECHO MEAS - MV V2 MEAN: 52.7 CM/SEC
BH CV ECHO MEAS - MV V2 MEAN: 61.7 CM/SEC
BH CV ECHO MEAS - MV V2 VTI: 19.6 CM
BH CV ECHO MEAS - MV V2 VTI: 28.7 CM
BH CV ECHO MEAS - MVA(VTI): 2 CM^2
BH CV ECHO MEAS - MVA(VTI): 3.5 CM^2
BH CV ECHO MEAS - PA ACC TIME: 0.14 SEC
BH CV ECHO MEAS - PA ACC TIME: 0.17 SEC
BH CV ECHO MEAS - PA MAX PG (FULL): 0.58 MMHG
BH CV ECHO MEAS - PA MAX PG (FULL): 1.3 MMHG
BH CV ECHO MEAS - PA MAX PG: 2.7 MMHG
BH CV ECHO MEAS - PA MAX PG: 3.8 MMHG
BH CV ECHO MEAS - PA MEAN PG (FULL): 0 MMHG
BH CV ECHO MEAS - PA MEAN PG (FULL): 1 MMHG
BH CV ECHO MEAS - PA MEAN PG: 1 MMHG
BH CV ECHO MEAS - PA MEAN PG: 2 MMHG
BH CV ECHO MEAS - PA PR(ACCEL): 14.2 MMHG
BH CV ECHO MEAS - PA PR(ACCEL): 4.8 MMHG
BH CV ECHO MEAS - PA V2 MAX: 81.8 CM/SEC
BH CV ECHO MEAS - PA V2 MAX: 96.9 CM/SEC
BH CV ECHO MEAS - PA V2 MEAN: 50.2 CM/SEC
BH CV ECHO MEAS - PA V2 MEAN: 56.3 CM/SEC
BH CV ECHO MEAS - PA V2 VTI: 16.4 CM
BH CV ECHO MEAS - PA V2 VTI: 16.6 CM
BH CV ECHO MEAS - PI END-D VEL: 112 CM/SEC
BH CV ECHO MEAS - PULM DIAS VEL: 63.8 CM/SEC
BH CV ECHO MEAS - PULM S/D: 1.3
BH CV ECHO MEAS - PULM SYS VEL: 81.6 CM/SEC
BH CV ECHO MEAS - RAP SYSTOLE: 3 MMHG
BH CV ECHO MEAS - RAP SYSTOLE: 3 MMHG
BH CV ECHO MEAS - RV MAX PG: 2.1 MMHG
BH CV ECHO MEAS - RV MAX PG: 2.5 MMHG
BH CV ECHO MEAS - RV MEAN PG: 1 MMHG
BH CV ECHO MEAS - RV MEAN PG: 1 MMHG
BH CV ECHO MEAS - RV V1 MAX: 72.4 CM/SEC
BH CV ECHO MEAS - RV V1 MAX: 78.3 CM/SEC
BH CV ECHO MEAS - RV V1 MEAN: 46 CM/SEC
BH CV ECHO MEAS - RV V1 MEAN: 46.9 CM/SEC
BH CV ECHO MEAS - RV V1 VTI: 15.6 CM
BH CV ECHO MEAS - RV V1 VTI: 15.8 CM
BH CV ECHO MEAS - RVSP: 23 MMHG
BH CV ECHO MEAS - RVSP: 27 MMHG
BH CV ECHO MEAS - SI(AO): 68.5 ML/M^2
BH CV ECHO MEAS - SI(AO): 97.7 ML/M^2
BH CV ECHO MEAS - SI(CUBED): 41.4 ML/M^2
BH CV ECHO MEAS - SI(CUBED): 52.3 ML/M^2
BH CV ECHO MEAS - SI(LVOT): 28.9 ML/M^2
BH CV ECHO MEAS - SI(LVOT): 35.8 ML/M^2
BH CV ECHO MEAS - SI(MOD-SP2): 28.2 ML/M^2
BH CV ECHO MEAS - SI(MOD-SP2): 43.6 ML/M^2
BH CV ECHO MEAS - SI(MOD-SP4): 21 ML/M^2
BH CV ECHO MEAS - SI(MOD-SP4): 25.4 ML/M^2
BH CV ECHO MEAS - SI(TEICH): 32.9 ML/M^2
BH CV ECHO MEAS - SI(TEICH): 41.1 ML/M^2
BH CV ECHO MEAS - SV(AO): 134.4 ML
BH CV ECHO MEAS - SV(AO): 185.9 ML
BH CV ECHO MEAS - SV(CUBED): 81.4 ML
BH CV ECHO MEAS - SV(CUBED): 99.5 ML
BH CV ECHO MEAS - SV(LVOT): 56.7 ML
BH CV ECHO MEAS - SV(LVOT): 68.2 ML
BH CV ECHO MEAS - SV(MOD-SP2): 53.6 ML
BH CV ECHO MEAS - SV(MOD-SP2): 85.6 ML
BH CV ECHO MEAS - SV(MOD-SP4): 41.2 ML
BH CV ECHO MEAS - SV(MOD-SP4): 48.3 ML
BH CV ECHO MEAS - SV(TEICH): 64.5 ML
BH CV ECHO MEAS - SV(TEICH): 78.2 ML
BH CV ECHO MEAS - TAPSE (>1.6): 1.7 CM
BH CV ECHO MEAS - TAPSE (>1.6): 2 CM
BH CV ECHO MEAS - TR MAX PG: 20 MMHG
BH CV ECHO MEAS - TR MAX PG: 24 MMHG
BH CV ECHO MEAS - TR MAX VEL: 226.3 CM/SEC
BH CV ECHO MEAS - TR MAX VEL: 242.7 CM/SEC
BH CV ECHO MEASUREMENTS AVERAGE E/E' RATIO: 12.83
BH CV ECHO MEASUREMENTS AVERAGE E/E' RATIO: 18.5
BH CV XLRA - RV BASE: 3.1 CM
BH CV XLRA - RV BASE: 3.4 CM
BH CV XLRA - RV LENGTH: 7.7 CM
BH CV XLRA - RV LENGTH: 8.5 CM
BH CV XLRA - RV MID: 2.6 CM
BH CV XLRA - RV MID: 3.3 CM
BH CV XLRA - TDI S': 11.7 CM/SEC
BH CV XLRA - TDI S': 12.1 CM/SEC
BILIRUB BLD-MCNC: NEGATIVE MG/DL
BILIRUB SERPL-MCNC: 0.2 MG/DL (ref 0–1.2)
BILIRUB SERPL-MCNC: 0.3 MG/DL (ref 0–1.2)
BILIRUB SERPL-MCNC: 0.4 MG/DL (ref 0–1.2)
BILIRUB SERPL-MCNC: 0.5 MG/DL (ref 0–1.2)
BILIRUB SERPL-MCNC: 0.6 MG/DL (ref 0–1.2)
BILIRUB SERPL-MCNC: 0.6 MG/DL (ref 0–1.2)
BILIRUB UR QL STRIP: NEGATIVE
BLD GP AB SCN SERPL QL: NEGATIVE
BLD GP AB SCN SERPL QL: NEGATIVE
BNP SERPL-MCNC: 1237.1 PG/ML (ref 0–100)
BNP SERPL-MCNC: 234.2 PG/ML (ref 0–100)
BUN SERPL-MCNC: 11 MG/DL (ref 8–23)
BUN SERPL-MCNC: 12 MG/DL (ref 8–23)
BUN SERPL-MCNC: 12 MG/DL (ref 8–23)
BUN SERPL-MCNC: 13 MG/DL (ref 8–23)
BUN SERPL-MCNC: 14 MG/DL (ref 8–27)
BUN SERPL-MCNC: 15 MG/DL (ref 8–23)
BUN SERPL-MCNC: 17 MG/DL (ref 8–23)
BUN SERPL-MCNC: 18 MG/DL (ref 8–23)
BUN SERPL-MCNC: 19 MG/DL (ref 8–23)
BUN SERPL-MCNC: 20 MG/DL (ref 8–23)
BUN SERPL-MCNC: 21 MG/DL (ref 8–23)
BUN SERPL-MCNC: 22 MG/DL (ref 8–23)
BUN SERPL-MCNC: 22 MG/DL (ref 8–23)
BUN SERPL-MCNC: 23 MG/DL (ref 8–23)
BUN SERPL-MCNC: 23 MG/DL (ref 8–23)
BUN SERPL-MCNC: 24 MG/DL (ref 8–23)
BUN SERPL-MCNC: 30 MG/DL (ref 8–23)
BUN SERPL-MCNC: 8 MG/DL (ref 8–23)
BUN SERPL-MCNC: 9 MG/DL (ref 8–23)
BUN SERPL-MCNC: 9 MG/DL (ref 8–23)
BUN SERPL-MCNC: 9 MG/DL (ref 8–27)
BUN/CREAT SERPL: 10 (ref 10–24)
BUN/CREAT SERPL: 10 (ref 7–25)
BUN/CREAT SERPL: 10.5 (ref 7–25)
BUN/CREAT SERPL: 11.3 (ref 7–25)
BUN/CREAT SERPL: 11.6 (ref 7–25)
BUN/CREAT SERPL: 11.7 (ref 7–25)
BUN/CREAT SERPL: 12.8 (ref 7–25)
BUN/CREAT SERPL: 13 (ref 7–25)
BUN/CREAT SERPL: 13.4 (ref 7–25)
BUN/CREAT SERPL: 14.3 (ref 7–25)
BUN/CREAT SERPL: 15.8 (ref 7–25)
BUN/CREAT SERPL: 16.4 (ref 7–25)
BUN/CREAT SERPL: 18.7 (ref 7–25)
BUN/CREAT SERPL: 19.8 (ref 7–25)
BUN/CREAT SERPL: 20 (ref 10–24)
BUN/CREAT SERPL: 20 (ref 7–25)
BUN/CREAT SERPL: 20.2 (ref 7–25)
BUN/CREAT SERPL: 21 (ref 7–25)
BUN/CREAT SERPL: 21.3 (ref 7–25)
BUN/CREAT SERPL: 22.1 (ref 7–25)
BUN/CREAT SERPL: 22.4 (ref 7–25)
BUN/CREAT SERPL: 22.6 (ref 7–25)
BUN/CREAT SERPL: 24.4 (ref 7–25)
BUN/CREAT SERPL: 24.7 (ref 7–25)
BUN/CREAT SERPL: 25 (ref 7–25)
BUN/CREAT SERPL: 26.5 (ref 7–25)
BUN/CREAT SERPL: 26.9 (ref 7–25)
BUN/CREAT SERPL: 28 (ref 7–25)
BUN/CREAT SERPL: 29.2 (ref 7–25)
BUN/CREAT SERPL: 29.3 (ref 7–25)
BUN/CREAT SERPL: 29.5 (ref 7–25)
BUN/CREAT SERPL: 30.8 (ref 7–25)
BUN/CREAT SERPL: 34.9 (ref 7–25)
C PNEUM DNA NPH QL NAA+NON-PROBE: NOT DETECTED
CALCIUM SERPL-MCNC: 8.1 MG/DL (ref 8.6–10.5)
CALCIUM SERPL-MCNC: 8.2 MG/DL (ref 8.6–10.5)
CALCIUM SERPL-MCNC: 8.3 MG/DL (ref 8.6–10.2)
CALCIUM SERPL-MCNC: 8.9 MG/DL (ref 8.6–10.2)
CALCIUM SERPL-MCNC: 8.9 MG/DL (ref 8.6–10.5)
CALCIUM SPEC-SCNC: 8.1 MG/DL (ref 8.6–10.5)
CALCIUM SPEC-SCNC: 8.2 MG/DL (ref 8.6–10.5)
CALCIUM SPEC-SCNC: 8.3 MG/DL (ref 8.6–10.5)
CALCIUM SPEC-SCNC: 8.4 MG/DL (ref 8.6–10.5)
CALCIUM SPEC-SCNC: 8.5 MG/DL (ref 8.6–10.5)
CALCIUM SPEC-SCNC: 8.6 MG/DL (ref 8.6–10.5)
CALCIUM SPEC-SCNC: 8.6 MG/DL (ref 8.6–10.5)
CALCIUM SPEC-SCNC: 8.7 MG/DL (ref 8.6–10.5)
CALCIUM SPEC-SCNC: 8.9 MG/DL (ref 8.6–10.5)
CHLORIDE SERPL-SCNC: 77 MMOL/L (ref 98–107)
CHLORIDE SERPL-SCNC: 79 MMOL/L (ref 96–106)
CHLORIDE SERPL-SCNC: 79 MMOL/L (ref 98–107)
CHLORIDE SERPL-SCNC: 80 MMOL/L (ref 98–107)
CHLORIDE SERPL-SCNC: 81 MMOL/L (ref 98–107)
CHLORIDE SERPL-SCNC: 81 MMOL/L (ref 98–107)
CHLORIDE SERPL-SCNC: 82 MMOL/L (ref 98–107)
CHLORIDE SERPL-SCNC: 83 MMOL/L (ref 98–107)
CHLORIDE SERPL-SCNC: 84 MMOL/L (ref 98–107)
CHLORIDE SERPL-SCNC: 84 MMOL/L (ref 98–107)
CHLORIDE SERPL-SCNC: 85 MMOL/L (ref 98–107)
CHLORIDE SERPL-SCNC: 85 MMOL/L (ref 98–107)
CHLORIDE SERPL-SCNC: 87 MMOL/L (ref 96–106)
CHLORIDE SERPL-SCNC: 87 MMOL/L (ref 98–107)
CHLORIDE SERPL-SCNC: 88 MMOL/L (ref 98–107)
CHLORIDE SERPL-SCNC: 89 MMOL/L (ref 98–107)
CHLORIDE SERPL-SCNC: 90 MMOL/L (ref 98–107)
CHLORIDE SERPL-SCNC: 90 MMOL/L (ref 98–107)
CHLORIDE SERPL-SCNC: 94 MMOL/L (ref 98–107)
CHLORIDE SERPL-SCNC: 95 MMOL/L (ref 98–107)
CHLORIDE SERPL-SCNC: 96 MMOL/L (ref 98–107)
CHLORIDE SERPL-SCNC: 96 MMOL/L (ref 98–107)
CHOLEST SERPL-MCNC: 85 MG/DL (ref 0–200)
CLARITY UR: ABNORMAL
CLARITY UR: CLEAR
CLARITY, POC: ABNORMAL
CLARITY, POC: ABNORMAL
CLARITY, POC: CLEAR
CO2 SERPL-SCNC: 21.8 MMOL/L (ref 22–29)
CO2 SERPL-SCNC: 23 MMOL/L (ref 20–29)
CO2 SERPL-SCNC: 23.2 MMOL/L (ref 22–29)
CO2 SERPL-SCNC: 23.9 MMOL/L (ref 22–29)
CO2 SERPL-SCNC: 25 MMOL/L (ref 22–29)
CO2 SERPL-SCNC: 25.2 MMOL/L (ref 22–29)
CO2 SERPL-SCNC: 25.3 MMOL/L (ref 22–29)
CO2 SERPL-SCNC: 25.5 MMOL/L (ref 22–29)
CO2 SERPL-SCNC: 26.2 MMOL/L (ref 22–29)
CO2 SERPL-SCNC: 26.2 MMOL/L (ref 22–29)
CO2 SERPL-SCNC: 26.4 MMOL/L (ref 22–29)
CO2 SERPL-SCNC: 26.7 MMOL/L (ref 22–29)
CO2 SERPL-SCNC: 27 MMOL/L (ref 20–29)
CO2 SERPL-SCNC: 27 MMOL/L (ref 22–29)
CO2 SERPL-SCNC: 27.5 MMOL/L (ref 22–29)
CO2 SERPL-SCNC: 27.8 MMOL/L (ref 22–29)
CO2 SERPL-SCNC: 27.8 MMOL/L (ref 22–29)
CO2 SERPL-SCNC: 28 MMOL/L (ref 22–29)
CO2 SERPL-SCNC: 28 MMOL/L (ref 22–29)
CO2 SERPL-SCNC: 28.4 MMOL/L (ref 22–29)
CO2 SERPL-SCNC: 28.5 MMOL/L (ref 22–29)
CO2 SERPL-SCNC: 28.9 MMOL/L (ref 22–29)
CO2 SERPL-SCNC: 29.3 MMOL/L (ref 22–29)
CO2 SERPL-SCNC: 29.7 MMOL/L (ref 22–29)
CO2 SERPL-SCNC: 29.8 MMOL/L (ref 22–29)
CO2 SERPL-SCNC: 30 MMOL/L (ref 22–29)
CO2 SERPL-SCNC: 30 MMOL/L (ref 22–29)
CO2 SERPL-SCNC: 30.7 MMOL/L (ref 22–29)
CO2 SERPL-SCNC: 31.4 MMOL/L (ref 22–29)
CO2 SERPL-SCNC: 31.6 MMOL/L (ref 22–29)
CO2 SERPL-SCNC: 31.9 MMOL/L (ref 22–29)
CO2 SERPL-SCNC: 32.2 MMOL/L (ref 22–29)
CO2 SERPL-SCNC: 34.8 MMOL/L (ref 22–29)
COHGB MFR BLD: 1.5 % (ref 0–2)
COLOR UR: ABNORMAL
COLOR UR: YELLOW
CREAT SERPL-MCNC: 0.67 MG/DL (ref 0.76–1.27)
CREAT SERPL-MCNC: 0.68 MG/DL (ref 0.76–1.27)
CREAT SERPL-MCNC: 0.69 MG/DL (ref 0.76–1.27)
CREAT SERPL-MCNC: 0.69 MG/DL (ref 0.76–1.27)
CREAT SERPL-MCNC: 0.72 MG/DL (ref 0.76–1.27)
CREAT SERPL-MCNC: 0.75 MG/DL (ref 0.76–1.27)
CREAT SERPL-MCNC: 0.76 MG/DL (ref 0.76–1.27)
CREAT SERPL-MCNC: 0.77 MG/DL (ref 0.76–1.27)
CREAT SERPL-MCNC: 0.78 MG/DL (ref 0.76–1.27)
CREAT SERPL-MCNC: 0.78 MG/DL (ref 0.76–1.27)
CREAT SERPL-MCNC: 0.8 MG/DL (ref 0.76–1.27)
CREAT SERPL-MCNC: 0.8 MG/DL (ref 0.76–1.27)
CREAT SERPL-MCNC: 0.82 MG/DL (ref 0.76–1.27)
CREAT SERPL-MCNC: 0.82 MG/DL (ref 0.76–1.27)
CREAT SERPL-MCNC: 0.85 MG/DL (ref 0.76–1.27)
CREAT SERPL-MCNC: 0.86 MG/DL (ref 0.76–1.27)
CREAT SERPL-MCNC: 0.86 MG/DL (ref 0.76–1.27)
CREAT SERPL-MCNC: 0.89 MG/DL (ref 0.76–1.27)
CREAT SERPL-MCNC: 0.9 MG/DL (ref 0.76–1.27)
CREAT SERPL-MCNC: 0.91 MG/DL (ref 0.76–1.27)
CREAT SERPL-MCNC: 0.91 MG/DL (ref 0.76–1.27)
CREAT SERPL-MCNC: 0.92 MG/DL (ref 0.76–1.27)
CREAT SERPL-MCNC: 0.93 MG/DL (ref 0.76–1.27)
CREAT SERPL-MCNC: 0.93 MG/DL (ref 0.76–1.27)
CREAT SERPL-MCNC: 0.94 MG/DL (ref 0.76–1.27)
CREAT SERPL-MCNC: 0.95 MG/DL (ref 0.76–1.27)
CREAT SERPL-MCNC: 0.99 MG/DL (ref 0.76–1.27)
CREAT SERPL-MCNC: 1 MG/DL (ref 0.76–1.27)
CREAT SERPL-MCNC: 1.1 MG/DL (ref 0.76–1.27)
CROSSMATCH INTERPRETATION: NORMAL
D-LACTATE SERPL-SCNC: 0.9 MMOL/L (ref 0.5–2)
DEPRECATED RDW RBC AUTO: 40.4 FL (ref 37–54)
DEPRECATED RDW RBC AUTO: 41.4 FL (ref 37–54)
DEPRECATED RDW RBC AUTO: 41.6 FL (ref 37–54)
DEPRECATED RDW RBC AUTO: 41.6 FL (ref 37–54)
DEPRECATED RDW RBC AUTO: 41.7 FL (ref 37–54)
DEPRECATED RDW RBC AUTO: 42.5 FL (ref 37–54)
DEPRECATED RDW RBC AUTO: 43 FL (ref 37–54)
DEPRECATED RDW RBC AUTO: 43.2 FL (ref 37–54)
DEPRECATED RDW RBC AUTO: 43.8 FL (ref 37–54)
DEPRECATED RDW RBC AUTO: 44.6 FL (ref 37–54)
DEPRECATED RDW RBC AUTO: 45.7 FL (ref 37–54)
DEPRECATED RDW RBC AUTO: 45.9 FL (ref 37–54)
DEPRECATED RDW RBC AUTO: 49.4 FL (ref 37–54)
DEPRECATED RDW RBC AUTO: 52.9 FL (ref 37–54)
DEPRECATED RDW RBC AUTO: 52.9 FL (ref 37–54)
DEPRECATED RDW RBC AUTO: 53.6 FL (ref 37–54)
DEPRECATED RDW RBC AUTO: 55 FL (ref 37–54)
DEPRECATED RDW RBC AUTO: 55.7 FL (ref 37–54)
DEPRECATED RDW RBC AUTO: 55.8 FL (ref 37–54)
DEPRECATED RDW RBC AUTO: 56.5 FL (ref 37–54)
DIGOXIN SERPL-MCNC: 0.37 NG/ML (ref 0.6–1.2)
DIGOXIN SERPL-MCNC: 0.5 NG/ML (ref 0.6–1.2)
DIGOXIN SERPL-MCNC: 0.66 NG/ML (ref 0.6–1.2)
EOSINOPHIL # BLD AUTO: 0 10*3/MM3 (ref 0–0.4)
EOSINOPHIL # BLD AUTO: 0.01 10*3/MM3 (ref 0–0.4)
EOSINOPHIL # BLD AUTO: 0.01 10*3/MM3 (ref 0–0.4)
EOSINOPHIL # BLD AUTO: 0.02 10*3/MM3 (ref 0–0.4)
EOSINOPHIL # BLD AUTO: 0.1 10*3/MM3 (ref 0–0.4)
EOSINOPHIL # BLD AUTO: 0.11 10*3/MM3 (ref 0–0.4)
EOSINOPHIL # BLD AUTO: 0.19 10*3/MM3 (ref 0–0.4)
EOSINOPHIL # BLD AUTO: 0.2 10*3/MM3 (ref 0–0.4)
EOSINOPHIL # BLD AUTO: 0.26 10*3/MM3 (ref 0–0.4)
EOSINOPHIL # BLD AUTO: 0.3 X10E3/UL (ref 0–0.4)
EOSINOPHIL # BLD AUTO: 0.3 X10E3/UL (ref 0–0.4)
EOSINOPHIL # BLD AUTO: 0.5 10*3/MM3 (ref 0–0.4)
EOSINOPHIL # BLD AUTO: 0.54 10*3/MM3 (ref 0–0.4)
EOSINOPHIL # BLD AUTO: 0.61 10*3/MM3 (ref 0–0.4)
EOSINOPHIL # BLD AUTO: 0.61 10*3/MM3 (ref 0–0.4)
EOSINOPHIL # BLD AUTO: 0.62 10*3/MM3 (ref 0–0.4)
EOSINOPHIL # BLD AUTO: 0.64 10*3/MM3 (ref 0–0.4)
EOSINOPHIL # BLD AUTO: 0.65 10*3/MM3 (ref 0–0.4)
EOSINOPHIL # BLD MANUAL: 0.39 10*3/MM3 (ref 0–0.4)
EOSINOPHIL NFR BLD AUTO: 0 % (ref 0.3–6.2)
EOSINOPHIL NFR BLD AUTO: 0.2 % (ref 0.3–6.2)
EOSINOPHIL NFR BLD AUTO: 0.2 % (ref 0.3–6.2)
EOSINOPHIL NFR BLD AUTO: 0.3 % (ref 0.3–6.2)
EOSINOPHIL NFR BLD AUTO: 1.1 % (ref 0.3–6.2)
EOSINOPHIL NFR BLD AUTO: 1.2 % (ref 0.3–6.2)
EOSINOPHIL NFR BLD AUTO: 10.6 % (ref 0.3–6.2)
EOSINOPHIL NFR BLD AUTO: 11.8 % (ref 0.3–6.2)
EOSINOPHIL NFR BLD AUTO: 12.1 % (ref 0.3–6.2)
EOSINOPHIL NFR BLD AUTO: 2.2 % (ref 0.3–6.2)
EOSINOPHIL NFR BLD AUTO: 2.9 % (ref 0.3–6.2)
EOSINOPHIL NFR BLD AUTO: 3 %
EOSINOPHIL NFR BLD AUTO: 3.4 % (ref 0.3–6.2)
EOSINOPHIL NFR BLD AUTO: 6 %
EOSINOPHIL NFR BLD AUTO: 8.3 % (ref 0.3–6.2)
EOSINOPHIL NFR BLD AUTO: 8.8 % (ref 0.3–6.2)
EOSINOPHIL NFR BLD AUTO: 8.9 % (ref 0.3–6.2)
EOSINOPHIL NFR BLD AUTO: 9.3 % (ref 0.3–6.2)
EOSINOPHIL NFR BLD MANUAL: 6 % (ref 0.3–6.2)
ERYTHROCYTE [DISTWIDTH] IN BLOOD BY AUTOMATED COUNT: 13 % (ref 12.3–15.4)
ERYTHROCYTE [DISTWIDTH] IN BLOOD BY AUTOMATED COUNT: 13.2 % (ref 12.3–15.4)
ERYTHROCYTE [DISTWIDTH] IN BLOOD BY AUTOMATED COUNT: 13.2 % (ref 12.3–15.4)
ERYTHROCYTE [DISTWIDTH] IN BLOOD BY AUTOMATED COUNT: 13.3 % (ref 12.3–15.4)
ERYTHROCYTE [DISTWIDTH] IN BLOOD BY AUTOMATED COUNT: 13.4 % (ref 12.3–15.4)
ERYTHROCYTE [DISTWIDTH] IN BLOOD BY AUTOMATED COUNT: 13.5 % (ref 12.3–15.4)
ERYTHROCYTE [DISTWIDTH] IN BLOOD BY AUTOMATED COUNT: 13.6 % (ref 12.3–15.4)
ERYTHROCYTE [DISTWIDTH] IN BLOOD BY AUTOMATED COUNT: 13.7 % (ref 11.6–15.4)
ERYTHROCYTE [DISTWIDTH] IN BLOOD BY AUTOMATED COUNT: 13.8 % (ref 12.3–15.4)
ERYTHROCYTE [DISTWIDTH] IN BLOOD BY AUTOMATED COUNT: 14.4 % (ref 12.3–15.4)
ERYTHROCYTE [DISTWIDTH] IN BLOOD BY AUTOMATED COUNT: 14.6 % (ref 12.3–15.4)
ERYTHROCYTE [DISTWIDTH] IN BLOOD BY AUTOMATED COUNT: 15 % (ref 12.3–15.4)
ERYTHROCYTE [DISTWIDTH] IN BLOOD BY AUTOMATED COUNT: 15 % (ref 12.3–15.4)
ERYTHROCYTE [DISTWIDTH] IN BLOOD BY AUTOMATED COUNT: 15.7 % (ref 12.3–15.4)
ERYTHROCYTE [DISTWIDTH] IN BLOOD BY AUTOMATED COUNT: 16.6 % (ref 12.3–15.4)
ERYTHROCYTE [DISTWIDTH] IN BLOOD BY AUTOMATED COUNT: 16.8 % (ref 12.3–15.4)
ERYTHROCYTE [DISTWIDTH] IN BLOOD BY AUTOMATED COUNT: 17.2 % (ref 12.3–15.4)
ERYTHROCYTE [DISTWIDTH] IN BLOOD BY AUTOMATED COUNT: 17.4 % (ref 12.3–15.4)
ERYTHROCYTE [DISTWIDTH] IN BLOOD BY AUTOMATED COUNT: 17.6 % (ref 12.3–15.4)
ERYTHROCYTE [DISTWIDTH] IN BLOOD BY AUTOMATED COUNT: 17.8 % (ref 12.3–15.4)
ERYTHROCYTE [DISTWIDTH] IN BLOOD BY AUTOMATED COUNT: 17.9 % (ref 11.6–15.4)
EXPIRATION DATE: ABNORMAL
EXPIRATION DATE: ABNORMAL
FERRITIN SERPL-MCNC: 740 NG/ML (ref 30–400)
FLUAV SUBTYP SPEC NAA+PROBE: NOT DETECTED
FLUBV RNA ISLT QL NAA+PROBE: NOT DETECTED
FOLATE SERPL-MCNC: 19.3 NG/ML (ref 4.78–24.2)
GAS FLOW AIRWAY: 4 LPM
GFR SERPL CREATININE-BSD FRML MDRD: 103 ML/MIN/1.73
GFR SERPL CREATININE-BSD FRML MDRD: 108 ML/MIN/1.73
GFR SERPL CREATININE-BSD FRML MDRD: 110 ML/MIN/1.73
GFR SERPL CREATININE-BSD FRML MDRD: 112 ML/MIN/1.73
GFR SERPL CREATININE-BSD FRML MDRD: 63 ML/MIN/1.73
GFR SERPL CREATININE-BSD FRML MDRD: 71 ML/MIN/1.73
GFR SERPL CREATININE-BSD FRML MDRD: 72 ML/MIN/1.73
GFR SERPL CREATININE-BSD FRML MDRD: 75 ML/MIN/1.73
GFR SERPL CREATININE-BSD FRML MDRD: 76 ML/MIN/1.73
GFR SERPL CREATININE-BSD FRML MDRD: 77 ML/MIN/1.73
GFR SERPL CREATININE-BSD FRML MDRD: 78 ML/MIN/1.73
GFR SERPL CREATININE-BSD FRML MDRD: 79 ML/MIN/1.73
GFR SERPL CREATININE-BSD FRML MDRD: 79 ML/MIN/1.73
GFR SERPL CREATININE-BSD FRML MDRD: 80 ML/MIN/1.73
GFR SERPL CREATININE-BSD FRML MDRD: 81 ML/MIN/1.73
GFR SERPL CREATININE-BSD FRML MDRD: 84 ML/MIN/1.73
GFR SERPL CREATININE-BSD FRML MDRD: 84 ML/MIN/1.73
GFR SERPL CREATININE-BSD FRML MDRD: 85 ML/MIN/1.73
GFR SERPL CREATININE-BSD FRML MDRD: 89 ML/MIN/1.73
GFR SERPL CREATININE-BSD FRML MDRD: 91 ML/MIN/1.73
GFR SERPL CREATININE-BSD FRML MDRD: 94 ML/MIN/1.73
GFR SERPL CREATININE-BSD FRML MDRD: 94 ML/MIN/1.73
GFR SERPL CREATININE-BSD FRML MDRD: 96 ML/MIN/1.73
GFR SERPL CREATININE-BSD FRML MDRD: 97 ML/MIN/1.73
GFR SERPL CREATININE-BSD FRML MDRD: 97 ML/MIN/1.73
GFR SERPL CREATININE-BSD FRML MDRD: 99 ML/MIN/1.73
GLOBULIN SER CALC-MCNC: 1.7 GM/DL
GLOBULIN SER CALC-MCNC: 2 GM/DL
GLOBULIN SER CALC-MCNC: 2.3 G/DL (ref 1.5–4.5)
GLOBULIN UR ELPH-MCNC: 2.1 GM/DL
GLOBULIN UR ELPH-MCNC: 2.2 GM/DL
GLOBULIN UR ELPH-MCNC: 2.3 GM/DL
GLOBULIN UR ELPH-MCNC: 2.3 GM/DL
GLOBULIN UR ELPH-MCNC: 2.4 GM/DL
GLOBULIN UR ELPH-MCNC: 2.9 GM/DL
GLOBULIN UR ELPH-MCNC: 3.5 GM/DL
GLUCOSE BLDC GLUCOMTR-MCNC: 131 MG/DL (ref 70–130)
GLUCOSE BLDC GLUCOMTR-MCNC: 184 MG/DL (ref 70–130)
GLUCOSE BLDC GLUCOMTR-MCNC: 93 MG/DL (ref 70–130)
GLUCOSE SERPL-MCNC: 100 MG/DL (ref 65–99)
GLUCOSE SERPL-MCNC: 102 MG/DL (ref 65–99)
GLUCOSE SERPL-MCNC: 106 MG/DL (ref 65–99)
GLUCOSE SERPL-MCNC: 107 MG/DL (ref 65–99)
GLUCOSE SERPL-MCNC: 108 MG/DL (ref 65–99)
GLUCOSE SERPL-MCNC: 110 MG/DL (ref 65–99)
GLUCOSE SERPL-MCNC: 115 MG/DL (ref 65–99)
GLUCOSE SERPL-MCNC: 116 MG/DL (ref 65–99)
GLUCOSE SERPL-MCNC: 117 MG/DL (ref 65–99)
GLUCOSE SERPL-MCNC: 118 MG/DL (ref 65–99)
GLUCOSE SERPL-MCNC: 118 MG/DL (ref 65–99)
GLUCOSE SERPL-MCNC: 122 MG/DL (ref 65–99)
GLUCOSE SERPL-MCNC: 132 MG/DL (ref 65–99)
GLUCOSE SERPL-MCNC: 140 MG/DL (ref 65–99)
GLUCOSE SERPL-MCNC: 142 MG/DL (ref 65–99)
GLUCOSE SERPL-MCNC: 149 MG/DL (ref 65–99)
GLUCOSE SERPL-MCNC: 157 MG/DL (ref 65–99)
GLUCOSE SERPL-MCNC: 178 MG/DL (ref 65–99)
GLUCOSE SERPL-MCNC: 85 MG/DL (ref 65–99)
GLUCOSE SERPL-MCNC: 86 MG/DL (ref 65–99)
GLUCOSE SERPL-MCNC: 89 MG/DL (ref 65–99)
GLUCOSE SERPL-MCNC: 90 MG/DL (ref 65–99)
GLUCOSE SERPL-MCNC: 91 MG/DL (ref 65–99)
GLUCOSE SERPL-MCNC: 92 MG/DL (ref 65–99)
GLUCOSE SERPL-MCNC: 93 MG/DL (ref 65–99)
GLUCOSE SERPL-MCNC: 95 MG/DL (ref 65–99)
GLUCOSE SERPL-MCNC: 96 MG/DL (ref 65–99)
GLUCOSE SERPL-MCNC: 97 MG/DL (ref 65–99)
GLUCOSE SERPL-MCNC: 97 MG/DL (ref 65–99)
GLUCOSE SERPL-MCNC: 98 MG/DL (ref 65–99)
GLUCOSE SERPL-MCNC: 98 MG/DL (ref 65–99)
GLUCOSE UR STRIP-MCNC: NEGATIVE MG/DL
HADV DNA SPEC NAA+PROBE: NOT DETECTED
HCO3 BLDA-SCNC: 23.9 MMOL/L (ref 22–28)
HCOV 229E RNA SPEC QL NAA+PROBE: NOT DETECTED
HCOV HKU1 RNA SPEC QL NAA+PROBE: NOT DETECTED
HCOV NL63 RNA SPEC QL NAA+PROBE: NOT DETECTED
HCOV OC43 RNA SPEC QL NAA+PROBE: NOT DETECTED
HCT VFR BLD AUTO: 23 % (ref 37.5–51)
HCT VFR BLD AUTO: 23.7 % (ref 37.5–51)
HCT VFR BLD AUTO: 24.3 % (ref 37.5–51)
HCT VFR BLD AUTO: 24.5 % (ref 37.5–51)
HCT VFR BLD AUTO: 24.7 % (ref 37.5–51)
HCT VFR BLD AUTO: 25.2 % (ref 37.5–51)
HCT VFR BLD AUTO: 25.7 % (ref 37.5–51)
HCT VFR BLD AUTO: 25.8 % (ref 37.5–51)
HCT VFR BLD AUTO: 25.8 % (ref 37.5–51)
HCT VFR BLD AUTO: 25.9 % (ref 37.5–51)
HCT VFR BLD AUTO: 26.3 % (ref 37.5–51)
HCT VFR BLD AUTO: 26.4 % (ref 37.5–51)
HCT VFR BLD AUTO: 26.5 % (ref 37.5–51)
HCT VFR BLD AUTO: 26.6 % (ref 37.5–51)
HCT VFR BLD AUTO: 27 % (ref 37.5–51)
HCT VFR BLD AUTO: 27 % (ref 37.5–51)
HCT VFR BLD AUTO: 27.2 % (ref 37.5–51)
HCT VFR BLD AUTO: 27.5 % (ref 37.5–51)
HCT VFR BLD AUTO: 28 % (ref 37.5–51)
HCT VFR BLD AUTO: 29 % (ref 37.5–51)
HCT VFR BLD AUTO: 29.7 % (ref 37.5–51)
HCT VFR BLD AUTO: 29.7 % (ref 37.5–51)
HCT VFR BLD AUTO: 31 % (ref 37.5–51)
HCT VFR BLD AUTO: 31.2 % (ref 37.5–51)
HCT VFR BLD AUTO: 32.1 % (ref 37.5–51)
HCT VFR BLD AUTO: 33.5 % (ref 37.5–51)
HCT VFR BLD CALC: 27.9 %
HDLC SERPL-MCNC: 37 MG/DL (ref 40–60)
HGB BLD-MCNC: 10.4 G/DL (ref 13–17.7)
HGB BLD-MCNC: 10.6 G/DL (ref 13–17.7)
HGB BLD-MCNC: 10.8 G/DL (ref 13–17.7)
HGB BLD-MCNC: 11.1 G/DL (ref 13–17.7)
HGB BLD-MCNC: 7.5 G/DL (ref 13–17.7)
HGB BLD-MCNC: 7.7 G/DL (ref 13–17.7)
HGB BLD-MCNC: 7.9 G/DL (ref 13–17.7)
HGB BLD-MCNC: 8.1 G/DL (ref 13–17.7)
HGB BLD-MCNC: 8.3 G/DL (ref 13–17.7)
HGB BLD-MCNC: 8.4 G/DL (ref 13–17.7)
HGB BLD-MCNC: 8.5 G/DL (ref 13–17.7)
HGB BLD-MCNC: 8.8 G/DL (ref 13–17.7)
HGB BLD-MCNC: 8.9 G/DL (ref 13–17.7)
HGB BLD-MCNC: 8.9 G/DL (ref 13–17.7)
HGB BLD-MCNC: 9 G/DL (ref 13–17.7)
HGB BLD-MCNC: 9.1 G/DL (ref 13–17.7)
HGB BLD-MCNC: 9.1 G/DL (ref 13–17.7)
HGB BLD-MCNC: 9.2 G/DL (ref 13–17.7)
HGB BLD-MCNC: 9.4 G/DL (ref 13–17.7)
HGB BLD-MCNC: 9.5 G/DL (ref 13–17.7)
HGB BLD-MCNC: 9.6 G/DL (ref 13–17.7)
HGB BLD-MCNC: 9.6 G/DL (ref 13–17.7)
HGB BLD-MCNC: 9.7 G/DL (ref 13–17.7)
HGB BLD-MCNC: 9.8 G/DL (ref 13–17.7)
HGB BLD-MCNC: 9.9 G/DL (ref 13–17.7)
HGB UR QL STRIP.AUTO: ABNORMAL
HGB UR QL STRIP.AUTO: NEGATIVE
HMPV RNA NPH QL NAA+NON-PROBE: NOT DETECTED
HOLD SPECIMEN: NORMAL
HPIV1 RNA SPEC QL NAA+PROBE: NOT DETECTED
HPIV2 RNA SPEC QL NAA+PROBE: NOT DETECTED
HPIV3 RNA NPH QL NAA+PROBE: NOT DETECTED
HPIV4 P GENE NPH QL NAA+PROBE: NOT DETECTED
HYALINE CASTS UR QL AUTO: ABNORMAL /LPF
IMM GRANULOCYTES # BLD AUTO: 0 X10E3/UL (ref 0–0.1)
IMM GRANULOCYTES # BLD AUTO: 0.02 10*3/MM3 (ref 0–0.05)
IMM GRANULOCYTES # BLD AUTO: 0.03 10*3/MM3 (ref 0–0.05)
IMM GRANULOCYTES # BLD AUTO: 0.04 10*3/MM3 (ref 0–0.05)
IMM GRANULOCYTES # BLD AUTO: 0.05 10*3/MM3 (ref 0–0.05)
IMM GRANULOCYTES # BLD AUTO: 0.06 10*3/MM3 (ref 0–0.05)
IMM GRANULOCYTES # BLD AUTO: 0.06 10*3/MM3 (ref 0–0.05)
IMM GRANULOCYTES # BLD AUTO: 0.07 10*3/MM3 (ref 0–0.05)
IMM GRANULOCYTES # BLD AUTO: 0.08 10*3/MM3 (ref 0–0.05)
IMM GRANULOCYTES # BLD AUTO: 0.09 10*3/MM3 (ref 0–0.05)
IMM GRANULOCYTES # BLD AUTO: 0.1 X10E3/UL (ref 0–0.1)
IMM GRANULOCYTES # BLD AUTO: 0.11 10*3/MM3 (ref 0–0.05)
IMM GRANULOCYTES NFR BLD AUTO: 0 %
IMM GRANULOCYTES NFR BLD AUTO: 0.4 % (ref 0–0.5)
IMM GRANULOCYTES NFR BLD AUTO: 0.5 % (ref 0–0.5)
IMM GRANULOCYTES NFR BLD AUTO: 0.6 % (ref 0–0.5)
IMM GRANULOCYTES NFR BLD AUTO: 0.7 % (ref 0–0.5)
IMM GRANULOCYTES NFR BLD AUTO: 0.7 % (ref 0–0.5)
IMM GRANULOCYTES NFR BLD AUTO: 0.8 % (ref 0–0.5)
IMM GRANULOCYTES NFR BLD AUTO: 0.8 % (ref 0–0.5)
IMM GRANULOCYTES NFR BLD AUTO: 0.9 % (ref 0–0.5)
IMM GRANULOCYTES NFR BLD AUTO: 1 %
IMM GRANULOCYTES NFR BLD AUTO: 1.2 % (ref 0–0.5)
IMM GRANULOCYTES NFR BLD AUTO: 1.2 % (ref 0–0.5)
IMM GRANULOCYTES NFR BLD AUTO: 1.3 % (ref 0–0.5)
IMM GRANULOCYTES NFR BLD AUTO: 1.6 % (ref 0–0.5)
IMM GRANULOCYTES NFR BLD AUTO: 1.8 % (ref 0–0.5)
INHALED O2 CONCENTRATION: 36 %
INR PPP: 1.03 (ref 0.9–1.1)
IRON 24H UR-MRATE: 21 MCG/DL (ref 59–158)
IRON SATN MFR SERPL: 8 % (ref 20–50)
IRON SATN MFR SERPL: 8 % (ref 20–50)
IRON SERPL-MCNC: 19 MCG/DL (ref 59–158)
KETONES UR QL STRIP: NEGATIVE
KETONES UR QL: NEGATIVE
LDLC SERPL CALC-MCNC: 36 MG/DL (ref 0–100)
LDLC/HDLC SERPL: 1.05 {RATIO}
LEFT ATRIUM VOLUME INDEX: 16.2 ML/M^2
LEFT ATRIUM VOLUME: 31.9 ML
LEUKOCYTE EST, POC: ABNORMAL
LEUKOCYTE EST, POC: ABNORMAL
LEUKOCYTE EST, POC: NEGATIVE
LEUKOCYTE ESTERASE UR QL STRIP.AUTO: ABNORMAL
LEUKOCYTE ESTERASE UR QL STRIP.AUTO: ABNORMAL
LEUKOCYTE ESTERASE UR QL STRIP.AUTO: NEGATIVE
LYMPHOCYTES # BLD AUTO: 0.28 10*3/MM3 (ref 0.7–3.1)
LYMPHOCYTES # BLD AUTO: 0.45 10*3/MM3 (ref 0.7–3.1)
LYMPHOCYTES # BLD AUTO: 0.45 10*3/MM3 (ref 0.7–3.1)
LYMPHOCYTES # BLD AUTO: 0.54 10*3/MM3 (ref 0.7–3.1)
LYMPHOCYTES # BLD AUTO: 0.55 10*3/MM3 (ref 0.7–3.1)
LYMPHOCYTES # BLD AUTO: 0.61 10*3/MM3 (ref 0.7–3.1)
LYMPHOCYTES # BLD AUTO: 0.65 10*3/MM3 (ref 0.7–3.1)
LYMPHOCYTES # BLD AUTO: 0.67 10*3/MM3 (ref 0.7–3.1)
LYMPHOCYTES # BLD AUTO: 0.67 10*3/MM3 (ref 0.7–3.1)
LYMPHOCYTES # BLD AUTO: 0.71 10*3/MM3 (ref 0.7–3.1)
LYMPHOCYTES # BLD AUTO: 0.75 10*3/MM3 (ref 0.7–3.1)
LYMPHOCYTES # BLD AUTO: 0.8 X10E3/UL (ref 0.7–3.1)
LYMPHOCYTES # BLD AUTO: 0.81 10*3/MM3 (ref 0.7–3.1)
LYMPHOCYTES # BLD AUTO: 0.84 10*3/MM3 (ref 0.7–3.1)
LYMPHOCYTES # BLD AUTO: 1.28 10*3/MM3 (ref 0.7–3.1)
LYMPHOCYTES # BLD AUTO: 1.28 10*3/MM3 (ref 0.7–3.1)
LYMPHOCYTES # BLD AUTO: 1.29 10*3/MM3 (ref 0.7–3.1)
LYMPHOCYTES # BLD AUTO: 1.36 10*3/MM3 (ref 0.7–3.1)
LYMPHOCYTES # BLD AUTO: 1.5 X10E3/UL (ref 0.7–3.1)
LYMPHOCYTES # BLD AUTO: 1.87 10*3/MM3 (ref 0.7–3.1)
LYMPHOCYTES # BLD MANUAL: 1.1 10*3/MM3 (ref 0.7–3.1)
LYMPHOCYTES NFR BLD AUTO: 10.3 % (ref 19.6–45.3)
LYMPHOCYTES NFR BLD AUTO: 10.3 % (ref 19.6–45.3)
LYMPHOCYTES NFR BLD AUTO: 11.5 % (ref 19.6–45.3)
LYMPHOCYTES NFR BLD AUTO: 11.6 % (ref 19.6–45.3)
LYMPHOCYTES NFR BLD AUTO: 12.9 % (ref 19.6–45.3)
LYMPHOCYTES NFR BLD AUTO: 14.4 % (ref 19.6–45.3)
LYMPHOCYTES NFR BLD AUTO: 22.2 % (ref 19.6–45.3)
LYMPHOCYTES NFR BLD AUTO: 22.8 % (ref 19.6–45.3)
LYMPHOCYTES NFR BLD AUTO: 24.7 % (ref 19.6–45.3)
LYMPHOCYTES NFR BLD AUTO: 24.9 % (ref 19.6–45.3)
LYMPHOCYTES NFR BLD AUTO: 26 % (ref 19.6–45.3)
LYMPHOCYTES NFR BLD AUTO: 30 %
LYMPHOCYTES NFR BLD AUTO: 4.1 % (ref 19.6–45.3)
LYMPHOCYTES NFR BLD AUTO: 4.9 % (ref 19.6–45.3)
LYMPHOCYTES NFR BLD AUTO: 5.9 % (ref 19.6–45.3)
LYMPHOCYTES NFR BLD AUTO: 6.4 % (ref 19.6–45.3)
LYMPHOCYTES NFR BLD AUTO: 7.4 % (ref 19.6–45.3)
LYMPHOCYTES NFR BLD AUTO: 9 %
LYMPHOCYTES NFR BLD AUTO: 9.6 % (ref 19.6–45.3)
LYMPHOCYTES NFR BLD AUTO: 9.7 % (ref 19.6–45.3)
LYMPHOCYTES NFR BLD MANUAL: 17 % (ref 19.6–45.3)
LYMPHOCYTES NFR BLD MANUAL: 4 % (ref 5–12)
Lab: ABNORMAL
Lab: NORMAL
M PNEUMO IGG SER IA-ACNC: NOT DETECTED
MAGNESIUM SERPL-MCNC: 1.6 MG/DL (ref 1.6–2.4)
MAGNESIUM SERPL-MCNC: 1.7 MG/DL (ref 1.6–2.4)
MAGNESIUM SERPL-MCNC: 1.9 MG/DL (ref 1.6–2.4)
MAGNESIUM SERPL-MCNC: 2 MG/DL (ref 1.6–2.4)
MAGNESIUM SERPL-MCNC: 2.1 MG/DL (ref 1.6–2.4)
MAXIMAL PREDICTED HEART RATE: 133 BPM
MAXIMAL PREDICTED HEART RATE: 133 BPM
MCH RBC QN AUTO: 27 PG (ref 26.6–33)
MCH RBC QN AUTO: 27.7 PG (ref 26.6–33)
MCH RBC QN AUTO: 27.8 PG (ref 26.6–33)
MCH RBC QN AUTO: 27.9 PG (ref 26.6–33)
MCH RBC QN AUTO: 27.9 PG (ref 26.6–33)
MCH RBC QN AUTO: 28.1 PG (ref 26.6–33)
MCH RBC QN AUTO: 28.3 PG (ref 26.6–33)
MCH RBC QN AUTO: 28.5 PG (ref 26.6–33)
MCH RBC QN AUTO: 28.5 PG (ref 26.6–33)
MCH RBC QN AUTO: 28.6 PG (ref 26.6–33)
MCH RBC QN AUTO: 29.1 PG (ref 26.6–33)
MCH RBC QN AUTO: 29.5 PG (ref 26.6–33)
MCH RBC QN AUTO: 29.5 PG (ref 26.6–33)
MCH RBC QN AUTO: 29.6 PG (ref 26.6–33)
MCH RBC QN AUTO: 29.7 PG (ref 26.6–33)
MCH RBC QN AUTO: 30.2 PG (ref 26.6–33)
MCH RBC QN AUTO: 30.4 PG (ref 26.6–33)
MCH RBC QN AUTO: 30.5 PG (ref 26.6–33)
MCH RBC QN AUTO: 30.5 PG (ref 26.6–33)
MCH RBC QN AUTO: 31.1 PG (ref 26.6–33)
MCHC RBC AUTO-ENTMCNC: 32.2 G/DL (ref 31.5–35.7)
MCHC RBC AUTO-ENTMCNC: 32.2 G/DL (ref 31.5–35.7)
MCHC RBC AUTO-ENTMCNC: 33 G/DL (ref 31.5–35.7)
MCHC RBC AUTO-ENTMCNC: 33 G/DL (ref 31.5–35.7)
MCHC RBC AUTO-ENTMCNC: 33.1 G/DL (ref 31.5–35.7)
MCHC RBC AUTO-ENTMCNC: 33.3 G/DL (ref 31.5–35.7)
MCHC RBC AUTO-ENTMCNC: 33.4 G/DL (ref 31.5–35.7)
MCHC RBC AUTO-ENTMCNC: 33.5 G/DL (ref 31.5–35.7)
MCHC RBC AUTO-ENTMCNC: 33.6 G/DL (ref 31.5–35.7)
MCHC RBC AUTO-ENTMCNC: 34 G/DL (ref 31.5–35.7)
MCHC RBC AUTO-ENTMCNC: 34.2 G/DL (ref 31.5–35.7)
MCHC RBC AUTO-ENTMCNC: 34.3 G/DL (ref 31.5–35.7)
MCHC RBC AUTO-ENTMCNC: 34.4 G/DL (ref 31.5–35.7)
MCHC RBC AUTO-ENTMCNC: 34.5 G/DL (ref 31.5–35.7)
MCHC RBC AUTO-ENTMCNC: 34.6 G/DL (ref 31.5–35.7)
MCHC RBC AUTO-ENTMCNC: 34.7 G/DL (ref 31.5–35.7)
MCHC RBC AUTO-ENTMCNC: 35 G/DL (ref 31.5–35.7)
MCHC RBC AUTO-ENTMCNC: 35 G/DL (ref 31.5–35.7)
MCHC RBC AUTO-ENTMCNC: 35.3 G/DL (ref 31.5–35.7)
MCHC RBC AUTO-ENTMCNC: 35.3 G/DL (ref 31.5–35.7)
MCHC RBC AUTO-ENTMCNC: 35.6 G/DL (ref 31.5–35.7)
MCV RBC AUTO: 83.1 FL (ref 79–97)
MCV RBC AUTO: 83.2 FL (ref 79–97)
MCV RBC AUTO: 83.5 FL (ref 79–97)
MCV RBC AUTO: 83.6 FL (ref 79–97)
MCV RBC AUTO: 83.8 FL (ref 79–97)
MCV RBC AUTO: 84.1 FL (ref 79–97)
MCV RBC AUTO: 84.5 FL (ref 79–97)
MCV RBC AUTO: 84.6 FL (ref 79–97)
MCV RBC AUTO: 84.9 FL (ref 79–97)
MCV RBC AUTO: 85 FL (ref 79–97)
MCV RBC AUTO: 85.2 FL (ref 79–97)
MCV RBC AUTO: 85.5 FL (ref 79–97)
MCV RBC AUTO: 85.5 FL (ref 79–97)
MCV RBC AUTO: 85.7 FL (ref 79–97)
MCV RBC AUTO: 85.7 FL (ref 79–97)
MCV RBC AUTO: 86.1 FL (ref 79–97)
MCV RBC AUTO: 86.3 FL (ref 79–97)
MCV RBC AUTO: 86.3 FL (ref 79–97)
MCV RBC AUTO: 86.4 FL (ref 79–97)
MCV RBC AUTO: 86.4 FL (ref 79–97)
MCV RBC AUTO: 86.9 FL (ref 79–97)
MCV RBC AUTO: 87 FL (ref 79–97)
MCV RBC AUTO: 87.1 FL (ref 79–97)
MCV RBC AUTO: 87.3 FL (ref 79–97)
MCV RBC AUTO: 88.1 FL (ref 79–97)
METAMYELOCYTES NFR BLD MANUAL: 1 % (ref 0–0)
METHGB BLD QL: 0.3 % (ref 0–1.5)
METHYLMALONATE SERPL-SCNC: 245 NMOL/L (ref 0–378)
MODALITY: ABNORMAL
MONOCYTES # BLD AUTO: 0.1 10*3/MM3 (ref 0.1–0.9)
MONOCYTES # BLD AUTO: 0.26 10*3/MM3 (ref 0.1–0.9)
MONOCYTES # BLD AUTO: 0.4 X10E3/UL (ref 0.1–0.9)
MONOCYTES # BLD AUTO: 0.5 10*3/MM3 (ref 0.1–0.9)
MONOCYTES # BLD AUTO: 0.5 10*3/MM3 (ref 0.1–0.9)
MONOCYTES # BLD AUTO: 0.51 10*3/MM3 (ref 0.1–0.9)
MONOCYTES # BLD AUTO: 0.52 10*3/MM3 (ref 0.1–0.9)
MONOCYTES # BLD AUTO: 0.56 10*3/MM3 (ref 0.1–0.9)
MONOCYTES # BLD AUTO: 0.58 10*3/MM3 (ref 0.1–0.9)
MONOCYTES # BLD AUTO: 0.59 10*3/MM3 (ref 0.1–0.9)
MONOCYTES # BLD AUTO: 0.59 10*3/MM3 (ref 0.1–0.9)
MONOCYTES # BLD AUTO: 0.6 10*3/MM3 (ref 0.1–0.9)
MONOCYTES # BLD AUTO: 0.66 10*3/MM3 (ref 0.1–0.9)
MONOCYTES # BLD AUTO: 0.68 10*3/MM3 (ref 0.1–0.9)
MONOCYTES # BLD AUTO: 0.68 10*3/MM3 (ref 0.1–0.9)
MONOCYTES # BLD AUTO: 0.79 10*3/MM3 (ref 0.1–0.9)
MONOCYTES # BLD AUTO: 0.79 10*3/MM3 (ref 0.1–0.9)
MONOCYTES # BLD AUTO: 0.8 X10E3/UL (ref 0.1–0.9)
MONOCYTES # BLD AUTO: 0.81 10*3/MM3 (ref 0.1–0.9)
MONOCYTES # BLD AUTO: 0.96 10*3/MM3 (ref 0.1–0.9)
MONOCYTES # BLD AUTO: 1.05 10*3/MM3 (ref 0.1–0.9)
MONOCYTES NFR BLD AUTO: 1.5 % (ref 5–12)
MONOCYTES NFR BLD AUTO: 10.1 % (ref 5–12)
MONOCYTES NFR BLD AUTO: 10.2 % (ref 5–12)
MONOCYTES NFR BLD AUTO: 10.7 % (ref 5–12)
MONOCYTES NFR BLD AUTO: 11 % (ref 5–12)
MONOCYTES NFR BLD AUTO: 11.1 % (ref 5–12)
MONOCYTES NFR BLD AUTO: 11.2 % (ref 5–12)
MONOCYTES NFR BLD AUTO: 11.3 % (ref 5–12)
MONOCYTES NFR BLD AUTO: 12.1 % (ref 5–12)
MONOCYTES NFR BLD AUTO: 12.4 % (ref 5–12)
MONOCYTES NFR BLD AUTO: 13.5 % (ref 5–12)
MONOCYTES NFR BLD AUTO: 6.6 % (ref 5–12)
MONOCYTES NFR BLD AUTO: 7.3 % (ref 5–12)
MONOCYTES NFR BLD AUTO: 7.6 % (ref 5–12)
MONOCYTES NFR BLD AUTO: 8 %
MONOCYTES NFR BLD AUTO: 8 % (ref 5–12)
MONOCYTES NFR BLD AUTO: 9 %
MONOCYTES NFR BLD AUTO: 9.3 % (ref 5–12)
MONOCYTES NFR BLD AUTO: 9.7 % (ref 5–12)
MONOCYTES NFR BLD AUTO: 9.9 % (ref 5–12)
MUCOUS THREADS URNS QL MICRO: ABNORMAL /HPF
NEUTROPHILS # BLD AUTO: 2.64 10*3/MM3 (ref 1.7–7)
NEUTROPHILS # BLD AUTO: 2.7 X10E3/UL (ref 1.4–7)
NEUTROPHILS # BLD AUTO: 4.67 10*3/MM3 (ref 1.7–7)
NEUTROPHILS # BLD AUTO: 6.9 X10E3/UL (ref 1.4–7)
NEUTROPHILS NFR BLD AUTO: 2.93 10*3/MM3 (ref 1.7–7)
NEUTROPHILS NFR BLD AUTO: 3.21 10*3/MM3 (ref 1.7–7)
NEUTROPHILS NFR BLD AUTO: 3.24 10*3/MM3 (ref 1.7–7)
NEUTROPHILS NFR BLD AUTO: 3.48 10*3/MM3 (ref 1.7–7)
NEUTROPHILS NFR BLD AUTO: 3.61 10*3/MM3 (ref 1.7–7)
NEUTROPHILS NFR BLD AUTO: 3.75 10*3/MM3 (ref 1.7–7)
NEUTROPHILS NFR BLD AUTO: 3.87 10*3/MM3 (ref 1.7–7)
NEUTROPHILS NFR BLD AUTO: 4.1 10*3/MM3 (ref 1.7–7)
NEUTROPHILS NFR BLD AUTO: 4.57 10*3/MM3 (ref 1.7–7)
NEUTROPHILS NFR BLD AUTO: 4.62 10*3/MM3 (ref 1.7–7)
NEUTROPHILS NFR BLD AUTO: 5.69 10*3/MM3 (ref 1.7–7)
NEUTROPHILS NFR BLD AUTO: 5.77 10*3/MM3 (ref 1.7–7)
NEUTROPHILS NFR BLD AUTO: 51.3 % (ref 42.7–76)
NEUTROPHILS NFR BLD AUTO: 52.2 % (ref 42.7–76)
NEUTROPHILS NFR BLD AUTO: 53.1 % (ref 42.7–76)
NEUTROPHILS NFR BLD AUTO: 55 %
NEUTROPHILS NFR BLD AUTO: 55.8 % (ref 42.7–76)
NEUTROPHILS NFR BLD AUTO: 57.3 % (ref 42.7–76)
NEUTROPHILS NFR BLD AUTO: 6.34 10*3/MM3 (ref 1.7–7)
NEUTROPHILS NFR BLD AUTO: 6.67 10*3/MM3 (ref 1.7–7)
NEUTROPHILS NFR BLD AUTO: 68.7 % (ref 42.7–76)
NEUTROPHILS NFR BLD AUTO: 69.8 % (ref 42.7–76)
NEUTROPHILS NFR BLD AUTO: 7.3 10*3/MM3 (ref 1.7–7)
NEUTROPHILS NFR BLD AUTO: 7.76 10*3/MM3 (ref 1.7–7)
NEUTROPHILS NFR BLD AUTO: 71.4 % (ref 42.7–76)
NEUTROPHILS NFR BLD AUTO: 71.4 % (ref 42.7–76)
NEUTROPHILS NFR BLD AUTO: 74.7 % (ref 42.7–76)
NEUTROPHILS NFR BLD AUTO: 76.8 % (ref 42.7–76)
NEUTROPHILS NFR BLD AUTO: 77 %
NEUTROPHILS NFR BLD AUTO: 77.1 % (ref 42.7–76)
NEUTROPHILS NFR BLD AUTO: 78.1 % (ref 42.7–76)
NEUTROPHILS NFR BLD AUTO: 8.51 10*3/MM3 (ref 1.7–7)
NEUTROPHILS NFR BLD AUTO: 80.4 % (ref 42.7–76)
NEUTROPHILS NFR BLD AUTO: 80.9 % (ref 42.7–76)
NEUTROPHILS NFR BLD AUTO: 82 % (ref 42.7–76)
NEUTROPHILS NFR BLD AUTO: 85.1 % (ref 42.7–76)
NEUTROPHILS NFR BLD AUTO: 93.7 % (ref 42.7–76)
NEUTROPHILS NFR BLD MANUAL: 69 % (ref 42.7–76)
NEUTS BAND NFR BLD MANUAL: 3 % (ref 0–5)
NITRITE UR QL STRIP: NEGATIVE
NITRITE UR QL STRIP: POSITIVE
NITRITE UR-MCNC: NEGATIVE MG/ML
NITRITE UR-MCNC: NEGATIVE MG/ML
NITRITE UR-MCNC: POSITIVE MG/ML
NOTE: ABNORMAL
NRBC BLD AUTO-RTO: 0 /100 WBC (ref 0–0.2)
NRBC BLD AUTO-RTO: 0.2 /100 WBC (ref 0–0.2)
NT-PROBNP SERPL-MCNC: 2899 PG/ML (ref 0–1800)
NT-PROBNP SERPL-MCNC: 6988 PG/ML (ref 0–1800)
NT-PROBNP SERPL-MCNC: ABNORMAL PG/ML (ref 0–1800)
OSMOLALITY SERPL: 254 MOSM/KG (ref 280–301)
OSMOLALITY UR: 299 MOSM/KG
OXYHGB MFR BLDV: 92.2 % (ref 94–99)
PCO2 BLDA: 35.1 MM HG (ref 35–45)
PCO2 TEMP ADJ BLD: ABNORMAL MM[HG]
PH BLDA: 7.44 PH UNITS (ref 7.35–7.45)
PH UR STRIP.AUTO: 5.5 [PH] (ref 5–8)
PH UR STRIP.AUTO: 6 [PH] (ref 5–8)
PH UR STRIP.AUTO: 7 [PH] (ref 5–8)
PH UR: 6 [PH] (ref 5–8)
PH UR: 7.5 [PH] (ref 5–8)
PH UR: 7.5 [PH] (ref 5–8)
PH, TEMP CORRECTED: ABNORMAL
PHOSPHATE SERPL-MCNC: 2 MG/DL (ref 2.5–4.5)
PHOSPHATE SERPL-MCNC: 2.4 MG/DL (ref 2.5–4.5)
PHOSPHATE SERPL-MCNC: 3 MG/DL (ref 2.5–4.5)
PHOSPHATE SERPL-MCNC: 3 MG/DL (ref 2.5–4.5)
PHOSPHATE SERPL-MCNC: 3.7 MG/DL (ref 2.5–4.5)
PHOSPHATE SERPL-MCNC: 3.8 MG/DL (ref 2.5–4.5)
PLATELET # BLD AUTO: 172 10*3/MM3 (ref 140–450)
PLATELET # BLD AUTO: 186 10*3/MM3 (ref 140–450)
PLATELET # BLD AUTO: 189 10*3/MM3 (ref 140–450)
PLATELET # BLD AUTO: 201 10*3/MM3 (ref 140–450)
PLATELET # BLD AUTO: 202 10*3/MM3 (ref 140–450)
PLATELET # BLD AUTO: 215 10*3/MM3 (ref 140–450)
PLATELET # BLD AUTO: 217 10*3/MM3 (ref 140–450)
PLATELET # BLD AUTO: 218 10*3/MM3 (ref 140–450)
PLATELET # BLD AUTO: 228 10*3/MM3 (ref 140–450)
PLATELET # BLD AUTO: 229 10*3/MM3 (ref 140–450)
PLATELET # BLD AUTO: 235 10*3/MM3 (ref 140–450)
PLATELET # BLD AUTO: 258 10*3/MM3 (ref 140–450)
PLATELET # BLD AUTO: 270 10*3/MM3 (ref 140–450)
PLATELET # BLD AUTO: 280 10*3/MM3 (ref 140–450)
PLATELET # BLD AUTO: 281 10*3/MM3 (ref 140–450)
PLATELET # BLD AUTO: 289 10*3/MM3 (ref 140–450)
PLATELET # BLD AUTO: 307 X10E3/UL (ref 150–450)
PLATELET # BLD AUTO: 311 10*3/MM3 (ref 140–450)
PLATELET # BLD AUTO: 320 10*3/MM3 (ref 140–450)
PLATELET # BLD AUTO: 322 10*3/MM3 (ref 140–450)
PLATELET # BLD AUTO: 325 10*3/MM3 (ref 140–450)
PLATELET # BLD AUTO: 328 10*3/MM3 (ref 140–450)
PLATELET # BLD AUTO: 388 10*3/MM3 (ref 140–450)
PLATELET # BLD AUTO: 404 10*3/MM3 (ref 140–450)
PLATELET # BLD AUTO: 475 X10E3/UL (ref 150–450)
PMV BLD AUTO: 10 FL (ref 6–12)
PMV BLD AUTO: 10.3 FL (ref 6–12)
PMV BLD AUTO: 10.4 FL (ref 6–12)
PMV BLD AUTO: 10.5 FL (ref 6–12)
PMV BLD AUTO: 11 FL (ref 6–12)
PMV BLD AUTO: 11.2 FL (ref 6–12)
PMV BLD AUTO: 11.3 FL (ref 6–12)
PMV BLD AUTO: 8.9 FL (ref 6–12)
PMV BLD AUTO: 8.9 FL (ref 6–12)
PMV BLD AUTO: 9 FL (ref 6–12)
PMV BLD AUTO: 9 FL (ref 6–12)
PMV BLD AUTO: 9.2 FL (ref 6–12)
PMV BLD AUTO: 9.2 FL (ref 6–12)
PMV BLD AUTO: 9.3 FL (ref 6–12)
PMV BLD AUTO: 9.4 FL (ref 6–12)
PMV BLD AUTO: 9.5 FL (ref 6–12)
PMV BLD AUTO: 9.5 FL (ref 6–12)
PMV BLD AUTO: 9.6 FL (ref 6–12)
PMV BLD AUTO: 9.6 FL (ref 6–12)
PMV BLD AUTO: 9.8 FL (ref 6–12)
PO2 BLDA: 66.3 MM HG (ref 75–100)
PO2 TEMP ADJ BLD: ABNORMAL MM[HG]
POTASSIUM SERPL-SCNC: 3.2 MMOL/L (ref 3.5–5.2)
POTASSIUM SERPL-SCNC: 3.3 MMOL/L (ref 3.5–5.2)
POTASSIUM SERPL-SCNC: 3.4 MMOL/L (ref 3.5–5.2)
POTASSIUM SERPL-SCNC: 3.5 MMOL/L (ref 3.5–5.2)
POTASSIUM SERPL-SCNC: 3.6 MMOL/L (ref 3.5–5.2)
POTASSIUM SERPL-SCNC: 3.7 MMOL/L (ref 3.5–5.2)
POTASSIUM SERPL-SCNC: 3.8 MMOL/L (ref 3.5–5.2)
POTASSIUM SERPL-SCNC: 3.9 MMOL/L (ref 3.5–5.2)
POTASSIUM SERPL-SCNC: 4.1 MMOL/L (ref 3.5–5.2)
POTASSIUM SERPL-SCNC: 4.1 MMOL/L (ref 3.5–5.2)
POTASSIUM SERPL-SCNC: 4.3 MMOL/L (ref 3.5–5.2)
POTASSIUM SERPL-SCNC: 4.4 MMOL/L (ref 3.5–5.2)
POTASSIUM SERPL-SCNC: 4.5 MMOL/L (ref 3.5–5.2)
POTASSIUM SERPL-SCNC: 4.6 MMOL/L (ref 3.5–5.2)
POTASSIUM SERPL-SCNC: 4.6 MMOL/L (ref 3.5–5.2)
POTASSIUM SERPL-SCNC: 4.7 MMOL/L (ref 3.5–5.2)
POTASSIUM SERPL-SCNC: 4.8 MMOL/L (ref 3.5–5.2)
POTASSIUM SERPL-SCNC: 4.9 MMOL/L (ref 3.5–5.2)
POTASSIUM SERPL-SCNC: 5 MMOL/L (ref 3.5–5.2)
POTASSIUM SERPL-SCNC: 5.2 MMOL/L (ref 3.5–5.2)
POTASSIUM SERPL-SCNC: 5.2 MMOL/L (ref 3.5–5.2)
POTASSIUM SERPL-SCNC: 5.4 MMOL/L (ref 3.5–5.2)
PROCALCITONIN SERPL-MCNC: 0.08 NG/ML (ref 0–0.25)
PROCALCITONIN SERPL-MCNC: 0.18 NG/ML (ref 0–0.25)
PROT SERPL-MCNC: 5.1 G/DL (ref 6–8.5)
PROT SERPL-MCNC: 5.2 G/DL (ref 6–8.5)
PROT SERPL-MCNC: 5.2 G/DL (ref 6–8.5)
PROT SERPL-MCNC: 5.3 G/DL (ref 6–8.5)
PROT SERPL-MCNC: 5.3 G/DL (ref 6–8.5)
PROT SERPL-MCNC: 5.5 G/DL (ref 6–8.5)
PROT SERPL-MCNC: 5.6 G/DL (ref 6–8.5)
PROT SERPL-MCNC: 5.7 G/DL (ref 6–8.5)
PROT SERPL-MCNC: 5.7 G/DL (ref 6–8.5)
PROT SERPL-MCNC: 6 G/DL (ref 6–8.5)
PROT SERPL-MCNC: 6.2 G/DL (ref 6–8.5)
PROT SERPL-MCNC: 6.4 G/DL (ref 6–8.5)
PROT UR QL STRIP: ABNORMAL
PROT UR QL STRIP: NEGATIVE
PROT UR QL STRIP: NEGATIVE
PROT UR STRIP-MCNC: ABNORMAL MG/DL
PROT UR STRIP-MCNC: ABNORMAL MG/DL
PROT UR STRIP-MCNC: NEGATIVE MG/DL
PROTHROMBIN TIME: 14 SECONDS (ref 12–15.1)
QT INTERVAL: 348 MS
QT INTERVAL: 416 MS
QT INTERVAL: 422 MS
QTC INTERVAL: 442 MS
QTC INTERVAL: 473 MS
QTC INTERVAL: 483 MS
RBC # BLD AUTO: 2.91 10*6/MM3 (ref 4.14–5.8)
RBC # BLD AUTO: 2.93 10*6/MM3 (ref 4.14–5.8)
RBC # BLD AUTO: 2.93 10*6/MM3 (ref 4.14–5.8)
RBC # BLD AUTO: 2.97 10*6/MM3 (ref 4.14–5.8)
RBC # BLD AUTO: 2.97 10*6/MM3 (ref 4.14–5.8)
RBC # BLD AUTO: 2.97 X10E6/UL (ref 4.14–5.8)
RBC # BLD AUTO: 3.04 10*6/MM3 (ref 4.14–5.8)
RBC # BLD AUTO: 3.04 10*6/MM3 (ref 4.14–5.8)
RBC # BLD AUTO: 3.05 10*6/MM3 (ref 4.14–5.8)
RBC # BLD AUTO: 3.07 10*6/MM3 (ref 4.14–5.8)
RBC # BLD AUTO: 3.08 10*6/MM3 (ref 4.14–5.8)
RBC # BLD AUTO: 3.09 10*6/MM3 (ref 4.14–5.8)
RBC # BLD AUTO: 3.1 10*6/MM3 (ref 4.14–5.8)
RBC # BLD AUTO: 3.11 10*6/MM3 (ref 4.14–5.8)
RBC # BLD AUTO: 3.15 10*6/MM3 (ref 4.14–5.8)
RBC # BLD AUTO: 3.16 10*6/MM3 (ref 4.14–5.8)
RBC # BLD AUTO: 3.19 10*6/MM3 (ref 4.14–5.8)
RBC # BLD AUTO: 3.24 10*6/MM3 (ref 4.14–5.8)
RBC # BLD AUTO: 3.25 10*6/MM3 (ref 4.14–5.8)
RBC # BLD AUTO: 3.41 10*6/MM3 (ref 4.14–5.8)
RBC # BLD AUTO: 3.44 10*6/MM3 (ref 4.14–5.8)
RBC # BLD AUTO: 3.45 10*6/MM3 (ref 4.14–5.8)
RBC # BLD AUTO: 3.59 10*6/MM3 (ref 4.14–5.8)
RBC # BLD AUTO: 3.71 X10E6/UL (ref 4.14–5.8)
RBC # BLD AUTO: 3.88 10*6/MM3 (ref 4.14–5.8)
RBC # UR STRIP: ABNORMAL /UL
RBC # UR STRIP: NEGATIVE /UL
RBC # UR STRIP: NEGATIVE /UL
RBC # UR: ABNORMAL /HPF
RBC MORPH BLD: NORMAL
RBC MORPH BLD: NORMAL
REF LAB TEST METHOD: ABNORMAL
RETICS # AUTO: 0.09 10*6/MM3 (ref 0.02–0.13)
RETICS/RBC NFR AUTO: 3.04 % (ref 0.7–1.9)
RH BLD: POSITIVE
RHINOVIRUS RNA SPEC NAA+PROBE: NOT DETECTED
RSV RNA NPH QL NAA+NON-PROBE: NOT DETECTED
SAO2 % BLDCOA: 93.9 % (ref 94–100)
SARS-COV-2 RNA NOSE QL NAA+PROBE: DETECTED
SARS-COV-2 RNA NPH QL NAA+NON-PROBE: NOT DETECTED
SARS-COV-2 RNA PNL SPEC NAA+PROBE: NOT DETECTED
SCAN SLIDE: NORMAL
SMALL PLATELETS BLD QL SMEAR: ADEQUATE
SMALL PLATELETS BLD QL SMEAR: ADEQUATE
SODIUM SERPL-SCNC: 113 MMOL/L (ref 136–145)
SODIUM SERPL-SCNC: 114 MMOL/L (ref 136–145)
SODIUM SERPL-SCNC: 115 MMOL/L (ref 136–145)
SODIUM SERPL-SCNC: 115 MMOL/L (ref 136–145)
SODIUM SERPL-SCNC: 116 MMOL/L (ref 136–145)
SODIUM SERPL-SCNC: 116 MMOL/L (ref 136–145)
SODIUM SERPL-SCNC: 117 MMOL/L (ref 136–145)
SODIUM SERPL-SCNC: 118 MMOL/L (ref 136–145)
SODIUM SERPL-SCNC: 118 MMOL/L (ref 136–145)
SODIUM SERPL-SCNC: 119 MMOL/L (ref 134–144)
SODIUM SERPL-SCNC: 119 MMOL/L (ref 136–145)
SODIUM SERPL-SCNC: 120 MMOL/L (ref 136–145)
SODIUM SERPL-SCNC: 120 MMOL/L (ref 136–145)
SODIUM SERPL-SCNC: 121 MMOL/L (ref 136–145)
SODIUM SERPL-SCNC: 122 MMOL/L (ref 136–145)
SODIUM SERPL-SCNC: 123 MMOL/L (ref 136–145)
SODIUM SERPL-SCNC: 124 MMOL/L (ref 136–145)
SODIUM SERPL-SCNC: 125 MMOL/L (ref 134–144)
SODIUM SERPL-SCNC: 125 MMOL/L (ref 136–145)
SODIUM SERPL-SCNC: 125 MMOL/L (ref 136–145)
SODIUM SERPL-SCNC: 126 MMOL/L (ref 136–145)
SODIUM SERPL-SCNC: 128 MMOL/L (ref 136–145)
SODIUM SERPL-SCNC: 130 MMOL/L (ref 136–145)
SODIUM SERPL-SCNC: 131 MMOL/L (ref 136–145)
SODIUM SERPL-SCNC: 135 MMOL/L (ref 136–145)
SP GR UR STRIP: 1.01 (ref 1–1.03)
SP GR UR STRIP: 1.02 (ref 1–1.03)
SP GR UR: 1.01 (ref 1–1.03)
SQUAMOUS #/AREA URNS HPF: ABNORMAL /HPF
STRESS TARGET HR: 113 BPM
STRESS TARGET HR: 113 BPM
T&S EXPIRATION DATE: NORMAL
T&S EXPIRATION DATE: NORMAL
T4 FREE SERPL-MCNC: 1.48 NG/DL (ref 0.93–1.7)
TIBC SERPL-MCNC: 227 MCG/DL
TIBC SERPL-MCNC: 255 MCG/DL (ref 298–536)
TRANSFERRIN SERPL-MCNC: 171 MG/DL (ref 200–360)
TRIGL SERPL-MCNC: 46 MG/DL (ref 0–150)
TROPONIN T SERPL-MCNC: 0.02 NG/ML (ref 0–0.03)
TROPONIN T SERPL-MCNC: <0.01 NG/ML (ref 0–0.03)
TROPONIN T SERPL-MCNC: <0.01 NG/ML (ref 0–0.03)
TSH SERPL DL<=0.05 MIU/L-ACNC: 1.31 UIU/ML (ref 0.27–4.2)
TSH SERPL DL<=0.05 MIU/L-ACNC: 5.03 UIU/ML (ref 0.27–4.2)
TSH SERPL DL<=0.05 MIU/L-ACNC: 9.9 UIU/ML (ref 0.27–4.2)
UIBC SERPL-MCNC: 208 MCG/DL (ref 112–346)
UNIT  ABO: NORMAL
UNIT  RH: NORMAL
URATE SERPL-MCNC: 6.6 MG/DL (ref 3.4–7)
URATE SERPL-MCNC: 6.7 MG/DL (ref 3.4–7)
UROBILINOGEN UR QL STRIP: ABNORMAL
UROBILINOGEN UR QL STRIP: NORMAL
UROBILINOGEN UR QL STRIP: NORMAL
UROBILINOGEN UR QL: NORMAL
VENTILATOR MODE: ABNORMAL
VIT B12 BLD-MCNC: 1566 PG/ML (ref 211–946)
VIT B12 SERPL-MCNC: >2000 PG/ML (ref 211–946)
VLDLC SERPL-MCNC: 12 MG/DL (ref 5–40)
WBC # BLD AUTO: 10.37 10*3/MM3 (ref 3.4–10.8)
WBC # BLD AUTO: 4.66 10*3/MM3 (ref 3.4–10.8)
WBC # BLD AUTO: 4.9 X10E3/UL (ref 3.4–10.8)
WBC # BLD AUTO: 5.05 10*3/MM3 (ref 3.4–10.8)
WBC # BLD AUTO: 5.14 10*3/MM3 (ref 3.4–10.8)
WBC # BLD AUTO: 5.34 10*3/MM3 (ref 3.4–10.8)
WBC # BLD AUTO: 5.5 10*3/MM3 (ref 3.4–10.8)
WBC # BLD AUTO: 5.51 10*3/MM3 (ref 3.4–10.8)
WBC # BLD AUTO: 5.63 10*3/MM3 (ref 3.4–10.8)
WBC # BLD AUTO: 5.66 10*3/MM3 (ref 3.4–10.8)
WBC # BLD AUTO: 5.76 10*3/MM3 (ref 3.4–10.8)
WBC # BLD AUTO: 6.48 10*3/MM3 (ref 3.4–10.8)
WBC # BLD AUTO: 6.49 10*3/MM3 (ref 3.4–10.8)
WBC # BLD AUTO: 6.55 10*3/MM3 (ref 3.4–10.8)
WBC # BLD AUTO: 6.77 10*3/MM3 (ref 3.4–10.8)
WBC # BLD AUTO: 7.07 10*3/MM3 (ref 3.4–10.8)
WBC # BLD AUTO: 7.39 10*3/MM3 (ref 3.4–10.8)
WBC # BLD AUTO: 8.65 10*3/MM3 (ref 3.4–10.8)
WBC # BLD AUTO: 8.8 X10E3/UL (ref 3.4–10.8)
WBC # BLD AUTO: 9.02 10*3/MM3 (ref 3.4–10.8)
WBC # BLD AUTO: 9.12 10*3/MM3 (ref 3.4–10.8)
WBC MORPH BLD: NORMAL
WBC MORPH BLD: NORMAL
WBC NRBC COR # BLD: 4.04 10*3/MM3 (ref 3.4–10.8)
WBC NRBC COR # BLD: 4.81 10*3/MM3 (ref 3.4–10.8)
WBC NRBC COR # BLD: 5.58 10*3/MM3 (ref 3.4–10.8)
WBC NRBC COR # BLD: 7.18 10*3/MM3 (ref 3.4–10.8)
WBC UR QL AUTO: ABNORMAL /HPF
WHOLE BLOOD HOLD SPECIMEN: NORMAL

## 2021-01-01 PROCEDURE — 82805 BLOOD GASES W/O2 SATURATION: CPT

## 2021-01-01 PROCEDURE — 25010000002 ENOXAPARIN PER 10 MG: Performed by: INTERNAL MEDICINE

## 2021-01-01 PROCEDURE — 82962 GLUCOSE BLOOD TEST: CPT

## 2021-01-01 PROCEDURE — 63710000001 TAMSULOSIN 0.4 MG CAPSULE: Performed by: STUDENT IN AN ORGANIZED HEALTH CARE EDUCATION/TRAINING PROGRAM

## 2021-01-01 PROCEDURE — 97110 THERAPEUTIC EXERCISES: CPT

## 2021-01-01 PROCEDURE — 80069 RENAL FUNCTION PANEL: CPT | Performed by: INTERNAL MEDICINE

## 2021-01-01 PROCEDURE — P9047 ALBUMIN (HUMAN), 25%, 50ML: HCPCS | Performed by: INTERNAL MEDICINE

## 2021-01-01 PROCEDURE — 81001 URINALYSIS AUTO W/SCOPE: CPT | Performed by: PHYSICIAN ASSISTANT

## 2021-01-01 PROCEDURE — 94799 UNLISTED PULMONARY SVC/PX: CPT

## 2021-01-01 PROCEDURE — 25010000002 ONDANSETRON PER 1 MG: Performed by: NURSE PRACTITIONER

## 2021-01-01 PROCEDURE — 80048 BASIC METABOLIC PNL TOTAL CA: CPT | Performed by: STUDENT IN AN ORGANIZED HEALTH CARE EDUCATION/TRAINING PROGRAM

## 2021-01-01 PROCEDURE — 97116 GAIT TRAINING THERAPY: CPT

## 2021-01-01 PROCEDURE — C1898 LEAD, PMKR, OTHER THAN TRANS: HCPCS | Performed by: STUDENT IN AN ORGANIZED HEALTH CARE EDUCATION/TRAINING PROGRAM

## 2021-01-01 PROCEDURE — 85007 BL SMEAR W/DIFF WBC COUNT: CPT | Performed by: STUDENT IN AN ORGANIZED HEALTH CARE EDUCATION/TRAINING PROGRAM

## 2021-01-01 PROCEDURE — 80053 COMPREHEN METABOLIC PANEL: CPT | Performed by: NURSE PRACTITIONER

## 2021-01-01 PROCEDURE — 25010000002 FUROSEMIDE PER 20 MG: Performed by: PHYSICIAN ASSISTANT

## 2021-01-01 PROCEDURE — 94660 CPAP INITIATION&MGMT: CPT

## 2021-01-01 PROCEDURE — 81003 URINALYSIS AUTO W/O SCOPE: CPT | Performed by: PHYSICIAN ASSISTANT

## 2021-01-01 PROCEDURE — 80162 ASSAY OF DIGOXIN TOTAL: CPT | Performed by: PHYSICIAN ASSISTANT

## 2021-01-01 PROCEDURE — A9270 NON-COVERED ITEM OR SERVICE: HCPCS | Performed by: STUDENT IN AN ORGANIZED HEALTH CARE EDUCATION/TRAINING PROGRAM

## 2021-01-01 PROCEDURE — 85025 COMPLETE CBC W/AUTO DIFF WBC: CPT | Performed by: FAMILY MEDICINE

## 2021-01-01 PROCEDURE — 36600 WITHDRAWAL OF ARTERIAL BLOOD: CPT

## 2021-01-01 PROCEDURE — 25010000002 FUROSEMIDE PER 20 MG: Performed by: STUDENT IN AN ORGANIZED HEALTH CARE EDUCATION/TRAINING PROGRAM

## 2021-01-01 PROCEDURE — 99495 TRANSJ CARE MGMT MOD F2F 14D: CPT | Performed by: INTERNAL MEDICINE

## 2021-01-01 PROCEDURE — 80048 BASIC METABOLIC PNL TOTAL CA: CPT | Performed by: INTERNAL MEDICINE

## 2021-01-01 PROCEDURE — 80048 BASIC METABOLIC PNL TOTAL CA: CPT | Performed by: FAMILY MEDICINE

## 2021-01-01 PROCEDURE — 87635 SARS-COV-2 COVID-19 AMP PRB: CPT | Performed by: STUDENT IN AN ORGANIZED HEALTH CARE EDUCATION/TRAINING PROGRAM

## 2021-01-01 PROCEDURE — 94640 AIRWAY INHALATION TREATMENT: CPT

## 2021-01-01 PROCEDURE — 97161 PT EVAL LOW COMPLEX 20 MIN: CPT

## 2021-01-01 PROCEDURE — 63710000001 FERROUS SULFATE 325 (65 FE) MG TABLET: Performed by: STUDENT IN AN ORGANIZED HEALTH CARE EDUCATION/TRAINING PROGRAM

## 2021-01-01 PROCEDURE — 80053 COMPREHEN METABOLIC PANEL: CPT | Performed by: EMERGENCY MEDICINE

## 2021-01-01 PROCEDURE — 99024 POSTOP FOLLOW-UP VISIT: CPT | Performed by: STUDENT IN AN ORGANIZED HEALTH CARE EDUCATION/TRAINING PROGRAM

## 2021-01-01 PROCEDURE — 93005 ELECTROCARDIOGRAM TRACING: CPT | Performed by: PHYSICIAN ASSISTANT

## 2021-01-01 PROCEDURE — 25010000002 FUROSEMIDE PER 20 MG: Performed by: INTERNAL MEDICINE

## 2021-01-01 PROCEDURE — 99214 OFFICE O/P EST MOD 30 MIN: CPT | Performed by: INTERNAL MEDICINE

## 2021-01-01 PROCEDURE — 99239 HOSP IP/OBS DSCHRG MGMT >30: CPT | Performed by: STUDENT IN AN ORGANIZED HEALTH CARE EDUCATION/TRAINING PROGRAM

## 2021-01-01 PROCEDURE — 99152 MOD SED SAME PHYS/QHP 5/>YRS: CPT | Performed by: STUDENT IN AN ORGANIZED HEALTH CARE EDUCATION/TRAINING PROGRAM

## 2021-01-01 PROCEDURE — 84295 ASSAY OF SERUM SODIUM: CPT | Performed by: INTERNAL MEDICINE

## 2021-01-01 PROCEDURE — 71250 CT THORAX DX C-: CPT

## 2021-01-01 PROCEDURE — 25010000002 MEROPENEM IN SODIUM CHLORIDE 0.9% 50 ML 1 GM/50ML RECONSTITUTED SOLUTION: Performed by: FAMILY MEDICINE

## 2021-01-01 PROCEDURE — 84443 ASSAY THYROID STIM HORMONE: CPT | Performed by: PHYSICIAN ASSISTANT

## 2021-01-01 PROCEDURE — 99232 SBSQ HOSP IP/OBS MODERATE 35: CPT | Performed by: STUDENT IN AN ORGANIZED HEALTH CARE EDUCATION/TRAINING PROGRAM

## 2021-01-01 PROCEDURE — 99232 SBSQ HOSP IP/OBS MODERATE 35: CPT | Performed by: FAMILY MEDICINE

## 2021-01-01 PROCEDURE — 25010000002 LEVOFLOXACIN PER 250 MG: Performed by: PHYSICIAN ASSISTANT

## 2021-01-01 PROCEDURE — 99222 1ST HOSP IP/OBS MODERATE 55: CPT | Performed by: INTERNAL MEDICINE

## 2021-01-01 PROCEDURE — 99213 OFFICE O/P EST LOW 20 MIN: CPT | Performed by: INTERNAL MEDICINE

## 2021-01-01 PROCEDURE — 36430 TRANSFUSION BLD/BLD COMPNT: CPT

## 2021-01-01 PROCEDURE — 83735 ASSAY OF MAGNESIUM: CPT | Performed by: INTERNAL MEDICINE

## 2021-01-01 PROCEDURE — 33208 INSRT HEART PM ATRIAL & VENT: CPT | Performed by: STUDENT IN AN ORGANIZED HEALTH CARE EDUCATION/TRAINING PROGRAM

## 2021-01-01 PROCEDURE — 80053 COMPREHEN METABOLIC PANEL: CPT | Performed by: PHYSICIAN ASSISTANT

## 2021-01-01 PROCEDURE — 0 LIDOCAINE 1 % SOLUTION: Performed by: STUDENT IN AN ORGANIZED HEALTH CARE EDUCATION/TRAINING PROGRAM

## 2021-01-01 PROCEDURE — 99233 SBSQ HOSP IP/OBS HIGH 50: CPT | Performed by: FAMILY MEDICINE

## 2021-01-01 PROCEDURE — 25010000002 FUROSEMIDE PER 20 MG: Performed by: FAMILY MEDICINE

## 2021-01-01 PROCEDURE — 99223 1ST HOSP IP/OBS HIGH 75: CPT | Performed by: INTERNAL MEDICINE

## 2021-01-01 PROCEDURE — 84443 ASSAY THYROID STIM HORMONE: CPT | Performed by: NURSE PRACTITIONER

## 2021-01-01 PROCEDURE — 63710000001 PSYLLIUM 58.12 % PACK: Performed by: STUDENT IN AN ORGANIZED HEALTH CARE EDUCATION/TRAINING PROGRAM

## 2021-01-01 PROCEDURE — 84145 PROCALCITONIN (PCT): CPT | Performed by: PHYSICIAN ASSISTANT

## 2021-01-01 PROCEDURE — 25010000002 MEROPENEM IN SODIUM CHLORIDE 0.9% 50 ML 1 GM/50ML RECONSTITUTED SOLUTION: Performed by: INTERNAL MEDICINE

## 2021-01-01 PROCEDURE — 63710000001 ATORVASTATIN 20 MG TABLET: Performed by: STUDENT IN AN ORGANIZED HEALTH CARE EDUCATION/TRAINING PROGRAM

## 2021-01-01 PROCEDURE — 85027 COMPLETE CBC AUTOMATED: CPT | Performed by: PHYSICIAN ASSISTANT

## 2021-01-01 PROCEDURE — 83935 ASSAY OF URINE OSMOLALITY: CPT | Performed by: INTERNAL MEDICINE

## 2021-01-01 PROCEDURE — 83735 ASSAY OF MAGNESIUM: CPT | Performed by: PHYSICIAN ASSISTANT

## 2021-01-01 PROCEDURE — 82728 ASSAY OF FERRITIN: CPT | Performed by: INTERNAL MEDICINE

## 2021-01-01 PROCEDURE — 84484 ASSAY OF TROPONIN QUANT: CPT | Performed by: NURSE PRACTITIONER

## 2021-01-01 PROCEDURE — 81003 URINALYSIS AUTO W/O SCOPE: CPT | Performed by: INTERNAL MEDICINE

## 2021-01-01 PROCEDURE — 84484 ASSAY OF TROPONIN QUANT: CPT | Performed by: EMERGENCY MEDICINE

## 2021-01-01 PROCEDURE — 84145 PROCALCITONIN (PCT): CPT | Performed by: INTERNAL MEDICINE

## 2021-01-01 PROCEDURE — 85025 COMPLETE CBC W/AUTO DIFF WBC: CPT | Performed by: PHYSICIAN ASSISTANT

## 2021-01-01 PROCEDURE — 94664 DEMO&/EVAL PT USE INHALER: CPT

## 2021-01-01 PROCEDURE — 25010000002 ALBUMIN HUMAN 25% PER 50 ML: Performed by: INTERNAL MEDICINE

## 2021-01-01 PROCEDURE — 85014 HEMATOCRIT: CPT | Performed by: FAMILY MEDICINE

## 2021-01-01 PROCEDURE — 99239 HOSP IP/OBS DSCHRG MGMT >30: CPT | Performed by: INTERNAL MEDICINE

## 2021-01-01 PROCEDURE — 86900 BLOOD TYPING SEROLOGIC ABO: CPT | Performed by: PHYSICIAN ASSISTANT

## 2021-01-01 PROCEDURE — 97530 THERAPEUTIC ACTIVITIES: CPT

## 2021-01-01 PROCEDURE — 97165 OT EVAL LOW COMPLEX 30 MIN: CPT

## 2021-01-01 PROCEDURE — 25010000002 ONDANSETRON PER 1 MG: Performed by: STUDENT IN AN ORGANIZED HEALTH CARE EDUCATION/TRAINING PROGRAM

## 2021-01-01 PROCEDURE — 83735 ASSAY OF MAGNESIUM: CPT | Performed by: EMERGENCY MEDICINE

## 2021-01-01 PROCEDURE — 99233 SBSQ HOSP IP/OBS HIGH 50: CPT | Performed by: INTERNAL MEDICINE

## 2021-01-01 PROCEDURE — 82375 ASSAY CARBOXYHB QUANT: CPT

## 2021-01-01 PROCEDURE — 25010000002 GENTAMICIN PER 80 MG: Performed by: PHYSICIAN ASSISTANT

## 2021-01-01 PROCEDURE — 87086 URINE CULTURE/COLONY COUNT: CPT | Performed by: PHYSICIAN ASSISTANT

## 2021-01-01 PROCEDURE — 0202U NFCT DS 22 TRGT SARS-COV-2: CPT | Performed by: PHYSICIAN ASSISTANT

## 2021-01-01 PROCEDURE — 25010000002 VANCOMYCIN PER 500 MG: Performed by: PHYSICIAN ASSISTANT

## 2021-01-01 PROCEDURE — 85025 COMPLETE CBC W/AUTO DIFF WBC: CPT | Performed by: EMERGENCY MEDICINE

## 2021-01-01 PROCEDURE — 84466 ASSAY OF TRANSFERRIN: CPT | Performed by: INTERNAL MEDICINE

## 2021-01-01 PROCEDURE — 85018 HEMOGLOBIN: CPT | Performed by: FAMILY MEDICINE

## 2021-01-01 PROCEDURE — 63710000001 METOPROLOL SUCCINATE XL 25 MG TABLET SUSTAINED-RELEASE 24 HOUR: Performed by: STUDENT IN AN ORGANIZED HEALTH CARE EDUCATION/TRAINING PROGRAM

## 2021-01-01 PROCEDURE — 80048 BASIC METABOLIC PNL TOTAL CA: CPT | Performed by: PHYSICIAN ASSISTANT

## 2021-01-01 PROCEDURE — 63710000001 AMIODARONE 200 MG TABLET: Performed by: STUDENT IN AN ORGANIZED HEALTH CARE EDUCATION/TRAINING PROGRAM

## 2021-01-01 PROCEDURE — 99153 MOD SED SAME PHYS/QHP EA: CPT | Performed by: STUDENT IN AN ORGANIZED HEALTH CARE EDUCATION/TRAINING PROGRAM

## 2021-01-01 PROCEDURE — U0005 INFEC AGEN DETEC AMPLI PROBE: HCPCS

## 2021-01-01 PROCEDURE — 99285 EMERGENCY DEPT VISIT HI MDM: CPT

## 2021-01-01 PROCEDURE — C9803 HOPD COVID-19 SPEC COLLECT: HCPCS

## 2021-01-01 PROCEDURE — 85045 AUTOMATED RETICULOCYTE COUNT: CPT | Performed by: INTERNAL MEDICINE

## 2021-01-01 PROCEDURE — 63710000001 FAMOTIDINE 20 MG TABLET: Performed by: STUDENT IN AN ORGANIZED HEALTH CARE EDUCATION/TRAINING PROGRAM

## 2021-01-01 PROCEDURE — U0004 COV-19 TEST NON-CDC HGH THRU: HCPCS

## 2021-01-01 PROCEDURE — 93306 TTE W/DOPPLER COMPLETE: CPT

## 2021-01-01 PROCEDURE — 85025 COMPLETE CBC W/AUTO DIFF WBC: CPT | Performed by: INTERNAL MEDICINE

## 2021-01-01 PROCEDURE — 86901 BLOOD TYPING SEROLOGIC RH(D): CPT | Performed by: PHYSICIAN ASSISTANT

## 2021-01-01 PROCEDURE — 71045 X-RAY EXAM CHEST 1 VIEW: CPT

## 2021-01-01 PROCEDURE — 25010000002 IRON SUCROSE PER 1 MG: Performed by: INTERNAL MEDICINE

## 2021-01-01 PROCEDURE — 80162 ASSAY OF DIGOXIN TOTAL: CPT | Performed by: EMERGENCY MEDICINE

## 2021-01-01 PROCEDURE — 85027 COMPLETE CBC AUTOMATED: CPT | Performed by: FAMILY MEDICINE

## 2021-01-01 PROCEDURE — 85025 COMPLETE CBC W/AUTO DIFF WBC: CPT | Performed by: STUDENT IN AN ORGANIZED HEALTH CARE EDUCATION/TRAINING PROGRAM

## 2021-01-01 PROCEDURE — 84550 ASSAY OF BLOOD/URIC ACID: CPT | Performed by: INTERNAL MEDICINE

## 2021-01-01 PROCEDURE — 82607 VITAMIN B-12: CPT | Performed by: INTERNAL MEDICINE

## 2021-01-01 PROCEDURE — 93005 ELECTROCARDIOGRAM TRACING: CPT | Performed by: INTERNAL MEDICINE

## 2021-01-01 PROCEDURE — 85730 THROMBOPLASTIN TIME PARTIAL: CPT | Performed by: PHYSICIAN ASSISTANT

## 2021-01-01 PROCEDURE — 83880 ASSAY OF NATRIURETIC PEPTIDE: CPT | Performed by: EMERGENCY MEDICINE

## 2021-01-01 PROCEDURE — 80053 COMPREHEN METABOLIC PANEL: CPT | Performed by: FAMILY MEDICINE

## 2021-01-01 PROCEDURE — 25010000002 MIDAZOLAM PER 1 MG: Performed by: STUDENT IN AN ORGANIZED HEALTH CARE EDUCATION/TRAINING PROGRAM

## 2021-01-01 PROCEDURE — C1785 PMKR, DUAL, RATE-RESP: HCPCS | Performed by: STUDENT IN AN ORGANIZED HEALTH CARE EDUCATION/TRAINING PROGRAM

## 2021-01-01 PROCEDURE — 93306 TTE W/DOPPLER COMPLETE: CPT | Performed by: INTERNAL MEDICINE

## 2021-01-01 PROCEDURE — 86900 BLOOD TYPING SEROLOGIC ABO: CPT | Performed by: INTERNAL MEDICINE

## 2021-01-01 PROCEDURE — 71046 X-RAY EXAM CHEST 2 VIEWS: CPT

## 2021-01-01 PROCEDURE — 87186 SC STD MICRODIL/AGAR DIL: CPT | Performed by: PHYSICIAN ASSISTANT

## 2021-01-01 PROCEDURE — 25010000002 ACETAZOLAMIDE PER 500 MG: Performed by: INTERNAL MEDICINE

## 2021-01-01 PROCEDURE — 80053 COMPREHEN METABOLIC PANEL: CPT | Performed by: INTERNAL MEDICINE

## 2021-01-01 PROCEDURE — 1111F DSCHRG MED/CURRENT MED MERGE: CPT | Performed by: INTERNAL MEDICINE

## 2021-01-01 PROCEDURE — 80048 BASIC METABOLIC PNL TOTAL CA: CPT | Performed by: NURSE PRACTITIONER

## 2021-01-01 PROCEDURE — 93005 ELECTROCARDIOGRAM TRACING: CPT | Performed by: EMERGENCY MEDICINE

## 2021-01-01 PROCEDURE — 63710000001 MULTIVITAMIN WITH MINERALS TABLET: Performed by: STUDENT IN AN ORGANIZED HEALTH CARE EDUCATION/TRAINING PROGRAM

## 2021-01-01 PROCEDURE — 83930 ASSAY OF BLOOD OSMOLALITY: CPT | Performed by: INTERNAL MEDICINE

## 2021-01-01 PROCEDURE — 25010000002 ENOXAPARIN PER 10 MG: Performed by: STUDENT IN AN ORGANIZED HEALTH CARE EDUCATION/TRAINING PROGRAM

## 2021-01-01 PROCEDURE — 99222 1ST HOSP IP/OBS MODERATE 55: CPT | Performed by: STUDENT IN AN ORGANIZED HEALTH CARE EDUCATION/TRAINING PROGRAM

## 2021-01-01 PROCEDURE — 92610 EVALUATE SWALLOWING FUNCTION: CPT

## 2021-01-01 PROCEDURE — 99283 EMERGENCY DEPT VISIT LOW MDM: CPT

## 2021-01-01 PROCEDURE — 93010 ELECTROCARDIOGRAM REPORT: CPT | Performed by: INTERNAL MEDICINE

## 2021-01-01 PROCEDURE — 94618 PULMONARY STRESS TESTING: CPT

## 2021-01-01 PROCEDURE — 85027 COMPLETE CBC AUTOMATED: CPT | Performed by: NURSE PRACTITIONER

## 2021-01-01 PROCEDURE — 83735 ASSAY OF MAGNESIUM: CPT | Performed by: FAMILY MEDICINE

## 2021-01-01 PROCEDURE — 86901 BLOOD TYPING SEROLOGIC RH(D): CPT

## 2021-01-01 PROCEDURE — P9016 RBC LEUKOCYTES REDUCED: HCPCS

## 2021-01-01 PROCEDURE — 84484 ASSAY OF TROPONIN QUANT: CPT | Performed by: PHYSICIAN ASSISTANT

## 2021-01-01 PROCEDURE — 99024 POSTOP FOLLOW-UP VISIT: CPT | Performed by: PHYSICIAN ASSISTANT

## 2021-01-01 PROCEDURE — 83880 ASSAY OF NATRIURETIC PEPTIDE: CPT | Performed by: PHYSICIAN ASSISTANT

## 2021-01-01 PROCEDURE — 25010000002 IRON SUCROSE PER 1 MG: Performed by: FAMILY MEDICINE

## 2021-01-01 PROCEDURE — 99284 EMERGENCY DEPT VISIT MOD MDM: CPT

## 2021-01-01 PROCEDURE — 87040 BLOOD CULTURE FOR BACTERIA: CPT | Performed by: PHYSICIAN ASSISTANT

## 2021-01-01 PROCEDURE — 81001 URINALYSIS AUTO W/SCOPE: CPT | Performed by: EMERGENCY MEDICINE

## 2021-01-01 PROCEDURE — 97162 PT EVAL MOD COMPLEX 30 MIN: CPT

## 2021-01-01 PROCEDURE — C1892 INTRO/SHEATH,FIXED,PEEL-AWAY: HCPCS | Performed by: STUDENT IN AN ORGANIZED HEALTH CARE EDUCATION/TRAINING PROGRAM

## 2021-01-01 PROCEDURE — 99442 PR PHYS/QHP TELEPHONE EVALUATION 11-20 MIN: CPT | Performed by: INTERNAL MEDICINE

## 2021-01-01 PROCEDURE — 83050 HGB METHEMOGLOBIN QUAN: CPT

## 2021-01-01 PROCEDURE — 83880 ASSAY OF NATRIURETIC PEPTIDE: CPT | Performed by: FAMILY MEDICINE

## 2021-01-01 PROCEDURE — 63710000001 LEVOTHYROXINE 100 MCG TABLET: Performed by: STUDENT IN AN ORGANIZED HEALTH CARE EDUCATION/TRAINING PROGRAM

## 2021-01-01 PROCEDURE — 86850 RBC ANTIBODY SCREEN: CPT | Performed by: INTERNAL MEDICINE

## 2021-01-01 PROCEDURE — 85610 PROTHROMBIN TIME: CPT | Performed by: PHYSICIAN ASSISTANT

## 2021-01-01 PROCEDURE — 86901 BLOOD TYPING SEROLOGIC RH(D): CPT | Performed by: INTERNAL MEDICINE

## 2021-01-01 PROCEDURE — 99231 SBSQ HOSP IP/OBS SF/LOW 25: CPT | Performed by: INTERNAL MEDICINE

## 2021-01-01 PROCEDURE — 86850 RBC ANTIBODY SCREEN: CPT | Performed by: PHYSICIAN ASSISTANT

## 2021-01-01 PROCEDURE — 97166 OT EVAL MOD COMPLEX 45 MIN: CPT

## 2021-01-01 PROCEDURE — 99232 SBSQ HOSP IP/OBS MODERATE 35: CPT | Performed by: INTERNAL MEDICINE

## 2021-01-01 PROCEDURE — 36415 COLL VENOUS BLD VENIPUNCTURE: CPT

## 2021-01-01 PROCEDURE — 25010000002 DIPHENHYDRAMINE PER 50 MG: Performed by: PHYSICIAN ASSISTANT

## 2021-01-01 PROCEDURE — 25010000002 DIGOXIN PER 500 MCG: Performed by: PHYSICIAN ASSISTANT

## 2021-01-01 PROCEDURE — 99222 1ST HOSP IP/OBS MODERATE 55: CPT | Performed by: NURSE PRACTITIONER

## 2021-01-01 PROCEDURE — 86920 COMPATIBILITY TEST SPIN: CPT

## 2021-01-01 PROCEDURE — 63710000001 ASPIRIN 81 MG TABLET DELAYED-RELEASE: Performed by: STUDENT IN AN ORGANIZED HEALTH CARE EDUCATION/TRAINING PROGRAM

## 2021-01-01 PROCEDURE — 84443 ASSAY THYROID STIM HORMONE: CPT | Performed by: INTERNAL MEDICINE

## 2021-01-01 PROCEDURE — 83605 ASSAY OF LACTIC ACID: CPT | Performed by: PHYSICIAN ASSISTANT

## 2021-01-01 PROCEDURE — 80061 LIPID PANEL: CPT | Performed by: INTERNAL MEDICINE

## 2021-01-01 PROCEDURE — 84100 ASSAY OF PHOSPHORUS: CPT | Performed by: PHYSICIAN ASSISTANT

## 2021-01-01 PROCEDURE — 85007 BL SMEAR W/DIFF WBC COUNT: CPT | Performed by: FAMILY MEDICINE

## 2021-01-01 PROCEDURE — 25010000002 FENTANYL CITRATE (PF) 50 MCG/ML SOLUTION: Performed by: STUDENT IN AN ORGANIZED HEALTH CARE EDUCATION/TRAINING PROGRAM

## 2021-01-01 PROCEDURE — 86900 BLOOD TYPING SEROLOGIC ABO: CPT

## 2021-01-01 PROCEDURE — 87077 CULTURE AEROBIC IDENTIFY: CPT | Performed by: PHYSICIAN ASSISTANT

## 2021-01-01 PROCEDURE — 83540 ASSAY OF IRON: CPT | Performed by: INTERNAL MEDICINE

## 2021-01-01 PROCEDURE — 84439 ASSAY OF FREE THYROXINE: CPT | Performed by: PHYSICIAN ASSISTANT

## 2021-01-01 PROCEDURE — 99232 SBSQ HOSP IP/OBS MODERATE 35: CPT | Performed by: NURSE PRACTITIONER

## 2021-01-01 PROCEDURE — 25010000002 VANCOMYCIN PER 500 MG: Performed by: STUDENT IN AN ORGANIZED HEALTH CARE EDUCATION/TRAINING PROGRAM

## 2021-01-01 PROCEDURE — 82746 ASSAY OF FOLIC ACID SERUM: CPT | Performed by: INTERNAL MEDICINE

## 2021-01-01 PROCEDURE — 99239 HOSP IP/OBS DSCHRG MGMT >30: CPT | Performed by: FAMILY MEDICINE

## 2021-01-01 PROCEDURE — 25010000002 METHYLPREDNISOLONE PER 40 MG: Performed by: PHYSICIAN ASSISTANT

## 2021-01-01 DEVICE — PACE/SENSE LEAD
Type: IMPLANTABLE DEVICE | Status: FUNCTIONAL
Brand: INGEVITY™+

## 2021-01-01 DEVICE — PACEMAKER
Type: IMPLANTABLE DEVICE | Status: FUNCTIONAL
Brand: ACCOLADE™ MRI DR

## 2021-01-01 RX ORDER — METOPROLOL TARTRATE 5 MG/5ML
5 INJECTION INTRAVENOUS
Status: DISCONTINUED | OUTPATIENT
Start: 2021-01-01 | End: 2021-01-01 | Stop reason: HOSPADM

## 2021-01-01 RX ORDER — FUROSEMIDE 10 MG/ML
40 INJECTION INTRAMUSCULAR; INTRAVENOUS ONCE
Status: COMPLETED | OUTPATIENT
Start: 2021-01-01 | End: 2021-01-01

## 2021-01-01 RX ORDER — SODIUM CHLORIDE 0.9 % (FLUSH) 0.9 %
10 SYRINGE (ML) INJECTION AS NEEDED
Status: DISCONTINUED | OUTPATIENT
Start: 2021-01-01 | End: 2021-01-01 | Stop reason: HOSPADM

## 2021-01-01 RX ORDER — BISACODYL 5 MG/1
5 TABLET, DELAYED RELEASE ORAL DAILY PRN
Status: DISCONTINUED | OUTPATIENT
Start: 2021-01-01 | End: 2021-01-01 | Stop reason: HOSPADM

## 2021-01-01 RX ORDER — BUPIVACAINE HYDROCHLORIDE 5 MG/ML
INJECTION, SOLUTION PERINEURAL AS NEEDED
Status: DISCONTINUED | OUTPATIENT
Start: 2021-01-01 | End: 2021-01-01 | Stop reason: HOSPADM

## 2021-01-01 RX ORDER — AMIODARONE HYDROCHLORIDE 200 MG/1
200 TABLET ORAL
Qty: 30 TABLET | Refills: 0 | Status: SHIPPED | OUTPATIENT
Start: 2021-01-01 | End: 2021-01-01 | Stop reason: SDUPTHER

## 2021-01-01 RX ORDER — SODIUM CHLORIDE 1000 MG
1 TABLET, SOLUBLE MISCELLANEOUS DAILY
Qty: 30 TABLET | Refills: 5 | Status: SHIPPED | OUTPATIENT
Start: 2021-01-01 | End: 2022-01-01

## 2021-01-01 RX ORDER — TAMSULOSIN HYDROCHLORIDE 0.4 MG/1
0.4 CAPSULE ORAL NIGHTLY
Status: DISCONTINUED | OUTPATIENT
Start: 2021-01-01 | End: 2021-01-01 | Stop reason: HOSPADM

## 2021-01-01 RX ORDER — DILTIAZEM HYDROCHLORIDE 120 MG/1
120 CAPSULE, COATED, EXTENDED RELEASE ORAL
Status: DISCONTINUED | OUTPATIENT
Start: 2021-01-01 | End: 2021-01-01

## 2021-01-01 RX ORDER — SODIUM CHLORIDE FOR INHALATION 0.9 %
3 VIAL, NEBULIZER (ML) INHALATION AS NEEDED
Qty: 15 ML | Refills: 0 | Status: SHIPPED | OUTPATIENT
Start: 2021-01-01 | End: 2021-01-01 | Stop reason: HOSPADM

## 2021-01-01 RX ORDER — ASPIRIN 81 MG/1
81 TABLET ORAL DAILY
COMMUNITY

## 2021-01-01 RX ORDER — FUROSEMIDE 40 MG/1
40 TABLET ORAL DAILY
Status: DISCONTINUED | OUTPATIENT
Start: 2021-01-01 | End: 2021-01-01

## 2021-01-01 RX ORDER — ONDANSETRON 4 MG/1
4 TABLET, FILM COATED ORAL EVERY 8 HOURS PRN
Qty: 30 TABLET | Refills: 2 | Status: SHIPPED | OUTPATIENT
Start: 2021-01-01 | End: 2022-01-01

## 2021-01-01 RX ORDER — LEVOFLOXACIN 5 MG/ML
750 INJECTION, SOLUTION INTRAVENOUS EVERY 24 HOURS
Status: DISCONTINUED | OUTPATIENT
Start: 2021-01-01 | End: 2021-01-01

## 2021-01-01 RX ORDER — FUROSEMIDE 40 MG/1
TABLET ORAL
Qty: 30 TABLET | Refills: 2 | Status: SHIPPED | OUTPATIENT
Start: 2021-01-01 | End: 2021-01-01

## 2021-01-01 RX ORDER — SODIUM CHLORIDE FOR INHALATION 0.9 %
3 VIAL, NEBULIZER (ML) INHALATION
Status: DISCONTINUED | OUTPATIENT
Start: 2021-01-01 | End: 2021-01-01

## 2021-01-01 RX ORDER — ONDANSETRON 2 MG/ML
4 INJECTION INTRAMUSCULAR; INTRAVENOUS EVERY 6 HOURS PRN
Status: DISCONTINUED | OUTPATIENT
Start: 2021-01-01 | End: 2021-01-01 | Stop reason: HOSPADM

## 2021-01-01 RX ORDER — MULTIPLE VITAMINS W/ MINERALS TAB 9MG-400MCG
1 TAB ORAL DAILY
Status: DISCONTINUED | OUTPATIENT
Start: 2021-01-01 | End: 2021-01-01 | Stop reason: HOSPADM

## 2021-01-01 RX ORDER — CLOPIDOGREL BISULFATE 75 MG/1
75 TABLET ORAL DAILY
Status: DISCONTINUED | OUTPATIENT
Start: 2021-01-01 | End: 2021-01-01 | Stop reason: HOSPADM

## 2021-01-01 RX ORDER — ATORVASTATIN CALCIUM 20 MG/1
20 TABLET, FILM COATED ORAL NIGHTLY
Status: DISCONTINUED | OUTPATIENT
Start: 2021-01-01 | End: 2021-01-01 | Stop reason: HOSPADM

## 2021-01-01 RX ORDER — METHYLPREDNISOLONE SODIUM SUCCINATE 40 MG/ML
80 INJECTION, POWDER, LYOPHILIZED, FOR SOLUTION INTRAMUSCULAR; INTRAVENOUS ONCE
Status: COMPLETED | OUTPATIENT
Start: 2021-01-01 | End: 2021-01-01

## 2021-01-01 RX ORDER — MEROPENEM AND SODIUM CHLORIDE 1 G/50ML
1 INJECTION, SOLUTION INTRAVENOUS EVERY 8 HOURS
Status: DISCONTINUED | OUTPATIENT
Start: 2021-01-01 | End: 2021-01-01 | Stop reason: HOSPADM

## 2021-01-01 RX ORDER — LIDOCAINE HYDROCHLORIDE 10 MG/ML
INJECTION, SOLUTION INFILTRATION; PERINEURAL AS NEEDED
Status: DISCONTINUED | OUTPATIENT
Start: 2021-01-01 | End: 2021-01-01 | Stop reason: HOSPADM

## 2021-01-01 RX ORDER — FENTANYL CITRATE 50 UG/ML
INJECTION, SOLUTION INTRAMUSCULAR; INTRAVENOUS AS NEEDED
Status: DISCONTINUED | OUTPATIENT
Start: 2021-01-01 | End: 2021-01-01 | Stop reason: HOSPADM

## 2021-01-01 RX ORDER — METHYLPREDNISOLONE 4 MG/1
TABLET ORAL
Qty: 21 TABLET | Refills: 0 | Status: SHIPPED | OUTPATIENT
Start: 2021-01-01 | End: 2021-01-01 | Stop reason: HOSPADM

## 2021-01-01 RX ORDER — UREA 10 %
LOTION (ML) TOPICAL
COMMUNITY
End: 2022-01-01

## 2021-01-01 RX ORDER — MIDAZOLAM HYDROCHLORIDE 1 MG/ML
INJECTION INTRAMUSCULAR; INTRAVENOUS AS NEEDED
Status: DISCONTINUED | OUTPATIENT
Start: 2021-01-01 | End: 2021-01-01 | Stop reason: HOSPADM

## 2021-01-01 RX ORDER — METOPROLOL TARTRATE 50 MG/1
50 TABLET, FILM COATED ORAL EVERY 12 HOURS SCHEDULED
Status: DISCONTINUED | OUTPATIENT
Start: 2021-01-01 | End: 2021-01-01

## 2021-01-01 RX ORDER — FAMOTIDINE 20 MG/1
20 TABLET, FILM COATED ORAL 2 TIMES DAILY
Status: DISCONTINUED | OUTPATIENT
Start: 2021-01-01 | End: 2021-01-01

## 2021-01-01 RX ORDER — ASPIRIN 81 MG/1
81 TABLET ORAL DAILY
Status: DISCONTINUED | OUTPATIENT
Start: 2021-01-01 | End: 2021-01-01 | Stop reason: HOSPADM

## 2021-01-01 RX ORDER — AMOXICILLIN 250 MG
2 CAPSULE ORAL 2 TIMES DAILY
Status: DISCONTINUED | OUTPATIENT
Start: 2021-01-01 | End: 2021-01-01 | Stop reason: HOSPADM

## 2021-01-01 RX ORDER — DIGOXIN 125 MCG
125 TABLET ORAL
Status: DISCONTINUED | OUTPATIENT
Start: 2021-01-01 | End: 2021-01-01 | Stop reason: HOSPADM

## 2021-01-01 RX ORDER — ONDANSETRON 2 MG/ML
INJECTION INTRAMUSCULAR; INTRAVENOUS AS NEEDED
Status: DISCONTINUED | OUTPATIENT
Start: 2021-01-01 | End: 2021-01-01 | Stop reason: HOSPADM

## 2021-01-01 RX ORDER — IPRATROPIUM BROMIDE AND ALBUTEROL SULFATE 2.5; .5 MG/3ML; MG/3ML
3 SOLUTION RESPIRATORY (INHALATION) EVERY 4 HOURS PRN
Status: DISCONTINUED | OUTPATIENT
Start: 2021-01-01 | End: 2021-01-01 | Stop reason: HOSPADM

## 2021-01-01 RX ORDER — CLOBETASOL PROPIONATE 0.5 MG/G
1 CREAM TOPICAL EVERY 12 HOURS SCHEDULED
Status: DISCONTINUED | OUTPATIENT
Start: 2021-01-01 | End: 2021-01-01 | Stop reason: HOSPADM

## 2021-01-01 RX ORDER — BISACODYL 10 MG
10 SUPPOSITORY, RECTAL RECTAL DAILY PRN
Status: DISCONTINUED | OUTPATIENT
Start: 2021-01-01 | End: 2021-01-01 | Stop reason: HOSPADM

## 2021-01-01 RX ORDER — POTASSIUM CHLORIDE 750 MG/1
10 CAPSULE, EXTENDED RELEASE ORAL DAILY
Status: DISCONTINUED | OUTPATIENT
Start: 2021-01-01 | End: 2021-01-01

## 2021-01-01 RX ORDER — FUROSEMIDE 20 MG/1
20 TABLET ORAL DAILY
Start: 2021-01-01 | End: 2021-01-01 | Stop reason: SDUPTHER

## 2021-01-01 RX ORDER — DEXTROMETHORPHAN HYDROBROMIDE AND PROMETHAZINE HYDROCHLORIDE 15; 6.25 MG/5ML; MG/5ML
5 SYRUP ORAL 2 TIMES DAILY PRN
Qty: 118 ML | Refills: 0 | Status: SHIPPED | OUTPATIENT
Start: 2021-01-01 | End: 2021-01-01

## 2021-01-01 RX ORDER — CLOPIDOGREL BISULFATE 75 MG/1
75 TABLET ORAL DAILY
Status: DISCONTINUED | OUTPATIENT
Start: 2021-01-01 | End: 2021-01-01

## 2021-01-01 RX ORDER — FAMOTIDINE 20 MG/1
20 TABLET, FILM COATED ORAL 2 TIMES DAILY
Status: DISCONTINUED | OUTPATIENT
Start: 2021-01-01 | End: 2021-01-01 | Stop reason: HOSPADM

## 2021-01-01 RX ORDER — LABETALOL 200 MG/1
200 TABLET, FILM COATED ORAL ONCE
Status: COMPLETED | OUTPATIENT
Start: 2021-01-01 | End: 2021-01-01

## 2021-01-01 RX ORDER — FUROSEMIDE 10 MG/ML
80 INJECTION INTRAMUSCULAR; INTRAVENOUS ONCE
Status: COMPLETED | OUTPATIENT
Start: 2021-01-01 | End: 2021-01-01

## 2021-01-01 RX ORDER — METOPROLOL SUCCINATE 25 MG/1
25 TABLET, EXTENDED RELEASE ORAL
Status: DISCONTINUED | OUTPATIENT
Start: 2021-01-01 | End: 2021-01-01 | Stop reason: HOSPADM

## 2021-01-01 RX ORDER — FUROSEMIDE 10 MG/ML
60 INJECTION INTRAMUSCULAR; INTRAVENOUS EVERY 12 HOURS
Status: DISCONTINUED | OUTPATIENT
Start: 2021-01-01 | End: 2021-01-01

## 2021-01-01 RX ORDER — POLYETHYLENE GLYCOL 3350 17 G/17G
17 POWDER, FOR SOLUTION ORAL DAILY PRN
Status: DISCONTINUED | OUTPATIENT
Start: 2021-01-01 | End: 2021-01-01 | Stop reason: HOSPADM

## 2021-01-01 RX ORDER — LEVOTHYROXINE SODIUM 0.1 MG/1
100 TABLET ORAL
Status: DISCONTINUED | OUTPATIENT
Start: 2021-01-01 | End: 2021-01-01 | Stop reason: HOSPADM

## 2021-01-01 RX ORDER — ONDANSETRON 4 MG/1
4 TABLET, FILM COATED ORAL EVERY 8 HOURS PRN
Status: DISCONTINUED | OUTPATIENT
Start: 2021-01-01 | End: 2021-01-01 | Stop reason: HOSPADM

## 2021-01-01 RX ORDER — METOPROLOL SUCCINATE 50 MG/1
50 TABLET, EXTENDED RELEASE ORAL
Status: DISCONTINUED | OUTPATIENT
Start: 2021-01-01 | End: 2021-01-01

## 2021-01-01 RX ORDER — SODIUM CHLORIDE 1000 MG
1 TABLET, SOLUBLE MISCELLANEOUS DAILY
Status: DISCONTINUED | OUTPATIENT
Start: 2021-01-01 | End: 2021-01-01

## 2021-01-01 RX ORDER — VANCOMYCIN HYDROCHLORIDE 1 G/200ML
15 INJECTION, SOLUTION INTRAVENOUS ONCE
Status: COMPLETED | OUTPATIENT
Start: 2021-01-01 | End: 2021-01-01

## 2021-01-01 RX ORDER — FERROUS SULFATE TAB EC 324 MG (65 MG FE EQUIVALENT) 324 (65 FE) MG
324 TABLET DELAYED RESPONSE ORAL
Status: DISCONTINUED | OUTPATIENT
Start: 2021-01-01 | End: 2021-01-01 | Stop reason: HOSPADM

## 2021-01-01 RX ORDER — IPRATROPIUM BROMIDE AND ALBUTEROL SULFATE 2.5; .5 MG/3ML; MG/3ML
3 SOLUTION RESPIRATORY (INHALATION) ONCE
Status: COMPLETED | OUTPATIENT
Start: 2021-01-01 | End: 2021-01-01

## 2021-01-01 RX ORDER — ACETAMINOPHEN 325 MG/1
650 TABLET ORAL EVERY 4 HOURS PRN
Status: DISCONTINUED | OUTPATIENT
Start: 2021-01-01 | End: 2021-01-01 | Stop reason: HOSPADM

## 2021-01-01 RX ORDER — FUROSEMIDE 10 MG/ML
40 INJECTION INTRAMUSCULAR; INTRAVENOUS EVERY 12 HOURS
Status: DISCONTINUED | OUTPATIENT
Start: 2021-01-01 | End: 2021-01-01

## 2021-01-01 RX ORDER — FUROSEMIDE 40 MG/1
40 TABLET ORAL DAILY
Status: DISCONTINUED | OUTPATIENT
Start: 2021-01-01 | End: 2021-01-01 | Stop reason: HOSPADM

## 2021-01-01 RX ORDER — SODIUM CHLORIDE FOR INHALATION 0.9 %
3 VIAL, NEBULIZER (ML) INHALATION EVERY 6 HOURS SCHEDULED
Status: DISCONTINUED | OUTPATIENT
Start: 2021-01-01 | End: 2021-01-01

## 2021-01-01 RX ORDER — ACETAMINOPHEN 650 MG/1
650 SUPPOSITORY RECTAL EVERY 4 HOURS PRN
Status: DISCONTINUED | OUTPATIENT
Start: 2021-01-01 | End: 2021-01-01 | Stop reason: HOSPADM

## 2021-01-01 RX ORDER — MULTIVIT WITH MINERALS/LUTEIN
500 TABLET ORAL DAILY
Status: ON HOLD | COMMUNITY
End: 2021-01-01

## 2021-01-01 RX ORDER — TAMSULOSIN HYDROCHLORIDE 0.4 MG/1
0.4 CAPSULE ORAL ONCE
Status: COMPLETED | OUTPATIENT
Start: 2021-01-01 | End: 2021-01-01

## 2021-01-01 RX ORDER — POTASSIUM CITRATE 10 MEQ/1
10 TABLET, EXTENDED RELEASE ORAL AS NEEDED
COMMUNITY
End: 2021-01-01 | Stop reason: HOSPADM

## 2021-01-01 RX ORDER — VANCOMYCIN HYDROCHLORIDE 1 G/200ML
1000 INJECTION, SOLUTION INTRAVENOUS ONCE
Status: COMPLETED | OUTPATIENT
Start: 2021-01-01 | End: 2021-01-01

## 2021-01-01 RX ORDER — METOPROLOL TARTRATE 50 MG/1
50 TABLET, FILM COATED ORAL 2 TIMES DAILY
Status: DISCONTINUED | OUTPATIENT
Start: 2021-01-01 | End: 2021-01-01 | Stop reason: HOSPADM

## 2021-01-01 RX ORDER — SODIUM CHLORIDE 0.9 % (FLUSH) 0.9 %
3 SYRINGE (ML) INJECTION EVERY 12 HOURS SCHEDULED
Status: DISCONTINUED | OUTPATIENT
Start: 2021-01-01 | End: 2021-01-01 | Stop reason: HOSPADM

## 2021-01-01 RX ORDER — DIGOXIN 125 MCG
125 TABLET ORAL
Start: 2021-01-01 | End: 2021-01-01 | Stop reason: SDUPTHER

## 2021-01-01 RX ORDER — DIGOXIN 125 MCG
125 TABLET ORAL
Status: DISCONTINUED | OUTPATIENT
Start: 2021-01-01 | End: 2021-01-01

## 2021-01-01 RX ORDER — POTASSIUM CHLORIDE 750 MG/1
10 TABLET, FILM COATED, EXTENDED RELEASE ORAL DAILY
Qty: 30 TABLET | Refills: 11 | Status: SHIPPED | OUTPATIENT
Start: 2021-01-01 | End: 2021-01-01

## 2021-01-01 RX ORDER — FERROUS SULFATE 325(65) MG
325 TABLET ORAL
Status: DISCONTINUED | OUTPATIENT
Start: 2021-01-01 | End: 2021-01-01 | Stop reason: HOSPADM

## 2021-01-01 RX ORDER — LEVOTHYROXINE SODIUM 0.1 MG/1
100 TABLET ORAL DAILY
Status: DISCONTINUED | OUTPATIENT
Start: 2021-01-01 | End: 2021-01-01 | Stop reason: HOSPADM

## 2021-01-01 RX ORDER — SODIUM CHLORIDE 1000 MG
0.5 TABLET, SOLUBLE MISCELLANEOUS DAILY
Qty: 15 TABLET | Refills: 0 | Status: SHIPPED | OUTPATIENT
Start: 2021-01-01 | End: 2021-01-01 | Stop reason: SDUPTHER

## 2021-01-01 RX ORDER — MEGESTROL ACETATE 40 MG/1
40 TABLET ORAL 2 TIMES DAILY
Status: DISCONTINUED | OUTPATIENT
Start: 2021-01-01 | End: 2021-01-01 | Stop reason: HOSPADM

## 2021-01-01 RX ORDER — SODIUM CHLORIDE 1000 MG
1 TABLET, SOLUBLE MISCELLANEOUS 2 TIMES DAILY WITH MEALS
Status: DISCONTINUED | OUTPATIENT
Start: 2021-01-01 | End: 2021-01-01

## 2021-01-01 RX ORDER — TAMSULOSIN HYDROCHLORIDE 0.4 MG/1
0.4 CAPSULE ORAL DAILY
Status: DISCONTINUED | OUTPATIENT
Start: 2021-01-01 | End: 2021-01-01 | Stop reason: HOSPADM

## 2021-01-01 RX ORDER — LISINOPRIL 5 MG/1
5 TABLET ORAL DAILY
Qty: 30 TABLET | Refills: 5 | Status: SHIPPED | OUTPATIENT
Start: 2021-01-01 | End: 2022-01-01 | Stop reason: HOSPADM

## 2021-01-01 RX ORDER — MEROPENEM AND SODIUM CHLORIDE 1 G/50ML
1 INJECTION, SOLUTION INTRAVENOUS ONCE
Status: COMPLETED | OUTPATIENT
Start: 2021-01-01 | End: 2021-01-01

## 2021-01-01 RX ORDER — FUROSEMIDE 40 MG/1
TABLET ORAL
Qty: 60 TABLET | Refills: 2 | Status: SHIPPED | OUTPATIENT
Start: 2021-01-01 | End: 2021-01-01 | Stop reason: HOSPADM

## 2021-01-01 RX ORDER — DILTIAZEM HCL IN NACL,ISO-OSM 125 MG/125
5-15 PLASTIC BAG, INJECTION (ML) INTRAVENOUS
Status: DISCONTINUED | OUTPATIENT
Start: 2021-01-01 | End: 2021-01-01 | Stop reason: HOSPADM

## 2021-01-01 RX ORDER — METOPROLOL TARTRATE 50 MG/1
50 TABLET, FILM COATED ORAL ONCE
Status: COMPLETED | OUTPATIENT
Start: 2021-01-01 | End: 2021-01-01

## 2021-01-01 RX ORDER — FUROSEMIDE 40 MG/1
40 TABLET ORAL DAILY
Status: DISCONTINUED | OUTPATIENT
Start: 2021-01-01 | End: 2021-01-01 | Stop reason: SDUPTHER

## 2021-01-01 RX ORDER — FUROSEMIDE 40 MG/1
40 TABLET ORAL DAILY PRN
Qty: 30 TABLET | Refills: 2 | Status: SHIPPED | OUTPATIENT
Start: 2021-01-01 | End: 2021-01-01

## 2021-01-01 RX ORDER — NITROGLYCERIN 0.4 MG/1
0.4 TABLET SUBLINGUAL
Status: DISCONTINUED | OUTPATIENT
Start: 2021-01-01 | End: 2021-01-01 | Stop reason: HOSPADM

## 2021-01-01 RX ORDER — POTASSIUM CHLORIDE 750 MG/1
10 TABLET, FILM COATED, EXTENDED RELEASE ORAL DAILY
Qty: 30 TABLET | Refills: 0 | Status: SHIPPED | OUTPATIENT
Start: 2021-01-01 | End: 2021-01-01 | Stop reason: SDUPTHER

## 2021-01-01 RX ORDER — SULFAMETHOXAZOLE AND TRIMETHOPRIM 800; 160 MG/1; MG/1
1 TABLET ORAL EVERY 12 HOURS SCHEDULED
Status: DISCONTINUED | OUTPATIENT
Start: 2021-01-01 | End: 2021-01-01

## 2021-01-01 RX ORDER — AMIODARONE HYDROCHLORIDE 200 MG/1
200 TABLET ORAL
Status: DISCONTINUED | OUTPATIENT
Start: 2021-01-01 | End: 2021-01-01 | Stop reason: HOSPADM

## 2021-01-01 RX ORDER — LACTOSE-REDUCED FOOD 0.06 G-1.5
LIQUID (ML) ORAL DAILY
COMMUNITY
Start: 2021-01-01 | End: 2021-01-01

## 2021-01-01 RX ORDER — FUROSEMIDE 40 MG/1
40 TABLET ORAL DAILY
Qty: 30 TABLET | Refills: 0 | Status: ON HOLD | OUTPATIENT
Start: 2021-01-01 | End: 2021-01-01 | Stop reason: SDUPTHER

## 2021-01-01 RX ORDER — DIGOXIN 0.25 MG/ML
500 INJECTION INTRAMUSCULAR; INTRAVENOUS ONCE
Status: COMPLETED | OUTPATIENT
Start: 2021-01-01 | End: 2021-01-01

## 2021-01-01 RX ORDER — DIGOXIN 125 MCG
125 TABLET ORAL
Qty: 30 TABLET | Refills: 1 | Status: SHIPPED | OUTPATIENT
Start: 2021-01-01 | End: 2021-01-01 | Stop reason: HOSPADM

## 2021-01-01 RX ORDER — FUROSEMIDE 20 MG/1
20 TABLET ORAL DAILY PRN
Qty: 30 TABLET | Refills: 11 | Status: SHIPPED | OUTPATIENT
Start: 2021-01-01 | End: 2021-01-01 | Stop reason: HOSPADM

## 2021-01-01 RX ORDER — FUROSEMIDE 10 MG/ML
40 INJECTION INTRAMUSCULAR; INTRAVENOUS EVERY 6 HOURS
Status: COMPLETED | OUTPATIENT
Start: 2021-01-01 | End: 2021-01-01

## 2021-01-01 RX ORDER — SODIUM CHLORIDE 9 MG/ML
50 INJECTION, SOLUTION INTRAVENOUS CONTINUOUS
Status: DISCONTINUED | OUTPATIENT
Start: 2021-01-01 | End: 2021-01-01

## 2021-01-01 RX ORDER — DILTIAZEM HYDROCHLORIDE 120 MG/1
120 CAPSULE, COATED, EXTENDED RELEASE ORAL
Qty: 30 CAPSULE | Refills: 2 | Status: SHIPPED | OUTPATIENT
Start: 2021-01-01 | End: 2021-01-01 | Stop reason: HOSPADM

## 2021-01-01 RX ORDER — AZITHROMYCIN 250 MG/1
TABLET, FILM COATED ORAL
Qty: 6 TABLET | Refills: 0 | Status: SHIPPED | OUTPATIENT
Start: 2021-01-01 | End: 2021-01-01 | Stop reason: HOSPADM

## 2021-01-01 RX ORDER — LABETALOL 100 MG/1
100 TABLET, FILM COATED ORAL 3 TIMES DAILY
Status: DISCONTINUED | OUTPATIENT
Start: 2021-01-01 | End: 2021-01-01

## 2021-01-01 RX ORDER — FUROSEMIDE 40 MG/1
40 TABLET ORAL
Status: DISCONTINUED | OUTPATIENT
Start: 2021-01-01 | End: 2021-01-01

## 2021-01-01 RX ORDER — LEVOFLOXACIN 5 MG/ML
750 INJECTION, SOLUTION INTRAVENOUS ONCE
Status: COMPLETED | OUTPATIENT
Start: 2021-01-01 | End: 2021-01-01

## 2021-01-01 RX ORDER — CALCIUM CARBONATE 200(500)MG
2 TABLET,CHEWABLE ORAL 2 TIMES DAILY PRN
Status: DISCONTINUED | OUTPATIENT
Start: 2021-01-01 | End: 2021-01-01 | Stop reason: HOSPADM

## 2021-01-01 RX ORDER — SODIUM CHLORIDE 9 MG/ML
INJECTION, SOLUTION INTRAVENOUS CONTINUOUS PRN
Status: DISCONTINUED | OUTPATIENT
Start: 2021-01-01 | End: 2021-01-01 | Stop reason: HOSPADM

## 2021-01-01 RX ORDER — DIPHENHYDRAMINE HYDROCHLORIDE 50 MG/ML
25 INJECTION INTRAMUSCULAR; INTRAVENOUS ONCE
Status: COMPLETED | OUTPATIENT
Start: 2021-01-01 | End: 2021-01-01

## 2021-01-01 RX ORDER — CHOLECALCIFEROL (VITAMIN D3) 125 MCG
5 CAPSULE ORAL NIGHTLY PRN
Status: DISCONTINUED | OUTPATIENT
Start: 2021-01-01 | End: 2021-01-01 | Stop reason: HOSPADM

## 2021-01-01 RX ORDER — SODIUM CHLORIDE 1000 MG
0.5 TABLET, SOLUBLE MISCELLANEOUS DAILY
Status: DISCONTINUED | OUTPATIENT
Start: 2021-01-01 | End: 2021-01-01 | Stop reason: HOSPADM

## 2021-01-01 RX ORDER — ACETAMINOPHEN 160 MG/5ML
650 SOLUTION ORAL EVERY 4 HOURS PRN
Status: DISCONTINUED | OUTPATIENT
Start: 2021-01-01 | End: 2021-01-01 | Stop reason: HOSPADM

## 2021-01-01 RX ORDER — PANTOPRAZOLE SODIUM 40 MG/1
40 TABLET, DELAYED RELEASE ORAL
Status: DISCONTINUED | OUTPATIENT
Start: 2021-01-01 | End: 2021-01-01 | Stop reason: HOSPADM

## 2021-01-01 RX ORDER — FUROSEMIDE 20 MG/1
20 TABLET ORAL DAILY
Status: DISCONTINUED | OUTPATIENT
Start: 2021-01-01 | End: 2021-01-01 | Stop reason: HOSPADM

## 2021-01-01 RX ORDER — ASCORBIC ACID 500 MG
500 TABLET ORAL DAILY
Status: DISCONTINUED | OUTPATIENT
Start: 2021-01-01 | End: 2021-01-01 | Stop reason: HOSPADM

## 2021-01-01 RX ORDER — SODIUM CHLORIDE 0.9 % (FLUSH) 0.9 %
3-10 SYRINGE (ML) INJECTION AS NEEDED
Status: DISCONTINUED | OUTPATIENT
Start: 2021-01-01 | End: 2021-01-01 | Stop reason: HOSPADM

## 2021-01-01 RX ORDER — LABETALOL 200 MG/1
200 TABLET, FILM COATED ORAL 3 TIMES DAILY
Status: DISCONTINUED | OUTPATIENT
Start: 2021-01-01 | End: 2021-01-01 | Stop reason: HOSPADM

## 2021-01-01 RX ORDER — SODIUM CHLORIDE 0.9 % (FLUSH) 0.9 %
10 SYRINGE (ML) INJECTION EVERY 12 HOURS SCHEDULED
Status: DISCONTINUED | OUTPATIENT
Start: 2021-01-01 | End: 2021-01-01 | Stop reason: HOSPADM

## 2021-01-01 RX ORDER — SULFAMETHOXAZOLE AND TRIMETHOPRIM 400; 80 MG/1; MG/1
1 TABLET ORAL 2 TIMES DAILY
Qty: 10 TABLET | Refills: 0 | Status: SHIPPED | OUTPATIENT
Start: 2021-01-01 | End: 2021-01-01

## 2021-01-01 RX ORDER — DILTIAZEM HYDROCHLORIDE 120 MG/1
120 CAPSULE, COATED, EXTENDED RELEASE ORAL
Status: DISCONTINUED | OUTPATIENT
Start: 2021-01-01 | End: 2021-01-01 | Stop reason: HOSPADM

## 2021-01-01 RX ORDER — LEVOTHYROXINE SODIUM 0.05 MG/1
50 TABLET ORAL
Status: DISCONTINUED | OUTPATIENT
Start: 2021-01-01 | End: 2021-01-01 | Stop reason: HOSPADM

## 2021-01-01 RX ORDER — DILTIAZEM HYDROCHLORIDE 60 MG/1
30 TABLET, FILM COATED ORAL EVERY 6 HOURS SCHEDULED
Status: DISCONTINUED | OUTPATIENT
Start: 2021-01-01 | End: 2021-01-01

## 2021-01-01 RX ORDER — DEXTROSE AND SODIUM CHLORIDE 5; .45 G/100ML; G/100ML
50 INJECTION, SOLUTION INTRAVENOUS CONTINUOUS
Status: DISCONTINUED | OUTPATIENT
Start: 2021-01-01 | End: 2021-01-01

## 2021-01-01 RX ORDER — MULTIPLE VITAMINS W/ MINERALS TAB 9MG-400MCG
1 TAB ORAL DAILY
Status: ON HOLD | COMMUNITY
End: 2022-01-01

## 2021-01-01 RX ORDER — AMINO ACIDS/PROTEIN HYDROLYS 15G-100/30
30 LIQUID (ML) ORAL 2 TIMES DAILY
Status: DISCONTINUED | OUTPATIENT
Start: 2021-01-01 | End: 2021-01-01 | Stop reason: HOSPADM

## 2021-01-01 RX ORDER — DILTIAZEM HYDROCHLORIDE 5 MG/ML
20 INJECTION INTRAVENOUS ONCE
Status: DISCONTINUED | OUTPATIENT
Start: 2021-01-01 | End: 2021-01-01

## 2021-01-01 RX ORDER — FAMOTIDINE 20 MG/1
20 TABLET, FILM COATED ORAL DAILY
Status: DISCONTINUED | OUTPATIENT
Start: 2021-01-01 | End: 2021-01-01 | Stop reason: HOSPADM

## 2021-01-01 RX ORDER — ACETAZOLAMIDE 500 MG/5ML
250 INJECTION, POWDER, LYOPHILIZED, FOR SOLUTION INTRAVENOUS ONCE
Status: COMPLETED | OUTPATIENT
Start: 2021-01-01 | End: 2021-01-01

## 2021-01-01 RX ORDER — METOPROLOL SUCCINATE 25 MG/1
25 TABLET, EXTENDED RELEASE ORAL
Qty: 90 TABLET | Refills: 1 | Status: SHIPPED | OUTPATIENT
Start: 2021-01-01

## 2021-01-01 RX ORDER — FUROSEMIDE 10 MG/ML
40 INJECTION INTRAMUSCULAR; INTRAVENOUS EVERY 6 HOURS
Status: DISCONTINUED | OUTPATIENT
Start: 2021-01-01 | End: 2021-01-01

## 2021-01-01 RX ORDER — DILTIAZEM HYDROCHLORIDE 5 MG/ML
20 INJECTION INTRAVENOUS ONCE
Status: COMPLETED | OUTPATIENT
Start: 2021-01-01 | End: 2021-01-01

## 2021-01-01 RX ORDER — MEROPENEM AND SODIUM CHLORIDE 1 G/50ML
1 INJECTION, SOLUTION INTRAVENOUS EVERY 8 HOURS
Status: DISCONTINUED | OUTPATIENT
Start: 2021-01-01 | End: 2021-01-01

## 2021-01-01 RX ORDER — MEGESTROL ACETATE 40 MG/1
40 TABLET ORAL 2 TIMES DAILY
Qty: 60 TABLET | Refills: 3 | Status: SHIPPED | OUTPATIENT
Start: 2021-01-01 | End: 2021-01-01 | Stop reason: HOSPADM

## 2021-01-01 RX ORDER — POTASSIUM CHLORIDE 20 MEQ/1
20 TABLET, EXTENDED RELEASE ORAL DAILY
Status: DISCONTINUED | OUTPATIENT
Start: 2021-01-01 | End: 2021-01-01 | Stop reason: HOSPADM

## 2021-01-01 RX ORDER — METOPROLOL SUCCINATE 25 MG/1
25 TABLET, EXTENDED RELEASE ORAL
Qty: 30 TABLET | Refills: 0 | Status: SHIPPED | OUTPATIENT
Start: 2021-01-01 | End: 2021-01-01 | Stop reason: SDUPTHER

## 2021-01-01 RX ORDER — LEVOTHYROXINE SODIUM 0.05 MG/1
50 TABLET ORAL DAILY
Status: DISCONTINUED | OUTPATIENT
Start: 2021-01-01 | End: 2021-01-01 | Stop reason: HOSPADM

## 2021-01-01 RX ORDER — VANCOMYCIN HYDROCHLORIDE 1 G/200ML
15 INJECTION, SOLUTION INTRAVENOUS ONCE
Status: DISCONTINUED | OUTPATIENT
Start: 2021-01-01 | End: 2021-01-01

## 2021-01-01 RX ORDER — SODIUM CHLORIDE 1000 MG
1 TABLET, SOLUBLE MISCELLANEOUS DAILY
Status: DISCONTINUED | OUTPATIENT
Start: 2021-01-01 | End: 2021-01-01 | Stop reason: HOSPADM

## 2021-01-01 RX ORDER — FUROSEMIDE 10 MG/ML
40 SOLUTION ORAL DAILY
Status: DISCONTINUED | OUTPATIENT
Start: 2021-01-01 | End: 2021-01-01 | Stop reason: ALTCHOICE

## 2021-01-01 RX ORDER — ALBUMIN (HUMAN) 12.5 G/50ML
25 SOLUTION INTRAVENOUS ONCE
Status: COMPLETED | OUTPATIENT
Start: 2021-01-01 | End: 2021-01-01

## 2021-01-01 RX ORDER — IBUPROFEN 600 MG/1
600 TABLET ORAL DAILY PRN
COMMUNITY
End: 2021-01-01 | Stop reason: HOSPADM

## 2021-01-01 RX ORDER — CLOPIDOGREL BISULFATE 75 MG/1
75 TABLET ORAL DAILY
COMMUNITY
Start: 2021-01-01 | End: 2021-01-01

## 2021-01-01 RX ADMIN — SACUBITRIL AND VALSARTAN 1 TABLET: 24; 26 TABLET, FILM COATED ORAL at 09:01

## 2021-01-01 RX ADMIN — FUROSEMIDE 40 MG: 40 TABLET ORAL at 12:03

## 2021-01-01 RX ADMIN — APIXABAN 5 MG: 5 TABLET, FILM COATED ORAL at 08:34

## 2021-01-01 RX ADMIN — DILTIAZEM HYDROCHLORIDE 30 MG: 60 TABLET, FILM COATED ORAL at 06:27

## 2021-01-01 RX ADMIN — LEVOTHYROXINE SODIUM 50 MCG: 50 TABLET ORAL at 09:01

## 2021-01-01 RX ADMIN — FUROSEMIDE 40 MG: 10 INJECTION INTRAMUSCULAR; INTRAVENOUS at 13:25

## 2021-01-01 RX ADMIN — Medication 30 ML: at 08:33

## 2021-01-01 RX ADMIN — TAMSULOSIN HYDROCHLORIDE 0.4 MG: 0.4 CAPSULE ORAL at 22:00

## 2021-01-01 RX ADMIN — FAMOTIDINE 20 MG: 20 TABLET, FILM COATED ORAL at 09:41

## 2021-01-01 RX ADMIN — SODIUM CHLORIDE, PRESERVATIVE FREE 10 ML: 5 INJECTION INTRAVENOUS at 20:04

## 2021-01-01 RX ADMIN — Medication 30 ML: at 08:56

## 2021-01-01 RX ADMIN — SENNOSIDES AND DOCUSATE SODIUM 2 TABLET: 50; 8.6 TABLET ORAL at 09:55

## 2021-01-01 RX ADMIN — APIXABAN 5 MG: 5 TABLET, FILM COATED ORAL at 22:06

## 2021-01-01 RX ADMIN — DILTIAZEM HYDROCHLORIDE 30 MG: 60 TABLET, FILM COATED ORAL at 17:31

## 2021-01-01 RX ADMIN — FAMOTIDINE 20 MG: 20 TABLET ORAL at 10:22

## 2021-01-01 RX ADMIN — SACUBITRIL AND VALSARTAN 1 TABLET: 24; 26 TABLET, FILM COATED ORAL at 09:08

## 2021-01-01 RX ADMIN — IRON SUCROSE 200 MG: 20 INJECTION, SOLUTION INTRAVENOUS at 09:14

## 2021-01-01 RX ADMIN — ASPIRIN 81 MG: 81 TABLET, COATED ORAL at 08:55

## 2021-01-01 RX ADMIN — POTASSIUM CHLORIDE 20 MEQ: 1500 TABLET, EXTENDED RELEASE ORAL at 12:27

## 2021-01-01 RX ADMIN — IPRATROPIUM BROMIDE AND ALBUTEROL SULFATE 3 ML: .5; 3 SOLUTION RESPIRATORY (INHALATION) at 20:07

## 2021-01-01 RX ADMIN — ACETAMINOPHEN 650 MG: 325 TABLET ORAL at 18:00

## 2021-01-01 RX ADMIN — ASPIRIN 81 MG: 81 TABLET, COATED ORAL at 09:01

## 2021-01-01 RX ADMIN — FAMOTIDINE 20 MG: 20 TABLET ORAL at 21:01

## 2021-01-01 RX ADMIN — POTASSIUM CHLORIDE 10 MEQ: 10 CAPSULE, COATED, EXTENDED RELEASE ORAL at 09:54

## 2021-01-01 RX ADMIN — FERROUS SULFATE TAB EC 324 MG (65 MG FE EQUIVALENT) 324 MG: 324 (65 FE) TABLET DELAYED RESPONSE at 09:01

## 2021-01-01 RX ADMIN — MEGESTROL ACETATE 40 MG: 40 TABLET ORAL at 20:31

## 2021-01-01 RX ADMIN — MEROPENEM AND SODIUM CHLORIDE 1 G: 1 INJECTION, SOLUTION INTRAVENOUS at 05:20

## 2021-01-01 RX ADMIN — LEVOTHYROXINE SODIUM 100 MCG: 100 TABLET ORAL at 09:54

## 2021-01-01 RX ADMIN — DILTIAZEM HYDROCHLORIDE 5 MG/HR: 100 INJECTION, POWDER, LYOPHILIZED, FOR SOLUTION INTRAVENOUS at 02:06

## 2021-01-01 RX ADMIN — METOPROLOL TARTRATE 50 MG: 50 TABLET, FILM COATED ORAL at 22:07

## 2021-01-01 RX ADMIN — MEGESTROL ACETATE 40 MG: 40 TABLET ORAL at 08:28

## 2021-01-01 RX ADMIN — DOCUSATE SODIUM 50MG AND SENNOSIDES 8.6MG 2 TABLET: 8.6; 5 TABLET, FILM COATED ORAL at 21:16

## 2021-01-01 RX ADMIN — ISODIUM CHLORIDE 3 ML: 0.03 SOLUTION RESPIRATORY (INHALATION) at 06:35

## 2021-01-01 RX ADMIN — LEVOTHYROXINE SODIUM 50 MCG: 50 TABLET ORAL at 05:50

## 2021-01-01 RX ADMIN — SODIUM CHLORIDE, PRESERVATIVE FREE 10 ML: 5 INJECTION INTRAVENOUS at 22:00

## 2021-01-01 RX ADMIN — SULFAMETHOXAZOLE AND TRIMETHOPRIM 1 TABLET: 800; 160 TABLET ORAL at 01:01

## 2021-01-01 RX ADMIN — FAMOTIDINE 20 MG: 20 TABLET ORAL at 08:36

## 2021-01-01 RX ADMIN — Medication 30 ML: at 20:03

## 2021-01-01 RX ADMIN — METOPROLOL TARTRATE 25 MG: 25 TABLET, FILM COATED ORAL at 20:57

## 2021-01-01 RX ADMIN — Medication 30 ML: at 09:25

## 2021-01-01 RX ADMIN — SODIUM CHLORIDE TAB 1 GM 1 G: 1 TAB at 10:23

## 2021-01-01 RX ADMIN — TAMSULOSIN HYDROCHLORIDE 0.4 MG: 0.4 CAPSULE ORAL at 21:29

## 2021-01-01 RX ADMIN — METOPROLOL SUCCINATE 50 MG: 50 TABLET, EXTENDED RELEASE ORAL at 08:38

## 2021-01-01 RX ADMIN — Medication 30 ML: at 20:00

## 2021-01-01 RX ADMIN — APIXABAN 5 MG: 5 TABLET, FILM COATED ORAL at 21:01

## 2021-01-01 RX ADMIN — LEVOTHYROXINE SODIUM 50 MCG: 50 TABLET ORAL at 08:11

## 2021-01-01 RX ADMIN — SACUBITRIL AND VALSARTAN 1 TABLET: 24; 26 TABLET, FILM COATED ORAL at 08:38

## 2021-01-01 RX ADMIN — OXYCODONE HYDROCHLORIDE AND ACETAMINOPHEN 500 MG: 500 TABLET ORAL at 08:28

## 2021-01-01 RX ADMIN — MEROPENEM AND SODIUM CHLORIDE 1 G: 1 INJECTION, SOLUTION INTRAVENOUS at 16:54

## 2021-01-01 RX ADMIN — SENNOSIDES AND DOCUSATE SODIUM 2 TABLET: 50; 8.6 TABLET ORAL at 21:28

## 2021-01-01 RX ADMIN — ONDANSETRON 4 MG: 2 INJECTION INTRAMUSCULAR; INTRAVENOUS at 09:11

## 2021-01-01 RX ADMIN — VANCOMYCIN HYDROCHLORIDE 1000 MG: 1 INJECTION, SOLUTION INTRAVENOUS at 13:30

## 2021-01-01 RX ADMIN — LEVOTHYROXINE SODIUM 50 MCG: 50 TABLET ORAL at 09:41

## 2021-01-01 RX ADMIN — LEVOTHYROXINE SODIUM 50 MCG: 50 TABLET ORAL at 08:22

## 2021-01-01 RX ADMIN — FUROSEMIDE 40 MG: 10 INJECTION INTRAMUSCULAR; INTRAVENOUS at 10:35

## 2021-01-01 RX ADMIN — DOCUSATE SODIUM 50MG AND SENNOSIDES 8.6MG 2 TABLET: 8.6; 5 TABLET, FILM COATED ORAL at 08:28

## 2021-01-01 RX ADMIN — SACUBITRIL AND VALSARTAN 1 TABLET: 24; 26 TABLET, FILM COATED ORAL at 21:16

## 2021-01-01 RX ADMIN — SODIUM CHLORIDE, PRESERVATIVE FREE 3 ML: 5 INJECTION INTRAVENOUS at 08:39

## 2021-01-01 RX ADMIN — SODIUM CHLORIDE, PRESERVATIVE FREE 10 ML: 5 INJECTION INTRAVENOUS at 21:07

## 2021-01-01 RX ADMIN — OXYCODONE HYDROCHLORIDE AND ACETAMINOPHEN 500 MG: 500 TABLET ORAL at 09:01

## 2021-01-01 RX ADMIN — DOCUSATE SODIUM 50MG AND SENNOSIDES 8.6MG 2 TABLET: 8.6; 5 TABLET, FILM COATED ORAL at 20:31

## 2021-01-01 RX ADMIN — CLOBETASOL PROPIONATE 1 APPLICATION: 0.5 CREAM TOPICAL at 08:36

## 2021-01-01 RX ADMIN — MEROPENEM AND SODIUM CHLORIDE 1 G: 1 INJECTION, SOLUTION INTRAVENOUS at 21:19

## 2021-01-01 RX ADMIN — APIXABAN 5 MG: 5 TABLET, FILM COATED ORAL at 08:37

## 2021-01-01 RX ADMIN — APIXABAN 5 MG: 5 TABLET, FILM COATED ORAL at 08:29

## 2021-01-01 RX ADMIN — ASPIRIN 81 MG: 81 TABLET, COATED ORAL at 08:34

## 2021-01-01 RX ADMIN — APIXABAN 5 MG: 5 TABLET, FILM COATED ORAL at 09:41

## 2021-01-01 RX ADMIN — AMIODARONE HYDROCHLORIDE 200 MG: 200 TABLET ORAL at 09:01

## 2021-01-01 RX ADMIN — POTASSIUM CHLORIDE 20 MEQ: 1500 TABLET, EXTENDED RELEASE ORAL at 08:37

## 2021-01-01 RX ADMIN — MEGESTROL ACETATE 40 MG: 40 TABLET ORAL at 01:36

## 2021-01-01 RX ADMIN — PSYLLIUM HUSK 1 PACKET: 3.4 POWDER ORAL at 18:30

## 2021-01-01 RX ADMIN — IRON SUCROSE 200 MG: 20 INJECTION, SOLUTION INTRAVENOUS at 17:29

## 2021-01-01 RX ADMIN — CLOPIDOGREL BISULFATE 75 MG: 75 TABLET ORAL at 08:20

## 2021-01-01 RX ADMIN — FERROUS SULFATE TAB EC 324 MG (65 MG FE EQUIVALENT) 324 MG: 324 (65 FE) TABLET DELAYED RESPONSE at 08:34

## 2021-01-01 RX ADMIN — CLOBETASOL PROPIONATE 1 APPLICATION: 0.5 CREAM TOPICAL at 08:43

## 2021-01-01 RX ADMIN — FAMOTIDINE 20 MG: 20 TABLET ORAL at 22:00

## 2021-01-01 RX ADMIN — TAMSULOSIN HYDROCHLORIDE 0.4 MG: 0.4 CAPSULE ORAL at 08:32

## 2021-01-01 RX ADMIN — DOCUSATE SODIUM 50MG AND SENNOSIDES 8.6MG 2 TABLET: 8.6; 5 TABLET, FILM COATED ORAL at 01:36

## 2021-01-01 RX ADMIN — ISODIUM CHLORIDE 3 ML: 0.03 SOLUTION RESPIRATORY (INHALATION) at 19:25

## 2021-01-01 RX ADMIN — SODIUM CHLORIDE 500 ML: 9 INJECTION, SOLUTION INTRAVENOUS at 10:34

## 2021-01-01 RX ADMIN — VANCOMYCIN HYDROCHLORIDE 1000 MG: 1 INJECTION, SOLUTION INTRAVENOUS at 01:11

## 2021-01-01 RX ADMIN — FAMOTIDINE 20 MG: 20 TABLET ORAL at 09:01

## 2021-01-01 RX ADMIN — SODIUM CHLORIDE, PRESERVATIVE FREE 3 ML: 5 INJECTION INTRAVENOUS at 01:37

## 2021-01-01 RX ADMIN — Medication 30 ML: at 08:39

## 2021-01-01 RX ADMIN — PANTOPRAZOLE SODIUM 40 MG: 40 TABLET, DELAYED RELEASE ORAL at 05:18

## 2021-01-01 RX ADMIN — ENOXAPARIN SODIUM 40 MG: 40 INJECTION SUBCUTANEOUS at 22:32

## 2021-01-01 RX ADMIN — LEVOTHYROXINE SODIUM 100 MCG: 100 TABLET ORAL at 06:23

## 2021-01-01 RX ADMIN — FUROSEMIDE 80 MG: 10 INJECTION, SOLUTION INTRAMUSCULAR; INTRAVENOUS at 17:23

## 2021-01-01 RX ADMIN — ATORVASTATIN CALCIUM 20 MG: 20 TABLET, FILM COATED ORAL at 20:01

## 2021-01-01 RX ADMIN — FUROSEMIDE 60 MG: 10 INJECTION, SOLUTION INTRAMUSCULAR; INTRAVENOUS at 12:16

## 2021-01-01 RX ADMIN — SODIUM CHLORIDE, PRESERVATIVE FREE 3 ML: 5 INJECTION INTRAVENOUS at 08:29

## 2021-01-01 RX ADMIN — FERROUS SULFATE TAB EC 324 MG (65 MG FE EQUIVALENT) 324 MG: 324 (65 FE) TABLET DELAYED RESPONSE at 09:09

## 2021-01-01 RX ADMIN — SODIUM CHLORIDE TAB 1 GM 1 G: 1 TAB at 09:01

## 2021-01-01 RX ADMIN — TAMSULOSIN HYDROCHLORIDE 0.4 MG: 0.4 CAPSULE ORAL at 09:06

## 2021-01-01 RX ADMIN — CLOBETASOL PROPIONATE 1 APPLICATION: 0.5 CREAM TOPICAL at 09:25

## 2021-01-01 RX ADMIN — TAMSULOSIN HYDROCHLORIDE 0.4 MG: 0.4 CAPSULE ORAL at 08:12

## 2021-01-01 RX ADMIN — ATORVASTATIN CALCIUM 20 MG: 20 TABLET, FILM COATED ORAL at 20:59

## 2021-01-01 RX ADMIN — Medication 30 ML: at 09:41

## 2021-01-01 RX ADMIN — ATORVASTATIN CALCIUM 20 MG: 20 TABLET, FILM COATED ORAL at 21:20

## 2021-01-01 RX ADMIN — SODIUM CHLORIDE, PRESERVATIVE FREE 3 ML: 5 INJECTION INTRAVENOUS at 08:56

## 2021-01-01 RX ADMIN — SODIUM CHLORIDE, PRESERVATIVE FREE 10 ML: 5 INJECTION INTRAVENOUS at 22:07

## 2021-01-01 RX ADMIN — TAMSULOSIN HYDROCHLORIDE 0.4 MG: 0.4 CAPSULE ORAL at 21:20

## 2021-01-01 RX ADMIN — FAMOTIDINE 20 MG: 20 TABLET, FILM COATED ORAL at 08:37

## 2021-01-01 RX ADMIN — Medication 30 ML: at 22:06

## 2021-01-01 RX ADMIN — LABETALOL HYDROCHLORIDE 200 MG: 200 TABLET, FILM COATED ORAL at 16:11

## 2021-01-01 RX ADMIN — METOPROLOL TARTRATE 25 MG: 25 TABLET, FILM COATED ORAL at 19:58

## 2021-01-01 RX ADMIN — DOCUSATE SODIUM 50MG AND SENNOSIDES 8.6MG 2 TABLET: 8.6; 5 TABLET, FILM COATED ORAL at 09:09

## 2021-01-01 RX ADMIN — ASPIRIN 81 MG: 81 TABLET, COATED ORAL at 08:38

## 2021-01-01 RX ADMIN — SACUBITRIL AND VALSARTAN 1 TABLET: 24; 26 TABLET, FILM COATED ORAL at 20:31

## 2021-01-01 RX ADMIN — SODIUM CHLORIDE, PRESERVATIVE FREE 3 ML: 5 INJECTION INTRAVENOUS at 08:35

## 2021-01-01 RX ADMIN — LEVOTHYROXINE SODIUM 50 MCG: 50 TABLET ORAL at 09:09

## 2021-01-01 RX ADMIN — METOPROLOL SUCCINATE 25 MG: 25 TABLET, EXTENDED RELEASE ORAL at 09:07

## 2021-01-01 RX ADMIN — CLOPIDOGREL BISULFATE 75 MG: 75 TABLET ORAL at 08:47

## 2021-01-01 RX ADMIN — OXYCODONE HYDROCHLORIDE AND ACETAMINOPHEN 500 MG: 500 TABLET ORAL at 09:09

## 2021-01-01 RX ADMIN — LABETALOL HYDROCHLORIDE 100 MG: 100 TABLET, FILM COATED ORAL at 22:45

## 2021-01-01 RX ADMIN — FAMOTIDINE 20 MG: 20 TABLET ORAL at 08:11

## 2021-01-01 RX ADMIN — DILTIAZEM HYDROCHLORIDE 120 MG: 120 CAPSULE, COATED, EXTENDED RELEASE ORAL at 08:12

## 2021-01-01 RX ADMIN — MULTIPLE VITAMINS W/ MINERALS TAB 1 TABLET: TAB at 09:06

## 2021-01-01 RX ADMIN — POTASSIUM CHLORIDE 20 MEQ: 1500 TABLET, EXTENDED RELEASE ORAL at 08:22

## 2021-01-01 RX ADMIN — DIPHENHYDRAMINE HYDROCHLORIDE 25 MG: 50 INJECTION INTRAMUSCULAR; INTRAVENOUS at 20:33

## 2021-01-01 RX ADMIN — SODIUM CHLORIDE, PRESERVATIVE FREE 3 ML: 5 INJECTION INTRAVENOUS at 20:01

## 2021-01-01 RX ADMIN — MEGESTROL ACETATE 40 MG: 40 TABLET ORAL at 22:00

## 2021-01-01 RX ADMIN — ENOXAPARIN SODIUM 70 MG: 80 INJECTION SUBCUTANEOUS at 22:51

## 2021-01-01 RX ADMIN — DILTIAZEM HYDROCHLORIDE 5 MG/HR: 100 INJECTION, POWDER, LYOPHILIZED, FOR SOLUTION INTRAVENOUS at 05:33

## 2021-01-01 RX ADMIN — Medication 30 ML: at 08:25

## 2021-01-01 RX ADMIN — OXYCODONE HYDROCHLORIDE AND ACETAMINOPHEN 500 MG: 500 TABLET ORAL at 08:29

## 2021-01-01 RX ADMIN — APIXABAN 2.5 MG: 2.5 TABLET, FILM COATED ORAL at 20:57

## 2021-01-01 RX ADMIN — FUROSEMIDE 40 MG: 10 INJECTION INTRAMUSCULAR; INTRAVENOUS at 03:25

## 2021-01-01 RX ADMIN — DILTIAZEM HYDROCHLORIDE 120 MG: 120 CAPSULE, COATED, EXTENDED RELEASE ORAL at 08:33

## 2021-01-01 RX ADMIN — CLOBETASOL PROPIONATE 1 APPLICATION: 0.5 CREAM TOPICAL at 16:08

## 2021-01-01 RX ADMIN — SULFAMETHOXAZOLE AND TRIMETHOPRIM 1 TABLET: 800; 160 TABLET ORAL at 14:15

## 2021-01-01 RX ADMIN — TAMSULOSIN HYDROCHLORIDE 0.4 MG: 0.4 CAPSULE ORAL at 22:46

## 2021-01-01 RX ADMIN — MEROPENEM AND SODIUM CHLORIDE 1 G: 1 INJECTION, SOLUTION INTRAVENOUS at 01:17

## 2021-01-01 RX ADMIN — MEGESTROL ACETATE 40 MG: 40 TABLET ORAL at 21:01

## 2021-01-01 RX ADMIN — DOCUSATE SODIUM 50MG AND SENNOSIDES 8.6MG 2 TABLET: 8.6; 5 TABLET, FILM COATED ORAL at 08:33

## 2021-01-01 RX ADMIN — APIXABAN 2.5 MG: 2.5 TABLET, FILM COATED ORAL at 08:20

## 2021-01-01 RX ADMIN — DOCUSATE SODIUM 50MG AND SENNOSIDES 8.6MG 2 TABLET: 8.6; 5 TABLET, FILM COATED ORAL at 08:37

## 2021-01-01 RX ADMIN — ACETAZOLAMIDE SODIUM 250 MG: 500 INJECTION, POWDER, LYOPHILIZED, FOR SOLUTION INTRAVENOUS at 10:29

## 2021-01-01 RX ADMIN — SODIUM CHLORIDE, PRESERVATIVE FREE 10 ML: 5 INJECTION INTRAVENOUS at 08:11

## 2021-01-01 RX ADMIN — FUROSEMIDE 60 MG: 10 INJECTION, SOLUTION INTRAMUSCULAR; INTRAVENOUS at 00:53

## 2021-01-01 RX ADMIN — DILTIAZEM HYDROCHLORIDE 30 MG: 60 TABLET, FILM COATED ORAL at 17:05

## 2021-01-01 RX ADMIN — DOCUSATE SODIUM 50MG AND SENNOSIDES 8.6MG 2 TABLET: 8.6; 5 TABLET, FILM COATED ORAL at 08:11

## 2021-01-01 RX ADMIN — GENTAMICIN SULFATE 360 MG: 40 INJECTION, SOLUTION INTRAMUSCULAR; INTRAVENOUS at 22:10

## 2021-01-01 RX ADMIN — DILTIAZEM HYDROCHLORIDE 5 MG/HR: 100 INJECTION, POWDER, LYOPHILIZED, FOR SOLUTION INTRAVENOUS at 08:36

## 2021-01-01 RX ADMIN — FAMOTIDINE 20 MG: 20 TABLET, FILM COATED ORAL at 21:06

## 2021-01-01 RX ADMIN — SODIUM CHLORIDE TAB 1 GM 1 G: 1 TAB at 08:54

## 2021-01-01 RX ADMIN — METOPROLOL SUCCINATE 50 MG: 50 TABLET, EXTENDED RELEASE ORAL at 08:54

## 2021-01-01 RX ADMIN — TAMSULOSIN HYDROCHLORIDE 0.4 MG: 0.4 CAPSULE ORAL at 20:14

## 2021-01-01 RX ADMIN — FERROUS SULFATE TAB EC 324 MG (65 MG FE EQUIVALENT) 324 MG: 324 (65 FE) TABLET DELAYED RESPONSE at 08:11

## 2021-01-01 RX ADMIN — LEVOFLOXACIN 750 MG: 5 INJECTION, SOLUTION INTRAVENOUS at 19:32

## 2021-01-01 RX ADMIN — FAMOTIDINE 20 MG: 20 TABLET, FILM COATED ORAL at 08:20

## 2021-01-01 RX ADMIN — APIXABAN 2.5 MG: 2.5 TABLET, FILM COATED ORAL at 08:32

## 2021-01-01 RX ADMIN — FUROSEMIDE 40 MG: 10 INJECTION INTRAMUSCULAR; INTRAVENOUS at 11:46

## 2021-01-01 RX ADMIN — ISODIUM CHLORIDE 3 ML: 0.03 SOLUTION RESPIRATORY (INHALATION) at 14:52

## 2021-01-01 RX ADMIN — LABETALOL HYDROCHLORIDE 200 MG: 200 TABLET, FILM COATED ORAL at 08:47

## 2021-01-01 RX ADMIN — ATORVASTATIN CALCIUM 20 MG: 20 TABLET, FILM COATED ORAL at 22:00

## 2021-01-01 RX ADMIN — SACUBITRIL AND VALSARTAN 1 TABLET: 24; 26 TABLET, FILM COATED ORAL at 08:28

## 2021-01-01 RX ADMIN — ASPIRIN 81 MG: 81 TABLET, COATED ORAL at 09:09

## 2021-01-01 RX ADMIN — SODIUM CHLORIDE, PRESERVATIVE FREE 10 ML: 5 INJECTION INTRAVENOUS at 09:55

## 2021-01-01 RX ADMIN — APIXABAN 2.5 MG: 2.5 TABLET, FILM COATED ORAL at 02:09

## 2021-01-01 RX ADMIN — ENOXAPARIN SODIUM 70 MG: 80 INJECTION SUBCUTANEOUS at 09:55

## 2021-01-01 RX ADMIN — APIXABAN 5 MG: 5 TABLET, FILM COATED ORAL at 22:00

## 2021-01-01 RX ADMIN — FAMOTIDINE 20 MG: 20 TABLET, FILM COATED ORAL at 08:12

## 2021-01-01 RX ADMIN — AMIODARONE HYDROCHLORIDE 200 MG: 200 TABLET ORAL at 09:09

## 2021-01-01 RX ADMIN — FAMOTIDINE 20 MG: 20 TABLET, FILM COATED ORAL at 08:25

## 2021-01-01 RX ADMIN — ASPIRIN 81 MG: 81 TABLET, COATED ORAL at 08:12

## 2021-01-01 RX ADMIN — DIGOXIN 125 MCG: 125 TABLET ORAL at 11:33

## 2021-01-01 RX ADMIN — APIXABAN 2.5 MG: 2.5 TABLET, FILM COATED ORAL at 08:47

## 2021-01-01 RX ADMIN — SODIUM CHLORIDE TAB 1 GM 1 G: 1 TAB at 08:36

## 2021-01-01 RX ADMIN — SODIUM CHLORIDE, PRESERVATIVE FREE 10 ML: 5 INJECTION INTRAVENOUS at 10:24

## 2021-01-01 RX ADMIN — SENNOSIDES AND DOCUSATE SODIUM 2 TABLET: 50; 8.6 TABLET ORAL at 22:00

## 2021-01-01 RX ADMIN — Medication 30 ML: at 08:43

## 2021-01-01 RX ADMIN — CLOBETASOL PROPIONATE 1 APPLICATION: 0.5 CREAM TOPICAL at 08:41

## 2021-01-01 RX ADMIN — ATORVASTATIN CALCIUM 20 MG: 20 TABLET, FILM COATED ORAL at 02:09

## 2021-01-01 RX ADMIN — SODIUM CHLORIDE, PRESERVATIVE FREE 10 ML: 5 INJECTION INTRAVENOUS at 22:31

## 2021-01-01 RX ADMIN — METOPROLOL SUCCINATE 25 MG: 25 TABLET, EXTENDED RELEASE ORAL at 08:29

## 2021-01-01 RX ADMIN — MEGESTROL ACETATE 40 MG: 40 TABLET ORAL at 20:14

## 2021-01-01 RX ADMIN — ATORVASTATIN CALCIUM 20 MG: 20 TABLET, FILM COATED ORAL at 20:00

## 2021-01-01 RX ADMIN — APIXABAN 5 MG: 5 TABLET, FILM COATED ORAL at 20:32

## 2021-01-01 RX ADMIN — SODIUM CHLORIDE, PRESERVATIVE FREE 10 ML: 5 INJECTION INTRAVENOUS at 08:38

## 2021-01-01 RX ADMIN — Medication 30 ML: at 08:37

## 2021-01-01 RX ADMIN — Medication 30 ML: at 08:28

## 2021-01-01 RX ADMIN — CLOBETASOL PROPIONATE 1 APPLICATION: 0.5 CREAM TOPICAL at 08:40

## 2021-01-01 RX ADMIN — ALBUMIN HUMAN 25 G: 0.25 SOLUTION INTRAVENOUS at 11:11

## 2021-01-01 RX ADMIN — MEGESTROL ACETATE 40 MG: 40 TABLET ORAL at 20:02

## 2021-01-01 RX ADMIN — METOPROLOL TARTRATE 25 MG: 25 TABLET, FILM COATED ORAL at 21:01

## 2021-01-01 RX ADMIN — METOPROLOL TARTRATE 50 MG: 50 TABLET ORAL at 21:21

## 2021-01-01 RX ADMIN — SODIUM CHLORIDE TAB 1 GM 1 G: 1 TAB at 17:33

## 2021-01-01 RX ADMIN — MEROPENEM AND SODIUM CHLORIDE 1 G: 1 INJECTION, SOLUTION INTRAVENOUS at 03:38

## 2021-01-01 RX ADMIN — APIXABAN 2.5 MG: 2.5 TABLET, FILM COATED ORAL at 20:59

## 2021-01-01 RX ADMIN — METOPROLOL TARTRATE 50 MG: 50 TABLET, FILM COATED ORAL at 20:05

## 2021-01-01 RX ADMIN — METOPROLOL TARTRATE 25 MG: 25 TABLET, FILM COATED ORAL at 02:09

## 2021-01-01 RX ADMIN — ATORVASTATIN CALCIUM 20 MG: 20 TABLET, FILM COATED ORAL at 22:46

## 2021-01-01 RX ADMIN — IRON SUCROSE 200 MG: 20 INJECTION, SOLUTION INTRAVENOUS at 09:11

## 2021-01-01 RX ADMIN — AMIODARONE HYDROCHLORIDE 200 MG: 200 TABLET ORAL at 09:07

## 2021-01-01 RX ADMIN — ISODIUM CHLORIDE 3 ML: 0.03 SOLUTION RESPIRATORY (INHALATION) at 12:53

## 2021-01-01 RX ADMIN — ASPIRIN 81 MG: 81 TABLET, COATED ORAL at 08:11

## 2021-01-01 RX ADMIN — DOCUSATE SODIUM 50MG AND SENNOSIDES 8.6MG 2 TABLET: 8.6; 5 TABLET, FILM COATED ORAL at 09:01

## 2021-01-01 RX ADMIN — LABETALOL HYDROCHLORIDE 200 MG: 200 TABLET, FILM COATED ORAL at 08:37

## 2021-01-01 RX ADMIN — Medication 30 ML: at 21:17

## 2021-01-01 RX ADMIN — POTASSIUM CHLORIDE 20 MEQ: 1500 TABLET, EXTENDED RELEASE ORAL at 08:12

## 2021-01-01 RX ADMIN — DILTIAZEM HYDROCHLORIDE 20 MG: 5 INJECTION, SOLUTION INTRAVENOUS at 20:18

## 2021-01-01 RX ADMIN — ASPIRIN 81 MG: 81 TABLET, COATED ORAL at 08:32

## 2021-01-01 RX ADMIN — FUROSEMIDE 20 MG: 20 TABLET ORAL at 14:48

## 2021-01-01 RX ADMIN — SULFAMETHOXAZOLE AND TRIMETHOPRIM 1 TABLET: 800; 160 TABLET ORAL at 22:00

## 2021-01-01 RX ADMIN — DILTIAZEM HYDROCHLORIDE 30 MG: 60 TABLET, FILM COATED ORAL at 12:27

## 2021-01-01 RX ADMIN — SODIUM CHLORIDE TAB 1 GM 1 G: 1 TAB at 17:29

## 2021-01-01 RX ADMIN — METOPROLOL SUCCINATE 50 MG: 50 TABLET, EXTENDED RELEASE ORAL at 09:01

## 2021-01-01 RX ADMIN — MEGESTROL ACETATE 40 MG: 40 TABLET ORAL at 08:38

## 2021-01-01 RX ADMIN — OXYCODONE HYDROCHLORIDE AND ACETAMINOPHEN 500 MG: 500 TABLET ORAL at 08:35

## 2021-01-01 RX ADMIN — AMIODARONE HYDROCHLORIDE 200 MG: 200 TABLET ORAL at 08:28

## 2021-01-01 RX ADMIN — SACUBITRIL AND VALSARTAN 1 TABLET: 24; 26 TABLET, FILM COATED ORAL at 22:00

## 2021-01-01 RX ADMIN — ACETAMINOPHEN 650 MG: 325 TABLET ORAL at 01:24

## 2021-01-01 RX ADMIN — METOPROLOL SUCCINATE 25 MG: 25 TABLET, EXTENDED RELEASE ORAL at 09:09

## 2021-01-01 RX ADMIN — FUROSEMIDE 60 MG: 10 INJECTION, SOLUTION INTRAMUSCULAR; INTRAVENOUS at 02:24

## 2021-01-01 RX ADMIN — FUROSEMIDE 40 MG: 10 INJECTION INTRAMUSCULAR; INTRAVENOUS at 02:13

## 2021-01-01 RX ADMIN — ASPIRIN 81 MG: 81 TABLET, COATED ORAL at 08:36

## 2021-01-01 RX ADMIN — FUROSEMIDE 20 MG: 20 TABLET ORAL at 09:09

## 2021-01-01 RX ADMIN — LEVOTHYROXINE SODIUM 50 MCG: 50 TABLET ORAL at 06:41

## 2021-01-01 RX ADMIN — ATORVASTATIN CALCIUM 20 MG: 20 TABLET, FILM COATED ORAL at 21:21

## 2021-01-01 RX ADMIN — LEVOTHYROXINE SODIUM 50 MCG: 50 TABLET ORAL at 08:24

## 2021-01-01 RX ADMIN — DILTIAZEM HYDROCHLORIDE 120 MG: 120 CAPSULE, COATED, EXTENDED RELEASE ORAL at 12:03

## 2021-01-01 RX ADMIN — DIGOXIN 500 MCG: 0.25 INJECTION INTRAMUSCULAR; INTRAVENOUS at 12:56

## 2021-01-01 RX ADMIN — SACUBITRIL AND VALSARTAN 1 TABLET: 24; 26 TABLET, FILM COATED ORAL at 21:00

## 2021-01-01 RX ADMIN — ASPIRIN 81 MG: 81 TABLET, COATED ORAL at 08:22

## 2021-01-01 RX ADMIN — SODIUM CHLORIDE, PRESERVATIVE FREE 3 ML: 5 INJECTION INTRAVENOUS at 20:14

## 2021-01-01 RX ADMIN — TAMSULOSIN HYDROCHLORIDE 0.4 MG: 0.4 CAPSULE ORAL at 09:41

## 2021-01-01 RX ADMIN — ATORVASTATIN CALCIUM 20 MG: 20 TABLET, FILM COATED ORAL at 20:32

## 2021-01-01 RX ADMIN — ATORVASTATIN CALCIUM 20 MG: 20 TABLET, FILM COATED ORAL at 21:59

## 2021-01-01 RX ADMIN — TAMSULOSIN HYDROCHLORIDE 0.4 MG: 0.4 CAPSULE ORAL at 21:21

## 2021-01-01 RX ADMIN — FUROSEMIDE 40 MG: 10 INJECTION INTRAMUSCULAR; INTRAVENOUS at 01:19

## 2021-01-01 RX ADMIN — LEVOTHYROXINE SODIUM 100 MCG: 100 TABLET ORAL at 08:36

## 2021-01-01 RX ADMIN — FAMOTIDINE 20 MG: 20 TABLET, FILM COATED ORAL at 08:22

## 2021-01-01 RX ADMIN — ASPIRIN 81 MG: 81 TABLET, COATED ORAL at 08:29

## 2021-01-01 RX ADMIN — SENNOSIDES AND DOCUSATE SODIUM 2 TABLET: 50; 8.6 TABLET ORAL at 08:36

## 2021-01-01 RX ADMIN — DILTIAZEM HYDROCHLORIDE 30 MG: 60 TABLET, FILM COATED ORAL at 00:18

## 2021-01-01 RX ADMIN — FUROSEMIDE 40 MG: 40 TABLET ORAL at 09:54

## 2021-01-01 RX ADMIN — METOPROLOL TARTRATE 25 MG: 25 TABLET, FILM COATED ORAL at 08:24

## 2021-01-01 RX ADMIN — METOPROLOL TARTRATE 5 MG: 1 INJECTION, SOLUTION INTRAVENOUS at 16:54

## 2021-01-01 RX ADMIN — APIXABAN 2.5 MG: 2.5 TABLET, FILM COATED ORAL at 21:19

## 2021-01-01 RX ADMIN — METOPROLOL TARTRATE 50 MG: 50 TABLET, FILM COATED ORAL at 09:41

## 2021-01-01 RX ADMIN — SODIUM CHLORIDE TAB 1 GM 1 G: 1 TAB at 08:29

## 2021-01-01 RX ADMIN — MEROPENEM AND SODIUM CHLORIDE 1 G: 1 INJECTION, SOLUTION INTRAVENOUS at 04:13

## 2021-01-01 RX ADMIN — FAMOTIDINE 20 MG: 20 TABLET ORAL at 08:28

## 2021-01-01 RX ADMIN — DOCUSATE SODIUM 50MG AND SENNOSIDES 8.6MG 2 TABLET: 8.6; 5 TABLET, FILM COATED ORAL at 20:00

## 2021-01-01 RX ADMIN — FAMOTIDINE 20 MG: 20 TABLET ORAL at 21:28

## 2021-01-01 RX ADMIN — FUROSEMIDE 40 MG: 40 TABLET ORAL at 08:12

## 2021-01-01 RX ADMIN — AMIODARONE HYDROCHLORIDE 200 MG: 200 TABLET ORAL at 08:38

## 2021-01-01 RX ADMIN — METOPROLOL TARTRATE 25 MG: 25 TABLET, FILM COATED ORAL at 08:12

## 2021-01-01 RX ADMIN — APIXABAN 5 MG: 5 TABLET, FILM COATED ORAL at 08:55

## 2021-01-01 RX ADMIN — APIXABAN 5 MG: 5 TABLET, FILM COATED ORAL at 09:01

## 2021-01-01 RX ADMIN — LEVOTHYROXINE SODIUM 50 MCG: 50 TABLET ORAL at 08:37

## 2021-01-01 RX ADMIN — FUROSEMIDE 40 MG: 40 TABLET ORAL at 08:54

## 2021-01-01 RX ADMIN — SACUBITRIL AND VALSARTAN 1 TABLET: 24; 26 TABLET, FILM COATED ORAL at 08:54

## 2021-01-01 RX ADMIN — DILTIAZEM HYDROCHLORIDE 5 MG/HR: 100 INJECTION, POWDER, LYOPHILIZED, FOR SOLUTION INTRAVENOUS at 23:54

## 2021-01-01 RX ADMIN — METOPROLOL TARTRATE 50 MG: 50 TABLET, FILM COATED ORAL at 08:12

## 2021-01-01 RX ADMIN — SODIUM CHLORIDE TAB 1 GM 1 G: 1 TAB at 08:38

## 2021-01-01 RX ADMIN — TAMSULOSIN HYDROCHLORIDE 0.4 MG: 0.4 CAPSULE ORAL at 08:37

## 2021-01-01 RX ADMIN — OXYCODONE HYDROCHLORIDE AND ACETAMINOPHEN 500 MG: 500 TABLET ORAL at 08:54

## 2021-01-01 RX ADMIN — METOPROLOL TARTRATE 50 MG: 50 TABLET, FILM COATED ORAL at 20:00

## 2021-01-01 RX ADMIN — DOCUSATE SODIUM 50MG AND SENNOSIDES 8.6MG 2 TABLET: 8.6; 5 TABLET, FILM COATED ORAL at 08:34

## 2021-01-01 RX ADMIN — SODIUM CHLORIDE, PRESERVATIVE FREE 3 ML: 5 INJECTION INTRAVENOUS at 22:00

## 2021-01-01 RX ADMIN — MEROPENEM AND SODIUM CHLORIDE 1 G: 1 INJECTION, SOLUTION INTRAVENOUS at 20:59

## 2021-01-01 RX ADMIN — Medication 30 ML: at 21:07

## 2021-01-01 RX ADMIN — FUROSEMIDE 40 MG: 10 INJECTION INTRAMUSCULAR; INTRAVENOUS at 13:29

## 2021-01-01 RX ADMIN — Medication 30 ML: at 20:05

## 2021-01-01 RX ADMIN — FAMOTIDINE 20 MG: 20 TABLET ORAL at 20:14

## 2021-01-01 RX ADMIN — SODIUM CHLORIDE TAB 1 GM 1 G: 1 TAB at 18:19

## 2021-01-01 RX ADMIN — PSYLLIUM HUSK 1 PACKET: 3.4 POWDER ORAL at 09:07

## 2021-01-01 RX ADMIN — CLOBETASOL PROPIONATE 1 APPLICATION: 0.5 CREAM TOPICAL at 21:17

## 2021-01-01 RX ADMIN — SODIUM CHLORIDE 50 ML/HR: 9 INJECTION, SOLUTION INTRAVENOUS at 15:00

## 2021-01-01 RX ADMIN — TAMSULOSIN HYDROCHLORIDE 0.4 MG: 0.4 CAPSULE ORAL at 20:00

## 2021-01-01 RX ADMIN — FAMOTIDINE 20 MG: 20 TABLET ORAL at 09:54

## 2021-01-01 RX ADMIN — METOPROLOL TARTRATE 50 MG: 50 TABLET, FILM COATED ORAL at 08:22

## 2021-01-01 RX ADMIN — SACUBITRIL AND VALSARTAN 1 TABLET: 24; 26 TABLET, FILM COATED ORAL at 20:02

## 2021-01-01 RX ADMIN — FAMOTIDINE 20 MG: 20 TABLET, FILM COATED ORAL at 09:06

## 2021-01-01 RX ADMIN — ATORVASTATIN CALCIUM 20 MG: 20 TABLET, FILM COATED ORAL at 01:36

## 2021-01-01 RX ADMIN — FUROSEMIDE 40 MG: 40 TABLET ORAL at 13:24

## 2021-01-01 RX ADMIN — SODIUM CHLORIDE 50 ML/HR: 9 INJECTION, SOLUTION INTRAVENOUS at 12:30

## 2021-01-01 RX ADMIN — MEROPENEM AND SODIUM CHLORIDE 1 G: 1 INJECTION, SOLUTION INTRAVENOUS at 12:15

## 2021-01-01 RX ADMIN — DILTIAZEM HYDROCHLORIDE 120 MG: 120 CAPSULE, COATED, EXTENDED RELEASE ORAL at 08:11

## 2021-01-01 RX ADMIN — ASPIRIN 81 MG: 81 TABLET, COATED ORAL at 15:22

## 2021-01-01 RX ADMIN — Medication 30 ML: at 21:01

## 2021-01-01 RX ADMIN — TAMSULOSIN HYDROCHLORIDE 0.4 MG: 0.4 CAPSULE ORAL at 20:59

## 2021-01-01 RX ADMIN — FUROSEMIDE 40 MG: 10 INJECTION INTRAMUSCULAR; INTRAVENOUS at 19:07

## 2021-01-01 RX ADMIN — MEGESTROL ACETATE 40 MG: 40 TABLET ORAL at 21:17

## 2021-01-01 RX ADMIN — LEVOTHYROXINE SODIUM 50 MCG: 50 TABLET ORAL at 05:26

## 2021-01-01 RX ADMIN — MEGESTROL ACETATE 40 MG: 40 TABLET ORAL at 09:01

## 2021-01-01 RX ADMIN — DOCUSATE SODIUM 50MG AND SENNOSIDES 8.6MG 2 TABLET: 8.6; 5 TABLET, FILM COATED ORAL at 20:59

## 2021-01-01 RX ADMIN — ENOXAPARIN SODIUM 70 MG: 80 INJECTION SUBCUTANEOUS at 22:07

## 2021-01-01 RX ADMIN — SACUBITRIL AND VALSARTAN 1 TABLET: 24; 26 TABLET, FILM COATED ORAL at 14:15

## 2021-01-01 RX ADMIN — TAMSULOSIN HYDROCHLORIDE 0.4 MG: 0.4 CAPSULE ORAL at 18:29

## 2021-01-01 RX ADMIN — ATORVASTATIN CALCIUM 20 MG: 20 TABLET, FILM COATED ORAL at 21:01

## 2021-01-01 RX ADMIN — IRON SUCROSE 200 MG: 20 INJECTION, SOLUTION INTRAVENOUS at 12:02

## 2021-01-01 RX ADMIN — ASPIRIN 81 MG: 81 TABLET, COATED ORAL at 08:24

## 2021-01-01 RX ADMIN — SODIUM CHLORIDE, PRESERVATIVE FREE 3 ML: 5 INJECTION INTRAVENOUS at 21:03

## 2021-01-01 RX ADMIN — SODIUM CHLORIDE TAB 1 GM 0.5 G: 1 TAB at 09:09

## 2021-01-01 RX ADMIN — OXYCODONE HYDROCHLORIDE AND ACETAMINOPHEN 500 MG: 500 TABLET ORAL at 08:11

## 2021-01-01 RX ADMIN — SODIUM CHLORIDE, PRESERVATIVE FREE 3 ML: 5 INJECTION INTRAVENOUS at 08:33

## 2021-01-01 RX ADMIN — DIGOXIN 125 MCG: 125 TABLET ORAL at 11:43

## 2021-01-01 RX ADMIN — APIXABAN 5 MG: 5 TABLET, FILM COATED ORAL at 20:14

## 2021-01-01 RX ADMIN — ENOXAPARIN SODIUM 70 MG: 80 INJECTION SUBCUTANEOUS at 09:13

## 2021-01-01 RX ADMIN — AMIODARONE HYDROCHLORIDE 200 MG: 200 TABLET ORAL at 14:15

## 2021-01-01 RX ADMIN — SODIUM CHLORIDE, PRESERVATIVE FREE 3 ML: 5 INJECTION INTRAVENOUS at 20:30

## 2021-01-01 RX ADMIN — MEGESTROL ACETATE 40 MG: 40 TABLET ORAL at 08:34

## 2021-01-01 RX ADMIN — LABETALOL HYDROCHLORIDE 200 MG: 200 TABLET, FILM COATED ORAL at 08:20

## 2021-01-01 RX ADMIN — MEROPENEM AND SODIUM CHLORIDE 1 G: 1 INJECTION, SOLUTION INTRAVENOUS at 10:44

## 2021-01-01 RX ADMIN — SODIUM CHLORIDE TAB 1 GM 1 G: 1 TAB at 09:54

## 2021-01-01 RX ADMIN — APIXABAN 5 MG: 5 TABLET, FILM COATED ORAL at 20:00

## 2021-01-01 RX ADMIN — AMIODARONE HYDROCHLORIDE 200 MG: 200 TABLET ORAL at 08:55

## 2021-01-01 RX ADMIN — CLOPIDOGREL BISULFATE 75 MG: 75 TABLET ORAL at 08:25

## 2021-01-01 RX ADMIN — DILTIAZEM HYDROCHLORIDE 30 MG: 60 TABLET, FILM COATED ORAL at 00:11

## 2021-01-01 RX ADMIN — MULTIPLE VITAMINS W/ MINERALS TAB 1 TABLET: TAB at 18:29

## 2021-01-01 RX ADMIN — TAMSULOSIN HYDROCHLORIDE 0.4 MG: 0.4 CAPSULE ORAL at 21:17

## 2021-01-01 RX ADMIN — LABETALOL HYDROCHLORIDE 200 MG: 200 TABLET, FILM COATED ORAL at 20:59

## 2021-01-01 RX ADMIN — APIXABAN 5 MG: 5 TABLET, FILM COATED ORAL at 01:36

## 2021-01-01 RX ADMIN — DILTIAZEM HYDROCHLORIDE 5 MG/HR: 100 INJECTION, POWDER, LYOPHILIZED, FOR SOLUTION INTRAVENOUS at 22:31

## 2021-01-01 RX ADMIN — Medication 30 ML: at 22:00

## 2021-01-01 RX ADMIN — ISODIUM CHLORIDE 3 ML: 0.03 SOLUTION RESPIRATORY (INHALATION) at 19:12

## 2021-01-01 RX ADMIN — FERROUS SULFATE TAB EC 324 MG (65 MG FE EQUIVALENT) 324 MG: 324 (65 FE) TABLET DELAYED RESPONSE at 08:28

## 2021-01-01 RX ADMIN — SODIUM CHLORIDE, PRESERVATIVE FREE 3 ML: 5 INJECTION INTRAVENOUS at 09:25

## 2021-01-01 RX ADMIN — MEROPENEM AND SODIUM CHLORIDE 1 G: 1 INJECTION, SOLUTION INTRAVENOUS at 16:36

## 2021-01-01 RX ADMIN — SULFAMETHOXAZOLE AND TRIMETHOPRIM 1 TABLET: 800; 160 TABLET ORAL at 08:55

## 2021-01-01 RX ADMIN — FUROSEMIDE 40 MG: 10 INJECTION INTRAMUSCULAR; INTRAVENOUS at 21:22

## 2021-01-01 RX ADMIN — FUROSEMIDE 40 MG: 10 INJECTION INTRAMUSCULAR; INTRAVENOUS at 06:25

## 2021-01-01 RX ADMIN — MEGESTROL ACETATE 40 MG: 40 TABLET ORAL at 08:55

## 2021-01-01 RX ADMIN — METOPROLOL SUCCINATE 25 MG: 25 TABLET, EXTENDED RELEASE ORAL at 15:42

## 2021-01-01 RX ADMIN — SODIUM CHLORIDE, PRESERVATIVE FREE 10 ML: 5 INJECTION INTRAVENOUS at 20:57

## 2021-01-01 RX ADMIN — SENNOSIDES AND DOCUSATE SODIUM 2 TABLET: 50; 8.6 TABLET ORAL at 21:58

## 2021-01-01 RX ADMIN — APIXABAN 5 MG: 5 TABLET, FILM COATED ORAL at 08:12

## 2021-01-01 RX ADMIN — ATORVASTATIN CALCIUM 20 MG: 20 TABLET, FILM COATED ORAL at 22:06

## 2021-01-01 RX ADMIN — APIXABAN 5 MG: 5 TABLET, FILM COATED ORAL at 08:28

## 2021-01-01 RX ADMIN — ASPIRIN 81 MG: 81 TABLET, COATED ORAL at 10:22

## 2021-01-01 RX ADMIN — FUROSEMIDE 40 MG: 10 INJECTION INTRAMUSCULAR; INTRAVENOUS at 21:01

## 2021-01-01 RX ADMIN — DOCUSATE SODIUM 50MG AND SENNOSIDES 8.6MG 2 TABLET: 8.6; 5 TABLET, FILM COATED ORAL at 08:20

## 2021-01-01 RX ADMIN — SODIUM CHLORIDE, PRESERVATIVE FREE 10 ML: 5 INJECTION INTRAVENOUS at 21:29

## 2021-01-01 RX ADMIN — SENNOSIDES AND DOCUSATE SODIUM 2 TABLET: 50; 8.6 TABLET ORAL at 10:23

## 2021-01-01 RX ADMIN — LABETALOL HYDROCHLORIDE 200 MG: 200 TABLET, FILM COATED ORAL at 00:26

## 2021-01-01 RX ADMIN — SACUBITRIL AND VALSARTAN 1 TABLET: 24; 26 TABLET, FILM COATED ORAL at 08:35

## 2021-01-01 RX ADMIN — FAMOTIDINE 20 MG: 20 TABLET ORAL at 01:36

## 2021-01-01 RX ADMIN — FAMOTIDINE 20 MG: 20 TABLET, FILM COATED ORAL at 20:57

## 2021-01-01 RX ADMIN — METOPROLOL TARTRATE 50 MG: 50 TABLET, FILM COATED ORAL at 21:06

## 2021-01-01 RX ADMIN — FERROUS SULFATE TAB EC 324 MG (65 MG FE EQUIVALENT) 324 MG: 324 (65 FE) TABLET DELAYED RESPONSE at 08:38

## 2021-01-01 RX ADMIN — APIXABAN 5 MG: 5 TABLET, FILM COATED ORAL at 21:06

## 2021-01-01 RX ADMIN — Medication 30 ML: at 20:02

## 2021-01-01 RX ADMIN — ENOXAPARIN SODIUM 70 MG: 80 INJECTION SUBCUTANEOUS at 21:28

## 2021-01-01 RX ADMIN — METOPROLOL SUCCINATE 50 MG: 50 TABLET, EXTENDED RELEASE ORAL at 09:11

## 2021-01-01 RX ADMIN — SODIUM CHLORIDE, PRESERVATIVE FREE 3 ML: 5 INJECTION INTRAVENOUS at 21:19

## 2021-01-01 RX ADMIN — TAMSULOSIN HYDROCHLORIDE 0.4 MG: 0.4 CAPSULE ORAL at 20:02

## 2021-01-01 RX ADMIN — Medication 30 ML: at 20:14

## 2021-01-01 RX ADMIN — LABETALOL HYDROCHLORIDE 200 MG: 200 TABLET, FILM COATED ORAL at 16:27

## 2021-01-01 RX ADMIN — PANTOPRAZOLE SODIUM 40 MG: 40 TABLET, DELAYED RELEASE ORAL at 05:14

## 2021-01-01 RX ADMIN — FAMOTIDINE 20 MG: 20 TABLET ORAL at 08:54

## 2021-01-01 RX ADMIN — CLOBETASOL PROPIONATE 1 APPLICATION: 0.5 CREAM TOPICAL at 20:02

## 2021-01-01 RX ADMIN — APIXABAN 5 MG: 5 TABLET, FILM COATED ORAL at 20:05

## 2021-01-01 RX ADMIN — FERROUS SULFATE TAB EC 324 MG (65 MG FE EQUIVALENT) 324 MG: 324 (65 FE) TABLET DELAYED RESPONSE at 08:55

## 2021-01-01 RX ADMIN — FUROSEMIDE 40 MG: 10 INJECTION INTRAMUSCULAR; INTRAVENOUS at 12:59

## 2021-01-01 RX ADMIN — MEGESTROL ACETATE 40 MG: 40 TABLET ORAL at 08:11

## 2021-01-01 RX ADMIN — APIXABAN 2.5 MG: 2.5 TABLET, FILM COATED ORAL at 08:37

## 2021-01-01 RX ADMIN — FUROSEMIDE 60 MG: 10 INJECTION, SOLUTION INTRAMUSCULAR; INTRAVENOUS at 13:59

## 2021-01-01 RX ADMIN — FUROSEMIDE 40 MG: 10 INJECTION INTRAMUSCULAR; INTRAVENOUS at 13:32

## 2021-01-01 RX ADMIN — LEVOTHYROXINE SODIUM 50 MCG: 50 TABLET ORAL at 08:34

## 2021-01-01 RX ADMIN — ACETAMINOPHEN 650 MG: 325 TABLET ORAL at 00:21

## 2021-01-01 RX ADMIN — FUROSEMIDE 60 MG: 10 INJECTION, SOLUTION INTRAMUSCULAR; INTRAVENOUS at 02:09

## 2021-01-01 RX ADMIN — MEROPENEM AND SODIUM CHLORIDE 1 G: 1 INJECTION, SOLUTION INTRAVENOUS at 01:25

## 2021-01-01 RX ADMIN — FUROSEMIDE 40 MG: 10 INJECTION INTRAMUSCULAR; INTRAVENOUS at 10:29

## 2021-01-01 RX ADMIN — FUROSEMIDE 40 MG: 10 INJECTION INTRAMUSCULAR; INTRAVENOUS at 00:18

## 2021-01-01 RX ADMIN — FAMOTIDINE 20 MG: 20 TABLET, FILM COATED ORAL at 20:00

## 2021-01-01 RX ADMIN — ASPIRIN 81 MG: 81 TABLET, COATED ORAL at 09:41

## 2021-01-01 RX ADMIN — ATORVASTATIN CALCIUM 20 MG: 20 TABLET, FILM COATED ORAL at 20:14

## 2021-01-01 RX ADMIN — TAMSULOSIN HYDROCHLORIDE 0.4 MG: 0.4 CAPSULE ORAL at 21:00

## 2021-01-01 RX ADMIN — TAMSULOSIN HYDROCHLORIDE 0.4 MG: 0.4 CAPSULE ORAL at 08:22

## 2021-01-01 RX ADMIN — FAMOTIDINE 20 MG: 20 TABLET ORAL at 21:58

## 2021-01-01 RX ADMIN — METOPROLOL SUCCINATE 50 MG: 50 TABLET, EXTENDED RELEASE ORAL at 14:15

## 2021-01-01 RX ADMIN — FAMOTIDINE 20 MG: 20 TABLET, FILM COATED ORAL at 02:09

## 2021-01-01 RX ADMIN — AMIODARONE HYDROCHLORIDE 200 MG: 200 TABLET ORAL at 08:34

## 2021-01-01 RX ADMIN — CLOPIDOGREL BISULFATE 75 MG: 75 TABLET ORAL at 08:37

## 2021-01-01 RX ADMIN — ASPIRIN 81 MG: 81 TABLET, COATED ORAL at 09:06

## 2021-01-01 RX ADMIN — FUROSEMIDE 40 MG: 40 TABLET ORAL at 17:29

## 2021-01-01 RX ADMIN — FERROUS SULFATE TAB EC 324 MG (65 MG FE EQUIVALENT) 324 MG: 324 (65 FE) TABLET DELAYED RESPONSE at 08:29

## 2021-01-01 RX ADMIN — ACETAMINOPHEN 650 MG: 650 SOLUTION ORAL at 04:08

## 2021-01-01 RX ADMIN — Medication 30 ML: at 08:22

## 2021-01-01 RX ADMIN — FAMOTIDINE 20 MG: 20 TABLET ORAL at 21:17

## 2021-01-01 RX ADMIN — SODIUM CHLORIDE 500 ML: 9 INJECTION, SOLUTION INTRAVENOUS at 22:28

## 2021-01-01 RX ADMIN — Medication 30 ML: at 08:11

## 2021-01-01 RX ADMIN — LEVOTHYROXINE SODIUM 50 MCG: 50 TABLET ORAL at 08:39

## 2021-01-01 RX ADMIN — FUROSEMIDE 40 MG: 40 TABLET ORAL at 10:22

## 2021-01-01 RX ADMIN — ATORVASTATIN CALCIUM 20 MG: 20 TABLET, FILM COATED ORAL at 20:02

## 2021-01-01 RX ADMIN — MEGESTROL ACETATE 40 MG: 40 TABLET ORAL at 09:08

## 2021-01-01 RX ADMIN — FERROUS SULFATE TAB 325 MG (65 MG ELEMENTAL FE) 325 MG: 325 (65 FE) TAB at 09:06

## 2021-01-01 RX ADMIN — ASPIRIN 81 MG: 81 TABLET, COATED ORAL at 08:37

## 2021-01-01 RX ADMIN — METOPROLOL TARTRATE 25 MG: 25 TABLET, FILM COATED ORAL at 08:34

## 2021-01-01 RX ADMIN — APIXABAN 2.5 MG: 2.5 TABLET, FILM COATED ORAL at 22:46

## 2021-01-01 RX ADMIN — MEGESTROL ACETATE 40 MG: 40 TABLET ORAL at 20:01

## 2021-01-01 RX ADMIN — APIXABAN 5 MG: 5 TABLET, FILM COATED ORAL at 08:11

## 2021-01-01 RX ADMIN — SACUBITRIL AND VALSARTAN 1 TABLET: 24; 26 TABLET, FILM COATED ORAL at 20:00

## 2021-01-01 RX ADMIN — MEROPENEM AND SODIUM CHLORIDE 1 G: 1 INJECTION, SOLUTION INTRAVENOUS at 08:41

## 2021-01-01 RX ADMIN — MEROPENEM AND SODIUM CHLORIDE 1 G: 1 INJECTION, SOLUTION INTRAVENOUS at 22:57

## 2021-01-01 RX ADMIN — DOCUSATE SODIUM 50MG AND SENNOSIDES 8.6MG 2 TABLET: 8.6; 5 TABLET, FILM COATED ORAL at 08:29

## 2021-01-01 RX ADMIN — SODIUM CHLORIDE TAB 1 GM 1 G: 1 TAB at 11:33

## 2021-01-01 RX ADMIN — FAMOTIDINE 20 MG: 20 TABLET, FILM COATED ORAL at 21:21

## 2021-01-01 RX ADMIN — SODIUM CHLORIDE, PRESERVATIVE FREE 3 ML: 5 INJECTION INTRAVENOUS at 09:02

## 2021-01-01 RX ADMIN — TAMSULOSIN HYDROCHLORIDE 0.4 MG: 0.4 CAPSULE ORAL at 21:01

## 2021-01-01 RX ADMIN — DOCUSATE SODIUM 50MG AND SENNOSIDES 8.6MG 2 TABLET: 8.6; 5 TABLET, FILM COATED ORAL at 21:20

## 2021-01-01 RX ADMIN — CLOBETASOL PROPIONATE 1 APPLICATION: 0.5 CREAM TOPICAL at 20:32

## 2021-01-01 RX ADMIN — APIXABAN 5 MG: 5 TABLET, FILM COATED ORAL at 21:16

## 2021-01-01 RX ADMIN — ATORVASTATIN CALCIUM 20 MG: 20 TABLET, FILM COATED ORAL at 20:57

## 2021-01-01 RX ADMIN — ATORVASTATIN CALCIUM 20 MG: 20 TABLET, FILM COATED ORAL at 21:28

## 2021-01-01 RX ADMIN — MEROPENEM AND SODIUM CHLORIDE 1 G: 1 INJECTION, SOLUTION INTRAVENOUS at 08:29

## 2021-01-01 RX ADMIN — FAMOTIDINE 20 MG: 20 TABLET, FILM COATED ORAL at 20:04

## 2021-01-01 RX ADMIN — FAMOTIDINE 20 MG: 20 TABLET ORAL at 08:34

## 2021-01-01 RX ADMIN — APIXABAN 5 MG: 5 TABLET, FILM COATED ORAL at 08:22

## 2021-01-01 RX ADMIN — FUROSEMIDE 40 MG: 10 INJECTION INTRAMUSCULAR; INTRAVENOUS at 17:15

## 2021-01-01 RX ADMIN — ATORVASTATIN CALCIUM 20 MG: 20 TABLET, FILM COATED ORAL at 21:16

## 2021-01-01 RX ADMIN — SODIUM CHLORIDE, PRESERVATIVE FREE 10 ML: 5 INJECTION INTRAVENOUS at 20:59

## 2021-01-01 RX ADMIN — TAMSULOSIN HYDROCHLORIDE 0.4 MG: 0.4 CAPSULE ORAL at 20:31

## 2021-01-01 RX ADMIN — DILTIAZEM HYDROCHLORIDE 5 MG/HR: 100 INJECTION, POWDER, LYOPHILIZED, FOR SOLUTION INTRAVENOUS at 00:54

## 2021-01-01 RX ADMIN — CLOPIDOGREL BISULFATE 75 MG: 75 TABLET ORAL at 08:32

## 2021-01-01 RX ADMIN — PANTOPRAZOLE SODIUM 40 MG: 40 TABLET, DELAYED RELEASE ORAL at 05:29

## 2021-01-01 RX ADMIN — SODIUM CHLORIDE, PRESERVATIVE FREE 10 ML: 5 INJECTION INTRAVENOUS at 08:32

## 2021-01-01 RX ADMIN — LEVOTHYROXINE SODIUM 50 MCG: 50 TABLET ORAL at 08:54

## 2021-01-01 RX ADMIN — APIXABAN 5 MG: 5 TABLET, FILM COATED ORAL at 09:09

## 2021-01-01 RX ADMIN — SODIUM CHLORIDE, PRESERVATIVE FREE 10 ML: 5 INJECTION INTRAVENOUS at 08:36

## 2021-01-01 RX ADMIN — DIGOXIN 125 MCG: 125 TABLET ORAL at 11:10

## 2021-01-01 RX ADMIN — DOCUSATE SODIUM 50MG AND SENNOSIDES 8.6MG 2 TABLET: 8.6; 5 TABLET, FILM COATED ORAL at 22:45

## 2021-01-01 RX ADMIN — FUROSEMIDE 40 MG: 10 INJECTION INTRAMUSCULAR; INTRAVENOUS at 06:31

## 2021-01-01 RX ADMIN — MEROPENEM AND SODIUM CHLORIDE 1 G: 1 INJECTION, SOLUTION INTRAVENOUS at 13:59

## 2021-01-01 RX ADMIN — Medication 5 MG: at 21:47

## 2021-01-01 RX ADMIN — LABETALOL HYDROCHLORIDE 200 MG: 200 TABLET, FILM COATED ORAL at 17:14

## 2021-01-01 RX ADMIN — METOPROLOL TARTRATE 25 MG: 25 TABLET, FILM COATED ORAL at 08:32

## 2021-01-01 RX ADMIN — ATORVASTATIN CALCIUM 20 MG: 20 TABLET, FILM COATED ORAL at 21:06

## 2021-01-01 RX ADMIN — SODIUM CHLORIDE, PRESERVATIVE FREE 3 ML: 5 INJECTION INTRAVENOUS at 21:22

## 2021-01-01 RX ADMIN — APIXABAN 2.5 MG: 2.5 TABLET, FILM COATED ORAL at 08:24

## 2021-01-01 RX ADMIN — METOPROLOL TARTRATE 50 MG: 50 TABLET, FILM COATED ORAL at 08:37

## 2021-01-01 RX ADMIN — ASPIRIN 81 MG: 81 TABLET, COATED ORAL at 09:55

## 2021-01-01 RX ADMIN — DOCUSATE SODIUM 50MG AND SENNOSIDES 8.6MG 2 TABLET: 8.6; 5 TABLET, FILM COATED ORAL at 08:38

## 2021-01-01 RX ADMIN — LEVOTHYROXINE SODIUM 100 MCG: 100 TABLET ORAL at 10:23

## 2021-01-01 RX ADMIN — APIXABAN 5 MG: 5 TABLET, FILM COATED ORAL at 20:02

## 2021-01-01 RX ADMIN — OXYCODONE HYDROCHLORIDE AND ACETAMINOPHEN 500 MG: 500 TABLET ORAL at 08:39

## 2021-01-01 RX ADMIN — SACUBITRIL AND VALSARTAN 1 TABLET: 24; 26 TABLET, FILM COATED ORAL at 20:14

## 2021-01-01 RX ADMIN — SODIUM CHLORIDE, PRESERVATIVE FREE 10 ML: 5 INJECTION INTRAVENOUS at 21:59

## 2021-01-01 RX ADMIN — FUROSEMIDE 60 MG: 10 INJECTION, SOLUTION INTRAMUSCULAR; INTRAVENOUS at 13:17

## 2021-01-01 RX ADMIN — TAMSULOSIN HYDROCHLORIDE 0.4 MG: 0.4 CAPSULE ORAL at 08:24

## 2021-01-01 RX ADMIN — Medication 30 ML: at 11:14

## 2021-01-01 RX ADMIN — Medication 30 ML: at 20:57

## 2021-01-01 RX ADMIN — SODIUM CHLORIDE, PRESERVATIVE FREE 3 ML: 5 INJECTION INTRAVENOUS at 08:12

## 2021-01-01 RX ADMIN — APIXABAN 2.5 MG: 2.5 TABLET, FILM COATED ORAL at 10:23

## 2021-01-01 RX ADMIN — FAMOTIDINE 20 MG: 20 TABLET, FILM COATED ORAL at 08:32

## 2021-01-01 RX ADMIN — ALBUMIN HUMAN 25 G: 0.25 SOLUTION INTRAVENOUS at 11:46

## 2021-01-01 RX ADMIN — SULFAMETHOXAZOLE AND TRIMETHOPRIM 1 TABLET: 800; 160 TABLET ORAL at 09:01

## 2021-01-01 RX ADMIN — APIXABAN 2.5 MG: 2.5 TABLET, FILM COATED ORAL at 21:58

## 2021-01-01 RX ADMIN — MEROPENEM AND SODIUM CHLORIDE 1 G: 1 INJECTION, SOLUTION INTRAVENOUS at 13:24

## 2021-01-01 RX ADMIN — SODIUM CHLORIDE, PRESERVATIVE FREE 10 ML: 5 INJECTION INTRAVENOUS at 08:25

## 2021-01-01 RX ADMIN — DIGOXIN 125 MCG: 125 TABLET ORAL at 12:52

## 2021-01-01 RX ADMIN — DOCUSATE SODIUM 50MG AND SENNOSIDES 8.6MG 2 TABLET: 8.6; 5 TABLET, FILM COATED ORAL at 20:01

## 2021-01-01 RX ADMIN — AMIODARONE HYDROCHLORIDE 200 MG: 200 TABLET ORAL at 15:22

## 2021-01-01 RX ADMIN — LEVOTHYROXINE SODIUM 50 MCG: 50 TABLET ORAL at 08:28

## 2021-01-01 RX ADMIN — DILTIAZEM HYDROCHLORIDE 30 MG: 60 TABLET, FILM COATED ORAL at 11:42

## 2021-01-01 RX ADMIN — POTASSIUM CHLORIDE 10 MEQ: 10 CAPSULE, COATED, EXTENDED RELEASE ORAL at 08:36

## 2021-01-01 RX ADMIN — METHYLPREDNISOLONE SODIUM SUCCINATE 80 MG: 40 INJECTION, POWDER, FOR SOLUTION INTRAMUSCULAR; INTRAVENOUS at 20:33

## 2021-01-01 RX ADMIN — CLOBETASOL PROPIONATE 1 APPLICATION: 0.5 CREAM TOPICAL at 20:01

## 2021-01-01 RX ADMIN — SODIUM CHLORIDE, PRESERVATIVE FREE 3 ML: 5 INJECTION INTRAVENOUS at 20:03

## 2021-01-01 RX ADMIN — SODIUM CHLORIDE, PRESERVATIVE FREE 3 ML: 5 INJECTION INTRAVENOUS at 21:01

## 2021-01-01 RX ADMIN — APIXABAN 5 MG: 5 TABLET, FILM COATED ORAL at 08:39

## 2021-01-01 RX ADMIN — OXYCODONE HYDROCHLORIDE AND ACETAMINOPHEN 500 MG: 500 TABLET ORAL at 08:34

## 2021-01-01 RX ADMIN — FAMOTIDINE 20 MG: 20 TABLET ORAL at 08:39

## 2021-01-01 RX ADMIN — FUROSEMIDE 40 MG: 10 INJECTION INTRAMUSCULAR; INTRAVENOUS at 00:11

## 2021-01-01 RX ADMIN — FAMOTIDINE 20 MG: 20 TABLET, FILM COATED ORAL at 08:47

## 2021-01-01 RX ADMIN — SODIUM CHLORIDE, PRESERVATIVE FREE 10 ML: 5 INJECTION INTRAVENOUS at 20:00

## 2021-01-01 RX ADMIN — TAMSULOSIN HYDROCHLORIDE 0.4 MG: 0.4 CAPSULE ORAL at 21:59

## 2021-01-01 RX ADMIN — POTASSIUM CHLORIDE 10 MEQ: 10 CAPSULE, COATED, EXTENDED RELEASE ORAL at 10:22

## 2021-01-01 RX ADMIN — LEVOTHYROXINE SODIUM 50 MCG: 50 TABLET ORAL at 08:32

## 2021-01-01 RX ADMIN — ATORVASTATIN CALCIUM 20 MG: 20 TABLET, FILM COATED ORAL at 20:05

## 2021-01-01 RX ADMIN — DILTIAZEM HYDROCHLORIDE 30 MG: 60 TABLET, FILM COATED ORAL at 05:59

## 2021-01-01 RX ADMIN — SODIUM CHLORIDE TAB 1 GM 1 G: 1 TAB at 08:35

## 2021-01-01 RX ADMIN — FUROSEMIDE 40 MG: 10 INJECTION INTRAMUSCULAR; INTRAVENOUS at 16:07

## 2021-01-01 RX ADMIN — FAMOTIDINE 20 MG: 20 TABLET, FILM COATED ORAL at 22:06

## 2021-01-01 RX ADMIN — DOCUSATE SODIUM 50MG AND SENNOSIDES 8.6MG 2 TABLET: 8.6; 5 TABLET, FILM COATED ORAL at 21:01

## 2021-01-04 ENCOUNTER — OFFICE VISIT (OUTPATIENT)
Dept: INTERNAL MEDICINE | Facility: CLINIC | Age: 86
End: 2021-01-04

## 2021-01-04 VITALS
BODY MASS INDEX: 23.52 KG/M2 | WEIGHT: 168 LBS | HEART RATE: 76 BPM | DIASTOLIC BLOOD PRESSURE: 62 MMHG | SYSTOLIC BLOOD PRESSURE: 124 MMHG | TEMPERATURE: 97.1 F | HEIGHT: 71 IN | OXYGEN SATURATION: 98 %

## 2021-01-04 DIAGNOSIS — I10 BENIGN ESSENTIAL HYPERTENSION: Primary | ICD-10-CM

## 2021-01-04 DIAGNOSIS — R39.14 BENIGN PROSTATIC HYPERPLASIA WITH INCOMPLETE BLADDER EMPTYING: ICD-10-CM

## 2021-01-04 DIAGNOSIS — E78.00 HYPERCHOLESTEROLEMIA: ICD-10-CM

## 2021-01-04 DIAGNOSIS — E03.4 HYPOTHYROIDISM DUE TO ACQUIRED ATROPHY OF THYROID: ICD-10-CM

## 2021-01-04 DIAGNOSIS — E03.8 OTHER SPECIFIED HYPOTHYROIDISM: ICD-10-CM

## 2021-01-04 DIAGNOSIS — N40.1 BENIGN PROSTATIC HYPERPLASIA WITH INCOMPLETE BLADDER EMPTYING: ICD-10-CM

## 2021-01-04 PROCEDURE — 99214 OFFICE O/P EST MOD 30 MIN: CPT | Performed by: INTERNAL MEDICINE

## 2021-01-04 RX ORDER — CLOPIDOGREL BISULFATE 75 MG/1
75 TABLET ORAL DAILY
COMMUNITY
End: 2021-03-22

## 2021-01-04 RX ORDER — LEVOTHYROXINE SODIUM 0.05 MG/1
50 TABLET ORAL DAILY
Qty: 90 TABLET | Refills: 3 | Status: SHIPPED | OUTPATIENT
Start: 2021-01-04 | End: 2021-01-01 | Stop reason: SDUPTHER

## 2021-01-04 NOTE — PROGRESS NOTES
Chief Complaint   Patient presents with   • Follow-up     6 months for HTN, GERD, and hypothyroidism.      Subjective   Andrew Hernandez is a 86 y.o. male.     Here today for follow up of hypertension, carotid stenosis, hyperlipidemia, CVA involving central retinal artery of right eye, mitral regurgitation, GERD, hypothyroidism, BPH.  HTN/HLD/CVA- his BP is well controlled today.  His BP runs as 120-150/60-75. No CP, SOA edema.  He did have some palpitations over the weekend, this occurs rarely.  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    GERD-  He takes TUMS almost daily for GERD but is interested in something else for his stomach  Hypothyroid- he takes thyroid med an hour before all other meds.  He denies any problems with voice change, constipation, depression or difficulty swallowing  BPH- he awakens 1-2 times a night to urinate.  No problems with urination, remains on flomax  HCM- he did not get Hep A or shingles vaccine, does not want these, did get pneumovax series.  He declines flu shot today.   He is doing well and has no complaints today.   He continues to live in an apartment behind his family on their property.  He lives independently and is able to do all of his ADLs.       The following portions of the patient's history were reviewed and updated as appropriate: allergies, current medications, past family history, past medical history, past social history, past surgical history and problem list.    Review of Systems   Constitutional: Negative for activity change, appetite change and unexpected weight change.   Eyes: Negative for visual disturbance.   Respiratory: Negative for shortness of breath.    Cardiovascular: Positive for palpitations. Negative for chest pain and leg swelling.   Gastrointestinal: Negative for abdominal pain.   Genitourinary: Negative for difficulty urinating and frequency.   Neurological: Negative for headaches.   Psychiatric/Behavioral: Negative for  "dysphoric mood and sleep disturbance. The patient is not nervous/anxious.        Objective   /62   Pulse 76   Temp 97.1 °F (36.2 °C)   Ht 180.3 cm (71\")   Wt 76.2 kg (168 lb)   SpO2 98%   BMI 23.43 kg/m²   Body mass index is 23.43 kg/m².  Physical Exam  Vitals signs and nursing note reviewed.   Constitutional:       General: He is not in acute distress.     Appearance: Normal appearance. He is well-developed. He is not ill-appearing.      Comments: Pleasant and kind man, appears his age and in no distress today   HENT:      Head: Normocephalic and atraumatic.      Right Ear: External ear normal.      Left Ear: External ear normal.   Eyes:      General:         Right eye: No discharge.         Left eye: No discharge.      Extraocular Movements: Extraocular movements intact.      Conjunctiva/sclera: Conjunctivae normal.      Pupils: Pupils are equal, round, and reactive to light.   Neck:      Musculoskeletal: Normal range of motion and neck supple.      Thyroid: No thyromegaly.   Cardiovascular:      Rate and Rhythm: Normal rate and regular rhythm.      Pulses: Normal pulses.      Heart sounds: Normal heart sounds. No murmur.      Comments: Bilateral carotid bruits, right greater than left  Pulmonary:      Effort: Pulmonary effort is normal. No respiratory distress.      Breath sounds: Normal breath sounds. No wheezing.   Abdominal:      General: Bowel sounds are normal. There is no distension.      Palpations: Abdomen is soft.      Tenderness: There is no abdominal tenderness.   Musculoskeletal: Normal range of motion.      Right lower leg: No edema.      Left lower leg: No edema.   Lymphadenopathy:      Cervical: No cervical adenopathy.   Neurological:      General: No focal deficit present.      Mental Status: He is alert and oriented to person, place, and time. Mental status is at baseline.      Cranial Nerves: No cranial nerve deficit.      Coordination: Coordination normal.   Psychiatric:         " Mood and Affect: Mood normal.         Behavior: Behavior normal.         Thought Content: Thought content normal.         Judgment: Judgment normal.         Assessment/Plan   Andrew Hernandez is here today and the following problems have been addressed:      Diagnoses and all orders for this visit:    1. Benign essential hypertension (Primary)  -     Basic Metabolic Panel    2. Other specified hypothyroidism  -     levothyroxine (SYNTHROID, LEVOTHROID) 50 MCG tablet; Take 1 tablet by mouth Daily.  Dispense: 90 tablet; Refill: 3    3. Hypercholesterolemia    4. Hypothyroidism due to acquired atrophy of thyroid    5. Benign prostatic hyperplasia with incomplete bladder emptying      Labs as noted, follow-up sodium and chloride levels, usually run about 128 sodium and 97 chloride levels chronically  Follow heart healthy/low salt diet  Avoid processed foods  Monitor blood pressure as discussed  Exercise as tolerated   Take all medications as prescribed  He will start pepcid OTC for his GERD sxs-he declined offer for prescription  Continue labetalol and Eliquis, heart rate well controlled  Continue Flomax for BPH symptoms currently well controlled  All labs due on next visit during Medicare wellness exam  Thyroid refill provided today, labs due on next visit    Return in about 6 months (around 7/4/2021) for Medicare Wellness.      Marilyn K. Vermeesch, MD      Please note that portions of this note were completed with a voice recognition program.  Efforts were made to edit dictation, but occasionally words are mistranscribed.

## 2021-01-05 LAB
BUN SERPL-MCNC: 14 MG/DL (ref 8–23)
BUN/CREAT SERPL: 13.9 (ref 7–25)
CALCIUM SERPL-MCNC: 9.2 MG/DL (ref 8.6–10.5)
CHLORIDE SERPL-SCNC: 93 MMOL/L (ref 98–107)
CO2 SERPL-SCNC: 27.5 MMOL/L (ref 22–29)
CREAT SERPL-MCNC: 1.01 MG/DL (ref 0.76–1.27)
GLUCOSE SERPL-MCNC: 103 MG/DL (ref 65–99)
POTASSIUM SERPL-SCNC: 5.1 MMOL/L (ref 3.5–5.2)
SODIUM SERPL-SCNC: 129 MMOL/L (ref 136–145)

## 2021-01-05 NOTE — PROGRESS NOTES
Please contact caregiver and let her know that sodium and chloride remain low and potassium level is in acceptable or normal range.  He may continue to salt his food to help maintain sodium and chloride levels.

## 2021-03-22 ENCOUNTER — OFFICE VISIT (OUTPATIENT)
Dept: CARDIOLOGY | Facility: CLINIC | Age: 86
End: 2021-03-22

## 2021-03-22 VITALS
BODY MASS INDEX: 23.24 KG/M2 | WEIGHT: 166 LBS | HEART RATE: 67 BPM | HEIGHT: 71 IN | OXYGEN SATURATION: 99 % | DIASTOLIC BLOOD PRESSURE: 60 MMHG | SYSTOLIC BLOOD PRESSURE: 148 MMHG

## 2021-03-22 DIAGNOSIS — I67.9 CVD (CEREBROVASCULAR DISEASE): ICD-10-CM

## 2021-03-22 DIAGNOSIS — I10 ESSENTIAL HYPERTENSION: ICD-10-CM

## 2021-03-22 DIAGNOSIS — I48.0 PAROXYSMAL ATRIAL FIBRILLATION (HCC): ICD-10-CM

## 2021-03-22 DIAGNOSIS — I65.21 CAROTID STENOSIS, ASYMPTOMATIC, RIGHT: Primary | ICD-10-CM

## 2021-03-22 DIAGNOSIS — E78.2 MIXED HYPERLIPIDEMIA: ICD-10-CM

## 2021-03-22 PROCEDURE — 99214 OFFICE O/P EST MOD 30 MIN: CPT | Performed by: INTERNAL MEDICINE

## 2021-03-22 RX ORDER — LABETALOL 200 MG/1
200 TABLET, FILM COATED ORAL 3 TIMES DAILY
Qty: 270 TABLET | Refills: 3 | Status: SHIPPED | OUTPATIENT
Start: 2021-03-22 | End: 2021-01-01 | Stop reason: SDUPTHER

## 2021-03-22 NOTE — PROGRESS NOTES
[facility] at [facility]  Office Progress Note  Andrew Hernandez  1934      Visit Date: 03/22/21    PCP: Vermeesch, Marilyn K, MD  [address] [address]    IDENTIFICATION: A 87 y.o. 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   3/17 PRITESH restented- Given  5/20 CUS PRITESH patent   Residual left nonobstructive internal carotid artery stenosis 50-69%    Hypothyroidism    Gastroesophageal reflux disease    Hypercholesterolemia  11/19 151/51/84/57    Hyponatremia  10/17 134, 11/18 130        Chief Complaint   Patient presents with   • Atrial Fibrillation       Allergies  Allergies   Allergen Reactions   • Amoxicillin Rash   • Rocephin [Ceftriaxone] Hives       Current Medications    Current Outpatient Medications:   •  Acetaminophen (TYLENOL PO), Take  by mouth As Needed., Disp: , Rfl:   •  amLODIPine (NORVASC) 2.5 MG tablet, Take 1 tablet by mouth 2 (Two) Times a Day As Needed (take 1 extra tablet daily as needed for high BP)., Disp: 180 tablet, Rfl: 3  •  apixaban (ELIQUIS) 5 MG tablet tablet, Take 1 tablet by mouth Every 12 (Twelve) Hours., Disp: 60 tablet, Rfl: 11  •  aspirin (aspirin) 81 MG EC tablet, Take 1 tablet by mouth Daily., Disp: 30 tablet, Rfl: 0  •  atorvastatin (LIPITOR) 20 MG tablet, Take 1 tablet by mouth Every Night., Disp: 90 tablet, Rfl: 5  •  calcium carbonate (TUMS) 500 MG chewable tablet, Chew. TAKE AS DIRECTED., Disp: , Rfl:   •  labetalol (NORMODYNE) 200 MG tablet, Take 1 tablet by mouth 3 (Three) Times a Day., Disp: 270 tablet, Rfl: 3  •  levothyroxine (SYNTHROID, LEVOTHROID) 50 MCG tablet, Take 1 tablet by mouth Daily., Disp: 90 tablet, Rfl: 3  •  multivitamin (THERAGRAN)  "tablet tablet, Take 1 tablet by mouth daily., Disp: , Rfl:   •  nitroglycerin (NITROSTAT) 0.4 MG SL tablet, Place 1 tablet under the tongue Every 5 (Five) Minutes As Needed for Chest Pain for up to 3 doses. Take no more than 3 doses in 15 minutes., Disp: 100 tablet, Rfl: 11  •  Probiotic Product ([employer] PO), Take 2 tablets by mouth Daily., Disp: , Rfl:   •  tamsulosin (FLOMAX) 0.4 MG capsule 24 hr capsule, Take 1 capsule by mouth Daily., Disp: 90 capsule, Rfl: 3      History of Present Illness   Andrew Hernandez is a 87 y.o. year old male here for follow up.   Goes up and down steps at his apartment  above his family member's house several times daily.  He is interested in fishing later this spring.  His blood pressure diary reveals systolics approximately 140.  He has had no TIA or cardiac symptoms      OBJECTIVE:  Vitals:    03/22/21 0925   BP: 148/60   BP Location: Right arm   Patient Position: Sitting   Pulse: 67   SpO2: 99%   Weight: 75.3 kg (166 lb)   Height: 180.3 cm (71\")     Physical Exam  Constitutional:       Appearance: He is well-developed.   Neck:      Thyroid: No thyromegaly.      Vascular: No carotid bruit, hepatojugular reflux or JVD.      Trachea: No tracheal deviation.   Cardiovascular:      Rate and Rhythm: Tachycardia present. Rhythm irregularly irregular.      Chest Wall: PMI is not displaced.      Pulses: Normal pulses and intact distal pulses. No midsystolic click and no opening snap.           Carotid pulses are on the right side with bruit and on the left side with bruit.       Radial pulses are 2+ on the right side and 2+ on the left side.        Dorsalis pedis pulses are 2+ on the right side and 2+ on the left side.        Posterior tibial pulses are 2+ on the right side and 2+ on the left side.      Heart sounds: S1 normal and S2 normal. Heart sounds not distant. No murmur heard.   No friction rub. No gallop.    Pulmonary:      Effort: Pulmonary effort is normal.      Breath " sounds: Normal breath sounds. No wheezing or rales.   Abdominal:      General: Bowel sounds are normal.      Palpations: Abdomen is soft. There is no mass.      Tenderness: There is no abdominal tenderness. There is no guarding.   Musculoskeletal:      Cervical back: Normal range of motion and neck supple.         Diagnostic Data:  Procedures      ASSESSMENT:   Diagnosis Plan   1. Carotid stenosis, asymptomatic, right  Duplex Carotid Ultrasound CAR   2. Paroxysmal atrial fibrillation (CMS/HCC)     3. Essential hypertension     4. CVD (cerebrovascular disease)     5. Mixed hyperlipidemia         PLAN:  Atrial fibrillation Eliquis 5 twice daily    Hypertension labile and complex regimen     Cerebrovascular disease for follow-up carotid duplex day without significant change post carotid stenting    Dyslipidemia controlled on statin therapy    Chronic hyponatremia        Vermeesch, Marilyn K, MD, thank you for referring Mr. Hernandez for evaluation.  I have forwarded my electronically generated recommendations to you for review.  Please do not hesitate to call with any questions.      Oziel Mcnair MD, FACC

## 2021-03-26 ENCOUNTER — TELEPHONE (OUTPATIENT)
Dept: CARDIOLOGY | Facility: CLINIC | Age: 86
End: 2021-03-26

## 2021-03-26 NOTE — TELEPHONE ENCOUNTER
----- Message from Andrew Hernandez sent at 3/26/2021 11:07 AM EDT -----  Regarding: Visit Follow-Up Question  Contact: [phone]  Dr Mcnair/Phillip  I took Andrew for his yearly eye appt with [facility] in [place] this morning and he saw Dr. Jimenez. She found bleeding in his Left eye and also edema in the middle of his Left eye. (This is his GOOD eye) He had a stroke a few years ago and can only see shadows   Peripherally in his right eye. Dr Jimenez made him an appointment to see [facility] in [place] on Tuesday.   Dr. Jimenez is concerned about the Eliquis and wanted me to contact Dr. Mcnair about it.   Please call me with instructions. [phone]. Andrew has Carotid US at [address] on Monday morning at 8am.       Thank you,  Roshni Hernandez

## 2021-04-02 ENCOUNTER — HOSPITAL ENCOUNTER (OUTPATIENT)
Dept: CARDIOLOGY | Facility: HOSPITAL | Age: 86
Discharge: HOME | End: 2021-04-02
Admitting: INTERNAL MEDICINE

## 2021-04-02 DIAGNOSIS — I65.21 CAROTID STENOSIS, ASYMPTOMATIC, RIGHT: ICD-10-CM

## 2021-04-02 LAB
BH CV ECHO MEAS - BSA(HAYCOCK): 1.9 M^2
BH CV ECHO MEAS - BSA: 1.9 M^2
BH CV ECHO MEAS - BZI_BMI: 23.2 KILOGRAMS/M^2
BH CV ECHO MEAS - BZI_METRIC_HEIGHT: 180.3 CM
BH CV ECHO MEAS - BZI_METRIC_WEIGHT: 75.3 KG
BH CV MID RIGHT ICA HIDDEN LRR: 1 CM
BH CV RIGHT CCA HIDDEN LRR: 1 CM/S
BH CV VAS CAROTID RIGHT DISTAL STENT EDV: 16 CM/S
BH CV VAS CAROTID RIGHT DISTAL STENT PSV: 151 CM/S
BH CV VAS CAROTID RIGHT DISTAL TO STENT NATIVE VESSEL E: 17 CM/S
BH CV VAS CAROTID RIGHT DISTAL TO STENT PSV: 107 CM/S
BH CV VAS CAROTID RIGHT MID STENT EDV: 13 CM/S
BH CV VAS CAROTID RIGHT MID STENT PSV: 150 CM/S
BH CV VAS CAROTID RIGHT PROXIMAL STENT EDV: 6 CM/S
BH CV VAS CAROTID RIGHT PROXIMAL STENT PSV: 77 CM/S
BH CV VAS CAROTID RIGHT STENT NATIVE VESSEL PROXIMAL EDV: 9 CM/S
BH CV VAS CAROTID RIGHT STENT NATIVE VESSEL PROXIMAL PS: 71 CM/S
BH CV XLRA MEAS LEFT DIST CCA EDV: 10.8 CM/SEC
BH CV XLRA MEAS LEFT DIST CCA PSV: 134 CM/SEC
BH CV XLRA MEAS LEFT DIST ICA EDV: 19.6 CM/SEC
BH CV XLRA MEAS LEFT DIST ICA PSV: 93.5 CM/SEC
BH CV XLRA MEAS LEFT ICA/CCA RATIO: 1.5
BH CV XLRA MEAS LEFT MID CCA EDV: 8.3 CM/SEC
BH CV XLRA MEAS LEFT MID CCA PSV: 94.3 CM/SEC
BH CV XLRA MEAS LEFT MID ICA EDV: 22 CM/SEC
BH CV XLRA MEAS LEFT MID ICA PSV: 133 CM/SEC
BH CV XLRA MEAS LEFT PROX CCA EDV: 9.4 CM/SEC
BH CV XLRA MEAS LEFT PROX CCA PSV: 86.6 CM/SEC
BH CV XLRA MEAS LEFT PROX ECA EDV: 0 CM/SEC
BH CV XLRA MEAS LEFT PROX ECA PSV: 332 CM/SEC
BH CV XLRA MEAS LEFT PROX ICA EDV: 33 CM/SEC
BH CV XLRA MEAS LEFT PROX ICA PSV: 206 CM/SEC
BH CV XLRA MEAS LEFT PROX SCLA EDV: 0 CM/SEC
BH CV XLRA MEAS LEFT PROX SCLA PSV: 119 CM/SEC
BH CV XLRA MEAS LEFT VERTEBRAL A EDV: 8.4 CM/SEC
BH CV XLRA MEAS LEFT VERTEBRAL A PSV: 32.8 CM/SEC
BH CV XLRA MEAS RIGHT DIST ICA EDV: 14.3 CM/SEC
BH CV XLRA MEAS RIGHT DIST ICA PSV: 94.3 CM/SEC
BH CV XLRA MEAS RIGHT MID CCA EDV: 0 CM/SEC
BH CV XLRA MEAS RIGHT MID CCA PSV: 68.5 CM/SEC
BH CV XLRA MEAS RIGHT PROX CCA EDV: 10.1 CM/SEC
BH CV XLRA MEAS RIGHT PROX CCA PSV: 71.7 CM/SEC
BH CV XLRA MEAS RIGHT PROX ECA EDV: 0 CM/SEC
BH CV XLRA MEAS RIGHT PROX ECA PSV: 152 CM/SEC
BH CV XLRA MEAS RIGHT PROX SCLA EDV: 0 CM/SEC
BH CV XLRA MEAS RIGHT PROX SCLA PSV: 87.2 CM/SEC
BH CV XLRA MEAS RIGHT VERTEBRAL A EDV: 0 CM/SEC
BH CV XLRA MEAS RIGHT VERTEBRAL A PSV: 133 CM/SEC
BH CVPROX RIGHT ICA HIDDEN LRR: 1 CM

## 2021-04-02 PROCEDURE — 93880 EXTRACRANIAL BILAT STUDY: CPT

## 2021-04-02 PROCEDURE — 93880 EXTRACRANIAL BILAT STUDY: CPT | Performed by: INTERNAL MEDICINE

## 2021-06-25 DIAGNOSIS — E78.00 HYPERCHOLESTEROLEMIA: Primary | ICD-10-CM

## 2021-06-25 DIAGNOSIS — I65.21 CAROTID STENOSIS, ASYMPTOMATIC, RIGHT: ICD-10-CM

## 2021-06-25 DIAGNOSIS — I65.29 CAROTID ATHEROSCLEROSIS, UNSPECIFIED LATERALITY: ICD-10-CM

## 2021-06-25 RX ORDER — ATORVASTATIN CALCIUM 20 MG/1
20 TABLET, FILM COATED ORAL NIGHTLY
Qty: 90 TABLET | Refills: 3 | Status: SHIPPED | OUTPATIENT
Start: 2021-06-25 | End: 2022-01-01

## 2021-07-06 ENCOUNTER — OFFICE VISIT (OUTPATIENT)
Dept: INTERNAL MEDICINE | Facility: CLINIC | Age: 86
End: 2021-07-06

## 2021-07-06 VITALS
HEART RATE: 66 BPM | HEIGHT: 71 IN | SYSTOLIC BLOOD PRESSURE: 124 MMHG | OXYGEN SATURATION: 98 % | WEIGHT: 164 LBS | TEMPERATURE: 97 F | DIASTOLIC BLOOD PRESSURE: 60 MMHG | BODY MASS INDEX: 22.96 KG/M2

## 2021-07-06 DIAGNOSIS — E03.4 HYPOTHYROIDISM DUE TO ACQUIRED ATROPHY OF THYROID: ICD-10-CM

## 2021-07-06 DIAGNOSIS — E78.00 HYPERCHOLESTEROLEMIA: ICD-10-CM

## 2021-07-06 DIAGNOSIS — K14.8 TONGUE LESION: ICD-10-CM

## 2021-07-06 DIAGNOSIS — I10 BENIGN ESSENTIAL HYPERTENSION: ICD-10-CM

## 2021-07-06 DIAGNOSIS — K21.9 GASTROESOPHAGEAL REFLUX DISEASE WITHOUT ESOPHAGITIS: ICD-10-CM

## 2021-07-06 DIAGNOSIS — N40.0 BENIGN PROSTATIC HYPERPLASIA WITHOUT LOWER URINARY TRACT SYMPTOMS: ICD-10-CM

## 2021-07-06 DIAGNOSIS — Z00.00 ENCOUNTER FOR MEDICARE ANNUAL WELLNESS EXAM: Primary | ICD-10-CM

## 2021-07-06 DIAGNOSIS — R09.89 ABDOMINAL BRUIT: ICD-10-CM

## 2021-07-06 PROCEDURE — G0439 PPPS, SUBSEQ VISIT: HCPCS | Performed by: INTERNAL MEDICINE

## 2021-07-06 RX ORDER — TAMSULOSIN HYDROCHLORIDE 0.4 MG/1
1 CAPSULE ORAL DAILY
Qty: 90 CAPSULE | Refills: 3 | Status: SHIPPED | OUTPATIENT
Start: 2021-07-06 | End: 2022-01-01

## 2021-07-06 RX ORDER — RANIBIZUMAB 6 MG/ML
INJECTION, SOLUTION INTRAVITREAL
COMMUNITY
End: 2021-01-01 | Stop reason: ALTCHOICE

## 2021-07-06 NOTE — PROGRESS NOTES
The ABCs of the Annual Wellness Visit  Subsequent Medicare Wellness Visit    Chief Complaint   Patient presents with   • Medicare Wellness-subsequent     Pt has sore in mouth, would like to get parking permit paper signed, and flomax refill.       Subjective   History of Present Illness:  Andrew Hernandez is a 87 y.o. male who presents for a Subsequent Medicare Wellness Visit.  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  He has a sore on his tongue for past several months, it is bothering his dentures and when he eats.  He has tried some listerine and baking soda and campho phenique and it not helpful. He has some BEJARANO with stairs, no CP or palpitations.  No GERD sxs.  He awakens 3-4 times a night to urinate.  He sees the eye doctor monthly for injections with decreasing edema in eyes for MD.     HEALTH RISK ASSESSMENT    Recent Hospitalizations:  No hospitalization(s) within the last year.    Current Medical Providers:  Patient Care Team:  Vermeesch, Marilyn K, MD as PCP - General (Internal Medicine)  Dirk De Leon MD as Consulting Physician (Ophthalmology)  Oziel Mcnair MD as Consulting Physician (Cardiology)  Harsh Huerta MD as Consulting Physician (Urology)    Smoking Status:  Social History     Tobacco Use   Smoking Status Former Smoker   • Packs/day: 1.00   • Years: 55.00   • Pack years: 55.00   • Types: Cigarettes   • Start date:    • Quit date:    • Years since quittin.5   Smokeless Tobacco Former User   • Types: Chew   • Quit date:        Alcohol Consumption:  Social History     Substance and Sexual Activity   Alcohol Use Yes    Comment: quit        Depression Screen:   PHQ-2/PHQ-9 Depression Screening 2021   Little interest or pleasure in doing things 0   Feeling down, depressed, or hopeless 0   Trouble falling or staying asleep, or sleeping too much -   Feeling  tired or having little energy -   Poor appetite or overeating -   Feeling bad about yourself - or that you are a failure or have let yourself or your family down -   Trouble concentrating on things, such as reading the newspaper or watching television -   Moving or speaking so slowly that other people could have noticed. Or the opposite - being so fidgety or restless that you have been moving around a lot more than usual -   Thoughts that you would be better off dead, or of hurting yourself in some way -   Total Score 0   If you checked off any problems, how difficult have these problems made it for you to do your work, take care of things at home, or get along with other people? -       Fall Risk Screen:  DAWSON Fall Risk Assessment was completed, and patient is at LOW risk for falls.Assessment completed on:7/6/2021    Health Habits and Functional and Cognitive Screening:  Functional & Cognitive Status 7/6/2021   Do you have difficulty preparing food and eating? No   Do you have difficulty bathing yourself, getting dressed or grooming yourself? No   Do you have difficulty using the toilet? No   Do you have difficulty moving around from place to place? No   Do you have trouble with steps or getting out of a bed or a chair? No   Current Diet Well Balanced Diet   Dental Exam Not up to date   Eye Exam Up to date   Exercise (times per week) 0 times per week   Current Exercises Include No Regular Exercise   Current Exercise Activities Include -   Do you need help using the phone?  No   Are you deaf or do you have serious difficulty hearing?  No   Do you need help with transportation? No   Do you need help shopping? No   Do you need help preparing meals?  No   Do you need help with housework?  No   Do you need help with laundry? No   Do you need help taking your medications? No   Do you need help managing money? No   Do you ever drive or ride in a car without wearing a seat belt? No   Have you felt unusual stress, anger  or loneliness in the last month? No   Who do you live with? Alone   If you need help, do you have trouble finding someone available to you? No   Have you been bothered in the last four weeks by sexual problems? No   Do you have difficulty concentrating, remembering or making decisions? No         Does the patient have evidence of cognitive impairment? No    Asprin use counseling:Taking ASA appropriately as indicated    Age-appropriate Screening Schedule:  Refer to the list below for future screening recommendations based on patient's age, sex and/or medical conditions. Orders for these recommended tests are listed in the plan section. The patient has been provided with a written plan.    Health Maintenance   Topic Date Due   • LIPID PANEL  11/06/2020   • INFLUENZA VACCINE  08/01/2021   • TDAP/TD VACCINES  Discontinued   • ZOSTER VACCINE  Discontinued          The following portions of the patient's history were reviewed and updated as appropriate: allergies, current medications, past family history, past medical history, past social history, past surgical history and problem list.    Outpatient Medications Prior to Visit   Medication Sig Dispense Refill   • Acetaminophen (TYLENOL PO) Take  by mouth As Needed.     • amLODIPine (NORVASC) 2.5 MG tablet Take 1 tablet by mouth 2 (Two) Times a Day As Needed (take 1 extra tablet daily as needed for high BP). 180 tablet 3   • apixaban (ELIQUIS) 5 MG tablet tablet Take 1 tablet by mouth Every 12 (Twelve) Hours. 60 tablet 11   • aspirin (aspirin) 81 MG EC tablet Take 1 tablet by mouth Daily. 30 tablet 0   • atorvastatin (LIPITOR) 20 MG tablet Take 1 tablet by mouth Every Night. 90 tablet 3   • calcium carbonate (TUMS) 500 MG chewable tablet Chew. TAKE AS DIRECTED.     • Famotidine (PEPCID AC PO) Take  by mouth.     • labetalol (NORMODYNE) 200 MG tablet Take 1 tablet by mouth 3 (Three) Times a Day. 270 tablet 3   • levothyroxine (SYNTHROID, LEVOTHROID) 50 MCG tablet Take 1  tablet by mouth Daily. 90 tablet 3   • multivitamin (THERAGRAN) tablet tablet Take 1 tablet by mouth daily.     • Probiotic Product (Cardia PO) Take 2 tablets by mouth Daily.     • Ranibizumab (Lucentis) 0.3 MG/0.05ML solution by Intravitreal route.     • tamsulosin (FLOMAX) 0.4 MG capsule 24 hr capsule Take 1 capsule by mouth Daily. 90 capsule 3   • nitroglycerin (NITROSTAT) 0.4 MG SL tablet Place 1 tablet under the tongue Every 5 (Five) Minutes As Needed for Chest Pain for up to 3 doses. Take no more than 3 doses in 15 minutes. 100 tablet 11     No facility-administered medications prior to visit.       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 Patient has an advance directive in EMR which is still valid.     Review of Systems   Constitutional: Negative.    HENT: Positive for mouth sores.    Eyes: Negative.    Respiratory: Positive for shortness of breath.    Cardiovascular: Negative.    Gastrointestinal: Negative.    Endocrine: Negative.    Genitourinary: Negative.    Musculoskeletal: Negative.    Skin: Negative.    Allergic/Immunologic: Negative.    Neurological: Negative.    Hematological: Bruises/bleeds easily.   Psychiatric/Behavioral: Negative.        Compared to one  "year ago, the patient feels his physical health is the same.  Compared to one year ago, the patient feels his mental health is the same.    Reviewed chart for potential of high risk medication in the elderly: yes  Reviewed chart for potential of harmful drug interactions in the elderly:yes    Objective         Vitals:    07/06/21 0803   BP: 124/60   Pulse: 66   Temp: 97 °F (36.1 °C)   SpO2: 98%   Weight: 74.4 kg (164 lb)   Height: 180.3 cm (71\")   PainSc: 0-No pain       Body mass index is 22.87 kg/m².  Discussed the patient's BMI with him. The BMI is in the acceptable range.    Physical Exam  Vitals and nursing note reviewed.   Constitutional:       General: He is not in acute distress.     Appearance: Normal appearance. He is well-developed. He is not ill-appearing.      Comments: Kind and pleasant elderly gentleman, appears stated age and in NAD today   HENT:      Head: Normocephalic and atraumatic.      Right Ear: Tympanic membrane, ear canal and external ear normal.      Left Ear: Tympanic membrane, ear canal and external ear normal.      Ears:      Comments: Scant amount of wax in canals bilaterally     Mouth/Throat:      Comments: Right proximal lateral tongue with 1.5 cm mass noted  Eyes:      General:         Right eye: No discharge.         Left eye: No discharge.      Extraocular Movements: Extraocular movements intact.      Conjunctiva/sclera: Conjunctivae normal.      Pupils: Pupils are equal, round, and reactive to light.   Neck:      Thyroid: No thyromegaly.      Vascular: Carotid bruit present.      Comments: No thyromegaly or mass  Bilateral faint carotid bruits noted, history of bilateral CEAs  Cardiovascular:      Rate and Rhythm: Normal rate and regular rhythm.      Pulses: Normal pulses.      Heart sounds: Murmur heard.        Comments: Systolic murmur grade 1/6 over precordium, loudest at left sternal border  Pulmonary:      Effort: Pulmonary effort is normal. No respiratory distress.      " Breath sounds: Normal breath sounds. No wheezing.   Abdominal:      General: Bowel sounds are normal. There is no distension.      Palpations: Abdomen is soft.      Tenderness: There is no abdominal tenderness.      Comments: Faint mid abdominal bruit noted   Musculoskeletal:         General: Normal range of motion.      Cervical back: Normal range of motion and neck supple.      Right lower leg: No edema.      Left lower leg: No edema.   Lymphadenopathy:      Cervical: No cervical adenopathy.   Skin:     General: Skin is warm.      Findings: No rash.   Neurological:      General: No focal deficit present.      Mental Status: He is alert and oriented to person, place, and time. Mental status is at baseline.      Cranial Nerves: No cranial nerve deficit.      Motor: No weakness.      Coordination: Coordination normal.      Gait: Gait normal.   Psychiatric:         Mood and Affect: Mood normal.         Behavior: Behavior normal.         Thought Content: Thought content normal.         Judgment: Judgment normal.               Assessment/Plan   Medicare Risks and Personalized Health Plan  CMS Preventative Services Quick Reference  Immunizations Discussed/Encouraged (specific immunizations; COVID19 )    The above risks/problems have been discussed with the patient.  Pertinent information has been shared with the patient in the After Visit Summary.  Follow up plans and orders are seen below in the Assessment/Plan Section.    Diagnoses and all orders for this visit:    1. Encounter for Medicare annual wellness exam (Primary)  -     CBC & Differential  -     Comprehensive Metabolic Panel  -     Lipid Panel With / Chol / HDL Ratio  -     T4, Free  -     TSH    2. Benign essential hypertension  -     CBC & Differential  -     Comprehensive Metabolic Panel  -     Lipid Panel With / Chol / HDL Ratio    3. Hypercholesterolemia  -     Comprehensive Metabolic Panel  -     Lipid Panel With / Chol / HDL Ratio    4. Hypothyroidism  due to acquired atrophy of thyroid  -     T4, Free  -     TSH    5. Gastroesophageal reflux disease without esophagitis  -     CBC & Differential    6. Benign prostatic hyperplasia without lower urinary tract symptoms  -     tamsulosin (FLOMAX) 0.4 MG capsule 24 hr capsule; Take 1 capsule by mouth Daily.  Dispense: 90 capsule; Refill: 3    7. Tongue lesion  -     Ambulatory Referral to Oral Maxillofacial Surgery    8. Abdominal bruit  -     US Aorta Limited; Future    Follow heart healthy/low salt/low cholesterol diet  Avoid processed foods  Monitor blood pressure on occasion  Exercise as tolerated up to 30 minutes 5 days per week  Take all medications as prescribed  Continue current dose of levothyroxine, will adjust dose if needed based on labs  Continue atorvastatin for hyperlipidemia  Continue Pepcid, no complaints of GERD  BPH symptoms are controlled with Flomax  Refer to oral surgeon for biopsy of right lateral tongue lesion  Ultrasound of abdomen ordered to evaluate for aneurysm due to bruit noted on exam  Patient declines to have Covid vaccine, encouraged him to use mask when he is in crowds of people indoors or when around others who have not been vaccinated  Parking permit provided to patient due to complaints of shortness of breath with prolonged walking distances        Follow Up:  Return in about 6 months (around 1/6/2022) for Next scheduled follow up.     An After Visit Summary and PPPS were given to the patient.

## 2021-07-07 ENCOUNTER — TELEPHONE (OUTPATIENT)
Dept: INTERNAL MEDICINE | Facility: CLINIC | Age: 86
End: 2021-07-07

## 2021-07-07 ENCOUNTER — HOSPITAL ENCOUNTER (OUTPATIENT)
Dept: ULTRASOUND IMAGING | Facility: HOSPITAL | Age: 86
Discharge: HOME | End: 2021-07-07
Admitting: INTERNAL MEDICINE

## 2021-07-07 DIAGNOSIS — D64.9 ANEMIA, UNSPECIFIED TYPE: Primary | ICD-10-CM

## 2021-07-07 DIAGNOSIS — R09.89 ABDOMINAL BRUIT: ICD-10-CM

## 2021-07-07 LAB
ALBUMIN SERPL-MCNC: 4.5 G/DL (ref 3.5–5.2)
ALBUMIN/GLOB SERPL: 2.6 G/DL
ALP SERPL-CCNC: 72 U/L (ref 39–117)
ALT SERPL-CCNC: 16 U/L (ref 1–41)
AST SERPL-CCNC: 19 U/L (ref 1–40)
BASOPHILS # BLD AUTO: 0.05 10*3/MM3 (ref 0–0.2)
BASOPHILS NFR BLD AUTO: 0.8 % (ref 0–1.5)
BILIRUB SERPL-MCNC: 0.4 MG/DL (ref 0–1.2)
BUN SERPL-MCNC: 12 MG/DL (ref 8–23)
BUN/CREAT SERPL: 11.5 (ref 7–25)
CALCIUM SERPL-MCNC: 9.4 MG/DL (ref 8.6–10.5)
CHLORIDE SERPL-SCNC: 90 MMOL/L (ref 98–107)
CHOLEST SERPL-MCNC: 138 MG/DL (ref 0–200)
CHOLEST/HDLC SERPL: 1.77 {RATIO}
CO2 SERPL-SCNC: 25.2 MMOL/L (ref 22–29)
CREAT SERPL-MCNC: 1.04 MG/DL (ref 0.76–1.27)
EOSINOPHIL # BLD AUTO: 0.53 10*3/MM3 (ref 0–0.4)
EOSINOPHIL NFR BLD AUTO: 8.5 % (ref 0.3–6.2)
ERYTHROCYTE [DISTWIDTH] IN BLOOD BY AUTOMATED COUNT: 13.2 % (ref 12.3–15.4)
GLOBULIN SER CALC-MCNC: 1.7 GM/DL
GLUCOSE SERPL-MCNC: 100 MG/DL (ref 65–99)
HCT VFR BLD AUTO: 34.5 % (ref 37.5–51)
HDLC SERPL-MCNC: 78 MG/DL (ref 40–60)
HGB BLD-MCNC: 11.9 G/DL (ref 13–17.7)
IMM GRANULOCYTES # BLD AUTO: 0.03 10*3/MM3 (ref 0–0.05)
IMM GRANULOCYTES NFR BLD AUTO: 0.5 % (ref 0–0.5)
LDLC SERPL CALC-MCNC: 47 MG/DL (ref 0–100)
LYMPHOCYTES # BLD AUTO: 1.2 10*3/MM3 (ref 0.7–3.1)
LYMPHOCYTES NFR BLD AUTO: 19.4 % (ref 19.6–45.3)
MCH RBC QN AUTO: 30.8 PG (ref 26.6–33)
MCHC RBC AUTO-ENTMCNC: 34.5 G/DL (ref 31.5–35.7)
MCV RBC AUTO: 89.4 FL (ref 79–97)
MONOCYTES # BLD AUTO: 0.71 10*3/MM3 (ref 0.1–0.9)
MONOCYTES NFR BLD AUTO: 11.5 % (ref 5–12)
NEUTROPHILS # BLD AUTO: 3.68 10*3/MM3 (ref 1.7–7)
NEUTROPHILS NFR BLD AUTO: 59.3 % (ref 42.7–76)
NRBC BLD AUTO-RTO: 0 /100 WBC (ref 0–0.2)
PLATELET # BLD AUTO: 210 10*3/MM3 (ref 140–450)
POTASSIUM SERPL-SCNC: 5.1 MMOL/L (ref 3.5–5.2)
PROT SERPL-MCNC: 6.2 G/DL (ref 6–8.5)
RBC # BLD AUTO: 3.86 10*6/MM3 (ref 4.14–5.8)
SODIUM SERPL-SCNC: 127 MMOL/L (ref 136–145)
T4 FREE SERPL-MCNC: 1.61 NG/DL (ref 0.93–1.7)
TRIGL SERPL-MCNC: 60 MG/DL (ref 0–150)
TSH SERPL DL<=0.005 MIU/L-ACNC: 4.89 UIU/ML (ref 0.27–4.2)
VLDLC SERPL CALC-MCNC: 13 MG/DL (ref 5–40)
WBC # BLD AUTO: 6.2 10*3/MM3 (ref 3.4–10.8)

## 2021-07-07 PROCEDURE — 76775 US EXAM ABDO BACK WALL LIM: CPT

## 2021-07-07 NOTE — PROGRESS NOTES
Please ask referrals to cancel oral surgeon appointment.  I believe I already sent a message that his ultrasound of aorta was normal.

## 2021-07-07 NOTE — TELEPHONE ENCOUNTER
Pharmacy Name:  [phone]    Pharmacy representative phone number: [phone]    What medication are you calling in regards to: MARY       Who is the provider that prescribed the medication: MARILYN VERMEESCH    Additional notes: PHARMACY CALLED AND STATED THAT THEY HAVE THE 2.5 MG OF ELIQUIS BUT THE PATIENT DOES NOT HAVE INURANCE

## 2021-07-07 NOTE — PROGRESS NOTES
Please call caregiver with lab results.  Kidney and liver function are normal.  His sodium and chloride levels are again low but this is not new for him and it is okay for him to salt his foods as he desires.  His thyroid function levels reveal 1 is in normal range and other is slightly abnormal, no need for adjustment in his medication.  Cholesterol panel is excellent with a high HDL or good cholesterol which is protective for his heart.  Complete blood count reveals slight anemia, I have ordered additional labs to check to see if this is due to iron deficiency versus B12 deficiency or other.  Please ask lab to add these to blood drawn yesterday.  I have also ordered stool cards and would like her to come pick these up and have these returned as soon as possible.  Please tell her these need to be done on 3 separate days.  Tell her that the anemia may be associated with lesion on his tongue.  It is also just mild anemia and nothing significant that requires transfusion.

## 2021-07-07 NOTE — PROGRESS NOTES
Please cancel stool card order if she does not believe that patient will do this.  Please ask referral coordinators to set up patient with an oral surgeon that takes his insurance and let Roshni know that we are taking care of this she does not have to do this for him.  Please arrange a follow-up appointment for patient with me in approximately 2 months for sooner follow-up.  Thank you

## 2021-07-09 LAB
Lab: NORMAL
WRITTEN AUTHORIZATION: NORMAL

## 2021-07-09 NOTE — PROGRESS NOTES
Please tell caregiver that iron levels, B12 and methylmalonic acid levels are all normal.  His mild anemia is likely due to chronic disease.  We will monitor this and I will repeat it on next office visit.

## 2021-07-14 ENCOUNTER — TELEPHONE (OUTPATIENT)
Dept: CARDIOLOGY | Facility: CLINIC | Age: 86
End: 2021-07-14

## 2021-07-14 NOTE — TELEPHONE ENCOUNTER
Pt is needing cardiac clearance for under tongue biopsy with Dr.Valentino. Pt last seen on 03/22/21 and is on Eliquis and ASA. Please advise and a letter will be sent

## 2021-07-14 NOTE — TELEPHONE ENCOUNTER
----- Message from Andrew MADRIGALCorin David sent at 7/14/2021  8:32 AM EDT -----  Regarding: Non-Urgent Medical Question  Contact: [phone]  Dr. Mcnair/Emiliano    Good Morning. My father in law saw Dr. Joe Valentino (ENT @ [facility]) yesterday for a mass about 3cm long x 1.5cm deep in his mouth under the right side of his tongue. Dr. Valentino is most certain that the mass is Cancer. He will be doing biopsy and scans possibly at the end of this week and then surgery after results obtained. Valentino requested that I get Cardiac Clearance from you for the surgery. I didn't know if you could go off of the last visit and write something or if you needed to see him again. Valentino says the mass is about 3cm long x 1.5cm deep. My number is [phone].     Thank you   Roshni

## 2021-08-30 NOTE — PROGRESS NOTES
[facility] Cardiology  Office Progress Note  Andrew Hernandez  1934  [address] [address]       Visit Date: 21    PCP: Vermeesch, Marilyn K, MD  [address]  [place] [address]    IDENTIFICATION: A 87 y.o. male retired . Roshni Hernandez's uncle.    PROBLEM LIST:     NSTEMI   perioperative tongue surgery [facility].        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   3/17 PRITESH restented- Given   CUS PRITESH patent   Residual left nonobstructive internal carotid artery stenosis 50-69%   Carotid US: Right stent imagin-49%. Left ICA 50-69%.      Abdominal Bruit  : Aortic diameter 1.8 cm. NO evidence of AAA.     Hypothyroidism     Gastroesophageal reflux disease     Hypercholesterolemia   151/51/84/57   138/60/78/47     Hyponatremia  10/17 134,  130   126    Tongue Cancer- invasive squamous cell [facility]    CC:   Chief Complaint   Patient presents with   • carotid stenosis       Allergies  Allergies   Allergen Reactions   • Amoxicillin Rash   • Rocephin [Ceftriaxone] Hives       Current Medications    Current Outpatient Medications:   •  Acetaminophen (TYLENOL PO), Take  by mouth As Needed., Disp: , Rfl:   •  apixaban (ELIQUIS) 2.5 MG tablet tablet, Take 1 tablet by mouth Every 12 (Twelve) Hours., Disp: 60 tablet, Rfl: 11  •  atorvastatin (LIPITOR) 20 MG tablet, Take 1 tablet by mouth Every Night., Disp: 90 tablet, Rfl: 3  •  calcium carbonate (TUMS) 500 MG chewable tablet, Chew. TAKE AS DIRECTED., Disp: , Rfl:   •  clopidogrel (PLAVIX) 75 MG  tablet, Take 75 mg by mouth Daily., Disp: , Rfl:   •  Famotidine (PEPCID AC PO), Take  by mouth 2 (two) times a day., Disp: , Rfl:   •  ibuprofen (ADVIL,MOTRIN) 600 MG tablet, Take 600 mg by mouth Daily., Disp: , Rfl:   •  labetalol (NORMODYNE) 200 MG tablet, Take 1 tablet by mouth 3 (Three) Times a Day., Disp: 270 tablet, Rfl: 3  •  levothyroxine (SYNTHROID, LEVOTHROID) 50 MCG tablet, Take 1 tablet by mouth Daily., Disp: 90 tablet, Rfl: 3  •  Nutritional Supplements (Boost Plus) liquid, Take  by mouth Daily., Disp: , Rfl:   •  Probiotic Product (onkea PO), Take 2 tablets by mouth As Needed., Disp: , Rfl:   •  tamsulosin (FLOMAX) 0.4 MG capsule 24 hr capsule, Take 1 capsule by mouth Daily., Disp: 90 capsule, Rfl: 3  •  amLODIPine (NORVASC) 2.5 MG tablet, Take 1 tablet by mouth 2 (Two) Times a Day As Needed (take 1 extra tablet daily as needed for high BP)., Disp: 180 tablet, Rfl: 3  •  aspirin (aspirin) 81 MG EC tablet, Take 1 tablet by mouth Daily., Disp: 30 tablet, Rfl: 0  •  nitroglycerin (NITROSTAT) 0.4 MG SL tablet, Place 1 tablet under the tongue Every 5 (Five) Minutes As Needed for Chest Pain for up to 3 doses. Take no more than 3 doses in 15 minutes., Disp: 100 tablet, Rfl: 11  •  Ranibizumab (Lucentis) 0.3 MG/0.05ML solution, by Intravitreal route., Disp: , Rfl:       History of Present Illness   Andrew Hernandez is a 87 y.o. year old male here for early follow up.  He had tongue surgery first invasive squamous cell carcinoma in August at [facility].  Per report he was noted with ischemic ST-T changes perioperatively.  He was noted with elevated troponin and mild LV dysfunction.  He was offered ischemic evaluation but deferred.  He was placed on nocturnal oxygen and released.  He has scheduled follow-up for radiation treatment he is opted for only 3 treatments to resolve hair follicles on his flap from his neck.  He deferred chemotherapy and/or extensive radiation therapy.  He has had no recurrent  "or preceding chest discomfort.  He states his shortness of breath has improved slightly since discharge          OBJECTIVE:  Vitals:    08/30/21 0859   BP: 120/58   BP Location: Right arm   Patient Position: Sitting   Pulse: 73   SpO2: 98%   Weight: 73.9 kg (163 lb)   Height: 180.3 cm (71\")     Body mass index is 22.73 kg/m².    Constitutional:       Appearance: Not in distress. Frail.   Neck:      Vascular: No JVR. JVD normal.   Pulmonary:      Effort: Pulmonary effort is normal.      Breath sounds: Normal breath sounds. No wheezing. No rhonchi. No rales.   Chest:      Chest wall: Not tender to palpatation.   Cardiovascular:      PMI at left midclavicular line. Normal rate. Regular rhythm. Normal S1. Normal S2.      Murmurs: There is no murmur.      No gallop. No click. No rub.   Pulses:     Intact distal pulses.   Edema:     Peripheral edema absent.   Abdominal:      General: Bowel sounds are normal.      Palpations: Abdomen is soft.      Tenderness: There is no abdominal tenderness.   Musculoskeletal: Normal range of motion.         General: No tenderness. Skin:     General: Skin is warm and dry.   Neurological:      General: No focal deficit present.      Mental Status: Alert and oriented to person, place and time.         Diagnostic Data:  Procedures      ASSESSMENT:   Diagnosis Plan   1. Coronary artery disease involving native coronary artery of native heart without angina pectoris     2. Paroxysmal atrial fibrillation (CMS/HCC)     3. Essential hypertension     4. Mixed hyperlipidemia         PLAN:  CAD with recent non-ST MI patient elects for medical management as he has had no chest discomfort.  Given his known cerebrovascular disease high probability of significant coronary disease.  If any  chest symptoms, ventricular arrhythmia, inability to improve dyspnea- low threshold for invasive ischemic evaluation.  Would continue Plavix for 6 weeks.  With concomitant Eliquis    Paroxysmal atrial fibrillation " maintaining sinus mechanism on labetalol anticoagulated with Eliquis    Dyslipidemia on statin therapy patient does not wish to raise dose as he has had myalgias even on low-dose    We will use as needed Lasix 20 with potassium 10 weight gain 2 pounds in 24 hours in the interim      Ozeil Mcnair MD, FACC

## 2021-09-02 PROBLEM — R05.9 COUGH IN ADULT PATIENT: Status: ACTIVE | Noted: 2021-01-01

## 2021-09-02 PROBLEM — E87.70 EDEMA DUE TO HYPERVOLEMIA: Status: ACTIVE | Noted: 2021-01-01

## 2021-09-02 NOTE — ED NOTES
Urinal provided at bedside. Instructed patient & family to call once able to provide urine specimen.      Minna Michael RN  09/02/21 0132

## 2021-09-02 NOTE — ED PROVIDER NOTES
Subjective   Patient is an 87-year-old male with a history of tongue cancer status post resection at  on August 16, 2021, atrial fibrillation, coronary artery disease, hyperlipidemia, hypertension and stroke presented to the ER for evaluation of weakness and cough.  Patient's daughter is at bedside and helps with HPI.  She states that since his surgery on the 16th he has had to wear oxygen at home to maintain his saturations.  He states has been more short of breath recently and has had a productive cough with phlegm, denies any hemoptysis.  Daughter states he has been checked for Covid recently and it was negative.  He had a checkup with his primary doctor today and she wanted him to come in for further work-up with concerns of pneumonia or fluid overload.  Daughter states he is also had a low hemoglobin, was around 9 when he was admitted at the hospital at .  She states she is also noted his blood pressures have been on the low end of normal and they actually discontinued his amlodipine while he was in the hospital at , he still taking labetalol. She also notes he has had a bit of edema in his lower extremities.  He has not been vaccinated for COVID-19.  He denies any other recent sick contacts.  He denies any chest pain, headache, dizziness, syncope, abdominal pain, emesis, diarrhea.  Denies any melena or hematochezia.  Daughter states that he had follow-up with his surgeon within the past few days and they told him his wound was healing well.  He has also seen his cardiologist recently.          Review of Systems   Constitutional: Positive for fatigue. Negative for chills and fever.   HENT: Negative.    Eyes: Negative.    Respiratory: Positive for cough and shortness of breath.    Cardiovascular: Negative.    Gastrointestinal: Negative.    Genitourinary: Negative.    Musculoskeletal: Negative.    Skin: Negative.    Allergic/Immunologic: Negative for immunocompromised state.   Neurological: Negative.     Psychiatric/Behavioral: Negative.        Past Medical History:   Diagnosis Date   • A-fib (CMS/HCC)    • Arthritis    • Cancer (CMS/HCC)     TONGUE   • Carotid stenosis     s/p right ICA stent x 2   • Coronary artery disease    • Disease of thyroid gland    • Enlarged prostate    • Fracture    • Full dentures    • GERD (gastroesophageal reflux disease)    • Hyperlipidemia    • Hypertension    • Kidney infection    • Renal calculi    • Stroke (CMS/HCC) 03/2016    right retinal artery occlusion   • Wears glasses        Allergies   Allergen Reactions   • Amoxicillin Rash   • Rocephin [Ceftriaxone] Hives       Past Surgical History:   Procedure Laterality Date   • CAROTID ENDARTERECTOMY      X2-3/17, 9/17   • CAROTID STENT Right 03/18/2016    Carotid Wall Stent   • CAROTID STENT Right 03/04/2017    Zilver BANDAR for recurrent right ICA stenosis   • CATARACT EXTRACTION W/ INTRAOCULAR LENS IMPLANT Left 9/4/2018    Procedure: CATARACT PHACO EXTRACTION WITH INTRAOCULAR LENS IMPLANT LEFT, COMPLICATED WITH MALYUGIAN RING;  Surgeon: Paola Welch MD;  Location: [facility];  Service: Ophthalmology   • KIDNEY STONE SURGERY Right 2011    Shafron - open   • PATELLA FRACTURE SURGERY Left 1986   • IA TCAT IV STENT CRV CRTD ART EMBOLIC PROTECJ Right 3/4/2017    Placement of Zilver BANDAR right ICA 3/4/17   • TONGUE SURGERY  08/16/2021    cancer spot removed        Family History   Problem Relation Age of Onset   • Heart disease Mother    • Hyperlipidemia Mother    • Hypertension Mother    • Prostate cancer Father    • Cancer Father    • Heart attack Brother    • Heart disease Brother    • Hyperlipidemia Brother    • Hypertension Brother        Social History     Socioeconomic History   • Marital status:      Spouse name: Not on file   • Number of children: Not on file   • Years of education: Not on file   • Highest education level: Not on file   Tobacco Use   • Smoking status: Former Smoker     Packs/day: 1.00      "Years: 55.00     Pack years: 55.00     Types: Cigarettes     Start date:      Quit date:      Years since quittin.6   • Smokeless tobacco: Former User     Types: Chew     Quit date:    Vaping Use   • Vaping Use: Never used   Substance and Sexual Activity   • Alcohol use: Yes     Comment: quit    • Drug use: No   • Sexual activity: Defer           Objective   Physical Exam  Vitals reviewed.     /66   Pulse 90   Temp 98.9 °F (37.2 °C) (Oral)   Resp 20   Ht 180.3 cm (71\")   Wt 74.4 kg (164 lb)   SpO2 100%   BMI 22.87 kg/m²     GEN: No acute distress, sitting upright in stretcher.  He does appear frail and elderly.  He is answering questions appropriate.  He is wearing nasal cannula at this time.  Head: Normocephalic, atraumatic  Eyes: EOM intact  ENT: Sutures on the tongue are noticed.  There is no significant erythema or edema.  Does have some erythema to the Huddleston arch on the right side of the soft palate  Chest: Nontender to palpation  Cardiovascular: Regular rate and rhythm  Lungs: Lung sounds are diminished throughout.  He does have some coarse lung sounds in the bibasilar bases  Abdomen: Soft, nontender, nondistended, no peritoneal signs, no guarding  Extremities: Patient does have some mild 1+ pitting edema in the lower extremities.  Full range of motion  Neuro: GCS 15  Psych: Mood and affect are appropriate    Procedures           ED Course  ED Course as of Sep 02 2126   Thu Sep 02, 2021   1544 Daughter states patient missed his labetalol dose at 3:00pm, normally takes 200 mg at the time.    [LA]   1622 WBC: 10.37 [LA]   1622 Hemoglobin 9.5 on 21 at    Hemoglobin(!): 9.1 [LA]   1622 Neutrophil Rel %(!): 82.0 [LA]   1622 Lymphocyte Rel %(!): 5.9 [LA]   1622 Monocyte Rel %: 10.1 [LA]   1622 Eosinophil Rel %: 1.1 [LA]   1622 Basophil Rel %: 0.3 [LA]   1622 Immature Granulocyte Rel %(!): 0.6 [LA]   1622 Neutrophils Absolute(!): 8.51 [LA]   1622 Lymphocytes Absolute(!): " 0.61 [LA]   1622 Monocytes Absolute(!): 1.05 [LA]   1625 Narrative & Impression  PROCEDURE: XR CHEST 1 VW-     HISTORY: Weak/Dizzy/AMS triage protocol     COMPARISON:  9/12/2011     FINDINGS:  Portable view of the chest demonstrates bibasilar densities  compatible with atelectasis. Moderate bilateral pleural effusions are  seen. The mediastinum is unremarkable.     The heart size is normal.     IMPRESSION:  Moderate bilateral pleural effusions with atelectasis      This report was finalized on 9/2/2021 4:23 PM by Marin Dennis MD.    [LA]   1628 EKG interpreted by me: Sinus rhythm, normal rate, some nonspecific T waves, no acute ST changes, this is an abnormal EKG    [MP]   1630 Glucose(!): 131 [LA]   1649 proBNP(!): 6,988.0 [LA]   1649 Glucose(!): 110 [LA]   1649 BUN: 15 [LA]   1649 Creatinine: 0.95 [LA]   1649 Sodium(!!): 113 [LA]   1649 Potassium: 5.2 [LA]   1649 Chloride(!): 82 [LA]   1649 CO2(!): 21.8 [LA]   1649 Calcium(!): 8.5 [LA]   1649 Total Protein(!): 5.7 [LA]   1649 Albumin(!): 3.30 [LA]   1649 ALT (SGPT): 23 [LA]   1649 AST (SGOT): 15 [LA]   1649 Alkaline Phosphatase: 88 [LA]   1649 Total Bilirubin: 0.6 [LA]   1649 eGFR Non  Am: 75 [LA]   1649 A/G Ratio: 1.4 [LA]   1649 BUN/Creatinine Ratio: 15.8 [LA]   1649 Anion Gap: 9.2 [LA]   1712 Updated patient and family.    [LA]   1818 Discussed with Dr. Samaniego who graciously accepted the patient for admission.  We will add blood cultures, procalcitonin lactic acid testing.  He does also have a urinary tract infection.  She did initially want us to order Rocephin patient does have an allergy to this. Looked at previous cultures and Levaquin was susceptible to previous cultures.    [LA]   1927 Lactate: 0.9 [LA]   1927 Procalcitonin: 0.18 [LA]   1927 Following informed the patient feels a bit more short of breath.  Will order breathing treatment to see if this helps.    [LA]   2014 Patient's daughter called out.  Daughter states that after starting the  Levaquin is when he seemed to be more short of breath.  He is not complaining of any itching or rashes.  This was discontinued for the time being.  He is getting a breathing treatment at this time. Does have some increased work of breathing.  Will order Benadryl    [LA]      ED Course User Index  [LA] Jenny Swenson PA-C  [MP] Mliad Vital MD                                           MDM  Number of Diagnoses or Management Options  Bilateral pleural effusion  Edema due to hypervolemia  Hyponatremia  Urinary tract infection with hematuria, site unspecified  Diagnosis management comments: On arrival, patient is stable.  He is normotensive and afebrile.  He saturating 99% on his normal nasal cannula.  Differential could include bronchitis, pneumonia, ACS, pulmonary edema, CHF, anemia, electrolyte abnormalities, and other concerns.  Will obtain basic labs, troponin, proBNP, chest x-ray, respiratory panel, EKG.    EKG was interpreted by the attending.  X-ray revealed bilateral pleural effusions.  Patient did have an elevated proBNP of almost 7000 with no previous for comparison.  His hemoglobin appear to be stable at 9.1, had a hemoglobin of 9.5 2 weeks ago at .  He did have significant hyponatremia of 117, was in the upper 120s 2 weeks ago.  He had no elevation of troponin.  Respiratory panel was negative.  Given this, appears patient is fluid overloaded.  Did give Lasix here.  He did also appear to have a urinary tract infection.  Discussed with Dr. Samaniego for admission.  We did add a lactic acid and procalcitonin as well as blood cultures.  She had initially reviewed previous urine cultures and recommended ceftriaxone initially but the patient was allergic to this. We chose Levaquin instead.  She graciously accepted the patient for admission.    While awaiting room placement, patient's daughter states that patient appeared to be more short of breath, felt like it may have occurred after they started on  Levaquin.  He denied any pruritus, did not see any rashes.  She states she has never had Levaquin before to her knowledge.  He was getting a breathing treatment at that time already.  We stopped Levaquin at this time and ordered of Solu-Medrol and Benadryl.         Amount and/or Complexity of Data Reviewed  Clinical lab tests: reviewed and ordered  Tests in the radiology section of CPT®: reviewed and ordered  Discussion of test results with the performing providers: yes  Decide to obtain previous medical records or to obtain history from someone other than the patient: yes  Obtain history from someone other than the patient: yes  Review and summarize past medical records: yes  Discuss the patient with other providers: yes    Risk of Complications, Morbidity, and/or Mortality  Presenting problems: moderate  Diagnostic procedures: moderate  Management options: moderate    Patient Progress  Patient progress: stable      Final diagnoses:   Edema due to hypervolemia   Hyponatremia   Bilateral pleural effusion   Urinary tract infection with hematuria, site unspecified       ED Disposition  ED Disposition     ED Disposition Condition Comment    Decision to Admit  Level of Care: Telemetry [5]   Diagnosis: Edema due to hypervolemia [3380744]   Admitting Physician: CAMERON ROSARIO [[id number]]   Attending Physician: CAMERNO ROSARIO [[id number]]   Certification: I Certify That Inpatient Hospital Services Are Medically Necessary For Greater Than 2 Midnights            No follow-up provider specified.       Medication List      No changes were made to your prescriptions during this visit.          Jenny Swenson PA-C  09/02/21 2107       Jenny Swenson PA-C  09/02/21 2126

## 2021-09-03 NOTE — PLAN OF CARE
"Goal Outcome Evaluation:  Plan of Care Reviewed With: patient        Progress: no change  Outcome Summary: New admit, VSS, rested well, states breathing \"some better\", maintaining 02 sats >90% on 3 L NC, external catheter patent, UOP wnl, repeat labs as ordered.  "

## 2021-09-03 NOTE — PROGRESS NOTES
"    [facility]    Name:  Andrew Hernandez   Age:  87 y.o.  Sex:  male  :  1934  MRN:  [id number]   Visit Number:  [id number]  Admission Date:  2021  Date Of Service:  21  Primary Care Physician:  Vermeesch, Marilyn K, MD     LOS: 1 day :    Chief Complaint:      Shortness of breath    Subjective:    Patient seen and examined at bedside this morning. Chart reviewed and events noted. He was sitting in bedside chair. He states that he is feeling \"a little bit better\" today, but he states that he continues to feel weak. Daughter-in-law at bedside provides lots of information regarding the patient's baseline status prior to his procedure on  and how he has been following discharge from . All of her questions and concerns were addressed and answered.    Hospital Course:    87-year-old male with recent history of tongue cancer status post resection at  on 2021, A. fib on Eliquis, CAD, previous CVA who presents to the emergency room with complaints of shortness of breath and lower extremity edema.  Patient was recently treated at  from - for tongue resection.  Patient also had a myocardial infarction postoperatively.  He declined a heart catheterization and elected to manage medically.  Patient follows with Dr. Mcnair at [facility].  He last saw him  and was told to continue current medications including daily Plavix and Eliquis and instructed to start Lasix.  However, his daughter-in-law did not give him any Lasix because she was concerned due to low blood pressure readings at home. Patient is supposed to be on a mechanical soft diet per speech recommendations, but has not been adhering to a low salt diet. He has had progressively worsening dyspnea, orthopnea, and lower extremity edema.    Upon arrival to the ED, patient was found to have fever of 100.7. Labs were significant for negative troponin, proBNP of 7000, sodium level of 113, normal " renal function.  Patient's baseline sodium level appears to be in the 120s. UA with positive nitrites and leuk esterase. Chest xray moderate bilateral pleural effusions with atelectasis. He was started on Levaquin, but after 10minutes became short of breath, so this was discontinued and he was started on Merrem. He was given Lasix IV 80mg once in ED, then 40 upon arrival to the floor. He has had 2liters output thus far. Suspect hyponatremia is secondary to hypervolemia and should improve with diuresis. Will give Lasix 60mg bid. Repeat Echo ordered.    Review of Systems:     All systems were reviewed and negative except as mentioned in subjective, assessment and plan.    Vital Signs:    Temp:  [97.9 °F (36.6 °C)-100.7 °F (38.2 °C)] 97.9 °F (36.6 °C)  Heart Rate:  [] 67  Resp:  [16-22] 18  BP: (102-167)/(38-69) 107/38    Intake and output:    I/O last 3 completed shifts:  In: 464 [P.O.:360; I.V.:79; IV Piggyback:25]  Out: 2100 [Urine:2100]  I/O this shift:  In: 50 [IV Piggyback:50]  Out: 450 [Urine:450]    Physical Examination:    General Appearance:  Alert and cooperative. Sitting in bedside chair. In no acute distress.   Head:  Large ulcerating mass on back of head.   Eyes: Conjunctivae and sclerae normal, no icterus. No pallor.   Throat: No oral lesions, no thrush, oral mucosa moist. Partial removal of tongue.   Neck: Supple, trachea midline, no thyromegaly.   Lungs:   Breath sounds heard bilaterally equally.  Faint crackles heard throughout. No wheezing.   Heart:  Normal S1 and S2, no murmur, no gallop, no rub. No JVD.   Abdomen:   Normal bowel sounds, no masses, no organomegaly. Soft, nontender, nondistended, no rebound tenderness.   Extremities: 2+ Bilateral lower extremity edema. Supple, no cyanosis, no clubbing.   Skin: No bleeding or rash.   Neurologic: Alert and oriented x 3. No facial asymmetry. Moves all four limbs. No tremors.      Laboratory results:    Results from last 7 days   Lab Units  09/03/21  0640 09/03/21  0403 09/02/21  2232 09/02/21  1601 09/02/21  1601   SODIUM mmol/L 117* 115* 115*   < > 113*   POTASSIUM mmol/L 4.5  --   --   --  5.2   CHLORIDE mmol/L 80*  --   --   --  82*   CO2 mmol/L 23.2  --   --   --  21.8*   BUN mg/dL 18  --   --   --  15   CREATININE mg/dL 1.10  --   --   --  0.95   CALCIUM mg/dL 8.9  --   --   --  8.5*   BILIRUBIN mg/dL  --   --   --   --  0.6   ALK PHOS U/L  --   --   --   --  88   ALT (SGPT) U/L  --   --   --   --  23   AST (SGOT) U/L  --   --   --   --  15   GLUCOSE mg/dL 142*  --   --   --  110*    < > = values in this interval not displayed.     Results from last 7 days   Lab Units 09/03/21  0640 09/02/21  1601   WBC 10*3/mm3 6.77 10.37   HEMOGLOBIN g/dL 10.4* 9.1*   HEMATOCRIT % 29.7* 25.8*   PLATELETS 10*3/mm3 328 311         Results from last 7 days   Lab Units 09/02/21  1601   TROPONIN T ng/mL 0.018             I have reviewed the patient's laboratory results.    Radiology results:    XR Chest 1 View    Result Date: 9/2/2021  PROCEDURE: XR CHEST 1 VW-  HISTORY: Weak/Dizzy/AMS triage protocol  COMPARISON:  9/12/2011  FINDINGS:  Portable view of the chest demonstrates bibasilar densities compatible with atelectasis. Moderate bilateral pleural effusions are seen. The mediastinum is unremarkable.  The heart size is normal.      Impression: Moderate bilateral pleural effusions with atelectasis  This report was finalized on 9/2/2021 4:23 PM by Marin Dennis MD.    I have reviewed the patient's radiology reports.    Medication Review:     I have reviewed the patient's active and prn medications.     Problem List:      Edema due to hypervolemia      Assessment:    1. Acute on Chronic Systolic Heart Failure Exacerbation, POA  2. Acute Hypoxic Respiratory Failure 2/2 Above, POA  3. Hyponatremia, likely secondary to Hypervolemia  4. Uncomplicated Urinary Tract Infection, POA  5. Atrial Fibrillation, anticoagulated with Eliquis  6. Coronary Artery Disease  7. Tongue  Cancer s/p Resection  8. Previous CVA with complete Right Vision Loss  9. Carotid Artery Stenosis s/p Stenting of Right Carotid  10. Ulcerated Mass on Back of Head, concern for Squamous Cell Carcinoma  11. Essential Hypertension  12. Hyperlipidemia  13. Acquired Hypothyroidism  14. GERD    Plan:    - Patient has improved some since diuresis initiated.   - Will continue to diurese with IV Lasix 60mg bid  - Suspect that with improvement of volume status, there will be improvement in hyponatremia as well. Baseline sodium seems to be around 124 per the daughter-in-law.  - Strict Is/Os  - Echo ordered and pending  - Continue with Meropenem for total of 3 days of antibiotic therapy. Unfortunately given patient's significant allergies this is our only option.  - Continue Eliquis and Plavix  - Tolerating soft diet  - Patient does not wish for further workup of ulcerating mass on back of his head.  - Continue home medications as warranted    DVT Prophylaxis: Eliquis  Code Status: Full  Diet: Soft  Discharge Plan: Likely home with family in the next 48-72 hours    Whitney Rivera,   09/03/21  12:21 EDT    Dictated utilizing Dragon dictation.

## 2021-09-03 NOTE — ED NOTES
Pt states he felt more SOA after Levaquin began. Levaquin stopped. No rash noted to skin, pt O2 sats 100%. RADHA Alvarado to bedside. Orders to be placed.     Pat Pineda RN  09/02/21 2020       Pat Pineda RN  09/02/21 2021

## 2021-09-03 NOTE — PLAN OF CARE
Goal Outcome Evaluation:  Plan of Care Reviewed With: patient, daughter        Progress: no change  Outcome Summary: Pt seen for OT evaluation today.  Pt presents with weakness and decreased independence with self care and functional mobility tasks.  Pt is expected to benefit from skilled OT to improve his strength, endurance and independence with ADL tasks.

## 2021-09-03 NOTE — NURSING NOTE
Referral received for Phase II Cardiac Rehab.  Medical chart reviewed, and due to LVEF being above 35%, Pt does not have a qualifying diagnosis for Phase 2 Cardiac Rehab at this time.

## 2021-09-03 NOTE — PLAN OF CARE
Goal Outcome Evaluation:  Plan of Care Reviewed With: patient        Progress: improving  Outcome Summary: VSS. Pt's reanna edema getting better, pt tolerating diet, no acute changes noted during this shift

## 2021-09-03 NOTE — H&P
[facility]   INPATIENT PROGRESS NOTE    Date of Admission: 9/2/2021  Length of Stay: 1  Primary Care Physician: Vermeesch, Marilyn K, MD    Subjective   Subjective   CC: Shortness of breath    HPI: Mr. Hernandez was       Review Of Systems:       Objective   Objective    Physical Exam:  Temp:  [97.9 °F (36.6 °C)-100.7 °F (38.2 °C)] 97.9 °F (36.6 °C)  Heart Rate:  [] 73  Resp:  [16-22] 17  BP: (102-167)/(40-69) 113/46        Results Review:    I have personally reviewed most recent lab results and agree with findings, most notably: gh.      Results from last 7 days   Lab Units 09/03/21  0640 09/02/21  1601   WBC 10*3/mm3 6.77 10.37   HEMOGLOBIN g/dL 10.4* 9.1*   HEMATOCRIT % 29.7* 25.8*   PLATELETS 10*3/mm3 328 311     Results from last 7 days   Lab Units 09/03/21  0640 09/03/21  0403 09/02/21  2232 09/02/21  1601 09/02/21  1601   SODIUM mmol/L 117* 115* 115*   < > 113*   POTASSIUM mmol/L 4.5  --   --   --  5.2   CHLORIDE mmol/L 80*  --   --   --  82*   CO2 mmol/L 23.2  --   --   --  21.8*   BUN mg/dL 18  --   --   --  15   CREATININE mg/dL 1.10  --   --   --  0.95   GLUCOSE mg/dL 142*  --   --   --  110*   CALCIUM mg/dL 8.9  --   --   --  8.5*   ALK PHOS U/L  --   --   --   --  88   ALT (SGPT) U/L  --   --   --   --  23   AST (SGOT) U/L  --   --   --   --  15    < > = values in this interval not displayed.     Hemoglobin A1C (%)   Date/Time Value   03/19/2016 0412 5.1     TSH (uIU/mL)   Date/Time Value   07/06/2021 0854 4.890 (H)     Microbiology Results Abnormal       Procedure Component Value - Date/Time    Respiratory Panel PCR w/COVID-19(SARS-CoV-2) RACHID/LITA/TIFF/PAD/COR/MAD/JIM In-House, NP Swab in UTM/VTM, 3-4 HR TAT - Swab, Nasopharynx [[id number]]  (Normal) Collected: 09/02/21 1556    Lab Status: Final result Specimen: Swab from Nasopharynx Updated: 09/02/21 1826     ADENOVIRUS, PCR Not Detected     Coronavirus 229E Not Detected     Coronavirus HKU1 Not Detected     Coronavirus NL63 Not Detected      Coronavirus OC43 Not Detected     COVID19 Not Detected     Human Metapneumovirus Not Detected     Human Rhinovirus/Enterovirus Not Detected     Influenza A PCR Not Detected     Influenza B PCR Not Detected     Parainfluenza Virus 1 Not Detected     Parainfluenza Virus 2 Not Detected     Parainfluenza Virus 3 Not Detected     Parainfluenza Virus 4 Not Detected     RSV, PCR Not Detected     Bordetella pertussis pcr Not Detected     Bordetella parapertussis PCR Not Detected     Chlamydophila pneumoniae PCR Not Detected     Mycoplasma pneumo by PCR Not Detected    Narrative:      In the setting of a positive respiratory panel with a viral infection PLUS a negative procalcitonin without other underlying concern for bacterial infection, consider observing off antibiotics or discontinuation of antibiotics and continue supportive care. If the respiratory panel is positive for atypical bacterial infection (Bordetella pertussis, Chlamydophila pneumoniae, or Mycoplasma pneumoniae), consider antibiotic de-escalation to target atypical bacterial infection.          XR Chest 1 View    Result Date: 9/2/2021  Moderate bilateral pleural effusions with atelectasis  This report was finalized on 9/2/2021 4:23 PM by Marin Dennis MD.      Results for orders placed in visit on 08/30/16    Adult stress echo with color and doppler    Interpretation Summary  Small area of stress-induced distal inferior and apical akinesis.  No ischemic EKG changes.  Overall Global increase in left ventricular ejection fraction as well as decrease in left ventricular systolic volumes.  This represents a low risk abnormal treadmill stress echo.      I have reviewed the medications.    Assessment/Plan   Assessment/Plan   Brief Hospital Course:      Active Hospital Problems:  No notes have been filed under this hospital service.  Service: Hospitalist          DVT prophylaxis:  Discharge Planning: I expect patient to be discharged to home in 3 days.    Max  Kristyn, Medical Student, 09/03/21 9:16 AM EDT

## 2021-09-03 NOTE — ED NOTES
"Patient reporting worsening shortness of breath. States, \"this has happened the last 3 nights.\" Oxygen saturation 95% on 2L nasal cannula. Oxygen increased to 3L with improvement to 99%. Patient repositioned in bed with some improvement. RADHA Alvarado notified. Breathing treatment ordered. Respiratory to administer. Will continue to monitor closely.      Minna Michael RN  09/02/21 2001    "

## 2021-09-03 NOTE — CASE MANAGEMENT/SOCIAL WORK
Discharge Planning Assessment  [facility]     Patient Name: Andrew Hernandez  MRN: [id number]  Today's Date: 9/3/2021    Admit Date: 9/2/2021    Discharge Needs Assessment     Row Name 09/03/21 4684       Living Environment    Lives With  alone    Unique Family Situation  Pt lives in a garage apartment. Pt does live on the same property at his son and POA.    Current Living Arrangements  home/apartment/condo    Primary Care Provided by  self;child(noah)    Provides Primary Care For  no one, unable/limited ability to care for self    Family Caregiver if Needed  child(noah), adult    Family Caregiver Names  Solomon Hernandez (Son)    Quality of Family Relationships  supportive;helpful;involved    Able to Return to Prior Arrangements  yes       Resource/Environmental Concerns    Resource/Environmental Concerns  none       Transition Planning    Patient/Family Anticipates Transition to  home with family    Patient/Family Anticipated Services at Transition  none    Transportation Anticipated  family or friend will provide       Discharge Needs Assessment    Readmission Within the Last 30 Days  no previous admission in last 30 days    Equipment Currently Used at Home  wheelchair;oxygen 2 to 3L at night time through [employer] as private pay.    Concerns to be Addressed  no discharge needs identified    Equipment Needed After Discharge  none        Discharge Plan     Row Name 09/03/21 7070       Plan    Plan Comments  SW contacted the pt's POA/daughter in-law, Roshni via telephone to complete discharge planning. Confirmed pt's address as listed in the chart. Pt lives in a garage apartment on his son's property. Pt's POA states that the pt was completely independent before his tongue surgery in August. The POA states that the pt is able to bath himself and at times cook for himself. The pt's family provides care to pt. Pt has 7 adult children that helps out. Pt uses a wheelchair when going to the store or appointments. Pt does not use it  at home. Pt has O2 through [employer] that they private pay for. Pt's POA states that the O2 order is written for 2-3L at nighttime, but the pt has been having to use it continuously. POA reports no other equipment needs at this time.  POA states that they have plenty of family members to help out with the pt. Family anticipates that pt discharging home when ready. A family member will transport at discharge. Pt's PCP confirmed. No other CM needs have been identified at this time. CM will continue to follow and assist as needed.    14:04 EDT  Confirmed with [employer] that O2 order is written for 2L continuously.         Continued Care and Services - Admitted Since 9/2/2021    Coordination has not been started for this encounter.         Demographic Summary     Row Name 09/03/21 1331       General Information    Admission Type  inpatient    Arrived From  emergency department    Referral Source  admission list    Reason for Consult  discharge planning    Preferred Language  English     Used During This Interaction  no        Functional Status     Row Name 09/03/21 1332       Functional Status    Usual Activity Tolerance  moderate       Functional Status, IADL    Medications  assistive person    Meal Preparation  assistive person    Housekeeping  assistive person    Laundry  assistive person    Shopping  assistive equipment and person       Employment/    Employment Status  retired        Psychosocial     Row Name 09/03/21 1333       Intellectual Performance WDL    Level of Consciousness  Alert       Coping/Stress    Sources of Support  adult child(noah);other family members       Developmental Stage (Eriksson's)    Developmental Stage  Stage 8 (65 years-death/Late Adulthood) Integrity vs. Despair        Abuse/Neglect    No documentation.       Legal    No documentation.       Substance Abuse    No documentation.       Patient Forms    No documentation.           SIRENA Bailey

## 2021-09-03 NOTE — H&P
[facility]   HISTORY AND PHYSICAL      Name:  Andrew Hernandez   Age:  87 y.o.  Sex:  male  :  1934  MRN:  [id number]   Visit Number:  [id number]  Admission Date:  2021  Date Of Service:  21  Primary Care Physician:  Vermeesch, Marilyn K, MD    Chief Complaint:     Shortness of breath    History Of Presenting Illness:      Patient is a chronically ill 87-year-old male with recent history of tongue cancer status post resection at  on 2021, A. fib on Eliquis, CAD, previous CVA who presents to the emergency room with complaints of shortness of breath and lower extremity edema.  Patient was recently treated at  from - for tongue resection.  Patient also had a myocardial infarction postoperatively.  He declined a heart catheterization and elected to manage medically.  Patient follows with Dr. Mcnair at [facility].  He last saw him  and was told to continue current medications including daily Plavix and Eliquis.  According to his daughter-in-law, patient had an echocardiogram at  with an EF of 40%.  Patient was prescribed Lasix.  However, his daughter-in-law did not give him any Lasix because she was concerned due to low blood pressure readings at home.  Patient was discharged home from  with PEG tube and tube feeds.  These were recently discontinued as of last week, PEG tube removed and patient tolerating a soft diet per speech recommendations.  He has not been adhering to a low-salt diet.  In the ER, patient was noted to have a slight fever of 100.7 Fahrenheit, otherwise hemodynamically stable.  Labs were significant for negative troponin, proBNP of 7000, sodium level of 113, normal renal function.  Patient's baseline sodium level appears to be in the 120s.  Lactic acid and procalcitonin were within normal limits.  No leukocytosis.  Hemoglobin hematocrit at baseline.  COVID-19 negative.  Patient is not vaccinated.  Urinalysis does show evidence of acute  infection with nitrites and leukocytes.  Hospital service contacted for admission.    Review Of Systems:    All systems were reviewed and negative except as mentioned in history of presenting illness, assessment and plan.    Past Medical History: Patient  has a past medical history of A-fib (CMS/[facility]), Arthritis, Cancer (CMS/[facility]), Carotid stenosis, Coronary artery disease, Disease of thyroid gland, Enlarged prostate, Fracture, Full dentures, GERD (gastroesophageal reflux disease), Hyperlipidemia, Hypertension, Kidney infection, Renal calculi, Stroke (CMS/[facility]) (03/2016), and Wears glasses.    Past Surgical History: Patient  has a past surgical history that includes Patella fracture surgery (Left, 1986); Kidney stone surgery (Right, 2011); pr tcat iv stent crv crtd art embolic protecj (Right, 3/4/2017); Carotid stent (Right, 03/18/2016); Carotid stent (Right, 03/04/2017); Carotid endarterectomy; Cataract extraction w/ intraocular lens implant (Left, 9/4/2018); and Tongue surgery (08/16/2021).    Social History: Patient  reports that he quit smoking about 16 years ago. His smoking use included cigarettes. He started smoking about 71 years ago. He has a 55.00 pack-year smoking history. He quit smokeless tobacco use about 16 years ago.  His smokeless tobacco use included chew. He reports current alcohol use. He reports that he does not use drugs.    Family History: Patient's family history includes Cancer in his father; Heart attack in his brother; Heart disease in his brother and mother; Hyperlipidemia in his brother and mother; Hypertension in his brother and mother; Prostate cancer in his father.    Allergies:      Levaquin [levofloxacin], Amoxicillin, and Rocephin [ceftriaxone]    Home Medications:    Prior to Admission Medications     Prescriptions Last Dose Informant Patient Reported? Taking?    Acetaminophen (TYLENOL PO) 9/2/2021  Yes Yes    Take  by mouth As Needed.    apixaban (ELIQUIS) 2.5 MG tablet tablet  9/2/2021  No Yes    Take 1 tablet by mouth Every 12 (Twelve) Hours.    atorvastatin (LIPITOR) 20 MG tablet 9/1/2021  No Yes    Take 1 tablet by mouth Every Night.    clopidogrel (PLAVIX) 75 MG tablet 9/2/2021  Yes Yes    Take 75 mg by mouth Daily.    Famotidine (PEPCID AC PO) 9/2/2021  Yes Yes    Take  by mouth 2 (two) times a day.    ibuprofen (ADVIL,MOTRIN) 600 MG tablet 9/1/2021  Yes Yes    Take 600 mg by mouth Daily.    labetalol (NORMODYNE) 200 MG tablet 9/2/2021  No Yes    Take 1 tablet by mouth 3 (Three) Times a Day.    levothyroxine (SYNTHROID, LEVOTHROID) 50 MCG tablet 9/2/2021  No Yes    Take 1 tablet by mouth Daily.    Nutritional Supplements (Boost Plus) liquid 9/1/2021  Yes Yes    Take  by mouth Daily.    tamsulosin (FLOMAX) 0.4 MG capsule 24 hr capsule 9/1/2021  No Yes    Take 1 capsule by mouth Daily.    amLODIPine (NORVASC) 2.5 MG tablet   No No    Take 1 tablet by mouth 2 (Two) Times a Day As Needed (take 1 extra tablet daily as needed for high BP).    calcium carbonate (TUMS) 500 MG chewable tablet Unknown  Yes No    Chew. TAKE AS DIRECTED.    furosemide (LASIX) 20 MG tablet   No No    Take 1 tablet by mouth Daily As Needed for a weight gain of 2 pounds in 24 hours    nitroglycerin (NITROSTAT) 0.4 MG SL tablet   No No    Place 1 tablet under the tongue Every 5 (Five) Minutes As Needed for Chest Pain for up to 3 doses. Take no more than 3 doses in 15 minutes.    potassium chloride 10 MEQ CR tablet   No No    Take 1 tablet by mouth Daily.    Probiotic Product (GradFly PO) Not Taking  Yes No    Take 2 tablets by mouth As Needed.    Patient not taking:  Reported on 9/2/2021        ED Medications:    Medications   sodium chloride 0.9 % flush 10 mL (has no administration in time range)   apixaban (ELIQUIS) tablet 2.5 mg (has no administration in time range)   atorvastatin (LIPITOR) tablet 20 mg (has no administration in time range)   clopidogrel (PLAVIX) tablet 75 mg (has no administration  in time range)   tamsulosin (FLOMAX) 24 hr capsule 0.4 mg (has no administration in time range)   levothyroxine (SYNTHROID, LEVOTHROID) tablet 50 mcg (has no administration in time range)   famotidine (PEPCID) tablet 20 mg (has no administration in time range)   acetaminophen (TYLENOL) tablet 650 mg (has no administration in time range)     Or   acetaminophen (TYLENOL) 160 MG/5ML solution 650 mg (has no administration in time range)     Or   acetaminophen (TYLENOL) suppository 650 mg (has no administration in time range)   ondansetron (ZOFRAN) injection 4 mg (has no administration in time range)   furosemide (LASIX) injection 40 mg (has no administration in time range)   sennosides-docusate (PERICOLACE) 8.6-50 MG per tablet 2 tablet (has no administration in time range)     And   polyethylene glycol (MIRALAX) packet 17 g (has no administration in time range)     And   bisacodyl (DULCOLAX) EC tablet 5 mg (has no administration in time range)     And   bisacodyl (DULCOLAX) suppository 10 mg (has no administration in time range)   melatonin tablet 5 mg (has no administration in time range)   ipratropium-albuterol (DUO-NEB) nebulizer solution 3 mL (has no administration in time range)   meropenem in sodium chloride 0.9% 50 mL (MERREM) IVPB 1 g (has no administration in time range)   meropenem in sodium chloride 0.9% 50 mL (MERREM) IVPB 1 g (has no administration in time range)   labetalol (NORMODYNE) tablet 100 mg (has no administration in time range)   labetalol (NORMODYNE) tablet 200 mg (200 mg Oral Given 9/2/21 1611)   furosemide (LASIX) injection 80 mg (80 mg Intravenous Given 9/2/21 1723)   levoFLOXacin (LEVAQUIN) 750 mg/150 mL D5W (premix) (LEVAQUIN) 750 mg (0 mg Intravenous Stopped 9/2/21 2018)   ipratropium-albuterol (DUO-NEB) nebulizer solution 3 mL (3 mL Nebulization Given 9/2/21 2007)   diphenhydrAMINE (BENADRYL) injection 25 mg (25 mg Intravenous Given 9/2/21 2033)   methylPREDNISolone sodium succinate  (SOLU-Medrol) injection 80 mg (80 mg Intravenous Given 9/2/21 2033)     Vital Signs:  Temp:  [98.2 °F (36.8 °C)-100.7 °F (38.2 °C)] 98.9 °F (37.2 °C)  Heart Rate:  [67-92] 90  Resp:  [16-22] 20  BP: (102-153)/(40-69) 152/66        09/02/21  1523   Weight: 74.4 kg (164 lb)     Body mass index is 22.87 kg/m².    Physical Exam:     General Appearance:  Alert and cooperative.    Head:   Large ulcerating mass to the back of the head, present for years   Eyes: Conjunctivae and sclerae normal, no icterus. No pallor.   Ears:  Ears with no abnormalities noted.   Throat: No oral lesions, no thrush, oral mucosa moist.   Neck: Supple, trachea midline, no thyromegaly.       Lungs:   Breath sounds heard bilaterally equally.  Faint crackles heard bilaterally.  No use of accessory muscles.   Heart:  Normal S1 and S2, no murmur, No JVD.   Abdomen:   Normal bowel sounds, Soft, nontender, nondistended, no rebound tenderness.   Extremities: Supple, no edema, no cyanosis, no clubbing.   Pulses: Pulses palpable bilaterally.   Skin: No bleeding or rash.   Neurologic: Alert and oriented x 3. No facial asymmetry. Moves all four limbs. No tremors.      Laboratory data:    I have reviewed the labs done in the emergency room.    Results from last 7 days   Lab Units 09/02/21  1601   SODIUM mmol/L 113*   POTASSIUM mmol/L 5.2   CHLORIDE mmol/L 82*   CO2 mmol/L 21.8*   BUN mg/dL 15   CREATININE mg/dL 0.95   CALCIUM mg/dL 8.5*   BILIRUBIN mg/dL 0.6   ALK PHOS U/L 88   ALT (SGPT) U/L 23   AST (SGOT) U/L 15   GLUCOSE mg/dL 110*     Results from last 7 days   Lab Units 09/02/21  1601   WBC 10*3/mm3 10.37   HEMOGLOBIN g/dL 9.1*   HEMATOCRIT % 25.8*   PLATELETS 10*3/mm3 311         Results from last 7 days   Lab Units 09/02/21  1601   TROPONIN T ng/mL 0.018     Results from last 7 days   Lab Units 09/02/21  1601   PROBNP pg/mL 6,988.0*                 Results from last 7 days   Lab Units 09/02/21  1723   COLOR UA  Yellow   GLUCOSE UA  Negative   KETONES  UA  Negative   LEUKOCYTES UA  Large (3+)*   PH, URINE  6.0   BILIRUBIN UA  Negative   UROBILINOGEN UA  0.2 E.U./dL   RBC UA /HPF 0-2*   WBC UA /HPF Too Numerous to Count*       Pain Management Panel    There is no flowsheet data to display.         EKG:      EKG personally reviewed, sinus rhythm, no acute ST wave changes.    Radiology:    XR Chest 1 View    Result Date: 9/2/2021  PROCEDURE: XR CHEST 1 VW-  HISTORY: Weak/Dizzy/AMS triage protocol  COMPARISON:  9/12/2011  FINDINGS:  Portable view of the chest demonstrates bibasilar densities compatible with atelectasis. Moderate bilateral pleural effusions are seen. The mediastinum is unremarkable.  The heart size is normal.      Moderate bilateral pleural effusions with atelectasis  This report was finalized on 9/2/2021 4:23 PM by Marin Dennis MD.      Assessment:    Acute on chronic systolic heart failure exacerbation, POA  Acute hypoxic respiratory failure secondary to above, POA  Hyponatremia suspect secondary to hypervolemia, POA  UTI, POA  Atrial fibrillation on Eliquis  CAD  Tongue cancer status post resection  Previous CVA with complete right vision loss  Carotid artery stenosis status post stenting on the right  Ulcerated mass to the back of the head, squamous cell carcinoma?  Hypertension  Hyperlipidemia  Acquired hypothyroidism  GERD    Plan:    Admit patient to the hospital with cardiac monitoring.  Strict I's and O's.  External urinary catheter.  Did discuss the possibility of an indwelling Rivero catheter due to significant BPH.  Will conduct frequent bladder scans.  Monitor sodium level every 6 hours.  This appears to have occurred acutely/subacutely related to hypervolemia.  Sodium level 2 weeks ago was 124.  This appears to be baseline.  Patient has an allergy to penicillins as well as cephalosporins.  Based on previous cultures, Levaquin was administered in the ER.  Patient complained of shortness of breath within 10 minutes of receiving IV Levaquin.   This was discontinued.  Only other option is Merrem.  Anticipate 3 days of treatment.  Follow culture results.  Continue Eliquis and Plavix daily.  Continue home labetalol.  Hold other antihypertensives.  Continue Lipitor.  Repeat 2D echocardiogram.  Patient had his PEG tube removed approximately 1 week ago and passed his speech swallowing evaluation last week.  We will continue him on a soft ground diet.  Patient has declined further work-up and evaluation of the ulcerated mass on his posterior scalp.  Further orders as clinical course dictates.    Risk Assessment: High  DVT Prophylaxis: Eliquis  Code Status:   Diet: Dysphagia diet, fluid restriction    Advance Care Planning   ACP discussion was declined by the patient. Patient does not have an advance directive, declines further assistance.      Sal Samaniego DO  09/02/21  22:13 EDT    Dictated utilizing Dragon dictation.

## 2021-09-03 NOTE — THERAPY EVALUATION
Patient Name: Andrew Hernandez  : 1934    MRN: [id number]                              Today's Date: 9/3/2021       Admit Date: 2021    Visit Dx:     ICD-10-CM ICD-9-CM   1. Edema due to hypervolemia  E87.70 782.3     276.69   2. Hyponatremia  E87.1 276.1   3. Bilateral pleural effusion  J90 511.9   4. 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    • Carotid stenosis, symptomatic w/o infarct   • Hyperkalemia   • Hyponatremia   • Encounter for Medicare annual wellness exam   • Tongue lesion   • Abdominal bruit   • Cough in adult patient   • Edema due to hypervolemia     Past Medical History:   Diagnosis Date   • A-fib (CMS/[facility])    • Arthritis    • Cancer (CMS/[facility])     TONGUE   • Carotid stenosis     s/p right ICA stent x 2   • Coronary artery disease    • Disease of thyroid gland    • Enlarged prostate    • Fracture    • Full dentures    • GERD (gastroesophageal reflux disease)    • Hyperlipidemia    • Hypertension    • Impaired functional mobility, balance, gait, and endurance 2021   • Kidney infection    • Renal calculi    • Stroke (CMS/[facility]) 2016    right retinal artery occlusion   • Wears glasses      Past Surgical History:   Procedure Laterality Date   • CAROTID ENDARTERECTOMY      X2-3/17,    • CAROTID STENT Right 2016    Carotid Wall Stent   • CAROTID STENT Right 2017    [place] BANDAR for recurrent right ICA stenosis   •  CATARACT EXTRACTION W/ INTRAOCULAR LENS IMPLANT Left 9/4/2018    Procedure: CATARACT PHACO EXTRACTION WITH INTRAOCULAR LENS IMPLANT LEFT, COMPLICATED WITH MALYUGIAN RING;  Surgeon: Paola Welch MD;  Location: [facility];  Service: Ophthalmology   • KIDNEY STONE SURGERY Right 2011    [facility] - open   • PATELLA FRACTURE SURGERY Left 1986   • MA TCAT IV STENT CRV CRTD ART EMBOLIC PROTECJ Right 3/4/2017    Placement of [place]ilver BANDAR right ICA 3/4/17   • TONGUE SURGERY  08/16/2021    cancer spot removed      General Information     Row Name 09/03/21 1033          Physical Therapy Time and Intention    Document Type  evaluation  (Pended)   -CW     Mode of Treatment  physical therapy  (Pended)   -     Row Name 09/03/21 1033          General Information    Patient Profile Reviewed  yes  (Pended)   -CW     Prior Level of Function  independent:;all household mobility;community mobility  (Pended)   -CW     Existing Precautions/Restrictions  fall;oxygen therapy device and L/min  (Pended)   -CW     Barriers to Rehab  previous functional deficit  (Pended)   -CW     Row Name 09/03/21 1033          Living Environment    Lives With  alone  (Pended)   -     Row Name 09/03/21 1033          Home Main Entrance    Number of Stairs, Main Entrance  other (see comments)  (Pended)  15  -CW     Stair Railings, Main Entrance  railings on both sides of stairs  (Pended)   -     Row Name 09/03/21 1033          Stairs Within Home, Primary    Number of Stairs, Within Home, Primary  none  (Pended)   -     Row Name 09/03/21 1033          Cognition    Orientation Status (Cognition)  oriented x 4  (Pended)   -     Row Name 09/03/21 1033          Safety Issues, Functional Mobility    Safety Issues Affecting Function (Mobility)  safety precaution awareness;awareness of need for assistance;safety precautions follow-through/compliance  (Pended)   -CW     Impairments Affecting Function (Mobility)  balance;endurance/activity  tolerance;strength  (Pended)   -       User Key  (r) = Recorded By, (t) = Taken By, (c) = Cosigned By    Initials Name Provider Type    Taylor Del Rosario, PT Student PT Student        Mobility     Row Name 09/03/21 1033          Bed Mobility    Bed Mobility  bed mobility (all) activities  (Pended)   -     All Activities, [place] (Bed Mobility)  supervision  (Pended)   -     Assistive Device (Bed Mobility)  head of bed elevated  (Pended)   -     Row Name 09/03/21 1033          Sit-Stand Transfer    Sit-Stand [place] (Transfers)  minimum assist (75% patient effort)  (Pended)   -     Assistive Device (Sit-Stand Transfers)  other (see comments)  (Pended)  HHA  -     Row Name 09/03/21 1033          Gait/Stairs (Locomotion)    [place] Level (Gait)  minimum assist (75% patient effort)  (Pended)   -     Assistive Device (Gait)  other (see comments)  (Pended)  HHA  -     Distance in Feet (Gait)  12ft  (Pended)   -       User Key  (r) = Recorded By, (t) = Taken By, (c) = Cosigned By    Initials Name Provider Type    Taylor Del Rosario, PT Student PT Student        Obj/Interventions     Row Name 09/03/21 1033          Strength Comprehensive (MMT)    General Manual Muscle Testing (MMT) Assessment  lower extremity strength deficits identified  (Pended)   -     Comment, General Manual Muscle Testing (MMT) Assessment  LE MMT grossly 4/5  (Pended)   -     Row Name 09/03/21 1033          Balance    Balance Assessment  sitting static balance;sitting dynamic balance;standing static balance;standing dynamic balance  (Pended)   -     Static Sitting Balance  WFL  (Pended)   -CW     Dynamic Sitting Balance  WFL  (Pended)   -     Static Standing Balance  WFL  (Pended)   -     Dynamic Standing Balance  WFL  (Pended)   -       User Key  (r) = Recorded By, (t) = Taken By, (c) = Cosigned By    Initials Name Provider Type    Taylor Del Rosario, PT Student PT Student        Goals/Plan     Row Name  09/03/21 1033          Bed Mobility Goal 1 (PT)    Activity/Assistive Device (Bed Mobility Goal 1, PT)  bed mobility activities, all  (Pended)   -CW     [place] Level/Cues Needed (Bed Mobility Goal 1, PT)  modified independence  (Pended)   -CW     Time Frame (Bed Mobility Goal 1, PT)  long term goal (LTG);10 days  (Pended)   -CW     Progress/Outcomes (Bed Mobility Goal 1, PT)  goal ongoing  (Pended)   -CW     Row Name 09/03/21 1033          Transfer Goal 1 (PT)    Activity/Assistive Device (Transfer Goal 1, PT)  sit-to-stand/stand-to-sit  (Pended)   -CW     [place] Level/Cues Needed (Transfer Goal 1, PT)  modified independence  (Pended)   -CW     Time Frame (Transfer Goal 1, PT)  long term goal (LTG);10 days  (Pended)   -CW     Progress/Outcome (Transfer Goal 1, PT)  goal ongoing  (Pended)   -CW     Row Name 09/03/21 1033          Gait Training Goal 1 (PT)    Activity/Assistive Device (Gait Training Goal 1, PT)  gait (walking locomotion);decrease fall risk  (Pended)   -CW     [place] Level (Gait Training Goal 1, PT)  modified independence  (Pended)   -CW     Distance (Gait Training Goal 1, PT)  100ft  (Pended)   -CW     Time Frame (Gait Training Goal 1, PT)  long term goal (LTG);10 days  (Pended)   -CW     Row Name 09/03/21 1033          Stairs Goal 1 (PT)    Activity/Assistive Device (Stairs Goal 1, PT)  stairs, all skills;decrease fall risk  (Pended)   -CW     [place] Level/Cues Needed (Stairs Goal 1, PT)  minimum assist (75% or more patient effort)  (Pended)   -CW     Number of Stairs (Stairs Goal 1, PT)  15  (Pended)   -CW     Time Frame (Stairs Goal 1, PT)  long term goal (LTG);10 days  (Pended)   -CW     Progress/Outcome (Stairs Goal 1, PT)  goal ongoing  (Pended)   -CW       User Key  (r) = Recorded By, (t) = Taken By, (c) = Cosigned By    Initials Name Provider Type    CW Taylor Duarte, PT Student PT Student        Clinical Impression     Row Name 09/03/21 1033          Pain     Additional Documentation  Pain Scale: Numbers Pre/Post-Treatment (Group)  (Pended)   -     Row Name 09/03/21 1033          Pain Scale: Numbers Pre/Post-Treatment    Pretreatment Pain Rating  0/10 - no pain  (Pended)   -CW     Posttreatment Pain Rating  0/10 - no pain  (Pended)   -     Row Name 09/03/21 1033          Plan of Care Review    Plan of Care Reviewed With  patient  (Pended)   -CW     Progress  no change  (Pended)   -     Outcome Summary  PT evaluation completed this date. Patient was supervision for bed mobility, min asssit for 4 sit> stand transfers, and min assist for 12ft gait HHA. Patient was able to stand for 10 minuted but required two sitting rests. Patient demonstrated deficits in strength, endurance, gait, and functional mobility. Patient would benefit from skilled PT interventions to address deficits.  (Pended)   -[facility] Name 09/03/21 1033          Therapy Assessment/Plan (PT)    Patient/Family Therapy Goals Statement (PT)  to go home  (Pended)   -CW     Rehab Potential (PT)  good, to achieve stated therapy goals  (Pended)   -     Criteria for Skilled Interventions Met (PT)  meets criteria;yes;skilled treatment is necessary  (Pended)   -CW     Predicted Duration of Therapy Intervention (PT)  Until discharge  (Pended)   -     Row Name 09/03/21 1033          Vital Signs    O2 Delivery Pre Treatment  supplemental O2  (Pended)  3 LNC  -CW     O2 Delivery Intra Treatment  supplemental O2  (Pended)   -CW     O2 Delivery Post Treatment  supplemental O2  (Pended)   -CW     Pre Patient Position  Supine  (Pended)   -CW     Intra Patient Position  Standing  (Pended)   -CW     Post Patient Position  Sitting  (Pended)   -     Row Name 09/03/21 1033          Positioning and Restraints    Pre-Treatment Position  in bed  (Pended)   -CW     Post Treatment Position  chair  (Pended)   -CW     In Chair  sitting;call light within reach;encouraged to call for assist;with family/caregiver  (Pended)    -CW       User Key  (r) = Recorded By, (t) = Taken By, (c) = Cosigned By    Initials Name Provider Type    CW Taylor Duarte, PT Student PT Student        Outcome Measures     Row Name 09/03/21 1033          How much help from another person do you currently need...    Turning from your back to your side while in flat bed without using bedrails?  4  (Pended)   -CW     Moving from lying on back to sitting on the side of a flat bed without bedrails?  4  (Pended)   -CW     Moving to and from a bed to a chair (including a wheelchair)?  3  (Pended)   -CW     Standing up from a chair using your arms (e.g., wheelchair, bedside chair)?  3  (Pended)   -CW     Climbing 3-5 steps with a railing?  2  (Pended)   -CW     To walk in hospital room?  3  (Pended)   -CW     AM-PAC 6 Clicks Score (PT)  19  (Pended)   -CW     Row Name 09/03/21 1033          Functional Assessment    Outcome Measure Options  AM-PAC 6 Clicks Basic Mobility (PT)  (Pended)   -CW       User Key  (r) = Recorded By, (t) = Taken By, (c) = Cosigned By    Initials Name Provider Type    CW Taylor Duarte, PT Student PT Student                       Physical Therapy Education                 Title: PT OT SLP Therapies (Done)     Topic: Physical Therapy (Done)     Point: Mobility training (Done)     Learning Progress Summary           Patient Acceptance, E, VU by CW at 9/3/2021 1322    Comment: Educated the patient on the benefit of PT POC for mobility.                   Point: Home exercise program (Done)     Learning Progress Summary           Patient Acceptance, E, VU by CW at 9/3/2021 1322    Comment: Educated the patient on the benefit of PT POC for mobility.                   Point: Body mechanics (Done)     Learning Progress Summary           Patient Acceptance, E, VU by CW at 9/3/2021 1322    Comment: Educated the patient on the benefit of PT POC for mobility.                   Point: Precautions (Done)     Learning Progress Summary           Patient  Acceptance, E, VU by CW at 9/3/2021 1322    Comment: Educated the patient on the benefit of PT POC for mobility.                               User Key     Initials Effective Dates Name Provider Type Discipline     07/26/21 -  Taylor Duarte PT Student PT Student PT              PT Recommendation and Plan     Plan of Care Reviewed With: (P) patient  Progress: (P) no change  Outcome Summary: (P) PT evaluation completed this date. Patient was supervision for bed mobility, min asssit for 4 sit> stand transfers, and min assist for 12ft gait HHA. Patient was able to stand for 10 minuted but required two sitting rests. Patient demonstrated deficits in strength, endurance, gait, and functional mobility. Patient would benefit from skilled PT interventions to address deficits.     Time Calculation:   PT Charges     Row Name 09/03/21 1323             Time Calculation    Start Time  1033  (Pended)   -CW      PT Received On  09/03/21  (Pended)   -      PT Goal Re-Cert Due Date  09/13/21  (Pended)   -CW         Untimed Charges    PT Eval/Re-eval Minutes  46  (Pended)   -CW         Total Minutes    Untimed Charges Total Minutes  46  (Pended)   -CW       Total Minutes  46  (Pended)   -        User Key  (r) = Recorded By, (t) = Taken By, (c) = Cosigned By    Initials Name Provider Type    CW Taylor Duarte PT Student PT Student        Therapy Charges for Today     Code Description Service Date Service Provider Modifiers Qty    [id number] HC PT EVAL LOW COMPLEXITY 3 9/3/2021 Taylor Duarte PT Student GP 1          PT G-Codes  Outcome Measure Options: (P) AM-PAC 6 Clicks Basic Mobility (PT)  AM-PAC 6 Clicks Score (PT): (P) 19    MIRA GALVEZ  9/3/2021

## 2021-09-03 NOTE — THERAPY EVALUATION
Patient Name: Andrew Hernandez  : 1934    MRN: [id number]                              Today's Date: 9/3/2021       Admit Date: 2021    Visit Dx:     ICD-10-CM ICD-9-CM   1. Edema due to hypervolemia  E87.70 782.3     276.69   2. Hyponatremia  E87.1 276.1   3. Bilateral pleural effusion  J90 511.9   4. 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    • Carotid stenosis, symptomatic w/o infarct   • Hyperkalemia   • Hyponatremia   • Encounter for Medicare annual wellness exam   • Tongue lesion   • Abdominal bruit   • Cough in adult patient   • Edema due to hypervolemia     Past Medical History:   Diagnosis Date   • A-fib (CMS/formerly [facility])    • Arthritis    • Cancer (CMS/formerly [facility])     TONGUE   • Carotid stenosis     s/p right ICA stent x 2   • Coronary artery disease    • Disease of thyroid gland    • Enlarged prostate    • Fracture    • Full dentures    • GERD (gastroesophageal reflux disease)    • Hyperlipidemia    • Hypertension    • Impaired functional mobility, balance, gait, and endurance 2021   • Kidney infection    • Renal calculi    • Stroke (CMS/formerly [facility]) 2016    right retinal artery occlusion   • Wears glasses      Past Surgical History:   Procedure Laterality Date   • CAROTID ENDARTERECTOMY      X2-3/17,    • CAROTID STENT Right 2016    Carotid Wall Stent   • CAROTID STENT Right 2017    [place] BANDAR for recurrent right ICA stenosis   •  CATARACT EXTRACTION W/ INTRAOCULAR LENS IMPLANT Left 9/4/2018    Procedure: CATARACT PHACO EXTRACTION WITH INTRAOCULAR LENS IMPLANT LEFT, COMPLICATED WITH MALYUGIAN RING;  Surgeon: Paola Welch MD;  Location: [facility];  Service: Ophthalmology   • KIDNEY STONE SURGERY Right 2011    [facility] - open   • PATELLA FRACTURE SURGERY Left 1986   • [place] TCAT IV STENT CRV CRTD ART EMBOLIC PROTECJ Right 3/4/2017    Placement of Zilver BANDAR right ICA 3/4/17   • TONGUE SURGERY  08/16/2021    cancer spot removed      General Information     [facility] Name 09/03/21 1302          OT Time and Intention    Document Type  evaluation  -     Mode of Treatment  occupational therapy  -[facility] Name 09/03/21 1302          General Information    Patient Profile Reviewed  yes  -     Prior Level of Function  independent:;ADL's;community mobility pt has had a steady decline in his functional independence over the last few days  -     Existing Precautions/Restrictions  fall;oxygen therapy device and L/min  -     Barriers to Rehab  previous functional deficit  -[facility] Name 09/03/21 1302          Occupational Profile    Reason for Services/Referral (Occupational Profile)  ADL decline  -[facility] Name 09/03/21 1302          Living Environment    Lives With  alone Son and daughter-in-law live on the same property.  Daughter-in-law states they have been staying with him lately, but normally he stays alone  -[facility] Name 09/03/21 1302          Home Main Entrance    Number of Stairs, Main Entrance  other (see comments) 15 steps to enter his apartment  -[facility] Name 09/03/21 1302          Stairs Within Home, Primary    Stair Railings, Within Home, Primary  railings on both sides of stairs  -[facility] Name 09/03/21 1302          Cognition    Orientation Status (Cognition)  oriented x 4  -[facility] Name 09/03/21 1302          Safety Issues, Functional Mobility    Safety Issues Affecting Function (Mobility)  safety precaution  awareness;safety precautions follow-through/compliance  -     Impairments Affecting Function (Mobility)  balance;endurance/activity tolerance;strength  -       User Key  (r) = Recorded By, (t) = Taken By, (c) = Cosigned By    Initials Name Provider Type     Stephany Byers Occupational Therapist          Mobility/ADL's     Row Name 09/03/21 1311          Bed Mobility    Bed Mobility  supine-sit  -     Supine-Sit [place] (Bed Mobility)  supervision  -     Bed Mobility, Safety Issues  decreased use of arms for pushing/pulling;decreased use of legs for bridging/pushing  -     Assistive Device (Bed Mobility)  head of bed elevated  -[facility] Name 09/03/21 1311          Transfers    Transfers  sit-stand transfer;toilet transfer  -     Comment (Transfers)  Pt performed 5 sit to stand transfers during evaluation  -     Sit-Stand [place] (Transfers)  minimum assist (75% patient effort)  -     [place] Level (Toilet Transfer)  verbal cues;minimum assist (75% patient effort)  -     Assistive Device (Toilet Transfer)  commode, bedside without drop arms  -[facility] Name 09/03/21 1311          Sit-Stand Transfer    Assistive Device (Sit-Stand Transfers)  other (see comments) HHA and gait belt  -[facility] Name 09/03/21 1311          Toilet Transfer    Type (Toilet Transfer)  sit-stand;stand-sit  -[facility] Name 09/03/21 1311          Functional Mobility    Functional Mobility- Ind. Level  minimum assist (75% patient effort)  -     Functional Mobility- Device  other (see comments) HHA and gait belt  -     Functional Mobility-Distance (Feet)  12  -     Functional Mobility- Comment  decreased endurance for functional tasks.  -[facility] Name 09/03/21 1311          Activities of Daily Living    BADL Assessment/Intervention  bathing;upper body dressing;lower body dressing;grooming;feeding;toileting  -[facility] Name 09/03/21 1311          Bathing Assessment/Intervention    [place] Level  (Bathing)  minimum assist (75% patient effort)  -AH     Row Name 09/03/21 1311          Upper Body Dressing Assessment/Training    [place] Level (Upper Body Dressing)  contact guard assist  -AH     Row Name 09/03/21 1311          Lower Body Dressing Assessment/Training    [place] Level (Lower Body Dressing)  minimum assist (75% patient effort)  -AH     Row Name 09/03/21 1311          Grooming Assessment/Training    [place] Level (Grooming)  minimum assist (75% patient effort)  -AH     Row Name 09/03/21 1311          Self-Feeding Assessment/Training    [place] Level (Feeding)  set up  -AH     Row Name 09/03/21 1311          Toileting Assessment/Training    [place] Level (Toileting)  moderate assist (50% patient effort)  -       User Key  (r) = Recorded By, (t) = Taken By, (c) = Cosigned By    Initials Name Provider Type     Stephany yBers Occupational Therapist        Obj/Interventions     Row Name 09/03/21 1318          Vision Assessment/Intervention    Visual Impairment/Limitations  WFL;corrective lenses full-time  -AH     Row Name 09/03/21 1318          Range of Motion Comprehensive    General Range of Motion  bilateral upper extremity ROM WFL  -AH     Row Name 09/03/21 1318          Strength Comprehensive (MMT)    Comment, General Manual Muscle Testing (MMT) Assessment  BUE 4-/5  -       User Key  (r) = Recorded By, (t) = Taken By, (c) = Cosigned By    Initials Name Provider Type    Stephany Saeed Occupational Therapist        Goals/Plan     Row Name 09/03/21 1328          Transfer Goal 1 (OT)    Activity/Assistive Device (Transfer Goal 1, OT)  sit-to-stand/stand-to-sit;walker, rolling  -     [place] Level/Cues Needed (Transfer Goal 1, OT)  supervision required  -     Time Frame (Transfer Goal 1, OT)  by discharge  -     Progress/Outcome (Transfer Goal 1, OT)  goal ongoing  -AH     Row Name 09/03/21 1328          Bathing Goal 1 (OT)    Activity/Device (Bathing Goal 1,  OT)  bathing skills, all  -     [place] Level/Cues Needed (Bathing Goal 1, OT)  contact guard assist  -     Time Frame (Bathing Goal 1, OT)  by discharge  -     Progress/Outcomes (Bathing Goal 1, OT)  goal ongoing  -AH     Row Name 09/03/21 1328          Dressing Goal 1 (OT)    Activity/Device (Dressing Goal 1, OT)  lower body dressing  -     [place]/Cues Needed (Dressing Goal 1, OT)  contact guard assist  -     Time Frame (Dressing Goal 1, OT)  long term goal (LTG);5 days  -     Progress/Outcome (Dressing Goal 1, OT)  goal ongoing  -AH     Row Name 09/03/21 1328          Toileting Goal 1 (OT)    Activity/Device (Toileting Goal 1, OT)  adjust/manage clothing;perform perineal hygiene  -     [place] Level/Cues Needed (Toileting Goal 1, OT)  minimum assist (75% or more patient effort)  -     Time Frame (Toileting Goal 1, OT)  by discharge  -     Progress/Outcome (Toileting Goal 1, OT)  goal ongoing  -AH     Row Name 09/03/21 1328          Strength Goal 1 (OT)    Strength Goal 1 (OT)  Pt will perform UB strengthening ex to improve his strength and endurance to ADL tasks.  -     Time Frame (Strength Goal 1, OT)  by discharge  -     Progress/Outcome (Strength Goal 1, OT)  goal ongoing  -AH     Row Name 09/03/21 1328          Therapy Assessment/Plan (OT)    Planned Therapy Interventions (OT)  activity tolerance training;BADL retraining;patient/caregiver education/training;transfer/mobility retraining;strengthening exercise  -       User Key  (r) = Recorded By, (t) = Taken By, (c) = Cosigned By    Initials Name Provider Type    Stephany Saeed Occupational Therapist        Clinical Impression     [facility] Name 09/03/21 9149          Pain Assessment    Additional Documentation  Pain Scale: Numbers Pre/Post-Treatment (Group)  -AH     Row Name 09/03/21 1319          Pain Scale: Numbers Pre/Post-Treatment    Pretreatment Pain Rating  0/10 - no pain  -     Posttreatment Pain Rating  0/10 -  no pain  -[facility] Name 09/03/21 1319          Plan of Care Review    Plan of Care Reviewed With  patient;daughter  -     Progress  no change  -     Outcome Summary  Pt seen for OT evaluation today.  Pt presents with weakness and decreased independence with self care and functional mobility tasks.  Pt is expected to benefit from skilled OT to improve his strength, endurance and independence with ADL tasks.  -[facility] Name 09/03/21 1319          Therapy Assessment/Plan (OT)    Patient/Family Therapy Goal Statement (OT)  d/c home  -     Rehab Potential (OT)  good, to achieve stated therapy goals  -     Criteria for Skilled Therapeutic Interventions Met (OT)  yes;skilled treatment is necessary  -     Therapy Frequency (OT)  3 times/wk  -[facility] Name 09/03/21 1319          Therapy Plan Review/Discharge Plan (OT)    Anticipated Discharge Disposition (OT)  home with home health;home with assist  -[facility] Name 09/03/21 1319          Vital Signs    Pre SpO2 (%)  98  -     O2 Delivery Pre Treatment  supplemental O2 3L  -     O2 Delivery Intra Treatment  supplemental O2  -     O2 Delivery Post Treatment  supplemental O2  -[facility] Name 09/03/21 1319          Positioning and Restraints    Pre-Treatment Position  in bed  -     Post Treatment Position  chair  -     In Chair  sitting;call light within reach;encouraged to call for assist;with family/caregiver  -       User Key  (r) = Recorded By, (t) = Taken By, (c) = Cosigned By    Initials Name Provider Type    Stephany Saeed Occupational Therapist        Outcome Measures     [facility] Name 09/03/21 1330          How much help from another is currently needed...    Putting on and taking off regular lower body clothing?  3  -AH     Bathing (including washing, rinsing, and drying)  3  -AH     Toileting (which includes using toilet bed pan or urinal)  2  -AH     Putting on and taking off regular upper body clothing  3  -AH     Taking care of personal  grooming (such as brushing teeth)  3  -     Eating meals  3  -     AM-PAC 6 Clicks Score (OT)  17  -     Row Name 09/03/21 1033          How much help from another person do you currently need...    Turning from your back to your side while in flat bed without using bedrails?  4  (Pended)   -CW     Moving from lying on back to sitting on the side of a flat bed without bedrails?  4  (Pended)   -CW     Moving to and from a bed to a chair (including a wheelchair)?  3  (Pended)   -CW     Standing up from a chair using your arms (e.g., wheelchair, bedside chair)?  3  (Pended)   -CW     Climbing 3-5 steps with a railing?  2  (Pended)   -CW     To walk in hospital room?  3  (Pended)   -     AM-PAC 6 Clicks Score (PT)  19  (Pended)   -CW     Row Name 09/03/21 1330 09/03/21 1033       Functional Assessment    Outcome Measure Options  AM-PAC 6 Clicks Daily Activity (OT)  -  AM-PAC 6 Clicks Basic Mobility (PT)  (Pended)   -CW      User Key  (r) = Recorded By, (t) = Taken By, (c) = Cosigned By    Initials Name Provider Type     Stephany Byers Occupational Therapist    CW Taylor Duarte, PT Student PT Student          Occupational Therapy Education                 Title: PT OT SLP Therapies (In Progress)     Topic: Occupational Therapy (In Progress)     Point: ADL training (Done)     Description:   Instruct learner(s) on proper safety adaptation and remediation techniques during self care or transfers.   Instruct in proper use of assistive devices.              Learning Progress Summary           Patient Acceptance, E,TB, VU by  at 9/3/2021 1335    Comment: Role of OT/POC   Family Acceptance, E,TB, VU by  at 9/3/2021 1335    Comment: Role of OT/POC                   Point: Home exercise program (Not Started)     Description:   Instruct learner(s) on appropriate technique for monitoring, assisting and/or progressing therapeutic exercises/activities.              Learner Progress:  Not documented in this visit.           Point: Precautions (Not Started)     Description:   Instruct learner(s) on prescribed precautions during self-care and functional transfers.              Learner Progress:  Not documented in this visit.          Point: Body mechanics (Not Started)     Description:   Instruct learner(s) on proper positioning and spine alignment during self-care, functional mobility activities and/or exercises.              Learner Progress:  Not documented in this visit.                      User Key     Initials Effective Dates Name Provider Type Discipline     06/16/21 -  Stephany Byers Occupational Therapist OT              OT Recommendation and Plan  Planned Therapy Interventions (OT): activity tolerance training, BADL retraining, patient/caregiver education/training, transfer/mobility retraining, strengthening exercise  Therapy Frequency (OT): 3 times/wk  Plan of Care Review  Plan of Care Reviewed With: patient, daughter  Progress: no change  Outcome Summary: Pt seen for OT evaluation today.  Pt presents with weakness and decreased independence with self care and functional mobility tasks.  Pt is expected to benefit from skilled OT to improve his strength, endurance and independence with ADL tasks.     Time Calculation:   Time Calculation- OT     Row Name 09/03/21 1336             Time Calculation- OT    OT Start Time  1034  -      OT Received On  09/03/21  -      OT Goal Re-Cert Due Date  09/13/21  -         Untimed Charges    OT Eval/Re-eval Minutes  45  -AH         Total Minutes    Untimed Charges Total Minutes  45  -AH       Total Minutes  45  -AH        User Key  (r) = Recorded By, (t) = Taken By, (c) = Cosigned By    Initials Name Provider Type     Stephany Byers Occupational Therapist        Therapy Charges for Today     Code Description Service Date Service Provider Modifiers Qty    [id number]  OT EVAL LOW COMPLEXITY 3 9/3/2021 Stephany Byers GO 1               Stephany Byers  9/3/2021

## 2021-09-04 NOTE — THERAPY TREATMENT NOTE
Patient Name: Andrew Hernandez  : 1934    MRN: [id number]                              Today's Date: 2021       Admit Date: 2021    Visit Dx:     ICD-10-CM ICD-9-CM   1. Edema due to hypervolemia  E87.70 782.3     276.69   2. Hyponatremia  E87.1 276.1   3. Bilateral pleural effusion  J90 511.9   4. 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    • Carotid stenosis, symptomatic w/o infarct   • Hyperkalemia   • Hyponatremia   • Encounter for Medicare annual wellness exam   • Tongue lesion   • Abdominal bruit   • Cough in adult patient   • Edema due to hypervolemia     Past Medical History:   Diagnosis Date   • A-fib (CMS/[facility])    • Arthritis    • Cancer (CMS/[facility])     TONGUE   • Carotid stenosis     s/p right ICA stent x 2   • Coronary artery disease    • Disease of thyroid gland    • Enlarged prostate    • Fracture    • Full dentures    • GERD (gastroesophageal reflux disease)    • Hyperlipidemia    • Hypertension    • Impaired functional mobility, balance, gait, and endurance 2021   • Kidney infection    • Renal calculi    • Stroke (CMS/[facility]) 2016    right retinal artery occlusion   • Wears glasses      Past Surgical History:   Procedure Laterality Date   • CAROTID ENDARTERECTOMY      X2-3/17,    • CAROTID STENT Right 2016    Carotid Wall Stent   • CAROTID STENT Right 2017    Zilver BANDAR for recurrent right ICA stenosis   •  CATARACT EXTRACTION W/ INTRAOCULAR LENS IMPLANT Left 9/4/2018    Procedure: CATARACT PHACO EXTRACTION WITH INTRAOCULAR LENS IMPLANT LEFT, COMPLICATED WITH MALYUGIAN RING;  Surgeon: Paola Welch MD;  Location: [facility];  Service: Ophthalmology   • KIDNEY STONE SURGERY Right 2011    Sh[facility]on - open   • PATELLA FRACTURE SURGERY Left 1986   • IA TCAT IV STENT CRV CRTD ART EMBOLIC PROTECJ Right 3/4/2017    Placement of Zilver BANDAR right ICA 3/4/17   • TONGUE SURGERY  08/16/2021    cancer spot removed      General Information     Row Name 09/04/21 1139          Physical Therapy Time and Intention    Document Type  therapy note (daily note)  -TW     Mode of Treatment  physical therapy  -TW     Row Name 09/04/21 1139          General Information    Patient Profile Reviewed  yes  -TW     Existing Precautions/Restrictions  fall;oxygen therapy device and L/min  -TW     Row Name 09/04/21 1139          Cognition    Orientation Status (Cognition)  oriented x 4  -TW     Row Name 09/04/21 1139          Safety Issues, Functional Mobility    Impairments Affecting Function (Mobility)  balance;endurance/activity tolerance;strength  -TW       User Key  (r) = Recorded By, (t) = Taken By, (c) = Cosigned By    Initials Name Provider Type    TW Judy Barrientos, PT Physical Therapist        Mobility     Row Name 09/04/21 1139          Bed Mobility    Bed Mobility  supine-sit  -TW     Supine-Sit [place] (Bed Mobility)  supervision  -TW     Assistive Device (Bed Mobility)  head of bed elevated  -TW     Row Name 09/04/21 1139          Transfers    Comment (Transfers)  Cues for hand placement  -TW     Row Name 09/04/21 1139          Bed-Chair Transfer    Bed-Chair [place] (Transfers)  contact guard  -TW     Assistive Device (Bed-Chair Transfers)  walker, front-wheeled  -TW     Row Name 09/04/21 1139          Sit-Stand Transfer    Sit-Stand [place] (Transfers)  contact guard;verbal cues  -TW     Assistive Device  (Sit-Stand Transfers)  walker, front-wheeled  -TW     Row Name 09/04/21 1139          Gait/Stairs (Locomotion)    B[place] Level (Gait)  contact guard;verbal cues  -TW     Assistive Device (Gait)  walker, front-wheeled  -TW     Distance in Feet (Gait)  155 ft  -TW     Deviations/Abnormal Patterns (Gait)  other (see comments)  -TW     Comment (Gait/Stairs)  Pt has a very fast paced gait with slightly flexed posture. Pt does state he feels more secure with using rolling walker for amb in hernandez. Slightly short of breath but not change in O2 sats  -TW       User Key  (r) = Recorded By, (t) = Taken By, (c) = Cosigned By    Initials Name Provider Type    TW Judy Barrientos, MIRA Physical Therapist        Obj/Interventions     Row Name 09/04/21 1139          Balance    Balance Assessment  sitting static balance;sitting dynamic balance;standing static balance;standing dynamic balance  -TW     Static Sitting Balance  WFL;unsupported;sitting, edge of bed  -TW     Dynamic Sitting Balance  WFL;unsupported;sitting, edge of bed  -TW     Static Standing Balance  WFL;supported  -TW     Dynamic Standing Balance  mild impairment;supported  -TW       User Key  (r) = Recorded By, (t) = Taken By, (c) = Cosigned By    Initials Name Provider Type    TW Judy Barrientos, PT Physical Therapist        Goals/Plan    No documentation.       Clinical Impression     Row Name 09/04/21 1139          Pain    Additional Documentation  Pain Scale: Numbers Pre/Post-Treatment (Group)  -TW     Row Name 09/04/21 1139          Pain Scale: Numbers Pre/Post-Treatment    Pretreatment Pain Rating  0/10 - no pain  -TW     Posttreatment Pain Rating  0/10 - no pain  -TW     Row Name 09/04/21 1139          Plan of Care Review    Progress  improving  -TW     Outcome Summary  PT treatment completed with pt cooperative throughout and showing improvement in bed mobility and tranfers needing SBA for bedmobility and CGA for transfers with rolling walker. Pt amb 155 ft with  rolling walker and CGA using 3 L NC O2 with no significant drop in O2 sat. Cont PT POC.  -TW     Row Name 09/04/21 1139          Vital Signs    Pre SpO2 (%)  98  -TW     O2 Delivery Pre Treatment  supplemental O2 3 L  -TW     Intra SpO2 (%)  100  -TW     O2 Delivery Intra Treatment  supplemental O2 3 L  -TW     Post SpO2 (%)  98  -TW     O2 Delivery Post Treatment  supplemental O2 3 L  -TW     Pre Patient Position  Supine  -TW     Intra Patient Position  Standing  -TW     Post Patient Position  Sitting  -TW     Row Name 09/04/21 1139          Positioning and Restraints    Pre-Treatment Position  in bed  -TW     Post Treatment Position  chair  -TW     In Chair  call light within reach;encouraged to call for assist;with family/caregiver;legs elevated  -TW       User Key  (r) = Recorded By, (t) = Taken By, (c) = Cosigned By    Initials Name Provider Type    Judy Sharif PT Physical Therapist        Outcome Measures     Row Name 09/04/21 1139          How much help from another person do you currently need...    Turning from your back to your side while in flat bed without using bedrails?  4  -TW     Moving from lying on back to sitting on the side of a flat bed without bedrails?  4  -TW     Moving to and from a bed to a chair (including a wheelchair)?  3  -TW     Standing up from a chair using your arms (e.g., wheelchair, bedside chair)?  3  -TW     Climbing 3-5 steps with a railing?  2  -TW     To walk in hospital room?  3  -TW     AM-PAC 6 Clicks Score (PT)  19  -TW     Row Name 09/04/21 1139          Functional Assessment    Outcome Measure Options  AM-PAC 6 Clicks Basic Mobility (PT)  -TW       User Key  (r) = Recorded By, (t) = Taken By, (c) = Cosigned By    Initials Name Provider Type    Judy Sharif PT Physical Therapist                       Physical Therapy Education                 Title: PT OT SLP Therapies (In Progress)     Topic: Physical Therapy (Done)     Point: Mobility training (Done)      Learning Progress Summary           Patient Acceptance, E,D, DU by TW at 9/4/2021 1139    Comment: Pt education for use of rolling walker for safe transfers and gait.    Acceptance, E, VU by CW at 9/3/2021 1322    Comment: Educated the patient on the benefit of PT POC for mobility.                   Point: Home exercise program (Done)     Learning Progress Summary           Patient Acceptance, E, VU by CW at 9/3/2021 1322    Comment: Educated the patient on the benefit of PT POC for mobility.                   Point: Body mechanics (Done)     Learning Progress Summary           Patient Acceptance, E,D, DU by TW at 9/4/2021 1139    Comment: Pt education for use of rolling walker for safe transfers and gait.    Acceptance, E, VU by CW at 9/3/2021 1322    Comment: Educated the patient on the benefit of PT POC for mobility.                   Point: Precautions (Done)     Learning Progress Summary           Patient Acceptance, E, VU by CW at 9/3/2021 1322    Comment: Educated the patient on the benefit of PT POC for mobility.                               User Key     Initials Effective Dates Name Provider Type Discipline     06/16/21 -  Judy Barrientos, PT Physical Therapist PT     07/26/21 -  Taylor Duarte, PT Student PT Student PT              PT Recommendation and Plan     Progress: improving  Outcome Summary: PT treatment completed with pt cooperative throughout and showing improvement in bed mobility and tranfers needing SBA for bedmobility and CGA for transfers with rolling walker. Pt amb 155 ft with rolling walker and CGA using 3 L NC O2 with no significant drop in O2 sat. Cont PT POC.     Time Calculation:   PT Charges     Row Name 09/04/21 1139             Time Calculation    Start Time  1110  -TW      Stop Time  1139  -TW      Time Calculation (min)  29 min  -TW      PT Received On  09/04/21  -TW         Timed Charges    97181 - Gait Training Minutes   15  -TW      87989 - PT Therapeutic Activity Minutes   14  -TW         Total Minutes    Timed Charges Total Minutes  29  -TW       Total Minutes  29  -TW        User Key  (r) = Recorded By, (t) = Taken By, (c) = Cosigned By    Initials Name Provider Type    Judy Sharif PT Physical Therapist        Therapy Charges for Today     Code Description Service Date Service Provider Modifiers Qty    98010121996  GAIT TRAINING EA 15 MIN 9/4/2021 Judy Barrientos, PT GP 1    [id number]  PT THERAPEUTIC ACT EA 15 MIN 9/4/2021 Judy Barrientos, PT GP 1          PT G-Codes  Outcome Measure Options: AM-PAC 6 Clicks Basic Mobility (PT)  AM-PAC 6 Clicks Score (PT): 19  AM-PAC 6 Clicks Score (OT): 17    Judy Barrientos PT  9/4/2021

## 2021-09-04 NOTE — PROGRESS NOTES
"    [facility]    Name:  Andrew Hernandez   Age:  87 y.o.  Sex:  male  :  1934  MRN:  [id number]   Visit Number:  [id number]  Admission Date:  2021  Date Of Service:  21  Primary Care Physician:  Vermeesch, Marilyn K, MD     LOS: 2 days :    Chief Complaint:      Shortness of breath    Subjective:    Patient seen and examined at bedside this morning. Chart reviewed and events noted. He was sitting comfortably in bed. He states that he is feeling \"quite a bit better.\" He explains that yesterday he attempted to work with physical therapy but \"had a little trouble.\" He is looking forward to working with physical therapy again today in hopes of \"getting home.\" Daughter-in-law is at bedside all of her questions and concerns were addressed and answered.    Hospital Course:    87-year-old male with recent history of tongue cancer status post resection at  on 2021, A. fib on Eliquis, CAD, previous CVA who presents to the emergency room with complaints of shortness of breath and lower extremity edema.  Patient was recently treated at  from - for tongue resection.  Patient also had a myocardial infarction postoperatively.  He declined a heart catheterization and elected to manage medically.  Patient follows with Dr. Mcnair at [facility].  He last saw him  and was told to continue current medications including daily Plavix and Eliquis and instructed to start Lasix.  However, his daughter-in-law did not give him any Lasix because she was concerned due to low blood pressure readings at home. Patient is supposed to be on a mechanical soft diet per speech recommendations, but has not been adhering to a low salt diet. He has had progressively worsening dyspnea, orthopnea, and lower extremity edema.    Upon arrival to the ED, patient was found to have fever of 100.7. Labs were significant for negative troponin, proBNP of 7000, sodium level of 113, normal renal " function.  Patient's baseline sodium level appears to be in the 120s. UA with positive nitrites and leuk esterase. Chest xray moderate bilateral pleural effusions with atelectasis. He was started on Levaquin, but after 10minutes became short of breath, so this was discontinued and he was started on Merrem. He was given Lasix IV 80mg once in ED, then 40 upon arrival to the floor. He has had 2liters output thus far. Suspect hyponatremia is secondary to hypervolemia and should improve with diuresis. Will give Lasix 60mg bid. Echo: EF 50-55%, grade 1 diastolic dysfunction, moderate aortic regurgitation, mild MR and TR.    Review of Systems:     All systems were reviewed and negative except as mentioned in subjective, assessment and plan.    Vital Signs:    Temp:  [97.5 °F (36.4 °C)-98.6 °F (37 °C)] 98 °F (36.7 °C)  Heart Rate:  [] 67  Resp:  [14-17] 14  BP: ()/(30-45) 114/39    Intake and output:    I/O last 3 completed shifts:  In: 1011 [P.O.:600; I.V.:336; IV Piggyback:75]  Out: 5450 [Urine:5450]  I/O this shift:  In: 240 [P.O.:240]  Out: -     Physical Examination:    General Appearance:  Alert and cooperative. Sitting in bed. In no acute distress.   Head:  Large ulcerating mass on back of head.   Eyes: Conjunctivae and sclerae normal, no icterus. No pallor.   Throat: No oral lesions, no thrush, oral mucosa moist. Partial removal of tongue.   Neck: Supple, trachea midline, no thyromegaly.   Lungs:   Breath sounds heard bilaterally equally.  Faint crackles heard throughout right lung field. No wheezing.   Heart:  Normal S1 and S2, no murmur, no gallop, no rub. No JVD.   Abdomen:   Normal bowel sounds, no masses, no organomegaly. Soft, nontender, nondistended, no rebound tenderness.   Extremities: Trace Bilateral lower extremity edema. Supple, no cyanosis, no clubbing.   Skin: No bleeding or rash.   Neurologic: Alert and oriented x 3. No facial asymmetry. Moves all four limbs. No tremors.      Laboratory  results:    Results from last 7 days   Lab Units 09/04/21  0656 09/03/21  1527 09/03/21  0640 09/02/21  2232 09/02/21  1601   SODIUM mmol/L 124* 116* 117*   < > 113*   POTASSIUM mmol/L 4.3  --  4.5  --  5.2   CHLORIDE mmol/L 85*  --  80*  --  82*   CO2 mmol/L 27.8  --  23.2  --  21.8*   BUN mg/dL 21  --  18  --  15   CREATININE mg/dL 0.95  --  1.10  --  0.95   CALCIUM mg/dL 8.6  --  8.9  --  8.5*   BILIRUBIN mg/dL  --   --   --   --  0.6   ALK PHOS U/L  --   --   --   --  88   ALT (SGPT) U/L  --   --   --   --  23   AST (SGOT) U/L  --   --   --   --  15   GLUCOSE mg/dL 118*  --  142*  --  110*    < > = values in this interval not displayed.     Results from last 7 days   Lab Units 09/04/21  0656 09/03/21  0640 09/02/21  1601   WBC 10*3/mm3 8.65 6.77 10.37   HEMOGLOBIN g/dL 9.6* 10.4* 9.1*   HEMATOCRIT % 27.2* 29.7* 25.8*   PLATELETS 10*3/mm3 322 328 311         Results from last 7 days   Lab Units 09/02/21  1601   TROPONIN T ng/mL 0.018     Results from last 7 days   Lab Units 09/02/21  1849 09/02/21  1841   BLOODCX  No growth at 24 hours No growth at 24 hours         I have reviewed the patient's laboratory results.    Radiology results:    Adult Transthoracic Echo Complete w/ Color, Spectral and Contrast if necessary per protocol    Result Date: 9/3/2021  1.  Normal left ventricular size with low normal LV systolic function, LVEF 50-55%. 2.  Grade 1 diastolic dysfunction. 3.  Normal right ventricular size and systolic function. 4.  Normal left atrial volume index. 5.  Moderate aortic regurgitation. 6.  Mild MR and TR.    XR Chest 1 View    Result Date: 9/2/2021  PROCEDURE: XR CHEST 1 VW-  HISTORY: Weak/Dizzy/AMS triage protocol  COMPARISON:  9/12/2011  FINDINGS:  Portable view of the chest demonstrates bibasilar densities compatible with atelectasis. Moderate bilateral pleural effusions are seen. The mediastinum is unremarkable.  The heart size is normal.      Impression: Moderate bilateral pleural effusions with  atelectasis  This report was finalized on 9/2/2021 4:23 PM by Marin Dennis MD.    I have reviewed the patient's radiology reports.    Medication Review:     I have reviewed the patient's active and prn medications.     Problem List:      Edema due to hypervolemia      Assessment:    1. Acute on Chronic Systolic Heart Failure Exacerbation, POA  2. Acute Hypoxic Respiratory Failure 2/2 Above, POA  3. Hyponatremia, likely secondary to Hypervolemia, Improved  4. Uncomplicated Urinary Tract Infection, POA  5. Atrial Fibrillation, anticoagulated with Eliquis  6. Coronary Artery Disease  7. Tongue Cancer s/p Resection  8. Previous CVA with complete Right Vision Loss  9. Carotid Artery Stenosis s/p Stenting of Right Carotid  10. Ulcerated Mass on Back of Head, concern for Squamous Cell Carcinoma  11. Essential Hypertension  12. Hyperlipidemia  13. Acquired Hypothyroidism  14. GERD    Plan:    - Patient has improved remarkably with diuresis. Sodium now up to 124 which is the patient's baseline.   - Will continue to diurese with one additional dose of Lasix 60mg IV today.  - Strict Is/Os  - Echo: EF 50-55%, grade 1 diastolic dysfunction  - Continue with Meropenem for total of 3 days of antibiotic therapy. Unfortunately given patient's significant allergies this is our only option. Will finish antibiotic therapy tomorrow and will not require additional antibiotic at time of discharge.  - Continue Eliquis and Plavix  - Tolerating soft diet  - Patient does not wish for further workup of ulcerating mass on back of his head.  - Work with physical therapy today  - Anticipate as long as he continues to improve, then will be ready for discharge home tomorrow. He already has home oxygen set up and case management has assisted with this.    DVT Prophylaxis: Eliquis  Code Status: Full  Diet: Soft  Discharge Plan: Likely home with family in the next 24 hours    Whitney Rivera, DO  09/04/21  11:51 EDT    Dictated utilizing Dragon  dictation.

## 2021-09-04 NOTE — PLAN OF CARE
Goal Outcome Evaluation:  Plan of Care Reviewed With: patient        Progress: improving  Outcome Summary: VSS.  Pt rested comfortably during shift.  No change in pt condition to report during shift.  Will continue to monitor.

## 2021-09-04 NOTE — PLAN OF CARE
Goal Outcome Evaluation:           Progress: improving  Outcome Summary: PT treatment completed with pt cooperative throughout and showing improvement in bed mobility and tranfers needing SBA for bedmobility and CGA for transfers with rolling walker. Pt amb 155 ft with rolling walker and CGA using 3 L NC O2 with no significant drop in O2 sat. Cont PT POC.

## 2021-09-04 NOTE — PLAN OF CARE
Goal Outcome Evaluation:               Pt has no c/o pain today, up in chair this afternoon, family at bedside, wctm, notify MD for issues or concerns

## 2021-09-05 NOTE — PLAN OF CARE
Goal Outcome Evaluation:  Plan of Care Reviewed With: patient        Progress: improving  Outcome Summary: VSS; continue antibiotics; plan for dc to home

## 2021-09-05 NOTE — OUTREACH NOTE
Prep Survey      Responses   Scientologist facility patient discharged from?  [facility]   Is LACE score < 7 ?  No   Emergency Room discharge w/ pulse ox?  No   Eligibility  TCM   [facility]   Date of Admission  09/02/21   Date of Discharge  09/05/21   Discharge Disposition  Home or Self Care   Discharge diagnosis  A/C CHF, Acute hypoxic resp failure, hyponatremia, UTI, A-fib   Does the patient have one of the following disease processes/diagnoses(primary or secondary)?  CHF   Does the patient have Home health ordered?  No   Is there a DME ordered?  No   Prep survey completed?  Yes          Jodi Howell RN

## 2021-09-05 NOTE — PROGRESS NOTES
Exercise Oximetry    Patient Name:Andrew Hernandez   MRN: [id number]   Date: 09/05/21             ROOM AIR BASELINE   SpO2% 94   Heart Rate 71   Blood Pressure      EXERCISE ON ROOM AIR SpO2% EXERCISE ON O2 @ R/A LPM SpO2%   1 MINUTE 94 1 MINUTE    2 MINUTES 93 2 MINUTES    3 MINUTES 91 3 MINUTES    4 MINUTES 92 4 MINUTES    5 MINUTES 92 5 MINUTES    6 MINUTES 93 6 MINUTES               Distance Walked  120 Distance Walked   Dyspnea (Jamin Scale)  3 Dyspnea (Jamin Scale)   Fatigue (Jamin Scale)  3 Fatigue (Jamin Scale)   SpO2% Post Exercise  96 SpO2% Post Exercise   HR Post Exercise  81 HR Post Exercise   Time to Recovery  1 Time to Recovery     Comments: PT DID VERY WELL, DIDN'T COMPLAIN OF FATIGUE OR SOA, USED A WALKER AND WALKED AT A FAST PACE AND SATS MAINTAINED ABOVE 90%    PT RESTING ON R/A

## 2021-09-05 NOTE — CASE MANAGEMENT/SOCIAL WORK
Case Management Discharge Note                Selected Continued Care - Admitted Since 9/2/2021              Durable Medical Equipment Coordination complete.    Service Provider Selected Services Address Phone Fax Patient Preferred    [facility]  [employer] [facility] [employer] [address] [place], [place] [address] [facility] [place] [address] [phone] --       Internal Comment last updated by Cathy Macario RN 9/5/2021 [id number]    Provider of oxygen at time of admission per notes-  Previously written that patient uses 2-3L O2 at nighttime but has been using continuously. Private pays.  [facility] confirmed that the order they currently have is 2L continuously.                         Transportation Services  Private: Car    Final Discharge Disposition Code: 01 - home or self-care

## 2021-09-05 NOTE — DISCHARGE SUMMARY
[facility]   DISCHARGE SUMMARY      Name:  Andrew Hernandez   Age:  87 y.o.  Sex:  male  :  1934  MRN:  [id number]   Visit Number:  [id number]    Admission Date:  2021  Date of Discharge:  2021  Primary Care Physician:  Vermeesch, Marilyn K, MD    Important issues to note:    - Acute on Chronic Systolic and Diastolic Heart Failure Exacerbation  - Will be discharged home on po Lasix 40mg daily  - Will need close followup with PCP in 3 days for BMP at that time    Discharge Diagnoses:     1. Acute on Chronic Systolic and Diastolic Heart Failure Exacerbation, POA  2. Acute Hypoxic Respiratory Failure 2/2 Above, Improved, POA  3. Hyponatremia, likely secondary to Hypervolemia, Improved  4. Uncomplicated Urinary Tract Infection, POA  5. Atrial Fibrillation, anticoagulated with Eliquis  6. Coronary Artery Disease  7. Tongue Cancer s/p Resection  8. Previous CVA with complete Right Vision Loss  9. Carotid Artery Stenosis s/p Stenting of Right Carotid  10. Ulcerated Mass on Back of Head, concern for Squamous Cell Carcinoma  11. Essential Hypertension  12. Hyperlipidemia  13. Acquired Hypothyroidism    14.     GERD     Problem List:     Active Hospital Problems    Diagnosis  POA   • Edema due to hypervolemia [E87.70]  Yes      Resolved Hospital Problems   No resolved problems to display.     Presenting Problem:    Chief Complaint   Patient presents with   • Weakness - Generalized   • Cough      Consults:     Consulting Physician(s)             None          Procedures Performed:    Echocardiogram    History of presenting illness/Hospital Course:    87-year-old male with recent history of tongue cancer status post resection at  on 2021, A. fib on Eliquis, CAD, previous CVA who presents to the emergency room with complaints of shortness of breath and lower extremity edema.  Patient was recently treated at  from - for tongue resection.  Patient also had a myocardial  infarction postoperatively.  He declined a heart catheterization and elected to manage medically.  Patient follows with Dr. Mcnair at [facility].  He last saw him 8/30 and was told to continue current medications including daily Plavix and Eliquis and instructed to start Lasix.  However, his daughter-in-law did not give him any Lasix because she was concerned due to low blood pressure readings at home. Patient is supposed to be on a mechanical soft diet per speech recommendations, but has not been adhering to a low salt diet. He has had progressively worsening dyspnea, orthopnea, and lower extremity edema.     Upon arrival to the ED, patient was found to have fever of 100.7. Labs were significant for negative troponin, proBNP of 7000, sodium level of 113, normal renal function.  Patient's baseline sodium level appears to be in the 120s. UA with positive nitrites and leuk esterase. Chest xray moderate bilateral pleural effusions with atelectasis. He was started on Levaquin, but after 10minutes became short of breath, so this was discontinued and he was started on Merrem. He was given Lasix IV 80mg once in ED, then 40 upon arrival to the floor. He has had 2liters output thus far. Suspect hyponatremia is secondary to hypervolemia and should improve with diuresis. Will give Lasix 60mg bid. Echo: EF 50-55%, grade 1 diastolic dysfunction, moderate aortic regurgitation, mild MR and TR. Patient net negative 6.5L at time of discharge. Will discharge home on po Lasix 40mg daily. Patient and daughter in law instructed to weigh patient once home  Today and that will be his dry weight. If he gains 2 pounds overnight or 5 pounds in 2 days then will need to take additional dose of Lasix. Will need to followup with PCP in 3 days for BMP.    Vital Signs:    Temp:  [97.8 °F (36.6 °C)-98.4 °F (36.9 °C)] 97.8 °F (36.6 °C)  Heart Rate:  [65-73] 67  Resp:  [16-18] 18  BP: (111-138)/(36-74) 127/36    Physical Exam:    General Appearance:  Alert  and cooperative. Sitting in bed, in NAD.   Head:  Large, ulcerating mass on posterior scalp.    Eyes: Conjunctivae and sclerae normal, no icterus. No pallor.       Throat: No oral lesions, no thrush, oral mucosa moist.   Neck: Supple, trachea midline, no thyromegaly.       Lungs:   Breath sounds heard bilaterally equally.  No crackles or wheezing. No Pleural rub or bronchial breathing.   Heart:  Normal S1 and S2, no murmur, no gallop, no rub. No JVD.   Abdomen:   Normal bowel sounds, no masses, no organomegaly. Soft, nontender, nondistended, no rebound tenderness.   Extremities: Supple, no edema, no cyanosis, no clubbing.   Pulses: Pulses palpable bilaterally.   Skin: No bleeding or rash.   Neurologic: Alert and oriented x 3. No facial asymmetry. Moves all four limbs. No tremors.     Pertinent Lab Results:     Results from last 7 days   Lab Units 09/05/21  0602 09/04/21  0656 09/03/21  1527 09/03/21  0640 09/03/21  0640 09/02/21  2232 09/02/21  1601   SODIUM mmol/L 120* 124* 116*   < > 117*   < > 113*   POTASSIUM mmol/L 3.8 4.3  --   --  4.5  --  5.2   CHLORIDE mmol/L 84* 85*  --   --  80*  --  82*   CO2 mmol/L 27.5 27.8  --   --  23.2  --  21.8*   BUN mg/dL 17 21  --   --  18  --  15   CREATININE mg/dL 0.91 0.95  --   --  1.10  --  0.95   CALCIUM mg/dL 8.3* 8.6  --   --  8.9  --  8.5*   BILIRUBIN mg/dL  --   --   --   --   --   --  0.6   ALK PHOS U/L  --   --   --   --   --   --  88   ALT (SGPT) U/L  --   --   --   --   --   --  23   AST (SGOT) U/L  --   --   --   --   --   --  15   GLUCOSE mg/dL 100* 118*  --   --  142*  --  110*    < > = values in this interval not displayed.     Results from last 7 days   Lab Units 09/05/21  0602 09/04/21  0656 09/03/21  0640   WBC 10*3/mm3 5.63 8.65 6.77   HEMOGLOBIN g/dL 9.4* 9.6* 10.4*   HEMATOCRIT % 26.4* 27.2* 29.7*   PLATELETS 10*3/mm3 289 322 328         Results from last 7 days   Lab Units 09/02/21  1601   TROPONIN T ng/mL 0.018     Results from last 7 days   Lab Units  09/02/21  1601   PROBNP pg/mL 6,988.0*                 Results from last 7 days   Lab Units 09/02/21  1849 09/02/21  1841   BLOODCX  No growth at 2 days No growth at 2 days       Pertinent Radiology Results:    Imaging Results (All)     Procedure Component Value Units Date/Time    XR Chest 1 View [[id number]] Collected: 09/02/21 1622     Updated: 09/02/21 1625    Narrative:      PROCEDURE: XR CHEST 1 VW-     HISTORY: Weak/Dizzy/AMS triage protocol     COMPARISON:  9/12/2011     FINDINGS:  Portable view of the chest demonstrates bibasilar densities  compatible with atelectasis. Moderate bilateral pleural effusions are  seen. The mediastinum is unremarkable.     The heart size is normal.       Impression:      Moderate bilateral pleural effusions with atelectasis      This report was finalized on 9/2/2021 4:23 PM by Marin Dennis MD.          Echo:    Results for orders placed during the hospital encounter of 09/02/21    Adult Transthoracic Echo Complete w/ Color, Spectral and Contrast if necessary per protocol    Interpretation Summary  1.  Normal left ventricular size with low normal LV systolic function, LVEF 50-55%.  2.  Grade 1 diastolic dysfunction.  3.  Normal right ventricular size and systolic function.  4.  Normal left atrial volume index.  5.  Moderate aortic regurgitation.  6.  Mild MR and TR.    Condition on Discharge:      Stable.    Code status during the hospital stay:    Code Status and Medical Interventions:   Ordered at: 09/02/21 2000     Code Status:    CPR     Medical Interventions (Level of Support Prior to Arrest):    Full     Discharge Disposition:    Home or Self Care    Discharge Medications:       Discharge Medications      Changes to Medications      Instructions Start Date   furosemide 40 MG tablet  Commonly known as: LASIX  What changed:   · medication strength  · how much to take  · when to take this  · reasons to take this   40 mg, Oral, Daily   Start Date: September 6, 2021      labetalol 200 MG tablet  Commonly known as: NORMODYNE  What changed:   · when to take this  · additional instructions   200 mg, Oral, 3 Times Daily      levothyroxine 50 MCG tablet  Commonly known as: SYNTHROID, LEVOTHROID  What changed: when to take this   50 mcg, Oral, Daily      potassium chloride 10 MEQ CR tablet  What changed:   · when to take this  · reasons to take this   10 mEq, Oral, Daily      tamsulosin 0.4 MG capsule 24 hr capsule  Commonly known as: FLOMAX  What changed: when to take this   0.4 mg, Oral, Daily         Continue These Medications      Instructions Start Date   apixaban 2.5 MG tablet tablet  Commonly known as: ELIQUIS   2.5 mg, Oral, Every 12 Hours Scheduled      atorvastatin 20 MG tablet  Commonly known as: LIPITOR   20 mg, Oral, Nightly      Boost Plus liquid   Oral, Daily      clopidogrel 75 MG tablet  Commonly known as: PLAVIX   75 mg, Oral, Daily      nitroglycerin 0.4 MG SL tablet  Commonly known as: NITROSTAT   Place 1 tablet under the tongue Every 5 (Five) Minutes As Needed for Chest Pain for up to 3 doses. Take no more than 3 doses in 15 minutes.      PEPCID AC PO   20 mg, Oral, 2 times daily      [employer]in[employer] HEALTH PO   2 tablets, As Needed      Tums 500 MG chewable tablet  Generic drug: calcium carbonate   Chew. TAKE AS DIRECTED.      TYLENOL PO   500 mg, Oral, 2 Times Daily PRN         Stop These Medications    amLODIPine 2.5 MG tablet  Commonly known as: NORVASC     ibuprofen 600 MG tablet  Commonly known as: ADVIL,MOTRIN          Discharge Diet:     As tolerated    Activity at Discharge:     As tolerated    Follow-up Appointments:    Follow-up Information     Vermeesch, Marilyn K, MD .    Specialty: Internal Medicine  Contact information:  [address] [place] [address] [phone]                 Future Appointments   Date Time Provider Department Center   10/4/2021  9:45 AM Oziel Mcnair MD [facility]   1/3/2022  9:30 AM Oziel Mcnair MD [facility]  [facility] [place]   1/10/2022  8:00 AM Vermeesch, Marilyn K, MD [facility]     Test Results Pending at Discharge:    Pending Labs     Order Current Status    Blood Culture - Blood, Arm, Right Preliminary result    Blood Culture - Blood, Arm, Right Preliminary result             Whitney Rivera DO  09/05/21  14:32 EDT    Time: I spent 45 minutes on this discharge activity which included: face-to-face encounter with the patient, reviewing the data in the system, coordination of the care with the nursing staff as well as consultants, documentation, and entering orders.     Dictated utilizing Dragon dictation.

## 2021-09-06 NOTE — OUTREACH NOTE
Call Center TCM Note      Responses   [facility] patient discharged from?  [facility]   Does the patient have one of the following disease processes/diagnoses(primary or secondary)?  CHF   TCM attempt successful?  Yes   Call start time  1225   Call end time  1232   Person spoke with today (if not patient) and relationship  charla Monet reviewed with patient/caregiver?  Yes   Is the patient having any side effects they believe may be caused by any medication additions or changes?  No   Does the patient have all medications ordered at discharge?  N/A   Is the patient taking all medications as directed (includes completed medication regime)?  Yes   Does the patient have a primary care provider?   Yes   Does the patient have an appointment with their PCP within 7 days of discharge?  Yes   Comments regarding PCP  hospital DE fu appt on 9/9/21 at 2:45 PM   Pulse Ox monitoring  None   Psychosocial issues?  No   Did the patient receive a copy of their discharge instructions?  Yes   Nursing interventions  Reviewed instructions with patient   What is the patient's perception of their health status since discharge?  Improving   Nursing interventions  Nurse provided patient education   Is the patient weighing daily?  Yes   Does the patient have scales?  Yes   Daily weight interventions  Education provided on importance of daily weight   Is the patient able to teach back Heart Failure diet management?  Yes   Is the patient able to teach back Heart Failure Zones?  Yes   Is the patient able to teach back signs and symptoms of worsening condition? (i.e. weight gain, shortness of air, etc.)  Yes   If the patient is a current smoker, are they able to teach back resources for cessation?  Not a smoker   Is the patient/caregiver able to teach back the hierarchy of who to call/visit for symptoms/problems? PCP, Specialist, Home health nurse, Urgent Care, ED, 911  Yes   TCM call completed?  Yes   Wrap up additional comments   Solomon, son states pt is doing ok. JAY Barakat, verified [facility] appt on 9/9/21. No questions/concerns.           Shonna Casillas RN    9/6/2021, 12:33 EDT

## 2021-09-09 PROBLEM — N30.00 ACUTE CYSTITIS WITHOUT HEMATURIA: Status: ACTIVE | Noted: 2021-01-01

## 2021-09-09 PROBLEM — R05.9 COUGH IN ADULT PATIENT: Status: RESOLVED | Noted: 2021-01-01 | Resolved: 2021-01-01

## 2021-09-09 PROBLEM — I50.33 ACUTE ON CHRONIC DIASTOLIC (CONGESTIVE) HEART FAILURE (HCC): Status: ACTIVE | Noted: 2021-01-01

## 2021-09-09 NOTE — PROGRESS NOTES
Transitional Care Follow Up Visit  Subjective     Andrew Hernandez is a 87 y.o. male who presents for a transitional care management visit.    Within 48 business hours after discharge our office contacted him via telephone to coordinate his care and needs.      I reviewed and discussed the details of that call along with the discharge summary, hospital problems, inpatient lab results, inpatient diagnostic studies, and consultation reports with Andrew.     Current outpatient and discharge medications have been reconciled for the patient.  Reviewed by: Marilyn K Vermeesch, MD      Date of TCM Phone Call 9/5/2021   [facility]   Date of Admission 9/2/2021   Date of Discharge 9/5/2021   Risk for Readmission (LACE Score) -   Discharge Disposition Home or Self Care     Risk for Readmission (LACE) Score: 7 (9/5/2021  6:01 AM)      History of Present Illness   Course During Hospital Stay: Please see hospital course and discharge summary below.    History of presenting illness/Hospital Course:     87-year-old male with recent history of tongue cancer status post resection at  on 8/16/2021, A. fib on Eliquis, CAD, previous CVA who presents to the emergency room with complaints of shortness of breath and lower extremity edema.  Patient was recently treated at  from 8/16-8/20 for tongue resection.  Patient also had a myocardial infarction postoperatively.  He declined a heart catheterization and elected to manage medically.  Patient follows with Dr. Mcnair at [facility].  He last saw him 8/30 and was told to continue current medications including daily Plavix and Eliquis and instructed to start Lasix.  However, his daughter-in-law did not give him any Lasix because she was concerned due to low blood pressure readings at home. Patient is supposed to be on a mechanical soft diet per speech recommendations, but has not been adhering to a low salt diet. He has had progressively worsening dyspnea, orthopnea, and lower extremity  edema.     Upon arrival to the ED, patient was found to have fever of 100.7. Labs were significant for negative troponin, proBNP of 7000, sodium level of 113, normal renal function.  Patient's baseline sodium level appears to be in the 120s. UA with positive nitrites and leuk esterase. Chest xray moderate bilateral pleural effusions with atelectasis. He was started on Levaquin, but after 10minutes became short of breath, so this was discontinued and he was started on Merrem. He was given Lasix IV 80mg once in ED, then 40 upon arrival to the floor. He has had 2liters output thus far. Suspect hyponatremia is secondary to hypervolemia and should improve with diuresis. Will give Lasix 60mg bid. Echo: EF 50-55%, grade 1 diastolic dysfunction, moderate aortic regurgitation, mild MR and TR. Patient net negative 6.5L at time of discharge. Will discharge home on po Lasix 40mg daily. Patient and daughter in law instructed to weigh patient once home  Today and that will be his dry weight. If he gains 2 pounds overnight or 5 pounds in 2 days then will need to take additional dose of Lasix. Will need to followup with PCP in 3 days.    He is doing much better now.  He is taking lasix 40 mg daily, he has been gaining over 2 lbs at night past two nights and required extra 20 mg of lasix for 2 days.  He did not need extra lasix today.  He denies CP, SOA, tightness.  His appetite is good.  He slept well last night.  He just developed a cough today, the cough is dry.  His daughter in law has COVID was just diagnosed today.  No pain with urination.   Patient would benefit from a hospital bed to help him adjust position at night due to CHF.  He requires elevation of head of bed.     The following portions of the patient's history were reviewed and updated as appropriate: allergies, current medications, past family history, past medical history, past social history, past surgical history and problem list.    Review of Systems    Constitutional: Positive for fatigue. Negative for activity change, appetite change, fever and unexpected weight change.   Respiratory: Positive for cough. Negative for shortness of breath and wheezing.    Cardiovascular: Positive for leg swelling. Negative for chest pain and palpitations.   Gastrointestinal: Negative for constipation, nausea and vomiting.   Musculoskeletal: Positive for gait problem.   Neurological: Positive for weakness.   Hematological: Bruises/bleeds easily.   Psychiatric/Behavioral: Negative for sleep disturbance.       Objective   Physical Exam  Vitals and nursing note reviewed.   Constitutional:       General: He is not in acute distress.     Appearance: Normal appearance. He is well-developed. He is not ill-appearing.      Comments: Kind and pleasant elderly gentleman, appears stated age and in NAD today, seated in wheelchair   HENT:      Head: Normocephalic and atraumatic.      Right Ear: External ear normal.      Left Ear: External ear normal.      Mouth/Throat:      Comments: Right lateral tongue with sutures in place and healing well  Eyes:      General:         Right eye: No discharge.         Left eye: No discharge.      Extraocular Movements: Extraocular movements intact.      Conjunctiva/sclera: Conjunctivae normal.   Neck:      Thyroid: No thyromegaly.      Comments: No thyromegaly or mass  Cardiovascular:      Rate and Rhythm: Normal rate and regular rhythm.      Pulses: Normal pulses.      Heart sounds: Murmur heard.        Comments: Systolic murmur grade 2/6 heard loudest at left sternal border  Pulmonary:      Effort: Pulmonary effort is normal. No respiratory distress.      Breath sounds: Normal breath sounds. No wheezing or rhonchi.   Abdominal:      General: Bowel sounds are normal. There is no distension.      Palpations: Abdomen is soft.      Tenderness: There is no abdominal tenderness.   Musculoskeletal:         General: Normal range of motion.      Cervical back:  Normal range of motion and neck supple.      Right lower leg: Edema present.      Left lower leg: Edema present.      Comments: Trace edema at ankles bilaterally   Skin:     General: Skin is warm.      Findings: No rash.   Neurological:      General: No focal deficit present.      Mental Status: He is alert and oriented to person, place, and time. Mental status is at baseline.      Cranial Nerves: No cranial nerve deficit.      Motor: Weakness present.      Coordination: Coordination normal.      Gait: Gait abnormal.      Comments: Diffuse weakness, currently in wheelchair   Psychiatric:         Mood and Affect: Mood normal.         Behavior: Behavior normal.         Thought Content: Thought content normal.         Judgment: Judgment normal.         Assessment/Plan   Diagnoses and all orders for this visit:    1. Edema due to hypervolemia (Primary)  -     Comprehensive Metabolic Panel    2. Hyponatremia  -     Comprehensive Metabolic Panel    3. Acute on chronic diastolic (congestive) heart failure (CMS/HCC)    4. Acute cystitis without hematuria    Follow-up labs ordered, to be done today for follow-up of sodium and chloride levels  Trace edema noted on exam today, but shortness of breath is markedly improved per patient  Monitor weight every morning, if he has greater than 2 pound weight gain overnight he may take extra 20 mg of Lasix in addition to his 40 mg daily  May increase his current water intake by 1 bottle a day  Follow-up with cardiologist as scheduled  UTI symptoms are resolved at this time  Will check CBC on next office visit due to anemia, this is likely postoperative anemia from recent tongue cancer surgery  We will work on arranging hospital bed for patient due to underlying congestive heart failure  Patient would benefit from a hospital bed as he requires frequent changes in positioning throughout the night due to his CHF.  Head of bed requires elevation frequently.    Return to clinic for routine  follow-up visit in 1 month

## 2021-09-10 NOTE — PROGRESS NOTES
Please call family with lab results.  Sodium level has increased to 128 which is almost near his normal but is still quite low compared to normal range.  No evidence of dehydration at this time.  Please tell them to follow through with plan of care discussed at office visit yesterday.

## 2021-09-10 NOTE — TELEPHONE ENCOUNTER
----- Message from Andrew Hernandez sent at 9/9/2021 10:23 PM EDT -----  Regarding: Non-Urgent Medical Question  Contact: [phone]  Phillip,  I just wanted to give an update on Andrew.  He was hospitalized September 2-5 @ [facility] for CHF. He has appt with Tabby first part of October. I wasn't sure if he wanted to see him any sooner. He'll prob keep the same appt but I wanted to be sure.    Thanks  Roshni

## 2021-09-10 NOTE — TELEPHONE ENCOUNTER
See mychart message below and advise if patient needs to be seen sooner at [facility] instead of on current appt at 10/04/21.

## 2021-09-13 NOTE — TELEPHONE ENCOUNTER
Roshni and I only briefly discussed fever.  Could you please send Roshni a message and ask one of the family members to bring a urine sample to clinic.  Thank you

## 2021-09-13 NOTE — PROGRESS NOTES
Please call Roshni and tell her that he has UTI.  Urine culture ordered.  I have sent in Bactrim single strength to [facility] pharmacy, take twice daily with meals for 5 days.

## 2021-09-15 NOTE — PROGRESS NOTES
Please call Roshni and tell her that urine culture has multiple bacteria growing.  Ask her how well he is doing at this time.  Is his blood pressure stable with changes we made in labetalol?  How is the edema in his legs?

## 2021-09-15 NOTE — OUTREACH NOTE
CHF Week 2 Survey      Responses   [facility] of [facility] in [place] patient discharged from?  [facility]   Does the patient have one of the following disease processes/diagnoses(primary or secondary)?  CHF   Week 2 attempt successful?  Yes   Call start time  1045   Call end time  1056   Discharge diagnosis  A/C CHF, Acute hypoxic resp failure, hyponatremia, UTI, A-fib   Is patient permission given to speak with other caregiver?  Yes   Person spoke with today (if not patient) and relationship  son and SANJUANITA Barakat   Medication alerts for this patient  Bactrim added for a UTI   Meds reviewed with patient/caregiver?  Yes   Is the patient having any side effects they believe may be caused by any medication additions or changes?  No   Does the patient have all medications ordered at discharge?  Yes   Is the patient taking all medications as directed (includes completed medication regime)?  Yes   Does the patient have a primary care provider?   Yes   Does the patient have an appointment with their PCP within 7 days of discharge?  Yes   Has the patient kept scheduled appointments due by today?  Yes   Has home health visited the patient within 72 hours of discharge?  N/A   Comments  Roshni caregiver has Covid she reports she contracted from hospital. She reports patient has been tested today and is awaiting results. Patient has a cough and she is concerned he may have contracted it as well.    Did the patient receive a copy of their discharge instructions?  Yes   Nursing interventions  Reviewed instructions with patient   What is the patient's perception of their health status since discharge?  New symptoms unrelated to diagnosis   Nursing interventions  Nurse provided patient education   Is the patient weighing daily?  Yes   Does the patient have scales?  Yes   Daily weight interventions  Education provided on importance of daily weight   Is the patient able to teach back Heart Failure diet management?  Yes   Is the patient able to teach back  Heart Failure Zones?  Yes   Is the patient able to teach back signs and symptoms of worsening condition? (i.e. weight gain, shortness of air, etc.)  Yes   If the patient is a current smoker, are they able to teach back resources for cessation?  Not a smoker   Is the patient/caregiver able to teach back the hierarchy of who to call/visit for symptoms/problems? PCP, Specialist, Home health nurse, Urgent Care, ED, 911  Yes   Additional teach back comments  Encouraged if worsening of symptoms to seek medical care immediately.    CHF Week 2 call completed?  Yes          Joseph Simon RN

## 2021-09-15 NOTE — TELEPHONE ENCOUNTER
I have contacted Roshni and discussed how well he is doing with her today.  I would like to arrange home health with [facility] under CHF protocol for Andrew if possible.  I would also like them to see him as soon as possible if they could.  I would also like to arrange for a hospital bed semielectric due to diagnosis of CHF.  Please send me these orders and I will sign.  I will work on my last office note for hospital bed.  I made addendum in his last transitional care hospital note.

## 2021-09-20 PROBLEM — U07.1 COVID-19 VIRUS DETECTED: Status: ACTIVE | Noted: 2021-01-01

## 2021-09-20 PROBLEM — I50.9 ACUTE ON CHRONIC CONGESTIVE HEART FAILURE (HCC): Status: ACTIVE | Noted: 2021-01-01

## 2021-09-20 NOTE — TELEPHONE ENCOUNTER
Pallavi with [facility] states she seen Pt the other day.  HR 120s  BP 80/50  2 lb weight loss from Friday to Saturday    Pallavi states Pt has a telehealth visit with cardiologist today but she wanted to see if you would like any blood work done?

## 2021-09-20 NOTE — PROGRESS NOTES
[facility] Cardiology  Office Progress Note  Andrew Hernandez  1934  [address] [place] [facility] [place] [address]       Visit Date: 21    PCP: Vermeesch, Marilyn K, MD  [address]  [place] [address]    IDENTIFICATION: A 87 y.o. male retired . Roshni Hernandez's uncle.    PROBLEM LIST:     NSTEMI   perioperative tongue surgery [facility].        Peak at bedtime troponin greater than 300        Patient deferred ischemic evaluation         echocardiogram EF 45%    PAF   holter 2% afib    56/75/178   recurrent periop tongue flap sx 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   3/17 PRITESH restented- Given   CUS PRITESH patent   Residual left nonobstructive internal carotid artery stenosis 50-69%   Carotid US: Right stent imagin-49%. Left ICA 50-69%.      Abdominal Bruit  : Aortic diameter 1.8 cm. NO evidence of AAA.     Hypothyroidism     Gastroesophageal reflux disease     Hypercholesterolemia   151/51/84/57   138/60/78/47     Hyponatremia  10/17 134,  130   126    Tongue Cancer- invasive squamous cell [facility]     COVID-19 not hospitalized    CC:   Chief Complaint   Patient presents with   • Coronary Artery Disease   • Shortness of Breath   • Congestive Heart Failure       Allergies  Allergies   Allergen Reactions   • Levaquin [Levofloxacin] Shortness Of Breath   • Amoxicillin Rash   • Rocephin [Ceftriaxone] Hives       Current Medications    Current Outpatient Medications:   •  Acetaminophen (TYLENOL PO), Take 500 mg by mouth As Needed., Disp: , Rfl:   •  apixaban (ELIQUIS) 2.5 MG tablet tablet, Take 1 tablet by mouth Every 12 (Twelve) Hours. (Patient taking differently: Take 5 mg by mouth Every 12 (Twelve) Hours.), Disp: 60 tablet, Rfl: 11  •   atorvastatin (LIPITOR) 20 MG tablet, Take 1 tablet by mouth Every Night., Disp: 90 tablet, Rfl: 3  •  azithromycin (ZITHROMAX) 250 MG tablet, Take 2 tablets by mouth the first day, then 1 tablet daily for 4 days., Disp: 6 tablet, Rfl: 0  •  clopidogrel (PLAVIX) 75 MG tablet, Take 75 mg by mouth Daily., Disp: , Rfl:   •  Famotidine (PEPCID PO), Take 20 mg by mouth 2 (Two) Times a Day., Disp: , Rfl:   •  furosemide (LASIX) 40 MG tablet, Take 1 tablet by mouth Daily., Disp: 30 tablet, Rfl: 0  •  levothyroxine (SYNTHROID, LEVOTHROID) 50 MCG tablet, Take 1 tablet by mouth Daily. (Patient taking differently: Take 50 mcg by mouth Daily With Breakfast.), Disp: 90 tablet, Rfl: 3  •  methylPREDNISolone (MEDROL) 4 MG dose pack, Take by mouth as directed on package instructions. (6,5,4,3,2,1), Disp: 21 tablet, Rfl: 0  •  nitroglycerin (NITROSTAT) 0.4 MG SL tablet, Place 1 tablet under the tongue Every 5 (Five) Minutes As Needed for Chest Pain for up to 3 doses. Take no more than 3 doses in 15 minutes., Disp: 100 tablet, Rfl: 11  •  potassium citrate (UROCIT-K) 10 MEQ (1080 MG) CR tablet, Take 10 mEq by mouth As Needed., Disp: , Rfl:   •  Probiotic Product (ReferralMD PO), Take 2 tablets by mouth As Needed., Disp: , Rfl:   •  Ranibizumab 0.5 MG/0.05ML solution, by Intravitreal route Every 30 (Thirty) Days. Left eye, Disp: , Rfl:   •  tamsulosin (FLOMAX) 0.4 MG capsule 24 hr capsule, Take 1 capsule by mouth Daily. (Patient taking differently: Take 1 capsule by mouth Every Night.), Disp: 90 capsule, Rfl: 3  •  metoprolol tartrate (LOPRESSOR) 25 MG tablet, Take 1 tablet by mouth 2 (Two) Times a Day., Disp: 180 tablet, Rfl: 3      History of Present Illness   Andrew Hernandez is a 87 y.o. year old male here for telehealth follow-up.  Recently diagnosed with COVID-19.  Per his niece he has had crescendo shortness of breath requiring oxygen and some intermittent atrial fibrillation.  He has had no chest  "pain      OBJECTIVE:  Vitals:    09/20/21 1151   BP: 118/60   BP Location: Left arm   Patient Position: Sitting   Pulse: 74   SpO2: 98%   Weight: 74.1 kg (163 lb 4.8 oz)   Height: 180.3 cm (71\")     Body mass index is 22.78 kg/m².    Physical Exam    Diagnostic Data:  Procedures      ASSESSMENT:   Diagnosis Plan   1. Coronary artery disease involving native coronary artery of native heart without angina pectoris     2. Paroxysmal atrial fibrillation (CMS/HCC)     3. Essential hypertension     4. Mixed hyperlipidemia         PLAN:  CAD with recent post operative non-ST MI patient elects for medical management as he has had no chest discomfort.  Given his known cerebrovascular disease high probability of significant coronary disease.  If any  chest symptoms, ventricular arrhythmia, inability to improve dyspnea- low threshold for invasive ischemic evaluation.  Would continue aspirin 81 with concomitant Eliquis    Paroxysmal atrial fibrillation maintaining sinus mechanism transition labetalol to metoprolol given his hypotension      Dyslipidemia on statin therapy patient does not wish to raise dose as he has had myalgias even on low-dose    We will continue to use as needed Lasix 20 with potassium 10 weight gain 2 pounds in 24 hours in the interim    Chronic hyponatremia      Oziel Mcnair MD, FACC  "

## 2021-09-21 PROBLEM — E87.1 HYPONATREMIA: Status: ACTIVE | Noted: 2021-01-01

## 2021-09-21 PROBLEM — I50.43 ACUTE ON CHRONIC COMBINED SYSTOLIC AND DIASTOLIC CONGESTIVE HEART FAILURE (HCC): Status: ACTIVE | Noted: 2021-01-01

## 2021-09-21 PROBLEM — Z79.01 CHRONIC ANTICOAGULATION: Chronic | Status: ACTIVE | Noted: 2021-01-01

## 2021-09-21 PROBLEM — D48.9 NEOPLASM, UNCERTAIN WHETHER BENIGN OR MALIGNANT: Chronic | Status: ACTIVE | Noted: 2021-01-01

## 2021-09-21 PROBLEM — J96.21 ACUTE AND CHRONIC RESPIRATORY FAILURE WITH HYPOXIA (HCC): Status: ACTIVE | Noted: 2021-01-01

## 2021-09-21 PROBLEM — I69.90 LATE EFFECTS OF CVA (CEREBROVASCULAR ACCIDENT): Chronic | Status: ACTIVE | Noted: 2021-01-01

## 2021-09-21 PROBLEM — H54.40 BLINDNESS OF RIGHT EYE: Chronic | Status: ACTIVE | Noted: 2021-01-01

## 2021-09-21 PROBLEM — I48.20 CHRONIC ATRIAL FIBRILLATION WITH RVR (HCC): Status: ACTIVE | Noted: 2021-01-01

## 2021-09-22 NOTE — OUTREACH NOTE
CHF Week 3 Survey      Responses   [facility] patient discharged from?  [facility]   Does the patient have one of the following disease processes/diagnoses(primary or secondary)?  CHF   Week 3 attempt successful?  No   Revoke  Readmitted          Sade Christianson RN

## 2021-09-23 PROBLEM — Z78.9 IMPAIRED MOBILITY AND ADLS: Status: ACTIVE | Noted: 2021-01-01

## 2021-09-23 PROBLEM — R53.81 PHYSICAL DECONDITIONING: Status: ACTIVE | Noted: 2021-01-01

## 2021-09-23 PROBLEM — Z74.09 IMPAIRED MOBILITY AND ADLS: Status: ACTIVE | Noted: 2021-01-01

## 2021-09-23 PROBLEM — L89.152 PRESSURE INJURY OF COCCYGEAL REGION, STAGE 2 (HCC): Status: ACTIVE | Noted: 2021-01-01

## 2021-09-26 NOTE — OUTREACH NOTE
Prep Survey      Responses   [facility] patient discharged from?  [facility]   Is LACE score < 7 ?  No   Emergency Room discharge w/ pulse ox?  No   Eligibility  [facility]   Date of Admission  09/20/21   Date of Discharge  09/26/21   Discharge Disposition  Home or Self Care   Discharge diagnosis  Acute on chronic combined systolic and diastolic congestive heart failure    Does the patient have one of the following disease processes/diagnoses(primary or secondary)?  CHF   Does the patient have Home health ordered?  Yes   What is the Home health agency?   [facility]   Is there a DME ordered?  No   Prep survey completed?  Yes          Criselda Dominguez RN

## 2021-09-27 NOTE — OUTREACH NOTE
Call Center TCM Note      Responses   [facility] patient discharged from?  [facility]   Does the patient have one of the following disease processes/diagnoses(primary or secondary)?  CHF   TCM attempt successful?  Yes   Call start time  1444   Call end time  1453   Discharge diagnosis  Acute on chronic combined systolic and diastolic congestive heart failure    Is patient permission given to speak with other caregiver?  Yes   Person spoke with today (if not patient) and relationship  Poa Roshni   Meds reviewed with patient/caregiver?  Yes   Is the patient having any side effects they believe may be caused by any medication additions or changes?  No   Does the patient have all medications ordered at discharge?  Yes   Is the patient taking all medications as directed (includes completed medication regime)?  Yes   Does the patient have a primary care provider?   Yes   Does the patient have an appointment with their PCP within 7 days of discharge?  Greater than 7 days   Urgent appointment interventions  Facilitated patient appointment   Has the patient kept scheduled appointments due by today?  N/A   Comments  Sooner appt made with PCP for 10/4 @ 2:00   What is the Home health agency?   [facility]   Has home health visited the patient within 72 hours of discharge?  Call prior to 72 hours   Pulse Ox monitoring  None   Psychosocial issues?  No   Did the patient receive a copy of their discharge instructions?  Yes   Nursing interventions  Reviewed instructions with patient   What is the patient's perception of their health status since discharge?  Improving   Nursing interventions  Nurse provided patient education   Is the patient weighing daily?  Yes   Does the patient have scales?  Yes   Is the patient able to teach back Heart Failure diet management?  Yes   Is the patient able to teach back Heart Failure Zones?  Yes   Is the patient able to teach back signs and symptoms of worsening condition? (i.e. weight gain,  shortness of air, etc.)  Yes   If the patient is a current smoker, are they able to teach back resources for cessation?  Not a smoker   Is the patient/caregiver able to teach back the hierarchy of who to call/visit for symptoms/problems? PCP, Specialist, Home health nurse, Urgent Care, ED, 911  Yes   Additional teach back comments  States he is doing well and has been eating.  Still weak but he has been doing his exercises.  States she contacted [facility] and they are waiting for orders to resume care.  Will message PCP regarding this.   TCM call completed?  Yes   Wrap up additional comments  Will message PCP office regarding [facility] is needing orders to resume care.           Maida London LPN    9/27/2021, 14:56 EDT

## 2021-09-27 NOTE — TELEPHONE ENCOUNTER
PT daughter in law just wanted to let you know the patient came home from hospital yesterday afternoon.They have changed his meds a little. Added diltiazem cd 120 qd and increased metoprolol to 50mg BID and then made lasix PRN  but will follow your lead on that. Since being admitted he's lost almost 10 lbs from home weight on Monday September 20th. RN informed patient family of [facility] covid in person office policy. Please advise if anything further is needed

## 2021-10-04 NOTE — PROGRESS NOTES
Transitional Care Follow Up Visit  Subjective     Andrew Hernandez is a 87 y.o. male who presents for a transitional care management visit.    Within 48 business hours after discharge our office contacted him via telephone to coordinate his care and needs.      I reviewed and discussed the details of that call along with the discharge summary, hospital problems, inpatient lab results, inpatient diagnostic studies, and consultation reports with Andrew.     Current outpatient and discharge medications have been reconciled for the patient.  Reviewed by: Marilyn K Vermeesch, MD      Date of TCM Phone Call 9/26/2021   [facility]   Date of Admission 9/20/2021   Date of Discharge 9/26/2021   Risk for Readmission (LACE Score) -   Discharge Disposition Home or Self Care     Risk for Readmission (LACE) Score: 11 (9/26/2021  6:00 AM)      History of Present Illness   Course During Hospital Stay:  See discharge summary and hospital course below:    History of presenting illness:     87-year-old male with a history of systolic and diastolic heart failure with a recent hospital admission for CHF exacerbation has a history of A. fib on Eliquis has coronary artery disease.  Diagnosed with COVID-19 on September 15 and presented to the emergency room with increasing shortness of breath on September 20.  Had recent weight gain thought to be secondary to fluid overload  He was admitted for further evaluation and management of his symptoms     Hospital Course: In the emergency room noted to have an elevated BNP of 12,500.  He was given IV Lasix.  Noted to be in A. fib with a rapid ventricular rate.  Was given a diltiazem bolus and subsequently a diltiazem drip.  He was given diuretic therapy during the stay in the hospital was continued on Eliquis.  Pulmonary status remained reasonably stable the patient had been on a Medrol Dosepak while at home.  Continue to show improvement subsequently switched over to oral  diltiazem and then to a sustained-release formulation.  A chest x-ray on admission had shown some infiltrates and small bilateral pleural effusions  No antibiotic therapy was initiated and she was thought to be in fluid overload rather than pneumonia.  Other routine labs included TSH level which was wnl prior to discharge.    Since home HH nurse has been there only once.  PT came but he is in Afib with rapid rate and they cannot work with him.  His heart has been out of rhythm 4 out of past 7 days, he is usually out of rhythm all day.  She usually gives him his diltiazem earlier in the day.  His BP is low when HR is elevated, /40-60s.  Daughter has noted that urine output is low when blood pressure is low also.  He has not been eating very much since home, worse in the afternoons.  Home weights have been increasing from 155.9 and is now 160.4 lbs.  He is currently on lasix 40 mg daily.  His weight is down 1.5 lbs from yesterday.  He feels like he is not breathing well right now.  Has been using compression stockings daily due to chronic pedal edema.     The following portions of the patient's history were reviewed and updated as appropriate: allergies, current medications, past family history, past medical history, past social history, past surgical history and problem list.    Review of Systems   Constitutional: Positive for activity change, appetite change, fatigue and unexpected weight change.   Respiratory: Positive for shortness of breath.    Cardiovascular: Positive for palpitations and leg swelling.   Gastrointestinal: Negative for constipation and diarrhea.   Genitourinary: Positive for decreased urine volume.   Musculoskeletal: Positive for joint swelling.   Neurological: Positive for weakness.   Psychiatric/Behavioral: Positive for confusion.       Objective   Physical Exam  Vitals and nursing note reviewed.   Constitutional:       Appearance: Normal appearance. He is ill-appearing.      Comments:  Pleasant elderly man, appears pale and is seated in wheelchair today, on oxygen at 2 L per nasal cannula   HENT:      Head: Normocephalic and atraumatic.      Right Ear: External ear normal.      Left Ear: External ear normal.   Eyes:      General:         Right eye: No discharge.         Left eye: No discharge.      Extraocular Movements: Extraocular movements intact.   Cardiovascular:      Rate and Rhythm: Rhythm irregular.      Heart sounds: Murmur heard.        Comments: Irregularly irregular, systolic murmur grade 2/6 over precordium  Pulmonary:      Effort: Pulmonary effort is normal. No respiratory distress.      Breath sounds: Rhonchi present. No wheezing or rales.      Comments: Rhonchi noted over anterior and posterior right lung fields, few rhonchi in left lung but good air movement as compared to right  Abdominal:      Palpations: Abdomen is soft. There is no mass.      Tenderness: There is no abdominal tenderness.   Musculoskeletal:      Cervical back: Normal range of motion and neck supple.      Right lower leg: Edema present.      Left lower leg: Edema present.      Comments: +1 edema to knees bilaterally, compression stockings in place   Lymphadenopathy:      Cervical: No cervical adenopathy.   Skin:     Findings: No rash.   Neurological:      General: No focal deficit present.      Mental Status: He is alert and oriented to person, place, and time.      Cranial Nerves: No cranial nerve deficit.      Motor: Weakness present.      Gait: Gait abnormal.      Comments: Not ambulatory, seated in wheelchair due to weakness   Psychiatric:         Behavior: Behavior normal.         Thought Content: Thought content normal.      Comments: Quiet and subdued today, patient appears depressed.  Does not talk unless spoken to         Assessment/Plan   Diagnoses and all orders for this visit:    1. Acute on chronic diastolic (congestive) heart failure (HCC) (Primary)  -     CBC & Differential  -     Comprehensive  Metabolic Panel  -     BNP    2. COVID-19 virus detected    3. Acute and chronic respiratory failure with hypoxia (HCC)    4. Chronic atrial fibrillation with RVR (HCC)  -     Discontinue: furosemide (LASIX) 40 MG tablet; Take 1-2 tablets by mouth daily due to edema  Dispense: 30 tablet; Refill: 2  -     furosemide (LASIX) 40 MG tablet; Take 1-2 tablets by mouth daily due to edema  Dispense: 60 tablet; Refill: 2    Other orders  -     digoxin (LANOXIN) 125 MCG tablet; Take 1 tablet by mouth Daily.  Dispense: 30 tablet; Refill: 1  -     megestrol (MEGACE) 40 MG tablet; Take 1 tablet by mouth 2 (Two) Times a Day.  Dispense: 60 tablet; Refill: 3      Labs as noted  Recommend Lasix 40 mg daily with potassium 10 mEq daily and magnesium citrate 250 mg daily.  If he gains greater than 1 pound overnight may take an additional Lasix 20 to 40 mg as needed  Start digoxin 0.125 mg daily to help with rate control.  Prefer we not add additional diltiazem as his blood pressure does not tolerate this as he is already on metoprolol 25 mg twice daily also  Initiate Megace to help with appetite stimulation  Continue home health, however patient is limited in amount of physical therapy he can tolerate due to chronic atrial fibrillation at this time  We discontinued oxygen for short time during clinic visit.  Oxygen saturation dropped to 88% on room air while patient was sitting still.  Daughter states that oxygen levels dropped to 87% at home on room air also.  Patient definitely needs oxygen at 2 L per nasal cannula to maintain saturations greater than 92%.  He appears short of breath while seated with no exertion today, she will contact oxygen supply [employer] to send me order for signature  Follow-up with cardiology as scheduled    Follow-up visit in 4 weeks

## 2021-10-05 NOTE — OUTREACH NOTE
CHF Week 2 Survey      Responses   [facility] patient discharged from?  [facility]   Does the patient have one of the following disease processes/diagnoses(primary or secondary)?  CHF   Week 2 attempt successful?  Yes   Call start time  1257   Call end time  1259   Discharge diagnosis  Acute on chronic combined systolic and diastolic congestive heart failure    Person spoke with today (if not patient) and relationship  Amrit Roshni   Medication alerts for this patient  , giving Pedialyte today.    Meds reviewed with patient/caregiver?  Yes   Is the patient taking all medications as directed (includes completed medication regime)?  Yes   Medication comments  PCP added digoxin 10-5   Has the patient kept scheduled appointments due by today?  Yes   What is the Home health agency?   [facility]   Has home health visited the patient within 72 hours of discharge?  Yes   Pulse Ox monitoring  None   Comments  Pt reports that he is in afib, 104/49,  today.   What is the patient's perception of their health status since discharge?  Same   Nursing interventions  Nurse provided patient education   Is the patient weighing daily?  Yes   Does the patient have scales?  Yes   Is the patient able to teach back Heart Failure Zones?  Yes   Is the patient able to teach back signs and symptoms of worsening condition? (i.e. weight gain, shortness of air, etc.)  Yes   CHF Week 2 call completed?  Yes          Maria Elena Gibson RN

## 2021-10-05 NOTE — PROGRESS NOTES
Please tell daughter that Andrew is more anemic than 10 days ago.  In addition his sodium and chloride levels are quite low as we discussed earlier.  She is to continue Lasix 40 mg daily only for today and tomorrow due to extremely low sodium and chloride levels.  Please limit fluid intake to 1200 cc daily.  I would like her to give him Pedialyte 1 to 2 cups today and tomorrow to help with sodium levels.  Please make sure he is getting vitamin C 500 mg daily and iron slow release over-the-counter once daily.

## 2021-10-05 NOTE — PROGRESS NOTES
Please tell daughter that BNP is down from 19,000-1200.  Andrew is markedly improved in this regard and this may be his new baseline BNP level.  Hopefully digoxin will start to help him within the next few days.

## 2021-10-07 PROBLEM — I50.9 ACUTE ON CHRONIC CONGESTIVE HEART FAILURE (HCC): Status: ACTIVE | Noted: 2021-01-01

## 2021-10-07 NOTE — ED NOTES
Advised patient we needed a urine sample. Provided patient with urinal.     Gómez Olmstead, PCT  10/07/21 193

## 2021-10-08 NOTE — H&P
[facility]   HISTORY AND PHYSICAL      Name:  Andrew Hernandez   Age:  87 y.o.  Sex:  male  :  1934  MRN:  [id number]   Visit Number:  [id number]  Admission Date:  10/7/2021  Date Of Service:  10/07/21  Primary Care Physician:  Vermeesch, Marilyn K, MD    Chief Complaint:   Shortness of breath    History Of Presenting Illness:    Patient is a very pleasant 87-year-old male with medical history of chronic systolic and diastolic heart failure, chronic hypoxic respiratory failure on 3 to 4 L of oxygen continuously, atrial fibrillation on Eliquis, anemia, BPH, recent diagnosis of COVID-19, coronary artery disease, tongue cancer status post resection, previous CVA with complete right vision loss, carotid artery stenosis status post stenting of right carotid, hypertension, hyperlipidemia and hypothyroidism who was brought into the ER today for evaluation of shortness of breath.  Patient reports feeling short of breath at baseline and has been hospitalized 2 times in the last 1 month for CHF exacerbation.  He states that his breathing has been worsening over the last few days with an increased cough productive of thick sputum.  His daughter-in-law reports that the patient has had difficulty expectorating due to loss of tongue mobility after his resection.  Patient reports feeling quite weak also.  Denies any fever or chills.  He denies any other complains of chest pain, chest pressure, abdominal pain, diarrhea or dysuria.  Patient does have a history of recurrent UTIs.  Patient has known history of atrial fibrillation which has been difficult to control per his daughter-in-law.  He has been started on digoxin just 3 days ago and apparently just came out of atrial fibrillation yesterday.  She states that when he is in A. fib he tends to have worsening heart failure symptoms.  Patient apparently had been constipated for the last several days but has had 2 bowel movement since yesterday  evening after receiving laxatives.    On arrival to the ER patient noted to have normal vitals and is oxygenating at 99% on 4 L of oxygen which she wears at home.  His labs are notable for proBNP 12,738, negative troponin.  Sodium 114.  Normal potassium and magnesium.  H&H 8.5/24.7 which is right around his baseline.  Urinalysis appears to be a contaminated sample with 7-12 squamous cells but does have 6-12 WBCs and 2+ bacteria.  Patient denies any urinary symptoms.  Chest x-ray on my read shows significant pulmonary edema with large left-sided pleural effusion.  Patient was given a dose of IV Lasix and gentamicin for possible UTI and is being admitted to the hospitalist service for further care.    Review Of Systems:    All systems were reviewed and negative except as mentioned in history of presenting illness, assessment and plan.    Past Medical History: Patient  has a past medical history of A-fib ([facility]), Abnormal ECG, Arrhythmia, Arthritis, Asthma (Aug 2021), Benign prostatic hyperplasia, Cancer ([facility]), Carotid stenosis, CHF (congestive heart failure) ([facility]), Coronary artery disease, COVID-19 (09/2021), Disease of thyroid gland, Enlarged prostate, Fracture, Full dentures, GERD (gastroesophageal reflux disease), Heart valve disease, Hyperlipidemia, Hypertension, Hypothyroidism, Impaired functional mobility, balance, gait, and endurance (09/03/2021), Kidney infection, Mitral regurgitation, Myocardial infarction ([facility]) (8-16-21), Nonrheumatic aortic valve insufficiency, Renal calculi, Stroke ([facility]) (03/2016), 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[facility]), and Wears glasses.    Past Surgical History: Patient  has a past surgical history that includes Patella fracture surgery (Left, 1986); Kidney stone surgery (Right, 2011); pr tcat iv stent crv crtd art embolic protecj (Right, 3/4/2017); Carotid stent (Right, 03/18/2016); Carotid stent (Right, 03/04/2017); Carotid endarterectomy; Cataract extraction w/ intraocular lens implant (Left, 9/4/2018);  and Tongue surgery (08/16/2021).    Social History: Patient  reports that he quit smoking about 16 years ago. His smoking use included cigarettes. He started smoking about 71 years ago. He has a 55.00 pack-year smoking history. He quit smokeless tobacco use about 16 years ago.  His smokeless tobacco use included chew. He reports previous alcohol use. He reports that he does not use drugs.    Family History: Patient's family history includes Cancer in his father; Heart attack in his brother; Heart disease in his brother and mother; Hyperlipidemia in his brother and mother; Hypertension in his brother and mother; Prostate cancer in his father.    Allergies:      Levaquin [levofloxacin], Amoxicillin, and Rocephin [ceftriaxone]    Home Medications:    Prior to Admission Medications     Prescriptions Last Dose Informant Patient Reported? Taking?    apixaban (ELIQUIS) 2.5 MG tablet tablet 10/7/2021  No Yes    Take 1 tablet by mouth Every 12 (Twelve) Hours.    Patient taking differently:  Take 5 mg by mouth Every 12 (Twelve) Hours.    aspirin 81 MG EC tablet 10/7/2021  Yes Yes    Take 81 mg by mouth Daily.    atorvastatin (LIPITOR) 20 MG tablet 10/6/2021  No Yes    Take 1 tablet by mouth Every Night.    digoxin (LANOXIN) 125 MCG tablet 10/7/2021  No Yes    Take 1 tablet by mouth Daily.    dilTIAZem CD (CARDIZEM CD) 120 MG 24 hr capsule 10/7/2021  No Yes    Take 1 capsule by mouth Daily.    Famotidine (PEPCID PO) 10/7/2021  Yes Yes    Take 20 mg by mouth 2 (Two) Times a Day.    ferrous sulfate 140 (45 Fe) MG tablet controlled-release tablet 10/7/2021  Yes Yes    Take  by mouth Daily With Breakfast. SLOW RELEASE IRON 160/50    furosemide (LASIX) 40 MG tablet 10/7/2021  No Yes    Take 1-2 tablets by mouth daily due to edema    levothyroxine (SYNTHROID, LEVOTHROID) 50 MCG tablet 10/7/2021  No Yes    Take 1 tablet by mouth Daily.    Patient taking differently:  Take 50 mcg by mouth Daily With Breakfast.    metoprolol tartrate  (LOPRESSOR) 25 MG tablet 10/7/2021  No Yes    Take 1 tablet by mouth 2 (Two) Times a Day.    multivitamin with minerals (CENTRUM SILVER PO) 10/7/2021  Yes Yes    Take 1 tablet by mouth Daily.    nitroglycerin (NITROSTAT) 0.4 MG SL tablet Patient Taking Differently  No Yes    Place 1 tablet under the tongue Every 5 (Five) Minutes As Needed for Chest Pain for up to 3 doses. Take no more than 3 doses in 15 minutes.    potassium citrate (UROCIT-K) 10 MEQ (1080 MG) CR tablet 10/7/2021  Yes Yes    Take 10 mEq by mouth As Needed.    tamsulosin (FLOMAX) 0.4 MG capsule 24 hr capsule 10/7/2021  No Yes    Take 1 capsule by mouth Daily.    Patient taking differently:  Take 1 capsule by mouth Every Night.    vitamin C (ASCORBIC ACID) 250 MG tablet   Yes Yes    Take 500 mg by mouth Daily.    megestrol (MEGACE) 40 MG tablet   No No    Take 1 tablet by mouth 2 (Two) Times a Day.        ED Medications:    Medications   sodium chloride 0.9 % flush 10 mL (has no administration in time range)   apixaban (ELIQUIS) tablet 5 mg (has no administration in time range)   aspirin EC tablet 81 mg (has no administration in time range)   atorvastatin (LIPITOR) tablet 20 mg (has no administration in time range)   digoxin (LANOXIN) tablet 125 mcg (has no administration in time range)   dilTIAZem CD (CARDIZEM CD) 24 hr capsule 120 mg (has no administration in time range)   famotidine (PEPCID) tablet 20 mg (has no administration in time range)   ferrous sulfate EC tablet 324 mg (has no administration in time range)   levothyroxine (SYNTHROID, LEVOTHROID) tablet 50 mcg (has no administration in time range)   megestrol (MEGACE) tablet 40 mg (has no administration in time range)   metoprolol tartrate (LOPRESSOR) tablet 25 mg (has no administration in time range)   nitroglycerin (NITROSTAT) SL tablet 0.4 mg (has no administration in time range)   tamsulosin (FLOMAX) 24 hr capsule 0.4 mg (has no administration in time range)   ascorbic acid (VITAMIN C)  tablet 500 mg (has no administration in time range)   sodium chloride 0.9 % flush 3 mL (has no administration in time range)   sodium chloride 0.9 % flush 3-10 mL (has no administration in time range)   acetaminophen (TYLENOL) tablet 650 mg (has no administration in time range)     Or   acetaminophen (TYLENOL) 160 MG/5ML solution 650 mg (has no administration in time range)     Or   acetaminophen (TYLENOL) suppository 650 mg (has no administration in time range)   ondansetron (ZOFRAN) injection 4 mg (has no administration in time range)   melatonin tablet 5 mg (has no administration in time range)   sennosides-docusate (PERICOLACE) 8.6-50 MG per tablet 2 tablet (has no administration in time range)     And   polyethylene glycol (MIRALAX) packet 17 g (has no administration in time range)     And   bisacodyl (DULCOLAX) EC tablet 5 mg (has no administration in time range)     And   bisacodyl (DULCOLAX) suppository 10 mg (has no administration in time range)   metoprolol tartrate (LOPRESSOR) tablet 50 mg (50 mg Oral Given 10/7/21 2121)   tamsulosin (FLOMAX) 24 hr capsule 0.4 mg (0.4 mg Oral Given 10/7/21 2121)   gentamicin (GARAMYCIN) 360 mg in sodium chloride 0.9 % IVPB (360 mg Intravenous New Bag 10/7/21 2210)   furosemide (LASIX) injection 40 mg (40 mg Intravenous Given 10/7/21 2122)     Vital Signs:  Temp:  [94.6 °F (34.8 °C)-97.2 °F (36.2 °C)] 94.6 °F (34.8 °C)  Heart Rate:  [63-71] 63  Resp:  [18-26] 18  BP: (138-153)/(46-64) 138/64        10/07/21  1813 10/07/21  2242   Weight: 72.1 kg (159 lb) 75 kg (165 lb 5.5 oz)     Body mass index is 23.06 kg/m².    Physical Exam:     General Appearance:  Alert and cooperative.  Chronically ill-appearing.   Head:  Atraumatic and normocephalic.   Eyes: Conjunctivae and sclerae normal, no icterus. No pallor.   Ears:  Ears with no abnormalities noted.   Throat: No oral lesions, no thrush, oral mucosa moist.   Neck: Supple, trachea midline, no thyromegaly.   Back:   No  kyphoscoliosis present. No tenderness to palpation.   Lungs:   Breath sounds heard bilaterally equally.  Diffuse Rales and rhonchi with minimal expiratory wheezing bilaterally. No Pleural rub or bronchial breathing.   Heart:  Normal S1 and S2, no murmur, no gallop, no rub. No JVD.   Abdomen:   Normal bowel sounds, no masses, no organomegaly. Soft, nontender, nondistended, no rebound tenderness.   Extremities: Supple, 2+ pitting edema, no cyanosis, no clubbing.   Pulses: Pulses palpable bilaterally.   Skin: No bleeding or rash.   Neurologic: Alert and oriented x 3. No facial asymmetry. Moves all four limbs. No tremors.      Laboratory data:    I have reviewed the labs done in the emergency room.    Results from last 7 days   Lab Units 10/07/21  1841 10/04/21  1454   SODIUM mmol/L 114* 119*   POTASSIUM mmol/L 5.2 4.9   CHLORIDE mmol/L 77* 79*   TOTAL CO2 mmol/L  --  27   CO2 mmol/L 27.8  --    BUN mg/dL 22 14   CREATININE mg/dL 0.75* 0.69*   CALCIUM mg/dL 8.7 8.3*   BILIRUBIN mg/dL 0.4 0.6   ALK PHOS U/L 152* 111   ALT (SGPT) U/L 43* 40   AST (SGOT) U/L 31 31   GLUCOSE mg/dL 132*  --      Results from last 7 days   Lab Units 10/07/21  1841 10/04/21  1454   WBC 10*3/mm3 9.02 8.8   HEMOGLOBIN g/dL 8.5* 8.4*   HEMATOCRIT % 24.7* 25.2*   PLATELETS 10*3/mm3 404 475*         Results from last 7 days   Lab Units 10/07/21  1841   TROPONIN T ng/mL <0.010     Results from last 7 days   Lab Units 10/07/21  1841   PROBNP pg/mL 12,738.0*                 Results from last 7 days   Lab Units 10/07/21  2014   COLOR UA  Dark Yellow*   GLUCOSE UA  Negative   KETONES UA  Negative   LEUKOCYTES UA  Small (1+)*   PH, URINE  5.5   BILIRUBIN UA  Negative   UROBILINOGEN UA  1.0 E.U./dL   RBC UA /HPF 0-2*   WBC UA /HPF 6-12*       Pain Management Panel    There is no flowsheet data to display.         EKG:    Sinus rhythm    Radiology:    No radiology results for the last 3 days    Assessment:  1.  Acute exacerbation of chronic combined  systolic and diastolic heart failure, present on admission  2.  Hyponatremia, chronic issue likely acutely worsened in the setting of #1  3.  Chronic hypoxic respiratory failure on 3 to 4 L of oxygen continuously  4.  Paroxysmal atrial fibrillation on Eliquis, currently in sinus rhythm  5.  Anemia  6. BPH  7. Recent diagnosis of COVID-19  8. Coronary artery disease  9. Tongue cancer status post resection  10. Previous CVA with complete right vision loss  11. Carotid artery stenosis status post stenting of right carotid  12. Hypertension  13. Hyperlipidemia   14. Hypothyroidism   15.  Aortic regurgitation    Plan:  Patient will be [facility] with telemetry.  He will be continued on IV Lasix 40 mg twice daily with close observation of his renal functions and electrolytes.  He may need up titration of Lasix as he already takes 40 mg orally twice at home.  No need to repeat echocardiogram as he just had one done 1 month ago.  Monitor ins and outs and obtain daily weights.  Patient's daughter-in-law did inform me that the patient had some difficulty voiding intermittently requiring in and out catheterization during his hospitalization.  She would like to avoid this as much as possible as it did give him recurrent UTIs.  We will place a condom catheter as needed.    Patient's urinalysis was suggestive of an UTI however it is contaminated and the patient has no symptoms.  We will repeat this and hold off on any further antibiotics at this time.  We will check a procalcitonin to rule out possible underlying pneumonia.    Patient has known chronic anemia which appears to be slowly worsening.  Will check iron profile, ferritin, B12 and folate.  Patient's daughter-in-law was requesting to give IV iron during this admission, informed her that that will add additional volume and would like to avoid this for the time being.  If needed, this can be arranged on an outpatient basis are given to the patient prior to  discharge.    We will consult PT and OT.    Risk Assessment: High   DVT Prophylaxis: Eliquis  Code Status: Full  Diet: Cardiac with fluid and salt restriction    Advance Care Planning   ACP discussion was held with the patient during this visit. Patient does not have an advance directive, information provided.      Edgardo Ly MD  10/07/21  23:41 EDT    Dictated utilizing Dragon dictation.

## 2021-10-08 NOTE — ED PROVIDER NOTES
Subjective   History of Present Illness  Patient is an 87-year-old male multiple comorbidities presenting to the ER with complaints of shortness of breath.  Patient's daughter-in-law is at bedside and states that he had bloodwork performed by his PCP a few days ago that showed he had a hemoglobin of 8 something.  She states that he also has had difficulty urinating.  She states that he has had multiple UTIs.  She states that he is on fluid restriction and therefore is not drinking much which could be partially why he is not urinating.  Patient had a tongue resection in August performed at  with postop MI, then subsequently got Covid and has significantly declined since then. Patient's daughter-in-law states that prior to August, he was doing well and caring for himself without any issues. Patient denies additional symptoms/complaints at this time.    Review of Systems   Constitutional: Positive for fatigue.   Respiratory: Positive for shortness of breath.    Genitourinary: Positive for dysuria.   All other systems reviewed and are negative.      Past Medical History:   Diagnosis Date   • A-fib ([facility])    • Abnormal ECG    • Arrhythmia    • Arthritis    • Asthma Aug 2021   • Benign prostatic hyperplasia    • Cancer ([facility])     TONGUE   • Carotid stenosis     s/p right ICA stent x 2   • CHF (congestive heart failure) ([facility])    • Coronary artery disease    • COVID-19 09/2021   • Disease of thyroid gland    • Enlarged prostate    • Fracture    • Full dentures    • GERD (gastroesophageal reflux disease)    • Heart valve disease    • Hyperlipidemia    • Hypertension    • Hypothyroidism    • Impaired functional mobility, balance, gait, and endurance 09/03/2021   • Kidney infection    • Mitral regurgitation    • Myocardial infarction ([facility]) 8-16-21   • Nonrheumatic aortic valve insufficiency    • Renal calculi    • Stroke ([facility]) 03/2016    right retinal artery occlusion   • 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[facility])    • Wears glasses        Allergies    Allergen Reactions   • Levaquin [Levofloxacin] Shortness Of Breath   • Amoxicillin Rash   • Rocephin [Ceftriaxone] Hives       Past Surgical History:   Procedure Laterality Date   • CAROTID ENDARTERECTOMY      X2-3/17,    • CAROTID STENT Right 2016    Carotid Wall Stent   • CAROTID STENT Right 2017    Zilver BANDAR for recurrent right ICA stenosis   • CATARACT EXTRACTION W/ INTRAOCULAR LENS IMPLANT Left 2018    Procedure: CATARACT PHACO EXTRACTION WITH INTRAOCULAR LENS IMPLANT LEFT, COMPLICATED WITH MALYUGIAN RING;  Surgeon: Paola Welch MD;  Location: [facility];  Service: Ophthalmology   • KIDNEY STONE SURGERY Right     Shafron - open   • PATELLA FRACTURE SURGERY Left    • CA TCAT IV STENT CRV CRTD ART EMBOLIC PROTECJ Right 3/4/2017    Placement of Zilver BANDAR right ICA 3/4/17   • TONGUE SURGERY  2021    cancer spot removed        Family History   Problem Relation Age of Onset   • Heart disease Mother    • Hyperlipidemia Mother    • Hypertension Mother    • Prostate cancer Father    • Cancer Father    • Heart attack Brother    • Heart disease Brother    • Hyperlipidemia Brother    • Hypertension Brother        Social History     Socioeconomic History   • Marital status:      Spouse name: Not on file   • Number of children: Not on file   • Years of education: Not on file   • Highest education level: Not on file   Tobacco Use   • Smoking status: Former Smoker     Packs/day: 1.00     Years: 55.00     Pack years: 55.00     Types: Cigarettes     Start date:      Quit date:      Years since quittin.7   • Smokeless tobacco: Former User     Types: Chew     Quit date:    Vaping Use   • Vaping Use: Never used   Substance and Sexual Activity   • Alcohol use: Not Currently     Alcohol/week: 0.0 standard drinks     Comment: Quit    • Drug use: No   • Sexual activity: Not Currently     Partners: Female           Objective   Physical Exam  Vitals and  nursing note reviewed.   Constitutional:       General: He is not in acute distress.     Appearance: He is ill-appearing.      Interventions: He is not intubated.  HENT:      Head: Normocephalic and atraumatic.   Eyes:      Extraocular Movements: Extraocular movements intact.   Cardiovascular:      Rate and Rhythm: Normal rate.      Heart sounds: No murmur heard.   No friction rub. No gallop.    Pulmonary:      Effort: Tachypnea present. He is not intubated.      Breath sounds: Normal breath sounds. No stridor.      Comments: Mildly labored breathing  Abdominal:      Palpations: Abdomen is soft.      Tenderness: There is no abdominal tenderness.   Musculoskeletal:         General: Normal range of motion.      Cervical back: Normal range of motion and neck supple.   Skin:     General: Skin is warm and dry.   Neurological:      General: No focal deficit present.      Mental Status: He is alert and oriented to person, place, and time.   Psychiatric:         Mood and Affect: Mood normal.         Behavior: Behavior normal.         Procedures           ED Course  ED Course as of Oct 07 2150   Thu Oct 07, 2021   1852 EKG interpreted by me reveals sinus rhythm with rate of 68 bpm.  There are some signs of right ventricular conduction delay.  There are T wave inversions in the lateral leads.  This is an abnormal appearing EKG.    [TB]   2146 Protein, UA(!): Trace [AP]   2146 Leukocytes, UA(!): Small (1+) [AP]   2146 Nitrite, UA: Negative [AP]   2146 RBC, UA(!): 0-2 [AP]   2146 WBC, UA(!): 6-12 [AP]   2146 Bacteria, UA(!): 2+ [AP]   2146 Squamous Epithelial Cells, UA(!): 7-12 [AP]   2146 Troponin T: <0.010 [AP]   2146 Digoxin: 0.66 [AP]   2146 proBNP(!): 12,738.0 [AP]   2146 Glucose(!): 132 [AP]   2146 BUN: 22 [AP]   2146 Creatinine(!): 0.75 [AP]   2146 Sodium(!!): 114 [AP]   2146 Potassium: 5.2 [AP]   2146 Chloride(!): 77 [AP]   2146 CO2: 27.8 [AP]   2146 Calcium: 8.7 [AP]   2146 Total Protein: 6.2 [AP]   2146 Albumin(!):  2.70 [AP]   2146 ALT (SGPT)(!): 43 [AP]   2146 AST (SGOT): 31 [AP]   2147 Alkaline Phosphatase(!): 152 [AP]   2147 Total Bilirubin: 0.4 [AP]   2147 BUN/Creatinine Ratio(!): 29.3 [AP]   2147 Anion Gap: 9.2 [AP]   2147 WBC: 9.02 [AP]   2147 RBC(!): 2.97 [AP]   2147 Hemoglobin(!): 8.5 [AP]   2147 Hematocrit(!): 24.7 [AP]   2147 Platelets: 404 [AP]      ED Course User Index  [AP] Cecile Lyon PA-C  [TB] Whitney Carvalho MD                                           [facility]   Patient evaluated in the ER for shortness of breath, dysuria, fatigue.  Vitals are within normal limits other than tachypnea.  Oxygen saturation 97% on 4 L of oxygen which is what patient was using at home.  Labs remarkable for anemia with hemoglobin of 8.5, elevated BNP of 12,738, hyponatremia 114.  UA concerning for UTI.  Patient was given 1 dose of IV gentamicin in the ER.  Patient was also given 40 mg IV Lasix for treatment of CHF exacerbation.  Hospitalist, Dr. Seth, was consulted and accepted the patient for admission for further evaluation and management.    Final diagnoses:   Acute on chronic congestive heart failure, unspecified heart failure type (HCC)       ED Disposition  ED Disposition     ED Disposition Condition Comment    Decision to Admit  Level of Care: Telemetry [5]   Diagnosis: Acute on chronic congestive heart failure, unspecified heart failure type (HCC) [[id number]]   Admitting Physician: JANE SETH [[id number]]   Attending Physician: BASSAM ROMERO [[id number]]   Isolate for COVID?: No [0]   Certification: I Certify That Inpatient Hospital Services Are Medically Necessary For Greater Than 2 Midnights            No follow-up provider specified.       Medication List      No changes were made to your prescriptions during this visit.          Cecile Lyon PA-C  10/07/21 2156

## 2021-10-08 NOTE — PROGRESS NOTES
[facility]    Name:  Andrew Hernandez   Age:  87 y.o.  Sex:  male  :  1934  MRN:  [id number]   Visit Number:  [id number]  Admission Date:  10/7/2021  Date Of Service:  10/08/21  Primary Care Physician:  Vermeesch, Marilyn K, MD     LOS: 1 day :    Chief Complaint:      Shortness of breath    Subjective:  Patient was seen and examined this morning.  Patient is currently on facial mask for his oxygen, patient breathing through his mouth.  Patient in no acute distress.  Patient reports he is having shortness of breath that made him come to the hospital.  Patient reports however he is slightly feeling better now.  Patient denies any other acute complaints at this time.  I called his POA, Ms. Roshni Jose, and discussed the case and management plan with her.  All questions were answered to her satisfaction.      Hospital Course:    Patient is a very pleasant 87-year-old male with medical history of chronic systolic and diastolic heart failure, chronic hypoxic respiratory failure on 3 to 4 L of oxygen continuously, atrial fibrillation on Eliquis, anemia, BPH, recent diagnosis of COVID-19, coronary artery disease, tongue cancer status post resection, previous CVA with complete right vision loss, carotid artery stenosis status post stenting of right carotid, hypertension, hyperlipidemia and hypothyroidism who was brought into the ER today for evaluation of shortness of breath.  Patient reports feeling short of breath at baseline and has been hospitalized 2 times in the last 1 month for CHF exacerbation.  He states that his breathing has been worsening over the last few days with an increased cough productive of thick sputum.  His daughter-in-law reports that the patient has had difficulty expectorating due to loss of tongue mobility after his resection.  Patient reports feeling quite weak also.  Denies any fever or chills.  He denies any other complains of chest pain, chest  pressure, abdominal pain, diarrhea or dysuria.  Patient does have a history of recurrent UTIs.  Patient has known history of atrial fibrillation which has been difficult to control per his daughter-in-law.  He has been started on digoxin just 3 days ago and apparently just came out of atrial fibrillation yesterday.  She states that when he is in A. fib he tends to have worsening heart failure symptoms.  Patient apparently had been constipated for the last several days but has had 2 bowel movement since yesterday evening after receiving laxatives.     On arrival to the ER patient noted to have normal vitals and is oxygenating at 99% on 4 L of oxygen which she wears at home.  His labs are notable for proBNP 12,738, negative troponin.  Sodium 114.  Normal potassium and magnesium.  H&H 8.5/24.7 which is right around his baseline.  Urinalysis appears to be a contaminated sample with 7-12 squamous cells but does have 6-12 WBCs and 2+ bacteria.  Patient denies any urinary symptoms.  Chest x-ray on my read shows significant pulmonary edema with large left-sided pleural effusion.  Patient was given a dose of IV Lasix and gentamicin for possible UTI and is being admitted to the hospitalist service for further care.      Review of Systems:     All systems were reviewed and negative except as mentioned in subjective, assessment and plan.    Vital Signs:    Temp:  [94.6 °F (34.8 °C)-98.1 °F (36.7 °C)] 98.1 °F (36.7 °C)  Heart Rate:  [63-97] 97  Resp:  [18-26] 20  BP: (120-153)/(37-64) 120/61    Intake and output:    I/O last 3 completed shifts:  In: -   Out: 850 [Urine:850]  I/O this shift:  In: 240 [P.O.:240]  Out: -     Physical Examination:    General Appearance:  Alert and cooperative. Appears chronically ill.    Head:  Atraumatic and normocephalic.   Eyes: Conjunctivae and sclerae normal, no icterus. No pallor.   Throat: No oral lesions, no thrush, oral mucosa moist.   Neck: Supple, trachea midline, no thyromegaly.   Lungs:    Breath sounds heard bilaterally equally.  Bilateral rales. no crackles or wheezing. No Pleural rub or bronchial breathing.   Heart:  Normal S1 and S2, no murmur, no gallop, no rub. No JVD.   Abdomen:   Normal bowel sounds, no masses, no organomegaly. Soft, nontender, nondistended, no rebound tenderness.   Extremities: Supple, +2 pitting edema in bilat LE, no cyanosis, no clubbing.   Skin: No bleeding or rash.   Neurologic: Alert and oriented x 3. No facial asymmetry. Moves all four limbs. No tremors.      Laboratory results:    Results from last 7 days   Lab Units 10/08/21  0611 10/07/21  1841 10/04/21  1454   SODIUM mmol/L 118* 114* 119*   POTASSIUM mmol/L 5.0 5.2 4.9   CHLORIDE mmol/L 79* 77* 79*   TOTAL CO2 mmol/L  --   --  27   CO2 mmol/L 30.7* 27.8  --    BUN mg/dL 18 22 14   CREATININE mg/dL 0.67* 0.75* 0.69*   CALCIUM mg/dL 8.4* 8.7 8.3*   BILIRUBIN mg/dL  --  0.4 0.6   ALK PHOS U/L  --  152* 111   ALT (SGPT) U/L  --  43* 40   AST (SGOT) U/L  --  31 31   GLUCOSE mg/dL 115* 132*  --      Results from last 7 days   Lab Units 10/08/21  0610 10/07/21  1841 10/04/21  1454   WBC 10*3/mm3 9.12 9.02 8.8   HEMOGLOBIN g/dL 9.0* 8.5* 8.4*   HEMATOCRIT % 27.0* 24.7* 25.2*   PLATELETS 10*3/mm3 388 404 475*         Results from last 7 days   Lab Units 10/07/21  1841   TROPONIN T ng/mL <0.010             I have reviewed the patient's laboratory results.    Radiology results:    XR Chest 1 View    Result Date: 10/8/2021  PROCEDURE: XR CHEST 1 VW-  HISTORY: SOA Triage Protocol  COMPARISON: 09/20/2021.  FINDINGS: The heart is normal in size. The mediastinum is unremarkable. There is interstitial edema with bilateral pleural effusions and basilar opacities. There is no pneumothorax.  There are no acute osseous abnormalities.      Impression: Interstitial pulmonary edema with bilateral effusions and basilar opacities.  Continued followup is recommended.  This report was finalized on 10/8/2021 7:03 AM by Julieta Dai,  M.D..    I have reviewed the patient's radiology reports.    Medication Review:     I have reviewed the patient's active and prn medications.     Problem List:      Acute on chronic congestive heart failure (HCC)      Assessment:    1.  Acute exacerbation of chronic combined systolic and diastolic heart failure, present on admission  2.  Hyponatremia, chronic issue likely acutely worsened in the setting of #1  3.  Chronic hypoxic respiratory failure on 3 to 4 L of oxygen continuously  4.  Paroxysmal atrial fibrillation on Eliquis, currently in sinus rhythm  5.  Anemia  6. BPH  7. Recent diagnosis of COVID-19  8. Coronary artery disease  9. Tongue cancer status post resection  10. Previous CVA with complete right vision loss  11. Carotid artery stenosis status post stenting of right carotid  12. Hypertension  13. Hyperlipidemia   14. Hypothyroidism   15.  Aortic regurgitation    Plan:    [facility].  He will be continued on IV Lasix 40 mg twice daily with close observation of his renal functions and electrolytes.  He may need up titration of Lasix as he already takes 40 mg orally twice at home.  No need to repeat echocardiogram as he just had one done 1 month ago.  Monitor ins and outs and obtain daily weights.  Patient's daughter-in-law did inform me that the patient had some difficulty voiding intermittently requiring in and out catheterization during his hospitalization.  She would like to avoid this as much as possible as it did give him recurrent UTIs.  We will place a condom catheter as needed.     Patient's urinalysis was suggestive of an UTI however it is contaminated and the patient has no symptoms.  We will repeat this and hold off on any further antibiotics at this time.  We will check a procalcitonin to rule out possible underlying pneumonia.     Patient has known chronic anemia which appears to be slowly worsening.  Will check iron profile, ferritin, B12 and  folate.  Patient's daughter-in-law was requesting to give IV iron during this admission, informed her that that will add additional volume and would like to avoid this for the time being.  If needed, this can be arranged on an outpatient basis are given to the patient prior to discharge. We will consult PT and OT.    10/08: hyponatremia in the setting of current Diuretic use and CHF. Nephrology consulted for recommendations.  Continue with Lasix twice daily.  Will consult cardiology for evaluation and recommendations.  Continue oxygen supportive treatment and try to titrate as tolerated.      DVT Prophylaxis: Eliquis  Code Status: Full  Diet: Cardiac with fluid and salt restriction  Discharge Plan: Pending    Turner Chavez MD  10/08/21  10:55 EDT    Dictated utilizing Dragon dictation.

## 2021-10-08 NOTE — PLAN OF CARE
Goal Outcome Evaluation:  Plan of Care Reviewed With: patient, other (see comments) (Daughter-in-law)        Progress: no change  Outcome Summary: VSS. Patient's oxygen requirements increased to 15L nonrebreather this morning when taken off bipap. Oxygen decreased to 10L NRB this afternoon with O2 sats >90%. Patient quickly de-sats with any exertion or coughing. Continuing diuresing with IV Lasix.

## 2021-10-08 NOTE — CASE MANAGEMENT/SOCIAL WORK
Discharge Planning Assessment  [facility]     Patient Name: Andrew Hernandez  MRN: [id number]  Today's Date: 10/8/2021    Admit Date: 10/7/2021    Discharge Needs Assessment     Row Name 10/08/21 1920       Living Environment    Lives With  alone;other (see comments) many relatives-  is never alone.    Current Living Arrangements  home/apartment/condo    Primary Care Provided by  self;child(noah)    Provides Primary Care For  no one, unable/limited ability to care for self    Family Caregiver if Needed  child(noah), adult;other relative(s)    Family Caregiver Names  SANJUANITA Barakat and Son Solomon Hernandez    Quality of Family Relationships  supportive    Able to Return to Prior Arrangements  yes       Resource/Environmental Concerns    Resource/Environmental Concerns  home accessibility    Home Accessibility Concerns  stairs to enter home    Transportation Concerns  car, none       Transition Planning    Patient/Family Anticipates Transition to  home with family;home with help/services    Patient/Family Anticipated Services at Transition  home health care    Transportation Anticipated  family or friend will provide       Discharge Needs Assessment    Readmission Within the Last 30 Days  previous discharge plan unsuccessful    Equipment Currently Used at Home  oxygen;shower chair;wheelchair;walker, rolling;hospital bed;pulse ox;commode    Anticipated Changes Related to Illness  inability to care for self Will probably get worse physically    Current Discharge Risk  chronically ill;dependent with mobility/activities of daily living;physical impairment        Discharge Plan    Spoke with KATHROSY LIZETT HERNANDEZ on phone.  She is healthcare surrogate. Demographics verified.  [place] with family to transport.  This is another re-admission.  The patient and family seem to be doing everything right to treat the CHF. Probably need to discuss goals of care. Oxygen 3-4L per [place] ATC per [facility]. Home health thru [facility]. Patient  lives in an above garage apartment by himself-  But has many family members who provide ATC care.  The patient and family will not go to a nursing home. CM card given to patient, IMM completed, password previously set up, enrolled in meds to beds. Encouraged to call with any discharge needs.        Continued Care and Services - Admitted Since 10/7/2021     Durable Medical Equipment     Service Provider Request Status Selected Services Address Phone Fax Patient Preferred    VINE[facility] HOYT   Selected Durable Medical Equipment [address], [facility] [address] [phone] --       Internal Comment last updated by Cathy Macario, RN 10/8/2021 1915    Current oxygen provider 3-4L ATC per [place].                     Home Medical Care     Service Provider Request Status Selected Services Address Phone Fax Patient Preferred    EV[address]INA [address]MIER [address]   Selected Home Health Services [address] [place] [address] [address], [facility] [address] [phone] --       Internal Comment last updated by Cathy Macario, RN 10/8/2021 1915    Current HH provider                       Selected Continued Care - Prior Encounters Includes selections from prior encounters from 7/9/2021 to 10/8/2021    Discharged on 9/26/2021 Admission date: 9/20/2021 - Discharge disposition: Home or Self Care    Home Medical Care     Service Provider Selected Services Address Phone Fax Patient Preferred    EVELINA CORMIER DR  [facility] [address] [place] [address] [address], [facility] [address] [phone] --                Discharged on 9/5/2021 Admission date: 9/2/2021 - Discharge disposition: Home or Self Care    Durable Medical Equipment     Service Provider Selected Services Address Phone Fax Patient Preferred    VINEET HOYT  Durable Medical Equipment [address], [facility] [address] [phone] --       Internal Comment last updated by Cathy Macario, RN  9/5/2021 1508    Provider of oxygen at time of admission per notes-  Previously written that patient uses 2-3L O2 at nighttime but has been using continuously. Private pays.  [facility] confirmed that the order they currently have is 2L continuously.                                    Demographic Summary     Row Name 10/08/21 1917       General Information    Admission Type  inpatient    Arrived From  emergency department    Required Notices Provided  Important Message from Medicare    Reason for Consult  discharge planning    Preferred Language  English     Used During This Interaction  no        Functional Status     Row Name 10/08/21 1918       Functional Status    Usual Activity Tolerance  moderate    Current Activity Tolerance  fair       Functional Status, IADL    Medications  assistive person    Meal Preparation  completely dependent    Housekeeping  completely dependent    Laundry  completely dependent    Shopping  completely dependent        Psychosocial    No documentation.       Abuse/Neglect    No documentation.       Legal    No documentation.       Substance Abuse    No documentation.       Patient Forms    No documentation.           Cathy Macario RN

## 2021-10-08 NOTE — ED NOTES
Bed assignment has changed at this time. Patient will be going to 311     Shakeel Connolly  10/07/21 2129

## 2021-10-08 NOTE — THERAPY EVALUATION
OT eval attempted and patient refused d/t concerns of O2 dropping. Will follow up at later date as appropriate.

## 2021-10-08 NOTE — THERAPY EVALUATION
PT began eval with patient and collected all background information. When PT asked patient to sit on the EOB, he adamantly refused stating he had a very difficult time yesterday with SOA and didn't want his O2 sats to decline and cause the same situation again today. Pt was 100% on 12 LPM NRB and was breathing without struggle. PT explained that it was safe to advance activity and that it would benefit the patient to increase his mobility status but patient continued to decline. Patient's daughter present in the room and, at that point, also asked us to hold further mobility this date. PT will follow up with patient tomorrow and will proceed with eval as tolerated and appropriate.

## 2021-10-08 NOTE — OUTREACH NOTE
CHF Week 3 Survey      Responses   [facility] patient discharged from?  [facility]   Does the patient have one of the following disease processes/diagnoses(primary or secondary)?  CHF   Week 3 attempt successful?  No   Unsuccessful attempts  Attempt 1   Revoke  Readmitted          Araseli Glynn RN

## 2021-10-08 NOTE — CONSULTS
"Adult Nutrition  Assessment/PES    Patient Name:  Andrew Hernandez  YOB: 1934  MRN: [id number]  Admit Date:  10/7/2021    Assessment Date:  10/8/2021    Comments:    Recommend:  1. Consider liberalizing diet to exclude \"no added salt\" as pt's Na+ is low.  2. Encourage PO intake. PO intake average ~62% x 2 meals.  3. RD ordered Boost Pudding BID and Prostat BID.  4. Consider a multivitamin with minerals daily.  5. Continue to monitor and replace electrolytes PRN.    RD to follow pt and available PRN.      Reason for Assessment     Row Name 10/08/21 1426          Reason for Assessment    Reason For Assessment  identified at risk by screening criteria     Diagnosis  cardiac disease;cancer diagnosis/related complications;pulmonary disease;other (see comments) Tongue CA s/p resection, Hyponatremia, CAD, HTN, HLD, Anemia, C hypoxic respiratory failure, Afib     Identified At Risk by Screening Criteria  difficulty chewing/swallowing           Anthropometrics     Row Name 10/08/21 1430          Anthropometrics    Height  180.3 cm (71\")        Ideal Body Weight (IBW)    Ideal Body Weight (IBW) (kg)  79.27         Labs/Tests/Procedures/Meds     Row Name 10/08/21 1427          Labs/Procedures/Meds    Lab Results Reviewed  reviewed, pertinent     Lab Results Comments  Low: Na+, Cl-, Cr High: Gluc, ALT, BNP, Platelets        Medications    Pertinent Medications Reviewed  reviewed, pertinent     Pertinent Medications Comments  Prostat, Vitamin C, Lipitor, Pepcid, Lasix, Synthroid, NaCl, Pericolace, Megace, Ferrous sulfate         Physical Findings     Row Name 10/08/21 1428          Physical Findings    Skin  pressure injury;other (see comments) Bilateral coccyx PI Stage 2, Posterior parietal region         Estimated/Assessed Needs     Row Name 10/08/21 1430          Calculation Measurements    Weight Used For Calculations  75 kg (165 lb 5.5 oz) Actual BW     Height  180.3 cm (71\")        Estimated/Assessed Needs    " Additional Documentation  Calorie Requirements (Group);Protein Requirements (Group);KCAL/KG (Group);Fluid Requirements (Group)        Calorie Requirements    Estimated Calorie Requirement Comment  1875 - 2625        KCAL/KG    KCAL/KG  25 Kcal/Kg (kcal);35 Kcal/Kg (kcal)     25 Kcal/Kg (kcal)  1875     35 Kcal/Kg (kcal)  2625        Protein Requirements    Weight Used For Protein Calculations  75 kg (165 lb 5.5 oz) Actual BW     Est Protein Requirement Amount (gms/kg)  1.5 gm protein 75 - 112 gm     Estimated Protein Requirements (gms/day)  112.5        Fluid Requirements    Fluid Requirements (mL/day)  1875     Estimated Fluid Requirement Method  other (see comments) 1 mL/kcal     RDA Method (mL)  1875         Nutrition Prescription Ordered     Row Name 10/08/21 1432          Nutrition Prescription PO    Current PO Diet  Pureed     Supplement  ProStat     Supplement Frequency  2 times a day     Common Modifiers  Cardiac;Fluid Restriction;Low Sodium     Fluid Restriction mL per Day  1000 mL     Low Sodium Details  No added salt         Evaluation of Received Nutrient/Fluid Intake     Row Name 10/08/21 1433          PO Evaluation    Number of Days PO Intake Evaluated  1 day     Number of Meals  2     % PO Intake  62               Problem/Interventions:  Problem 1     Row Name 10/08/21 1433          Nutrition Diagnoses Problem 1    Problem 1  Increased Nutrient Needs     Macronutrient  Kcal;Protein     Etiology (related to)  Medical Diagnosis     Skin  Pressure injury Stage 2     Signs/Symptoms (evidenced by)  Report/Observation     Reported/Observed By  RD/Tech         Problem 2     Row Name 10/08/21 1434          Nutrition Diagnoses Problem 2    Problem 2  Needs Alternate Composition     Etiology (related to)  Medical Diagnosis     Neurological  Other (comment) Difficulty chewing/swallowing     Signs/Symptoms (evidenced by)  Report/Observation             Intervention Goal     Row Name 10/08/21 1434           Intervention Goal    General  Meet nutritional needs for age/condition;Improved nutrition related lab(s)     PO  Meet estimated needs;Increase intake;PO intake (%)     PO Intake %  62 %     Weight  Maintain weight         Nutrition Intervention     Row Name 10/08/21 1435          Nutrition Intervention    RD/Tech Action  Follow Tx progress;Encourage intake;Recommend/ordered     Recommended/Ordered  Supplement;Diet         Nutrition Prescription     Row Name 10/08/21 1435          Nutrition Prescription PO    PO Prescription  Begin/change diet;Begin/change supplement     Begin/Change Diet to  Pureed     Supplement  Boost Pudding     Supplement Frequency  2 times a day     Common Modifiers  Cardiac;Fluid Restriction     Fluid Restriction mL per Day  1000 mL     New PO Prescription Ordered?  No, recommended        Other Orders    Obtain Weight  Daily     Obtain Weight Ordered?  No, recommended     Supplement  Vitamin mineral supplement     Supplement Ordered?  No, recommended     Other  Continue to monitor and replace electrolytes PRN         Education/Evaluation     Row Name 10/08/21 1435          Education    Education  Will Instruct as appropriate        Monitor/Evaluation    Monitor  Per protocol;I&O;PO intake;Supplement intake;Pertinent labs;Weight;Skin status           Electronically signed by:  Radha Sharma RD  10/08/21 14:36 EDT

## 2021-10-09 NOTE — CONSULTS
"[facility] Consult Note    Referring Provider: Kathy  Reason for Consultation: Congestive heart failure decompensation    Patient Care Team:  Vermeesch, Marilyn K, MD as PCP - General (Internal Medicine)  Dirk De Leon MD as Consulting Physician (Ophthalmology)  Oziel Mcnair MD as Consulting Physician (Cardiology)  Harsh Huerta MD as Consulting Physician (Urology)    Chief complaint : Shortness of breath    Subjective:    History of present illness: This is an 87-year-old male patient who presents to the hospital with progressively worsening shortness of breath, orthopnea and peripheral edema.  The patient has a history of known congestive heart failure with a previous ejection fraction of 40%.  In August of this year the patient underwent surgery for tongue cancer at the [facility].  The patient experienced what was described as a \"massive\" myocardial infarction with cardiac troponins greater than 300.  The patient's family was informed that after the infarction his ejection fraction remained unchanged at 40%.  Subsequent to his perioperative myocardial infarction, the patient developed severe clinical congestive heart failure and recurrent atrial fibrillation.  The patient was offered an invasive management strategy with cardiac catheterization and potential coronary revascularization but refused indicating that he wanted no more invasive procedures.  The patient had a follow-up echocardiogram in September of this year showing an improvement in his ejection fraction to 50-55% with normal cardiac chamber dimensions, moderate valvular aortic regurgitation and mild mitral regurgitation.  On presentation the patient's BNP was elevated at greater than 12,000.  He was in atrial fibrillation with rapid ventricular response.  Despite heart rates up to 130 bpm, there was no ischemic ST-T wave changes or injury current.  His ECG demonstrated nonspecific T wave inversion.  The " patient had new QS complexes present in leads V6, I and aVL.  Cardiac troponins have been normal.  The patient has a history of severe chronic hyponatremia with baseline serum sodium is 124-126 mg/dL.  On presentation his serum sodium was 112 mg/dL.  Chest x-ray demonstrated both interstitial and alveolar pulmonary edema with bilateral pleural effusions.  The patient was significantly edematous.    Review of Systems   Review of Systems   Constitutional: Positive for malaise/fatigue. Negative for chills, diaphoresis, fever, weight gain and weight loss.   HENT: Negative for ear discharge, hearing loss, hoarse voice and nosebleeds.    Eyes: Negative for discharge, double vision, pain and photophobia.   Cardiovascular: Positive for dyspnea on exertion, leg swelling and orthopnea. Negative for chest pain, claudication, cyanosis, irregular heartbeat, near-syncope, palpitations, paroxysmal nocturnal dyspnea and syncope.   Respiratory: Positive for shortness of breath. Negative for cough, hemoptysis, sputum production and wheezing.    Endocrine: Negative for cold intolerance, heat intolerance, polydipsia, polyphagia and polyuria.   Hematologic/Lymphatic: Negative for adenopathy and bleeding problem. Does not bruise/bleed easily.   Skin: Negative for color change, flushing, itching and rash.   Musculoskeletal: Negative for muscle cramps, muscle weakness, myalgias and stiffness.   Gastrointestinal: Negative for abdominal pain, diarrhea, hematemesis, hematochezia, nausea and vomiting.   Genitourinary: Negative for dysuria, frequency and nocturia.   Neurological: Negative for focal weakness, loss of balance, numbness, paresthesias and seizures.   Psychiatric/Behavioral: Negative for altered mental status, hallucinations and suicidal ideas.   Allergic/Immunologic: Negative for HIV exposure, hives and persistent infections.       History  Past Medical History:   Diagnosis Date   • A-fib (HCC)    • Abnormal ECG    • Arrhythmia     • Arthritis    • Asthma Aug 2021   • Benign prostatic hyperplasia    • Cancer ([facility])     TONGUE   • Carotid stenosis     s/p right ICA stent x 2   • CHF (congestive heart failure) ([facility])    • Coronary artery disease    • COVID-19 09/2021   • Disease of thyroid gland    • Enlarged prostate    • Fracture    • Full dentures    • GERD (gastroesophageal reflux disease)    • Heart valve disease    • Hyperlipidemia    • Hypertension    • Hypothyroidism    • Impaired functional mobility, balance, gait, and endurance 09/03/2021   • Kidney infection    • Mitral regurgitation    • Myocardial infarction ([facility]) 8-16-21   • Nonrheumatic aortic valve insufficiency    • Renal calculi    • Stroke ([facility]) 03/2016    right retinal artery occlusion   • 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[facility])    • Wears glasses    ,   Past Surgical History:   Procedure Laterality Date   • CAROTID ENDARTERECTOMY      X2-3/17, 9/17   • CAROTID STENT Right 03/18/2016    Carotid Wall Stent   • CAROTID STENT Right 03/04/2017    Zilver BANDAR for recurrent right ICA stenosis   • CATARACT EXTRACTION W/ INTRAOCULAR LENS IMPLANT Left 9/4/2018    Procedure: CATARACT PHACO EXTRACTION WITH INTRAOCULAR LENS IMPLANT LEFT, COMPLICATED WITH MALYUGIAN RING;  Surgeon: Paola Welch MD;  Location: [facility];  Service: Ophthalmology   • KIDNEY STONE SURGERY Right 2011    Shafron - open   • PATELLA FRACTURE SURGERY Left 1986   • AL TCAT IV STENT CRV CRTD ART EMBOLIC PROTECJ Right 3/4/2017    Placement of Zilver BANDAR right ICA 3/4/17   • TONGUE SURGERY  08/16/2021    cancer spot removed    ,   Family History   Problem Relation Age of Onset   • Heart disease Mother    • Hyperlipidemia Mother    • Hypertension Mother    • Prostate cancer Father    • Cancer Father    • Heart attack Brother    • Heart disease Brother    • Hyperlipidemia Brother    • Hypertension Brother    ,   Social History     Tobacco Use   • Smoking status: Former Smoker     Packs/day: 1.00     Years: 55.00     Pack years:  55.00     Types: Cigarettes     Start date:      Quit date:      Years since quittin.7   • Smokeless tobacco: Former User     Types: Chew     Quit date:    Vaping Use   • Vaping Use: Never used   Substance Use Topics   • Alcohol use: Not Currently     Alcohol/week: 0.0 standard drinks     Comment: Quit    • Drug use: No   ,   Medications Prior to Admission   Medication Sig Dispense Refill Last Dose   • apixaban (ELIQUIS) 2.5 MG tablet tablet Take 1 tablet by mouth Every 12 (Twelve) Hours. (Patient taking differently: Take 5 mg by mouth Every 12 (Twelve) Hours.) 60 tablet 11 10/7/2021 at Unknown time   • aspirin 81 MG EC tablet Take 81 mg by mouth Daily.   10/7/2021 at Unknown time   • atorvastatin (LIPITOR) 20 MG tablet Take 1 tablet by mouth Every Night. 90 tablet 3 10/7/2021 at Unknown time   • digoxin (LANOXIN) 125 MCG tablet Take 1 tablet by mouth Daily. 30 tablet 1 10/7/2021 at Unknown time   • dilTIAZem CD (CARDIZEM CD) 120 MG 24 hr capsule Take 1 capsule by mouth Daily. 30 capsule 2 10/7/2021 at Unknown time   • ferrous sulfate 140 (45 Fe) MG tablet controlled-release tablet Take  by mouth Daily With Breakfast. SLOW RELEASE IRON 160/50   10/7/2021 at Unknown time   • furosemide (LASIX) 40 MG tablet Take 1-2 tablets by mouth daily due to edema 60 tablet 2 10/7/2021 at Unknown time   • levothyroxine (SYNTHROID, LEVOTHROID) 50 MCG tablet Take 1 tablet by mouth Daily. (Patient taking differently: Take 50 mcg by mouth Daily With Breakfast.) 90 tablet 3 10/7/2021 at Unknown time   • megestrol (MEGACE) 40 MG tablet Take 1 tablet by mouth 2 (Two) Times a Day. 60 tablet 3 10/7/2021 at Unknown time   • metoprolol tartrate (LOPRESSOR) 25 MG tablet Take 1 tablet by mouth 2 (Two) Times a Day. 180 tablet 3 10/7/2021 at Unknown time   • multivitamin with minerals (CENTRUM SILVER PO) Take 1 tablet by mouth Daily.   10/7/2021 at Unknown time   • nitroglycerin (NITROSTAT) 0.4 MG SL tablet Place 1 tablet  "under the tongue Every 5 (Five) Minutes As Needed for Chest Pain for up to 3 doses. Take no more than 3 doses in 15 minutes. 100 tablet 11 Patient Taking Differently at Unknown time   • potassium citrate (UROCIT-K) 10 MEQ (1080 MG) CR tablet Take 10 mEq by mouth As Needed.   10/7/2021 at Unknown time   • tamsulosin (FLOMAX) 0.4 MG capsule 24 hr capsule Take 1 capsule by mouth Daily. (Patient taking differently: Take 1 capsule by mouth Every Night.) 90 capsule 3 10/7/2021 at Unknown time   • vitamin C (ASCORBIC ACID) 250 MG tablet Take 500 mg by mouth Daily.      • Famotidine (PEPCID PO) Take 20 mg by mouth 2 (Two) Times a Day. (Patient not taking: Reported on 10/8/2021)   Not Taking at Unknown time    and Allergies:  Levaquin [levofloxacin], Amoxicillin, and Rocephin [ceftriaxone]    Objective:    Vital Sign Min/Max for last 24 hours  Temp  Min: 97.6 °F (36.4 °C)  Max: 98.1 °F (36.7 °C)   BP  Min: 100/44  Max: 130/42   Pulse  Min: 85  Max: 118   Resp  Min: 18  Max: 20   SpO2  Min: 94 %  Max: 100 %   Flow (L/min)  Min: 5  Max: 10   Weight  Min: 71.6 kg (157 lb 13.6 oz)  Max: 71.6 kg (157 lb 13.6 oz)     Flowsheet Rows      First Filed Value   Admission Height 180.3 cm (71\") Documented at 10/07/2021 1813   Admission Weight 72.1 kg (159 lb) Documented at 10/07/2021 1813             Echo EF Estimated  Lab Results   Component Value Date    ECHOEFEST 50 09/09/2016       Physical Exam:   Vitals and nursing note reviewed.   Constitutional:       Appearance: Healthy appearance. Not in distress.   Neck:      Vascular: No JVR. JVD normal.   Pulmonary:      Effort: Pulmonary effort is normal.      Breath sounds: Examination of the right-middle field reveals rales. Examination of the left-middle field reveals rales. Examination of the right-lower field reveals decreased breath sounds. Examination of the left-lower field reveals decreased breath sounds. Decreased breath sounds present. No wheezing. No rhonchi. Rales present. "   Chest:      Chest wall: Not tender to palpatation.   Cardiovascular:      PMI at left midclavicular line. Tachycardia present. Irregularly irregular rhythm. Normal S1. Normal S2.      Murmurs: There is a grade 2/4 low frequency blowing decrescendo, early diastolic murmur at the URSB.      No gallop. No click. No rub.   Pulses:     Intact distal pulses.   Edema:     Peripheral edema present.     Pretibial: bilateral 2+ pitting edema of the pretibial area.     Ankle: bilateral 2+ pitting edema of the ankle.     Feet: bilateral 2+ pitting edema of the feet.  Abdominal:      General: Bowel sounds are normal.      Palpations: Abdomen is soft.      Tenderness: There is no abdominal tenderness.   Musculoskeletal: Normal range of motion.         General: No tenderness. Skin:     General: Skin is warm and dry.   Neurological:      General: No focal deficit present.      Mental Status: Alert and oriented to person, place and time.         Results Review:   I reviewed the patient's new clinical results.  Results from last 7 days   Lab Units 10/08/21  0610 10/07/21  1841 10/04/21  1454   WBC 10*3/mm3 9.12 9.02 8.8   HEMOGLOBIN g/dL 9.0* 8.5* 8.4*   HEMATOCRIT % 27.0* 24.7* 25.2*   PLATELETS 10*3/mm3 388 404 475*     Results from last 7 days   Lab Units 10/09/21  0937 10/08/21  0611 10/08/21  0611 10/07/21  1841 10/07/21  1841   SODIUM mmol/L 122*  --  118*  --  114*   POTASSIUM mmol/L 4.3  --  5.0  --  5.2   CHLORIDE mmol/L 81*  --  79*  --  77*   CO2 mmol/L 31.4*  --  30.7*  --  27.8   BUN mg/dL 18  --  18  --  22   CREATININE mg/dL 0.68*  --  0.67*  --  0.75*   GLUCOSE mg/dL 157*   < > 115*   < > 132*   CALCIUM mg/dL 8.5*  --  8.4*  --  8.7    < > = values in this interval not displayed.     Lab Results   Lab Value Date/Time    TROPONINT <0.010 10/07/2021 1841    TROPONINT <0.010 09/20/2021 1659    TROPONINT 0.018 09/02/2021 1601     Results from last 7 days   Lab Units 10/08/21  0611   CHOLESTEROL mg/dL 85   TRIGLYCERIDES  mg/dL 46   HDL CHOL mg/dL 37*   LDL CHOL mg/dL 36         Assessment/Plan:      Acute on chronic congestive heart failure (HCC).  Heart failure with reduced ejection fraction.  Stage D.  New York Heart Association functional class IV symptoms.  Volume overloaded.    Paroxysmal atrial fibrillation.  Recurrent.  Hemodynamically stable.  Rhythm management strategy indicated.  Anticoagulation with Eliquis to continue.      IV diuresis will continue.  I have recommended a less than 1500 mg/day sodium restriction and less than 1.5 L/day fluid restriction.  I have recommended changing his Lopressor to Toprol-XL.  I have recommended starting Entresto 24-26 mg orally twice daily.  I have recommended changing his digoxin to 0.125 mg every 72 hours due to his advanced age with decreased volume of distribution as well as drug drug interaction with amiodarone.  I have recommended starting oral amiodarone 200 mg orally once per day.  Eliquis will continue.  I have recommended discontinuation of Cardizem CD as this oral medication has no role to play in the context of systolic heart failure.  We will achieve rate control with amiodarone, digoxin and Toprol-XL.  I have recommended a repeat echocardiogram during this hospitalization.    I discussed the patients findings and my recommendations with patient, family and nursing staff    Ishmael Nieves MD  10/09/21  12:18 EDT

## 2021-10-09 NOTE — SIGNIFICANT NOTE
Patient's daughter called requesting update.  Patient's daughter provided the correct password confirmed via EMR.  Patient's daughter informed patient was sleeping with Bipap on and oxygenation was within normal limits.

## 2021-10-09 NOTE — PLAN OF CARE
Goal Outcome Evaluation:              Outcome Summary: Patient intermittently tachycardiac during shift.  Remaning VSS.  Oxygenation maintained on 6L-8L O2 via nonrebreather mask and CPAP while sleeping.  No complaints during shift.  No additional events noted during shift.  Will continue to monitor patient.

## 2021-10-09 NOTE — PLAN OF CARE
Goal Outcome Evaluation:  Plan of Care Reviewed With: patient, family           Outcome Summary: PT evaluation completed this am with pt willing to sit on EOB for LE ther ex but did not want to try standing or getting to the bedside chair. Pt was able to sit on EOB x 6 min without loss of balance. Pt presents with deficits in strength, hamstring tightness, and endurance. Pt is expected to improve his functional mobility with continued PT services prior to d/c.

## 2021-10-09 NOTE — CONSULTS
[facility]      Nephrology Consultation      Referring Provider:   No ref. provider found    Reason for Consultation:  Hyponatremia.    Subjective:  Chief complaint   Chief Complaint   Patient presents with   • Shortness of Breath     History of present illness:    Patient is 87-year-old  male with multiple medical problems including coronary artery disease with chronic systolic and diastolic heart failure, chronic hypoxic respiratory failure with 3 to 4 L of oxygen continuously, history of atrial fibrillation on Eliquis with also history of anemia and BPH.  Recently had lung cancer that was resected.  History of carotid artery disease status post surgery and CVA with complete right vision loss.  He also has hyperlipidemia hypothyroidism and hypertension.  He was deemed not a candidate for any intervention when he had a heart attack.  Patient's POA is available and she did mention that he has known to have hyponatremia but usually runs closer to 130 has never been normal for the past year or so.  He was noted to have decreased serum sodium on this admission and I was consulted for further evaluation and treatment.  His initial sodium was 114 and today it was noted to be 122.  This is about over 36 hours.  Patient does not interact much most of the history was obtained from the POA.  He was also noted to be in congestive heart failure as well as bilateral pleural effusions and has been on diuretics since admission.  I have reviewed labs/imaging/records from this hospitalization, including ER staff and admitting/attending physicians H/P's and progress notes to establish a comprehensive understanding of this patient's clinical hospital course, as well as to establish plan of care appropriately.   Past Medical History:   Diagnosis Date   • A-fib (HCC)    • Abnormal ECG    • Arrhythmia    • Arthritis    • Asthma Aug 2021   • Benign prostatic hyperplasia    • Cancer (HCC)     TONGUE   •  Carotid stenosis     s/p right ICA stent x 2   • CHF (congestive heart failure) ([facility])    • Coronary artery disease    • COVID-19 09/2021   • Disease of thyroid gland    • Enlarged prostate    • Fracture    • Full dentures    • GERD (gastroesophageal reflux disease)    • Heart valve disease    • Hyperlipidemia    • Hypertension    • Hypothyroidism    • Impaired functional mobility, balance, gait, and endurance 09/03/2021   • Kidney infection    • Mitral regurgitation    • Myocardial infarction ([facility]) 8-16-21   • Nonrheumatic aortic valve insufficiency    • Renal calculi    • Stroke ([facility]) 03/2016    right retinal artery occlusion   • 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[facility])    • Wears glasses        Past Surgical History:   Procedure Laterality Date   • CAROTID ENDARTERECTOMY      X2-3/17, 9/17   • CAROTID STENT Right 03/18/2016    Carotid Wall Stent   • CAROTID STENT Right 03/04/2017    Zilver BANDAR for recurrent right ICA stenosis   • CATARACT EXTRACTION W/ INTRAOCULAR LENS IMPLANT Left 9/4/2018    Procedure: CATARACT PHACO EXTRACTION WITH INTRAOCULAR LENS IMPLANT LEFT, COMPLICATED WITH MALYUGIAN RING;  Surgeon: Paola Welch MD;  Location: [facility];  Service: Ophthalmology   • KIDNEY STONE SURGERY Right 2011    Shafron - open   • PATELLA FRACTURE SURGERY Left 1986   • [place] TCAT IV STENT CRV CRTD ART EMBOLIC PROTECJ Right 3/4/2017    Placement of Zilver BANDAR right ICA 3/4/17   • TONGUE SURGERY  08/16/2021    cancer spot removed      Family History   Problem Relation Age of Onset   • Heart disease Mother    • Hyperlipidemia Mother    • Hypertension Mother    • Prostate cancer Father    • Cancer Father    • Heart attack Brother    • Heart disease Brother    • Hyperlipidemia Brother    • Hypertension Brother      negative h/o ESRD     Social History     Tobacco Use   • Smoking status: Former Smoker     Packs/day: 1.00     Years: 55.00     Pack years: 55.00     Types: Cigarettes     Start date: 1950     Quit date: 2005     Years  since quittin.7   • Smokeless tobacco: Former User     Types: Chew     Quit date:    Vaping Use   • Vaping Use: Never used   Substance Use Topics   • Alcohol use: Not Currently     Alcohol/week: 0.0 standard drinks     Comment: Quit    • Drug use: No     Home medications:   Prior to Admission Medications     Prescriptions Last Dose Informant Patient Reported? Taking?    apixaban (ELIQUIS) 2.5 MG tablet tablet 10/7/2021  No Yes    Take 1 tablet by mouth Every 12 (Twelve) Hours.    Patient taking differently:  Take 5 mg by mouth Every 12 (Twelve) Hours.    aspirin 81 MG EC tablet 10/7/2021  Yes Yes    Take 81 mg by mouth Daily.    atorvastatin (LIPITOR) 20 MG tablet 10/7/2021  No Yes    Take 1 tablet by mouth Every Night.    digoxin (LANOXIN) 125 MCG tablet 10/7/2021  No Yes    Take 1 tablet by mouth Daily.    dilTIAZem CD (CARDIZEM CD) 120 MG 24 hr capsule 10/7/2021  No Yes    Take 1 capsule by mouth Daily.    ferrous sulfate 140 (45 Fe) MG tablet controlled-release tablet 10/7/2021  Yes Yes    Take  by mouth Daily With Breakfast. SLOW RELEASE IRON 160/50    furosemide (LASIX) 40 MG tablet 10/7/2021  No Yes    Take 1-2 tablets by mouth daily due to edema    levothyroxine (SYNTHROID, LEVOTHROID) 50 MCG tablet 10/7/2021  No Yes    Take 1 tablet by mouth Daily.    Patient taking differently:  Take 50 mcg by mouth Daily With Breakfast.    megestrol (MEGACE) 40 MG tablet 10/7/2021  No Yes    Take 1 tablet by mouth 2 (Two) Times a Day.    metoprolol tartrate (LOPRESSOR) 25 MG tablet 10/7/2021  No Yes    Take 1 tablet by mouth 2 (Two) Times a Day.    multivitamin with minerals (CENTRUM SILVER PO) 10/7/2021  Yes Yes    Take 1 tablet by mouth Daily.    nitroglycerin (NITROSTAT) 0.4 MG SL tablet Patient Taking Differently  No Yes    Place 1 tablet under the tongue Every 5 (Five) Minutes As Needed for Chest Pain for up to 3 doses. Take no more than 3 doses in 15 minutes.    potassium citrate (UROCIT-K) 10 MEQ (1080  MG) CR tablet 10/7/2021  Yes Yes    Take 10 mEq by mouth As Needed.    tamsulosin (FLOMAX) 0.4 MG capsule 24 hr capsule 10/7/2021  No Yes    Take 1 capsule by mouth Daily.    Patient taking differently:  Take 1 capsule by mouth Every Night.    vitamin C (ASCORBIC ACID) 250 MG tablet   Yes Yes    Take 500 mg by mouth Daily.    Famotidine (PEPCID PO) Not Taking  Yes No    Take 20 mg by mouth 2 (Two) Times a Day.    Patient not taking:  Reported on 10/8/2021        Emergency department medications:   Medications   sodium chloride 0.9 % flush 10 mL (has no administration in time range)   apixaban (ELIQUIS) tablet 5 mg (5 mg Oral Given 10/8/21 2101)   aspirin EC tablet 81 mg (81 mg Oral Given 10/8/21 0811)   atorvastatin (LIPITOR) tablet 20 mg (20 mg Oral Given 10/8/21 2101)   digoxin (LANOXIN) tablet 125 mcg (125 mcg Oral Given 10/8/21 1252)   dilTIAZem CD (CARDIZEM CD) 24 hr capsule 120 mg (120 mg Oral Given 10/8/21 0811)   famotidine (PEPCID) tablet 20 mg (20 mg Oral Given 10/8/21 2101)   ferrous sulfate EC tablet 324 mg (324 mg Oral Given 10/8/21 0811)   levothyroxine (SYNTHROID, LEVOTHROID) tablet 50 mcg (50 mcg Oral Given 10/8/21 0811)   megestrol (MEGACE) tablet 40 mg (40 mg Oral Given 10/8/21 2101)   metoprolol tartrate (LOPRESSOR) tablet 25 mg (25 mg Oral Given 10/8/21 2101)   nitroglycerin (NITROSTAT) SL tablet 0.4 mg (has no administration in time range)   tamsulosin (FLOMAX) 24 hr capsule 0.4 mg (0.4 mg Oral Given 10/8/21 2101)   ascorbic acid (VITAMIN C) tablet 500 mg (500 mg Oral Given 10/8/21 0811)   sodium chloride 0.9 % flush 3 mL (3 mL Intravenous Given 10/8/21 2101)   sodium chloride 0.9 % flush 3-10 mL (has no administration in time range)   acetaminophen (TYLENOL) tablet 650 mg (has no administration in time range)     Or   acetaminophen (TYLENOL) 160 MG/5ML solution 650 mg (has no administration in time range)     Or   acetaminophen (TYLENOL) suppository 650 mg (has no administration in time range)  "  ondansetron (ZOFRAN) injection 4 mg (has no administration in time range)   melatonin tablet 5 mg (has no administration in time range)   sennosides-docusate (PERICOLACE) 8.6-50 MG per tablet 2 tablet (2 tablets Oral Given 10/8/21 2101)     And   polyethylene glycol (MIRALAX) packet 17 g (has no administration in time range)     And   bisacodyl (DULCOLAX) EC tablet 5 mg (has no administration in time range)     And   bisacodyl (DULCOLAX) suppository 10 mg (has no administration in time range)   furosemide (LASIX) injection 40 mg (40 mg Intravenous Given 10/9/21 0625)   PRO-STAT oral liquid 30 mL (30 mL Oral Given 10/8/21 2101)   metoprolol tartrate (LOPRESSOR) tablet 50 mg (50 mg Oral Given 10/7/21 2121)   tamsulosin (FLOMAX) 24 hr capsule 0.4 mg (0.4 mg Oral Given 10/7/21 2121)   gentamicin (GARAMYCIN) 360 mg in sodium chloride 0.9 % IVPB (360 mg Intravenous New Bag 10/7/21 2210)   furosemide (LASIX) injection 40 mg (40 mg Intravenous Given 10/7/21 2122)       Allergies:  Levaquin [levofloxacin], Amoxicillin, and Rocephin [ceftriaxone]    Review of Systems    Patient is unresponsive and sedated no meaningful review of system is possible.    Objective:  Vital Signs  /45 (BP Location: Left arm, Patient Position: Lying)   Pulse 118   Temp 98.1 °F (36.7 °C) (Axillary)   Resp 18   Ht 180.3 cm (71\")   Wt 71.6 kg (157 lb 13.6 oz)   SpO2 97%   BMI 22.02 kg/m²          No intake/output data recorded.    Intake/Output Summary (Last 24 hours) at 10/9/2021 0820  Last data filed at 10/9/2021 0700  Gross per 24 hour   Intake 600 ml   Output 2200 ml   Net -1600 ml       Physical Exam:  General Appearance:   In no acute distress.     Head:   Normocephalic, without obvious abnormality, atraumatic.     Eyes:      Normal, conjunctivae and sclerae, no icterus, no pallor, corneas clear, PERRLA        Throat:   Oral mucosa dry      Neck:  No adenopathy, supple, trachea midline, no thyromegaly, no carotid bruit, no JVD     "    Lungs:    Clear to auscultation and fair air movement noted.      Heart::   Irregular rhythm and rate.       Abdomen:   Obese. Normal bowel sounds, no masses, no organomegaly, soft non-tender, non-distended, no guarding, no rebound tenderness      Genital urinary:   No urinary bladder palpable      Extremities:  Moves all extremities, no edema, no cyanosis, no redness.     Pulses:  Pulses palpable and equal bilaterally but weak.     Skin:  No bleeding, bruising or rash        Neurologic:   No meaningful interaction but does randomly move his extremities.         Results Review:   Results from last 7 days   Lab Units 10/08/21  0611 10/07/21  1841 10/04/21  1454   SODIUM mmol/L 118* 114* 119*   POTASSIUM mmol/L 5.0 5.2 4.9   CHLORIDE mmol/L 79* 77* 79*   TOTAL CO2 mmol/L  --   --  27   CO2 mmol/L 30.7* 27.8  --    BUN mg/dL 18 22 14   CREATININE mg/dL 0.67* 0.75* 0.69*   CALCIUM mg/dL 8.4* 8.7 8.3*   ALBUMIN g/dL  --  2.70* 2.8*   BILIRUBIN mg/dL  --  0.4 0.6   ALK PHOS U/L  --  152* 111   ALT (SGPT) U/L  --  43* 40   AST (SGOT) U/L  --  31 31   GLUCOSE mg/dL 115* 132*  --      Estimated Creatinine Clearance: 65.9 mL/min (A) (by C-G formula based on SCr of 0.67 mg/dL (L)).  Results from last 7 days   Lab Units 10/07/21  1841   MAGNESIUM mg/dL 2.1         Results from last 7 days   Lab Units 10/08/21  0610 10/07/21  1841 10/04/21  1454   WBC 10*3/mm3 9.12 9.02 8.8   HEMOGLOBIN g/dL 9.0* 8.5* 8.4*   PLATELETS 10*3/mm3 388 404 475*         Brief Urine Lab Results  (Last result in the past 365 days)      Color   Clarity   Blood   Leuk Est   Nitrite   Protein   CREAT   Urine HCG        10/08/21 0242 Yellow   Clear   Negative   Negative   Negative   Negative               No results found for: UTPCR  Imaging Results (Last 24 Hours)     ** No results found for the last 24 hours. **        apixaban, 5 mg, Oral, Q12H  vitamin C, 500 mg, Oral, Daily  aspirin, 81 mg, Oral, Daily  atorvastatin, 20 mg, Oral, Nightly  digoxin,  125 mcg, Oral, Daily  dilTIAZem CD, 120 mg, Oral, Q24H  famotidine, 20 mg, Oral, BID  ferrous sulfate, 324 mg, Oral, Daily With Breakfast  furosemide, 40 mg, Intravenous, Q12H  levothyroxine, 50 mcg, Oral, Daily With Breakfast  megestrol, 40 mg, Oral, BID  metoprolol tartrate, 25 mg, Oral, Q12H  PRO-STAT, 30 mL, Oral, BID  senna-docusate sodium, 2 tablet, Oral, BID  sodium chloride, 3 mL, Intravenous, Q12H  tamsulosin, 0.4 mg, Oral, Nightly           Assessment/Plan:    1. 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  POA is fairly clear does not want any aggressive measures and keep him comfortable.  She is okay to give medications and do blood work.  I will check serum and urine osmolality, with the diuretics I am not sure if we can get the good number.  She is okay with the salt tablets.  It will be started.  His TSH is normal.  2. Acute on chronic congestive heart failure (HCC): As per cardiology service currently on twice a day IV diuretics and can be continued.  Rest of the treatment as per cardiology services.  3. Hypertension blood pressures on the low side he is not on a lot of blood pressure medications.  4. A. fib: Rate controlled and anticoagulated.  5. Anemia: Check iron stores and start the oral iron.  6. Hypothyroidism: Last TSH is within normal limits on Synthroid.      Risk and complexity: High    Plan:  · I will check uric acid as well as serum and urine osmolality.  Continue with the diuretics and start on sodium chloride 1 g tablet twice a day and see how he will do with it.  · Continue with rest of the current treatment plan and surveillance labs.  · Details were discussed with the patient as well as family in the room.    · Details were also discussed with the hospitalist service.   · Further recommendations will depend on clinical course of the patient during  the current hospitalization.    · I also discussed the details with the nursing staff.  · Rest as ordered.    In closing, I sincerely appreciate opportunity to participate in care of this patient. If I can be of any further assistance with the management of this patient, please don’t hesitate to contact me.    Estuardo Winchester MD, RALPHN    10/09/21  08:20 EDT    Dictated using Dragon.

## 2021-10-09 NOTE — PROGRESS NOTES
[facility]    Name:  Andrew Hernandez   Age:  87 y.o.  Sex:  male  :  1934  MRN:  [id number]   Visit Number:  [id number]  Admission Date:  10/7/2021  Date Of Service:  10/09/21  Primary Care Physician:  Vermeesch, Marilyn K, MD     LOS: 2 days :    Chief Complaint:      Shortness of breath    Subjective:  Patient was seen and examined this morning. Patient is resting comfortably in his bed. He reports feeling better with stable respiratory status. Shortness of breath has been improving per his report. Pt denies any chest pain.     Hospital Course:    Patient is a very pleasant 87-year-old male with medical history of chronic systolic and diastolic heart failure, chronic hypoxic respiratory failure on 3 to 4 L of oxygen continuously, atrial fibrillation on Eliquis, anemia, BPH, recent diagnosis of COVID-19, coronary artery disease, tongue cancer status post resection, previous CVA with complete right vision loss, carotid artery stenosis status post stenting of right carotid, hypertension, hyperlipidemia and hypothyroidism who was brought into the ER today for evaluation of shortness of breath.  Patient reports feeling short of breath at baseline and has been hospitalized 2 times in the last 1 month for CHF exacerbation.  He states that his breathing has been worsening over the last few days with an increased cough productive of thick sputum.  His daughter-in-law reports that the patient has had difficulty expectorating due to loss of tongue mobility after his resection.  Patient reports feeling quite weak also.  Denies any fever or chills.  He denies any other complains of chest pain, chest pressure, abdominal pain, diarrhea or dysuria.  Patient does have a history of recurrent UTIs.  Patient has known history of atrial fibrillation which has been difficult to control per his daughter-in-law.  He has been started on digoxin just 3 days ago and apparently just came  out of atrial fibrillation yesterday.  She states that when he is in A. fib he tends to have worsening heart failure symptoms.  Patient apparently had been constipated for the last several days but has had 2 bowel movement since yesterday evening after receiving laxatives.     On arrival to the ER patient noted to have normal vitals and is oxygenating at 99% on 4 L of oxygen which she wears at home.  His labs are notable for proBNP 12,738, negative troponin.  Sodium 114.  Normal potassium and magnesium.  H&H 8.5/24.7 which is right around his baseline.  Urinalysis appears to be a contaminated sample with 7-12 squamous cells but does have 6-12 WBCs and 2+ bacteria.  Patient denies any urinary symptoms.  Chest x-ray on my read shows significant pulmonary edema with large left-sided pleural effusion.  Patient was given a dose of IV Lasix and gentamicin for possible UTI and is being admitted to the hospitalist service for further care.      Review of Systems:     All systems were reviewed and negative except as mentioned in subjective, assessment and plan.    Vital Signs:    Temp:  [97.6 °F (36.4 °C)-98.1 °F (36.7 °C)] 97.8 °F (36.6 °C)  Heart Rate:  [] 118  Resp:  [18-20] 20  BP: (100-130)/(42-53) 120/53    Intake and output:    I/O last 3 completed shifts:  In: 600 [P.O.:600]  Out: 3050 [Urine:3050]  I/O this shift:  In: 240 [P.O.:240]  Out: 250 [Urine:250]    Physical Examination:    General Appearance:  Alert and cooperative. Appears chronically ill.    Head:  Atraumatic and normocephalic.   Eyes: Conjunctivae and sclerae normal, no icterus. No pallor.   Throat: No oral lesions, no thrush, oral mucosa moist.   Neck: Supple, trachea midline, no thyromegaly.   Lungs:   Breath sounds heard bilaterally equally.  Bilateral rales. no crackles or wheezing. No Pleural rub or bronchial breathing.   Heart:  Normal S1 and S2, no murmur, no gallop, no rub. No JVD.   Abdomen:   Normal bowel sounds, no masses, no  organomegaly. Soft, nontender, nondistended, no rebound tenderness.   Extremities: Supple, +2 pitting edema in bilat LE, no cyanosis, no clubbing.   Skin: No bleeding or rash.   Neurologic: Alert and oriented x 3. No facial asymmetry. Moves all four limbs. No tremors.      Laboratory results:    Results from last 7 days   Lab Units 10/09/21  0937 10/08/21  0611 10/07/21  1841 10/04/21  1454   SODIUM mmol/L 122* 118* 114* 119*   POTASSIUM mmol/L 4.3 5.0 5.2 4.9   CHLORIDE mmol/L 81* 79* 77* 79*   TOTAL CO2 mmol/L  --   --   --  27   CO2 mmol/L 31.4* 30.7* 27.8  --    BUN mg/dL 18 18 22 14   CREATININE mg/dL 0.68* 0.67* 0.75* 0.69*   CALCIUM mg/dL 8.5* 8.4* 8.7 8.3*   BILIRUBIN mg/dL  --   --  0.4 0.6   ALK PHOS U/L  --   --  152* 111   ALT (SGPT) U/L  --   --  43* 40   AST (SGOT) U/L  --   --  31 31   GLUCOSE mg/dL 157* 115* 132*  --      Results from last 7 days   Lab Units 10/08/21  0610 10/07/21  1841 10/04/21  1454   WBC 10*3/mm3 9.12 9.02 8.8   HEMOGLOBIN g/dL 9.0* 8.5* 8.4*   HEMATOCRIT % 27.0* 24.7* 25.2*   PLATELETS 10*3/mm3 388 404 475*         Results from last 7 days   Lab Units 10/07/21  1841   TROPONIN T ng/mL <0.010     Results from last 7 days   Lab Units 10/07/21  2014   URINECX  >100,000 CFU/mL Klebsiella pneumoniae ssp pneumoniae*         I have reviewed the patient's laboratory results.    Radiology results:    XR Chest 1 View    Result Date: 10/8/2021  PROCEDURE: XR CHEST 1 VW-  HISTORY: SO Triage Protocol  COMPARISON: 09/20/2021.  FINDINGS: The heart is normal in size. The mediastinum is unremarkable. There is interstitial edema with bilateral pleural effusions and basilar opacities. There is no pneumothorax.  There are no acute osseous abnormalities.      Impression: Interstitial pulmonary edema with bilateral effusions and basilar opacities.  Continued followup is recommended.  This report was finalized on 10/8/2021 7:03 AM by Julieta Dai M.D..    I have reviewed the patient's radiology  reports.    Medication Review:     I have reviewed the patient's active and prn medications.     Problem List:      Acute on chronic congestive heart failure (HCC)      Assessment:    1.  Acute exacerbation of chronic combined systolic and diastolic heart failure, present on admission  2.  Hyponatremia, chronic issue likely acutely worsened in the setting of #1  3.  Chronic hypoxic respiratory failure on 3 to 4 L of oxygen continuously  4.  Paroxysmal atrial fibrillation on Eliquis, currently in sinus rhythm  5.  Anemia  6. BPH  7. Recent diagnosis of COVID-19  8. Coronary artery disease  9. Tongue cancer status post resection  10. Previous CVA with complete right vision loss  11. Carotid artery stenosis status post stenting of right carotid  12. Hypertension  13. Hyperlipidemia   14. Hypothyroidism   15.  Aortic regurgitation    Plan:    Patient will be admitted to [facility] with telemetry.  He will be continued on IV Lasix 40 mg twice daily with close observation of his renal functions and electrolytes.  He may need up titration of Lasix as he already takes 40 mg orally twice at home.  No need to repeat echocardiogram as he just had one done 1 month ago.  Monitor ins and outs and obtain daily weights.  Patient's daughter-in-law did inform me that the patient had some difficulty voiding intermittently requiring in and out catheterization during his hospitalization.  She would like to avoid this as much as possible as it did give him recurrent UTIs.  We will place a condom catheter as needed.     Patient's urinalysis was suggestive of an UTI however it is contaminated and the patient has no symptoms.  We will repeat this and hold off on any further antibiotics at this time.  We will check a procalcitonin to rule out possible underlying pneumonia.     Patient has known chronic anemia which appears to be slowly worsening.  Will check iron profile, ferritin, B12 and folate.  Patient's daughter-in-law was requesting to  give IV iron during this admission, informed her that that will add additional volume and would like to avoid this for the time being.  If needed, this can be arranged on an outpatient basis are given to the patient prior to discharge. We will consult PT and OT.    10/08: hyponatremia in the setting of current Diuretic use and CHF. Nephrology consulted for recommendations.  Continue with Lasix twice daily.  Will consult cardiology for evaluation and recommendations.  Continue oxygen supportive treatment and try to titrate as tolerated.    10/09: Cardiology seen the patient. Recs changing Lopressor to Toprol-XL, starting Entresto, changing his digoxin to 0.125 mg every 72 hours, starting oral amiodarone 200 mg orally once per day. Cont Eliquis. discontinuation of Cardizem. Repeat echocardiogram ordered. Pt continues to be on 3 to 4 L on O2 which is baseline for him. We will start the patient on Bactrim to finish his UTI treatment. Patient had Gentamycin x1 in the ER.We will follow up on the urine culture. Nephrology consulted for Hyponatremia.     DVT Prophylaxis: Eliquis  Code Status: Full  Diet: Cardiac with fluid and salt restriction  Discharge Plan: Pending    Turner Chavez MD  10/09/21  12:14 EDT    Dictated utilizing Dragon dictation.

## 2021-10-09 NOTE — PLAN OF CARE
Goal Outcome Evaluation:  Plan of Care Reviewed With: patient, family        Progress: improving   Andrew is A+O x 4. He remains in A-FIB at this time. Dr. Winchester and Dr. Nieevs were both at bedside today. Medications adjusted. He has been titrated down to 4L NC and SpO2 remains above 90%.

## 2021-10-09 NOTE — THERAPY EVALUATION
Patient Name: Andrew Hernandez  : 1934    MRN: [id number]                              Today's Date: 10/9/2021       Admit Date: 10/7/2021    Visit Dx:     ICD-10-CM ICD-9-CM   1. Acute on chronic congestive heart failure, unspecified heart failure type ([facility])  I50.9 428.0   2. Hypercholesterolemia  E78.00 272.0   3. 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    • Carotid stenosis, symptomatic w/o infarct   • Hyperkalemia   • Hyponatremia   • Encounter for Medicare annual wellness exam   • Tongue lesion   • Abdominal bruit   • Edema due to hypervolemia   • Acute on chronic diastolic (congestive) heart failure ([facility])   • Acute cystitis without hematuria   • Acute on chronic combined systolic and diastolic congestive heart failure ([facility])   • COVID-19 virus detected   • Chronic atrial fibrillation with RVR ([facility])   • Hyponatremia   • Late effects of CVA (cerebrovascular accident)   • Neoplasm, uncertain whether benign or malignant   • Chronic anticoagulation   • Acute and chronic respiratory failure with hypoxia ([facility])   • Blindness of right eye   • Pressure injury of coccygeal region, stage 2 ([facility])   • Physical deconditioning   • Impaired mobility and ADLs   • Acute on chronic congestive heart failure ([facility])     Past Medical History:   Diagnosis Date   • A-fib ([facility])    • Abnormal ECG    • Arrhythmia    • Arthritis    • Asthma Aug 2021   • Benign prostatic hyperplasia     • Cancer (HCC)     TONGUE   • Carotid stenosis     s/p right ICA stent x 2   • CHF (congestive heart failure) ([facility])    • Coronary artery disease    • COVID-19 09/2021   • Disease of thyroid gland    • Enlarged prostate    • Fracture    • Full dentures    • GERD (gastroesophageal reflux disease)    • Heart valve disease    • Hyperlipidemia    • Hypertension    • Hypothyroidism    • Impaired functional mobility, balance, gait, and endurance 09/03/2021   • Kidney infection    • Mitral regurgitation    • Myocardial infarction ([facility]) 8-16-21   • Nonrheumatic aortic valve insufficiency    • Renal calculi    • Stroke ([facility]) 03/2016    right retinal artery occlusion   • 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[facility])    • Wears glasses      Past Surgical History:   Procedure Laterality Date   • CAROTID ENDARTERECTOMY      X2-3/17, 9/17   • CAROTID STENT Right 03/18/2016    Carotid Wall Stent   • CAROTID STENT Right 03/04/2017    [place] BANDAR for recurrent right ICA stenosis   • CATARACT EXTRACTION W/ INTRAOCULAR LENS IMPLANT Left 9/4/2018    Procedure: CATARACT PHACO EXTRACTION WITH INTRAOCULAR LENS IMPLANT LEFT, COMPLICATED WITH MALYUGIAN RING;  Surgeon: Paola Welch MD;  Location: [facility];  Service: Ophthalmology   • KIDNEY STONE SURGERY Right 2011    [facility] - open   • PATELLA FRACTURE SURGERY Left 1986   • CO TCAT IV STENT CRV CRTD ART EMBOLIC PROTECJ Right 3/4/2017    Placement of [facility] BANDAR right ICA 3/4/17   • TONGUE SURGERY  08/16/2021    cancer spot removed       General Information     Row Name 10/09/21 1202          Physical Therapy Time and Intention    Mode of Treatment physical therapy  -TW     Row Name 10/09/21 1202          General Information    Patient Profile Reviewed yes  -TW     Prior Level of Function min assist:; all household mobility  has rolling walker, hospital bed, w/c. and home O2  -TW     Existing Precautions/Restrictions fall; oxygen therapy device and L/min; cardiac  -TW     Barriers to Rehab medically  complex; previous functional deficit  -TW     Row Name 10/09/21 1202          Living Environment    Lives With alone  pt states he lives behind his son's home and family helps him.  -TW     Row Name 10/09/21 1202          Stairs Within Home, Primary    Stairs, Within Home, Primary There are stairs to enter home but pt not sure how many  -TW     Row Name 10/09/21 1202          Cognition    Orientation Status (Cognition) oriented to; person; place; situation  -TW     Row Name 10/09/21 1202          Safety Issues, Functional Mobility    Safety Issues Affecting Function (Mobility) insight into deficits/self-awareness; safety precaution awareness; safety precautions follow-through/compliance; ability to follow commands  -TW     Impairments Affecting Function (Mobility) balance; endurance/activity tolerance; strength; cognition  -TW     Cognitive Impairments, Mobility Safety/Performance awareness, need for assistance; insight into deficits/self-awareness; problem-solving/reasoning; safety precaution awareness; safety precaution follow-through; sequencing abilities  -TW     Comment, Safety Issues/Impairments (Mobility) Pt did need reassurance that it was ok to begin increasing his mobility. Expresses fear of becoming short winded.  -TW           User Key  (r) = Recorded By, (t) = Taken By, (c) = Cosigned By    Initials Name Provider Type    TW Judy Barrientos, PT Physical Therapist               Mobility     Row Name 10/09/21 1202          Bed Mobility    Bed Mobility supine-sit; sit-supine  -TW     Supine-Sit [place] (Bed Mobility) minimum assist (75% patient effort); verbal cues  -TW     Sit-Supine [place] (Bed Mobility) minimum assist (75% patient effort); verbal cues  -TW     Assistive Device (Bed Mobility) head of bed elevated; bed rails  -TW     Comment (Bed Mobility) Extended time needed to complete task.  -     Row Name 10/09/21 1202          Transfers    Comment (Transfers) Pt declined to get up to chair  or to stand with walker. He did scoot himself x 2 to the right along EOB to get buttocks higher prior to returning to supine.  -TW     Row Name 10/09/21 1202          Bed-Chair Transfer    Bed-Chair [place] (Transfers) unable to assess  -TW     Row Name 10/09/21 1202          Sit-Stand Transfer    Sit-Stand [place] (Transfers) unable to assess  -     Row Name 10/09/21 1202          Gait/Stairs (Locomotion)    [place] Level (Gait) unable to assess  -TW           User Key  (r) = Recorded By, (t) = Taken By, (c) = Cosigned By    Initials Name Provider Type    TW Judy Barrientos, PT Physical Therapist               Obj/Interventions     Row Name 10/09/21 1202          Range of Motion Comprehensive    Comment, General Range of Motion Grossly WFLs; note B tight hamstrings  -     Row Name 10/09/21 1202          Strength Comprehensive (MMT)    Comment, General Manual Muscle Testing (MMT) Assessment BLE grossly 3-/5 to 3+/5  -     Row Name 10/09/21 1202          Motor Skills    Therapeutic Exercise knee; hip  BLE TKE sitting on EOB and hip add sets with pillow x 10 each.  -TW     Row Name 10/09/21 1202          Balance    Balance Assessment sitting static balance; sitting dynamic balance  -TW     Static Sitting Balance WFL; unsupported; sitting, edge of bed  -TW     Dynamic Sitting Balance WFL; unsupported; sitting, edge of bed  -TW     Comment, Balance Pt sat EOB x 6 minutes  -TW           User Key  (r) = Recorded By, (t) = Taken By, (c) = Cosigned By    Initials Name Provider Type    TW Judy Barrientos, PT Physical Therapist               Goals/Plan     Row Name 10/09/21 1202          Bed Mobility Goal 1 (PT)    Activity/Assistive Device (Bed Mobility Goal 1, PT) bed mobility activities, all  -TW     [place] Level/Cues Needed (Bed Mobility Goal 1, PT) standby assist  -TW     Time Frame (Bed Mobility Goal 1, PT) 2 weeks  -TW     Progress/Outcomes (Bed Mobility Goal 1, PT) goal ongoing  -     Row Name  10/09/21 1202          Transfer Goal 1 (PT)    Activity/Assistive Device (Transfer Goal 1, PT) sit-to-stand/stand-to-sit; bed-to-chair/chair-to-bed; toilet; walker, rolling  -TW     [place] Level/Cues Needed (Transfer Goal 1, PT) contact guard assist  -TW     Time Frame (Transfer Goal 1, PT) 2 weeks  -TW     Progress/Outcome (Transfer Goal 1, PT) goal ongoing  -TW     Row Name 10/09/21 1202          Gait Training Goal 1 (PT)    Activity/Assistive Device (Gait Training Goal 1, PT) gait (walking locomotion); assistive device use; walker, rolling  -TW     [place] Level (Gait Training Goal 1, PT) minimum assist (75% or more patient effort)  -TW     Distance (Gait Training Goal 1, PT) 40 ft  -TW     Strategies/Barriers (Gait Training Goal 1, PT) O2 sats above 90%  -TW     Progress/Outcome (Gait Training Goal 1, PT) goal ongoing  -TW     Row Name 10/09/21 1202          ROM Goal 1 (PT)    ROM Goal 1 (PT) Pt norman B hamstring stretching x 3  -TW     Time Frame (ROM Goal 1, PT) short-term goal (STG); 5 days  -TW     Progress/Outcome (ROM Goal 1, PT) goal ongoing  -TW     Row Name 10/09/21 1202          Patient Education Goal (PT)    Activity (Patient Education Goal, PT) LE ther ex 1 x 10  -TW     [place]/Cues/Accuracy (Memory Goal 2, PT) demonstrates adequately  -TW     Time Frame (Patient Education Goal, PT) 2 weeks  -TW     Progress/Outcome (Patient Education Goal, PT) goal ongoing  -TW           User Key  (r) = Recorded By, (t) = Taken By, (c) = Cosigned By    Initials Name Provider Type    TW Judy Barrientos, PT Physical Therapist               Clinical Impression     Row Name 10/09/21 1202          Pain    Additional Documentation Pain Scale: Numbers Pre/Post-Treatment (Group)  -TW     Row Name 10/09/21 1202          Pain Scale: Numbers Pre/Post-Treatment    Pretreatment Pain Rating 0/10 - no pain  -TW     Posttreatment Pain Rating 0/10 - no pain  -TW     Row Name 10/09/21 1202          Plan of Care Review     Plan of Care Reviewed With patient; family  -TW     Outcome Summary PT evaluation completed this am with pt willing to sit on EOB for LE ther ex but did not want to try standing or getting to the bedside chair. Pt was able to sit on EOB x 6 min without loss of balance. Pt presents with deficits in strength, hamstring tightness, and endurance. Pt is expected to improve his functional mobility with continued PT services prior to d/c.  -TW     Row Name 10/09/21 1202          Therapy Assessment/Plan (PT)    Patient/Family Therapy Goals Statement (PT) To go home  -TW     Rehab Potential (PT) good, to achieve stated therapy goals  -TW     Criteria for Skilled Interventions Met (PT) yes; meets criteria  -TW     Predicted Duration of Therapy Intervention (PT) 2 wks  -TW     Row Name 10/09/21 1202          Vital Signs    Pretreatment Heart Rate (beats/min) 96  -TW     Intratreatment Heart Rate (beats/min) 102  -TW     Posttreatment Heart Rate (beats/min) 94  -TW     Pre SpO2 (%) 91  -TW     O2 Delivery Pre Treatment supplemental O2  3 L  -TW     Intra SpO2 (%) 90  -TW     O2 Delivery Intra Treatment supplemental O2  3 L  -TW     Post SpO2 (%) 92  -TW     O2 Delivery Post Treatment supplemental O2  3 L  -TW     Pre Patient Position Supine  -TW     Intra Patient Position Sitting  -TW     Post Patient Position Supine  -TW     Row Name 10/09/21 1202          Positioning and Restraints    Pre-Treatment Position in bed  -TW     Post Treatment Position bed  -TW     In Bed call light within reach; encouraged to call for assist; side rails up x3; notified nsg; with family/caregiver  -TW           User Key  (r) = Recorded By, (t) = Taken By, (c) = Cosigned By    Initials Name Provider Type    TW Judy Barrientos, PT Physical Therapist               Outcome Measures    No documentation.                                PT Recommendation and Plan     Plan of Care Reviewed With: patient, family  Outcome Summary: PT evaluation completed this  am with pt willing to sit on EOB for LE ther ex but did not want to try standing or getting to the bedside chair. Pt was able to sit on EOB x 6 min without loss of balance. Pt presents with deficits in strength, hamstring tightness, and endurance. Pt is expected to improve his functional mobility with continued PT services prior to d/c.     Time Calculation:    PT Charges     Row Name 10/09/21 1202             Time Calculation    Stop Time 1202  -TW      PT Received On 10/09/21  -TW      PT Goal Re-Cert Due Date 10/19/21  -TW            User Key  (r) = Recorded By, (t) = Taken By, (c) = Cosigned By    Initials Name Provider Type    TW Judy Barrientos PT Physical Therapist              Therapy Charges for Today     Code Description Service Date Service Provider Modifiers Qty    88367670822 HC PT EVAL LOW COMPLEXITY 3 10/9/2021 Judy Barrientos PT GP 1               Judy Barrientos PT  10/9/2021

## 2021-10-10 NOTE — PLAN OF CARE
Goal Outcome Evaluation:  Plan of Care Reviewed With: patient, daughter           Outcome Summary: PT treatment completed bedside this am with pt stating he felt stronger today and willing to get up to bedside chair. Pt needed min assist with bed mobility and min assist for modified stand pivot to chair (with gt belt and HHA). Pt had no symptoms of dizziness but when vitals were taken pt's BP found to be 102/28. In consulting with nursing, pt transferred back to bed and positioned for comfort. Pt was up in chair for approximately 12 min. Continue with PT POC as pt is able to tolerate.

## 2021-10-10 NOTE — THERAPY TREATMENT NOTE
Patient Name: Andrew Hernandez  : 1934    MRN: [id number]                              Today's Date: 10/10/2021       Admit Date: 10/7/2021    Visit Dx:     ICD-10-CM ICD-9-CM   1. Acute on chronic congestive heart failure, unspecified heart failure type ([facility])  I50.9 428.0   2. Hypercholesterolemia  E78.00 272.0   3. 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    • Carotid stenosis, symptomatic w/o infarct   • Hyperkalemia   • Hyponatremia   • Encounter for Medicare annual wellness exam   • Tongue lesion   • Abdominal bruit   • Edema due to hypervolemia   • Acute on chronic diastolic (congestive) heart failure ([facility])   • Acute cystitis without hematuria   • Acute on chronic combined systolic and diastolic congestive heart failure ([facility])   • COVID-19 virus detected   • Chronic atrial fibrillation with RVR ([facility])   • Hyponatremia   • Late effects of CVA (cerebrovascular accident)   • Neoplasm, uncertain whether benign or malignant   • Chronic anticoagulation   • Acute and chronic respiratory failure with hypoxia ([facility])   • Blindness of right eye   • Pressure injury of coccygeal region, stage 2 ([facility])   • Physical deconditioning   • Impaired mobility and ADLs   • Acute on chronic congestive heart failure ([facility])     Past Medical History:   Diagnosis Date   • A-fib ([facility])    • Abnormal ECG    • Arrhythmia    • Arthritis    • Asthma Aug 2021   • Benign prostatic hyperplasia     • Cancer (HCC)     TONGUE   • Carotid stenosis     s/p right ICA stent x 2   • CHF (congestive heart failure) ([facility])    • Coronary artery disease    • COVID-19 09/2021   • Disease of thyroid gland    • Enlarged prostate    • Fracture    • Full dentures    • GERD (gastroesophageal reflux disease)    • Heart valve disease    • Hyperlipidemia    • Hypertension    • Hypothyroidism    • Impaired functional mobility, balance, gait, and endurance 09/03/2021   • Kidney infection    • Mitral regurgitation    • Myocardial infarction ([facility]) 8-16-21   • Nonrheumatic aortic valve insufficiency    • Renal calculi    • Stroke ([facility]) 03/2016    right retinal artery occlusion   • 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[facility])    • Wears glasses      Past Surgical History:   Procedure Laterality Date   • CAROTID ENDARTERECTOMY      X2-3/17, 9/17   • CAROTID STENT Right 03/18/2016    Carotid Wall Stent   • CAROTID STENT Right 03/04/2017    Zilver BANDAR for recurrent right ICA stenosis   • CATARACT EXTRACTION W/ INTRAOCULAR LENS IMPLANT Left 9/4/2018    Procedure: CATARACT PHACO EXTRACTION WITH INTRAOCULAR LENS IMPLANT LEFT, COMPLICATED WITH MALYUGIAN RING;  Surgeon: Paola Welch MD;  Location: [facility];  Service: Ophthalmology   • KIDNEY STONE SURGERY Right 2011    Shafron - open   • PATELLA FRACTURE SURGERY Left 1986   • SD TCAT IV STENT CRV CRTD ART EMBOLIC PROTECJ Right 3/4/2017    Placement of Zilver BANDAR right ICA 3/4/17   • TONGUE SURGERY  08/16/2021    cancer spot removed       General Information     Row Name 10/10/21 1130          Physical Therapy Time and Intention    Mode of Treatment physical therapy  -TW     Row Name 10/10/21 1130          General Information    Existing Precautions/Restrictions cardiac; fall; oxygen therapy device and L/min  -TW     Row Name 10/10/21 1130          Cognition    Orientation Status (Cognition) oriented to; person; place; situation  -TW     Row Name 10/10/21 1130          Safety Issues, Functional Mobility     Safety Issues Affecting Function (Mobility) insight into deficits/self-awareness; safety precaution awareness; safety precautions follow-through/compliance  -TW     Impairments Affecting Function (Mobility) balance; endurance/activity tolerance; strength  -TW     Cognitive Impairments, Mobility Safety/Performance insight into deficits/self-awareness; safety precaution awareness; safety precaution follow-through; sequencing abilities; problem-solving/reasoning  -TW     Comment, Safety Issues/Impairments (Mobility) Pt did not have any clinical symptoms of low blood pressure during treatment.  -TW           User Key  (r) = Recorded By, (t) = Taken By, (c) = Cosigned By    Initials Name Provider Type    TW Judy Barrientos, PT Physical Therapist               Mobility     Row Name 10/10/21 1130          Bed Mobility    Bed Mobility sit-supine; supine-sit  -TW     Supine-Sit [place] (Bed Mobility) minimum assist (75% patient effort); verbal cues  -TW     Sit-Supine [place] (Bed Mobility) contact guard; verbal cues  -TW     Assistive Device (Bed Mobility) head of bed elevated  -TW     Comment (Bed Mobility) When sitting on EOB pt needed to get hips back to be more secure on bed and pt was able to do so with cues and CGA only  -TW     Row Name 10/10/21 1130          Transfers    Comment (Transfers) Pt was in agreement to get up into bedside chair, stating he felt stronger today. Pt did not want to use walker and therapist assisted wtih HHA and gait belt.  -TW     Row Name 10/10/21 1130          Bed-Chair Transfer    Bed-Chair [place] (Transfers) minimum assist (75% patient effort); verbal cues  -TW     Assistive Device (Bed-Chair Transfers) other (see comments)  gt belt  -TW     Row Name 10/10/21 1130          Sit-Stand Transfer    Sit-Stand [place] (Transfers) minimum assist (75% patient effort); verbal cues  -TW     Assistive Device (Sit-Stand Transfers) other (see comments)  gait belt  -TW     Row  Name 10/10/21 1130          Gait/Stairs (Locomotion)    [place] Level (Gait) not tested  -TW           User Key  (r) = Recorded By, (t) = Taken By, (c) = Cosigned By    Initials Name Provider Type    Judy Sharif PT Physical Therapist               Obj/Interventions     Row Name 10/10/21 1130          Balance    Balance Assessment sitting dynamic balance; standing static balance; sitting static balance; standing dynamic balance  -TW     Static Sitting Balance WFL; unsupported; sitting, edge of bed  -TW     Dynamic Sitting Balance WFL; unsupported; sitting, edge of bed  -TW     Static Standing Balance moderate impairment; supported  -TW     Dynamic Standing Balance moderate impairment; supported  -TW     Comment, Balance Pt had no c/o dizziness while sitting on EOB or in the bedside chair.  -TW           User Key  (r) = Recorded By, (t) = Taken By, (c) = Cosigned By    Initials Name Provider Type    Judy Sharif PT Physical Therapist               Goals/Plan    No documentation.                Clinical Impression     Row Name 10/10/21 1130          Pain Scale: Numbers Pre/Post-Treatment    Pretreatment Pain Rating 0/10 - no pain  -TW     Posttreatment Pain Rating 0/10 - no pain  -TW     Row Name 10/10/21 1130          Plan of Care Review    Plan of Care Reviewed With patient; daughter  -TW     Outcome Summary PT treatment completed bedside this am with pt stating he felt stronger today and willing to get up to bedside chair. Pt needed min assist with bed mobility and min assist for modified stand pivot to chair (with gt belt and HHA). Pt had no symptoms of dizziness but when vitals were taken pt's BP found to be 102/28. In consulting with nursing, pt transferred back to bed and positioned for comfort. Pt was up in chair for approximately 12 min. Continue with PT POC as pt is able to tolerate.  -TW     Row Name 10/10/21 1130          Vital Signs    Intra Systolic BP Rehab 102  -TW     Intra Treatment  Diastolic BP 28  -TW     Intratreatment Heart Rate (beats/min) 99  -TW     Pre SpO2 (%) 90  -TW     O2 Delivery Pre Treatment supplemental O2  3 L  -TW     Intra SpO2 (%) 88  -TW     O2 Delivery Intra Treatment supplemental O2  3 L  -TW     Post SpO2 (%) 89  -TW     O2 Delivery Post Treatment supplemental O2  3 L  -TW     Pre Patient Position Supine  -TW     Intra Patient Position Standing  -TW     Post Patient Position Supine  -TW     Row Name 10/10/21 1130          Positioning and Restraints    Pre-Treatment Position in bed  -TW     Post Treatment Position bed  -TW     In Bed call light within reach; encouraged to call for assist; exit alarm on; with family/caregiver; notified nsg; side rails up x3  -TW           User Key  (r) = Recorded By, (t) = Taken By, (c) = Cosigned By    Initials Name Provider Type    Judy Sharif PT Physical Therapist               Outcome Measures     Row Name 10/10/21 1130          How much help from another person do you currently need...    Turning from your back to your side while in flat bed without using bedrails? 3  -TW     Moving from lying on back to sitting on the side of a flat bed without bedrails? 3  -TW     Moving to and from a bed to a chair (including a wheelchair)? 3  -TW     Standing up from a chair using your arms (e.g., wheelchair, bedside chair)? 3  -TW     Climbing 3-5 steps with a railing? 1  -TW     To walk in hospital room? 2  -TW     AM-PAC 6 Clicks Score (PT) 15  -TW     Row Name 10/10/21 1130          Functional Assessment    Outcome Measure Options AM-PAC 6 Clicks Basic Mobility (PT)  -TW           User Key  (r) = Recorded By, (t) = Taken By, (c) = Cosigned By    Initials Name Provider Type    Judy Sharif PT Physical Therapist                             Physical Therapy Education                 Title: PT OT SLP Therapies (In Progress)     Topic: Physical Therapy (In Progress)     Point: Mobility training (Done)     Learning Progress Summary            Patient Acceptance, E,D, DU by  at 10/10/2021 1130    Comment: Pt education on transfer technique for improving safety and independence.    Acceptance, E,D, VU,NR by  at 10/9/2021 1202    Comment: Pt education for improving bed mobility technique and for purpose and goals for PT POC.                   Point: Home exercise program (Not Started)     Learner Progress:  Not documented in this visit.          Point: Body mechanics (Not Started)     Learner Progress:  Not documented in this visit.          Point: Precautions (Not Started)     Learner Progress:  Not documented in this visit.                      User Key     Initials Effective Dates Name Provider Type Discipline     06/16/21 -  Judy Barrientos, PT Physical Therapist PT              PT Recommendation and Plan     Plan of Care Reviewed With: patient, daughter  Outcome Summary: PT treatment completed bedside this am with pt stating he felt stronger today and willing to get up to bedside chair. Pt needed min assist with bed mobility and min assist for modified stand pivot to chair (with gt belt and HHA). Pt had no symptoms of dizziness but when vitals were taken pt's BP found to be 102/28. In consulting with nursing, pt transferred back to bed and positioned for comfort. Pt was up in chair for approximately 12 min. Continue with PT POC as pt is able to tolerate.     Time Calculation:    PT Charges     Row Name 10/10/21 1130             Time Calculation    Start Time 1052  -TW      Stop Time 1130  -TW      Time Calculation (min) 38 min  -TW      PT Received On 10/10/21  -TW              Timed Charges    52584 - PT Therapeutic Activity Minutes 38  -TW              Total Minutes    Timed Charges Total Minutes 38  -TW       Total Minutes 38  -TW            User Key  (r) = Recorded By, (t) = Taken By, (c) = Cosigned By    Initials Name Provider Type    TW Judy Barrientos, PT Physical Therapist              Therapy Charges for Today     Code Description Service  Date Service Provider Modifiers Qty    97211642506 HC PT EVAL LOW COMPLEXITY 3 10/9/2021 Judy Barrientos, PT GP 1    30936808292 HC PT THERAPEUTIC ACT EA 15 MIN 10/10/2021 Judy Barrientos, PT GP 3          PT G-Codes  Outcome Measure Options: AM-PAC 6 Clicks Basic Mobility (PT)  AM-PAC 6 Clicks Score (PT): 15    Judy Barrientos PT  10/10/2021

## 2021-10-10 NOTE — PROGRESS NOTES
[facility] Progress Note  [facility]. [place]        Patient Name: Andrew Hernandez  : 1934  MRN: [id number]   LOS: 3 days    Patient Care Team:  Vermeesch, Marilyn K, MD as PCP - General (Internal Medicine)  Dirk De Leon MD as Consulting Physician (Ophthalmology)  Oziel Mcnair MD as Consulting Physician (Cardiology)  Harsh Huerta MD as Consulting Physician (Urology)    Chief Complaint:    Chief Complaint   Patient presents with   • Shortness of Breath     Primary Care Physician:  Vermeesch, Marilyn K, MD  Date of admission: 10/7/2021    Subjective     Interval History:   Patient is lot more awake alert and interactive this morning he said he feels better.  No other family members around.  Review of Systems:   As noted above    Objective     Vitals:   Temp:  [97.4 °F (36.3 °C)-98.1 °F (36.7 °C)] 97.6 °F (36.4 °C)  Heart Rate:  [67-88] 72  Resp:  [18-20] 18  BP: ()/(34-53) 121/52  Flow (L/min):  [4-8] 4    Intake/Output Summary (Last 24 hours) at 10/10/2021 0910  Last data filed at 10/10/2021 0200  Gross per 24 hour   Intake 480 ml   Output 1500 ml   Net -1020 ml       Physical Exam:    General Appearance: alert, no acute distress   Skin: warm and dry  HEENT: oral mucosa normal, nonicteric sclera  Neck: supple, no JVD  Lungs: CTA  Heart: RRR, normal S1 and S2  Abdomen: soft, nontender, nondistended  : no palpable bladder  Extremities: no edema, cyanosis or clubbing  Neuro: normal speech and grossly nonfocal.    Scheduled Meds:     Current Facility-Administered Medications   Medication Dose Route Frequency Provider Last Rate Last Admin   • acetaminophen (TYLENOL) tablet 650 mg  650 mg Oral Q4H PRN Edgardo yL MD        Or   • acetaminophen (TYLENOL) 160 MG/5ML solution 650 mg  650 mg Oral Q4H PRN Edgardo Ly MD        Or   • acetaminophen (TYLENOL) suppository 650 mg  650 mg Rectal Q4H PRN Edgardo Ly MD       • amiodarone (PACERONE)  tablet 200 mg  200 mg Oral Q24H Ishmael Nieves MD   200 mg at 10/10/21 0901   • apixaban (ELIQUIS) tablet 5 mg  5 mg Oral Q12H Edgardo Ly MD   5 mg at 10/10/21 0901   • ascorbic acid (VITAMIN C) tablet 500 mg  500 mg Oral Daily Edgardo Ly MD   500 mg at 10/10/21 0901   • aspirin EC tablet 81 mg  81 mg Oral Daily Edgardo Ly MD   81 mg at 10/10/21 0901   • atorvastatin (LIPITOR) tablet 20 mg  20 mg Oral Nightly Edgardo Ly MD   20 mg at 10/09/21 2200   • sennosides-docusate (PERICOLACE) 8.6-50 MG per tablet 2 tablet  2 tablet Oral BID Edgardo Ly MD   2 tablet at 10/10/21 0901    And   • polyethylene glycol (MIRALAX) packet 17 g  17 g Oral Daily PRN Edgardo Ly MD        And   • bisacodyl (DULCOLAX) EC tablet 5 mg  5 mg Oral Daily PRN Edgardo Ly MD        And   • bisacodyl (DULCOLAX) suppository 10 mg  10 mg Rectal Daily PRN Edgardo Ly MD       • [START ON 10/12/2021] digoxin (LANOXIN) tablet 125 mcg  125 mcg Oral Q3 Days Lizbeth, Ishmael HENSLEY MD       • famotidine (PEPCID) tablet 20 mg  20 mg Oral BID Edgardo Ly MD   20 mg at 10/10/21 0901   • ferrous sulfate EC tablet 324 mg  324 mg Oral Daily With Breakfast Edgardo Ly MD   324 mg at 10/10/21 0901   • furosemide (LASIX) injection 40 mg  40 mg Intravenous Q12H Edgardo Ly MD   40 mg at 10/09/21 0625   • levothyroxine (SYNTHROID, LEVOTHROID) tablet 50 mcg  50 mcg Oral Daily With Breakfast Edgardo Ly MD   50 mcg at 10/10/21 0901   • megestrol (MEGACE) tablet 40 mg  40 mg Oral BID Edgardo Ly MD   40 mg at 10/10/21 0901   • melatonin tablet 5 mg  5 mg Oral Nightly PRN Edgardo Ly MD       • metoprolol succinate XL (TOPROL-XL) 24 hr tablet 50 mg  50 mg Oral Q24H Ishmael Nieves MD   50 mg at 10/10/21 0901   • nitroglycerin (NITROSTAT) SL tablet 0.4 mg  0.4 mg Sublingual Q5 Min PRN Edgardo Ly MD       • ondansetron (ZOFRAN) injection 4 mg  4 mg Intravenous Q6H PRN  Edgardo Ly MD       • PRO-STAT oral liquid 30 mL  30 mL Oral BID Turner Chavez MD   30 mL at 10/09/21 2200   • sacubitril-valsartan (ENTRESTO) 24-26 MG tablet 1 tablet  1 tablet Oral Q12H Ishmael Nieves MD   1 tablet at 10/10/21 0901   • sodium chloride 0.9 % flush 10 mL  10 mL Intravenous PRN Edgardo Ly MD       • sodium chloride 0.9 % flush 3 mL  3 mL Intravenous Q12H Edgardo Ly MD   3 mL at 10/10/21 0902   • sodium chloride 0.9 % flush 3-10 mL  3-10 mL Intravenous PRN Edgardo Ly MD       • sodium chloride tablet 1 g  1 g Oral BID With Meals Estuardo Winchester MD, FASN   1 g at 10/10/21 0901   • sulfamethoxazole-trimethoprim (BACTRIM DS,SEPTRA DS) 800-160 MG per tablet 1 tablet  1 tablet Oral Q12H Turner Chavez MD   1 tablet at 10/10/21 0901   • tamsulosin (FLOMAX) 24 hr capsule 0.4 mg  0.4 mg Oral Nightly Edgardo Ly MD   0.4 mg at 10/09/21 2200       amiodarone, 200 mg, Oral, Q24H  apixaban, 5 mg, Oral, Q12H  vitamin C, 500 mg, Oral, Daily  aspirin, 81 mg, Oral, Daily  atorvastatin, 20 mg, Oral, Nightly  [START ON 10/12/2021] digoxin, 125 mcg, Oral, Q3 Days  famotidine, 20 mg, Oral, BID  ferrous sulfate, 324 mg, Oral, Daily With Breakfast  furosemide, 40 mg, Intravenous, Q12H  levothyroxine, 50 mcg, Oral, Daily With Breakfast  megestrol, 40 mg, Oral, BID  metoprolol succinate XL, 50 mg, Oral, Q24H  PRO-STAT, 30 mL, Oral, BID  sacubitril-valsartan, 1 tablet, Oral, Q12H  senna-docusate sodium, 2 tablet, Oral, BID  sodium chloride, 3 mL, Intravenous, Q12H  sodium chloride, 1 g, Oral, BID With Meals  sulfamethoxazole-trimethoprim, 1 tablet, Oral, Q12H  tamsulosin, 0.4 mg, Oral, Nightly        IV Meds:        Results Reviewed:   I have personally reviewed the results from the time of this admission to 10/10/2021 09:10 EDT     Results from last 7 days   Lab Units 10/10/21  0611 10/09/21  0937 10/08/21  0611 10/07/21  1841 10/07/21  1841 10/04/21  1454   SODIUM mmol/L 124* 122*  118*   < > 114* 119*   POTASSIUM mmol/L 3.8 4.3 5.0   < > 5.2 4.9   CHLORIDE mmol/L 83* 81* 79*   < > 77* 79*   TOTAL CO2 mmol/L  --   --   --   --   --  27   CO2 mmol/L 31.9* 31.4* 30.7*   < > 27.8  --    BUN mg/dL 23 18 18   < > 22 14   CREATININE mg/dL 0.82 0.68* 0.67*   < > 0.75* 0.69*   CALCIUM mg/dL 8.1* 8.5* 8.4*   < > 8.7 8.3*   BILIRUBIN mg/dL  --   --   --   --  0.4 0.6   ALK PHOS U/L  --   --   --   --  152* 111   ALT (SGPT) U/L  --   --   --   --  43* 40   AST (SGOT) U/L  --   --   --   --  31 31   GLUCOSE mg/dL 98 157* 115*   < > 132*  --     < > = values in this interval not displayed.       Estimated Creatinine Clearance: 63.7 mL/min (by C-G formula based on SCr of 0.82 mg/dL).    Results from last 7 days   Lab Units 10/07/21  1841   MAGNESIUM mg/dL 2.1       Results from last 7 days   Lab Units 10/09/21  0937 10/08/21  0611   URIC ACID mg/dL 6.7 6.6       Results from last 7 days   Lab Units 10/08/21  0610 10/07/21  1841 10/04/21  1454   WBC 10*3/mm3 9.12 9.02 8.8   HEMOGLOBIN g/dL 9.0* 8.5* 8.4*   PLATELETS 10*3/mm3 388 404 475*             Brief Urine Lab Results  (Last result in the past 365 days)      Color   Clarity   Blood   Leuk Est   Nitrite   Protein   CREAT   Urine HCG        10/08/21 0242 Yellow   Clear   Negative   Negative   Negative   Negative                 No results found for: UTPCR    Imaging Results (Last 24 Hours)     ** No results found for the last 24 hours. **              Assessment / Plan     ASSESSMENT:    1. 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  POA is fairly clear does not want any aggressive measures and keep him comfortable.  She is okay to give medications and do blood work.  I will check serum and urine osmolality, with the diuretics I am not sure if we can get the good number.  She is okay with the salt tablets.  It will be  started.  His TSH is normal.  2. Acute on chronic congestive heart failure (HCC): As per cardiology service currently on twice a day IV diuretics and can be continued.  Rest of the treatment as per cardiology services.  3. Hypertension blood pressures on the low side he is not on a lot of blood pressure medications.  4. A. fib: Rate controlled and anticoagulated.  5. Anemia: Check iron stores and start the oral iron.  6. Hypothyroidism: Last TSH is within normal limits on Synthroid.  7. UTI: He was started on Bactrim.    PLAN:  · Repeat UA does not show any significant signs of infection, I really doubt that he is going to need the oral antibiotic.  · Serum sodium is slightly better, it is about where he lives.  Continue with 1 g twice a day of sodium chloride tablets, if it stabilizes will decrease it to once a day.  His volume status looks much better we will switch him to oral diuretics he is already received his morning dose of IV Lasix.  · He likely will benefit from IV iron if there is no signs of infection and he is off antibiotic we will give him Venofer 200 mg a day while he is in the hospital.  · Details were discussed with the patient as well as family in the room.    · Details were also discussed with the hospitalist service.   · Continue with rest of the current treatment plan and surveillance labs.  · Further recommendations will depend on clinical course of the patient during the current hospitalization.     Thank you for involving us in the care of Andrew Hernandez.  Please feel free to call with any questions.    Estuardo Winchester MD, ANULE  10/10/21  09:10 EDT    [facility]  [phone]      Much of this encounter note is an electronic transcription/translation of spoken language to printed text. The electronic translation of spoken language may permit erroneous, or at times, nonsensical words or phrases to be inadvertently transcribed; Although I have reviewed the note for such  errors, some may still exist.

## 2021-10-10 NOTE — PLAN OF CARE
Goal Outcome Evaluation:      Patient has been had improvement with edema this shift. BP has been low, MD aware. NNO. Maintaining oxygen on 4LNC. Plan for adding iron infusion and could be discharge as early as tomorrow per MD.     Progress: no change

## 2021-10-10 NOTE — PLAN OF CARE
Problem: Skin Injury Risk Increased  Goal: Skin Health and Integrity  10/10/2021 0531 by Sofiya Blake RN  Outcome: Ongoing, Progressing   Goal Outcome Evaluation:              Outcome Summary: VSS.  Oxygenation maintained on 4L - 5L O2 via nasal cannula and nonrebreather mask.  Patient alert and oriented x4.  No complaints during shift.  Telemetry indicated rhythm change from Afib to sinus rhythm during shift.  No acute events noted during shift.  Will continue to monitor patient.

## 2021-10-10 NOTE — SIGNIFICANT NOTE
Patient's daughter called requesting update.  Password provided by daughter and confirmed via Epic note.  Daughter informed patient was resting comfortably, watching a baseball game, blood pressure 106/46 manually and oxygenation maintaining at 95% via nasal cannula.

## 2021-10-11 NOTE — CASE MANAGEMENT/SOCIAL WORK
Continued Stay Note  [facility]     Patient Name: Andrew Hernandez  MRN: [id number]  Today's Date: 10/11/2021    Admit Date: 10/7/2021     Discharge Plan     Row Name 10/11/21 1642       Plan    Plan unable to speak to pt and  family at this time they are speaking to the doctor case management  to follow 10/12               Discharge Codes    No documentation.                     Jackelyn Hunt RN

## 2021-10-11 NOTE — PLAN OF CARE
Goal Outcome Evaluation:  Plan of Care Reviewed With: patient, daughter        Progress: no change  Outcome Summary: Patient seen for OT initial eval.  Patient declined mobility and transfers.  Patient's B UE ROM was WFL's and B shoulder strength was 3+/5 and B elbow/wrist is 4/5.  Patient reports requiring assistance previously for bathing but was independent with dressing tasks.  Patient would benefit from skilled OT focusing on increased strength, mobility, and ADL performance.

## 2021-10-11 NOTE — PLAN OF CARE
Goal Outcome Evaluation:  Plan of Care Reviewed With: patient, daughter        Progress: improving  Outcome Summary: Patient demonstrates improving strength as seen by increased tolerance to exercises.  He declined performing mobility activites.  Plan to continue PT per POC

## 2021-10-11 NOTE — THERAPY EVALUATION
Patient Name: Andrew Hernandez  : 1934    MRN: [id number]                              Today's Date: 10/11/2021       Admit Date: 10/7/2021    Visit Dx:     ICD-10-CM ICD-9-CM   1. Acute on chronic congestive heart failure, unspecified heart failure type ([facility])  I50.9 428.0   2. Hypercholesterolemia  E78.00 272.0   3. 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    • Carotid stenosis, symptomatic w/o infarct   • Hyperkalemia   • Hyponatremia   • Encounter for Medicare annual wellness exam   • Tongue lesion   • Abdominal bruit   • Edema due to hypervolemia   • Acute on chronic diastolic (congestive) heart failure ([facility])   • Acute cystitis without hematuria   • Acute on chronic combined systolic and diastolic congestive heart failure ([facility])   • COVID-19 virus detected   • Chronic atrial fibrillation with RVR ([facility])   • Hyponatremia   • Late effects of CVA (cerebrovascular accident)   • Neoplasm, uncertain whether benign or malignant   • Chronic anticoagulation   • Acute and chronic respiratory failure with hypoxia ([facility])   • Blindness of right eye   • Pressure injury of coccygeal region, stage 2 ([facility])   • Physical deconditioning   • Impaired mobility and ADLs   • Acute on chronic congestive heart failure ([facility])     Past Medical History:   Diagnosis Date   • A-fib ([facility])    • Abnormal ECG    • Arrhythmia    • Arthritis    • Asthma Aug 2021   • Benign prostatic hyperplasia     • Cancer (HCC)     TONGUE   • Carotid stenosis     s/p right ICA stent x 2   • CHF (congestive heart failure) ([facility])    • Coronary artery disease    • COVID-19 09/2021   • Disease of thyroid gland    • Enlarged prostate    • Fracture    • Full dentures    • GERD (gastroesophageal reflux disease)    • Heart valve disease    • Hyperlipidemia    • Hypertension    • Hypothyroidism    • Impaired functional mobility, balance, gait, and endurance 09/03/2021   • Kidney infection    • Mitral regurgitation    • Myocardial infarction ([facility]) 8-16-21   • Nonrheumatic aortic valve insufficiency    • Renal calculi    • Stroke ([facility]) 03/2016    right retinal artery occlusion   • 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[facility])    • Wears glasses      Past Surgical History:   Procedure Laterality Date   • CAROTID ENDARTERECTOMY      X2-3/17, 9/17   • CAROTID STENT Right 03/18/2016    Carotid Wall Stent   • CAROTID STENT Right 03/04/2017    [place] BANDAR for recurrent right ICA stenosis   • CATARACT EXTRACTION W/ INTRAOCULAR LENS IMPLANT Left 9/4/2018    Procedure: CATARACT PHACO EXTRACTION WITH INTRAOCULAR LENS IMPLANT LEFT, COMPLICATED WITH MALYUGIAN RING;  Surgeon: Paola Welch MD;  Location: [facility];  Service: Ophthalmology   • KIDNEY STONE SURGERY Right 2011    [facility] - open   • PATELLA FRACTURE SURGERY Left 1986   • NV TCAT IV STENT CRV CRTD ART EMBOLIC PROTECJ Right 3/4/2017    Placement of Zilver BANDAR right ICA 3/4/17   • TONGUE SURGERY  08/16/2021    cancer spot removed       General Information     Row Name 10/11/21 1248          OT Time and Intention    Document Type evaluation  -TL     Mode of Treatment occupational therapy  -TL     Row Name 10/11/21 1248          General Information    Patient Profile Reviewed yes  -TL     Prior Level of Function min assist:; all household mobility; ADL's  -TL     Existing Precautions/Restrictions cardiac; fall; oxygen therapy device and L/min  -TL     Barriers to Rehab medically complex; previous  functional deficit  -TL     Row Name 10/11/21 1248          Occupational Profile    Reason for Services/Referral (Occupational Profile) Decreased ADL independence  -TL     Patient Goals (Occupational Profile) Increase strength  -TL     Row Name 10/11/21 1248          Living Environment    Lives With alone  -TL     Row Name 10/11/21 1248          Cognition    Orientation Status (Cognition) oriented x 3  -TL     Row Name 10/11/21 1248          Safety Issues, Functional Mobility    Safety Issues Affecting Function (Mobility) insight into deficits/self-awareness; safety precaution awareness; safety precautions follow-through/compliance  -TL     Impairments Affecting Function (Mobility) endurance/activity tolerance; strength  -TL           User Key  (r) = Recorded By, (t) = Taken By, (c) = Cosigned By    Initials Name Provider Type    TL Emily Flanagan OTR Occupational Therapist                 Mobility/ADL's     Row Name 10/11/21 1255          Bed Mobility    Comment (Bed Mobility) Did not assess mobility or transfers as patient declined but will utilize PT assessment to set functional mobility and transfer goals  -TL     Row Name 10/11/21 1255          Functional Mobility    Functional Mobility- Comment Not assessed  -TL     Row Name 10/11/21 1255          Activities of Daily Living    BADL Assessment/Intervention lower body dressing; bathing  -TL     Row Name 10/11/21 1255          Lower Body Dressing Assessment/Training    [place] Level (Lower Body Dressing) minimum assist (75% patient effort)  -TL     Row Name 10/11/21 1255          Bathing Assessment/Intervention    [place] Level (Bathing) minimum assist (75% patient effort)  -TL     Comment (Bathing) Patient reported requiring assistance for bathing previously  -TL           User Key  (r) = Recorded By, (t) = Taken By, (c) = Cosigned By    Initials Name Provider Type    Emily Kessler OTR Occupational Therapist               Obj/Interventions     Row Name  10/11/21 1258          Vision Assessment/Intervention    Visual Impairment/Limitations WFL; other (see comments)  wears glasses  -TL     Row Name 10/11/21 1258          Range of Motion Comprehensive    General Range of Motion bilateral upper extremity ROM WFL  -TL     Row Name 10/11/21 1258          Strength Comprehensive (MMT)    General Manual Muscle Testing (MMT) Assessment upper extremity strength deficits identified  -TL     Comment, General Manual Muscle Testing (MMT) Assessment B shoulders 3+/5 and elbow/wrist 4/5  -TL           User Key  (r) = Recorded By, (t) = Taken By, (c) = Cosigned By    Initials Name Provider Type    TL Long, Emily, OTR Occupational Therapist               Goals/Plan     Row Name 10/11/21 1304          Bed Mobility Goal 1 (OT)    Activity/Assistive Device (Bed Mobility Goal 1, OT) bed mobility activities, all  -TL     Mercer Level/Cues Needed (Bed Mobility Goal 1, OT) contact guard assist  -TL     Time Frame (Bed Mobility Goal 1, OT) 2 weeks; long term goal (LTG)  -TL     Row Name 10/11/21 1304          Transfer Goal 1 (OT)    Activity/Assistive Device (Transfer Goal 1, OT) sit-to-stand/stand-to-sit; walker, rolling  -TL     [place]cer Level/Cues Needed (Transfer Goal 1, OT) contact guard assist  -TL     Time Frame (Transfer Goal 1, OT) long term goal (LTG); 2 weeks  -TL     Row Name 10/11/21 1304          Bathing Goal 1 (OT)    Activity/Device (Bathing Goal 1, OT) lower body bathing  -TL     [place]cer Level/Cues Needed (Bathing Goal 1, OT) contact guard assist  -TL     Time Frame (Bathing Goal 1, OT) long term goal (LTG); 2 weeks  -TL     Row Name 10/11/21 1304          Strength Goal 1 (OT)    Strength Goal 1 (OT) Patient will participate in B UE strengthening exercises.  -TL     Time Frame (Strength Goal 1, OT) long term goal (LTG); 2 weeks  -TL           User Key  (r) = Recorded By, (t) = Taken By, (c) = Cosigned By    Initials Name Provider Type    TL Long, Emily, OTR  Occupational Therapist               Clinical Impression     Row Name 10/11/21 1253          Pain Assessment    Additional Documentation Pain Scale: Numbers Pre/Post-Treatment (Group)  -TL     Row Name 10/11/21 1251          Pain Scale: Numbers Pre/Post-Treatment    Pretreatment Pain Rating 0/10 - no pain  -TL     Posttreatment Pain Rating 0/10 - no pain  -TL     Row Name 10/11/21 1257          Plan of Care Review    Plan of Care Reviewed With patient; daughter  -TL     Progress no change  -TL     Outcome Summary Patient seen for OT initial eval.  Patient declined mobility and transfers.  Patient's B UE ROM was WFL's and B shoulder strength was 3+/5 and B elbow/wrist is 4/5.  Patient reports requiring assistance previously for bathing but was independent with dressing tasks.  Patient would benefit from skilled OT focusing on increased strength, mobility, and ADL performance.  -TL     Row Name 10/11/21 1255          Therapy Assessment/Plan (OT)    Patient/Family Therapy Goal Statement (OT) increased strength  -TL     Rehab Potential (OT) good, to achieve stated therapy goals  -TL     Criteria for Skilled Therapeutic Interventions Met (OT) yes  -TL     Therapy Frequency (OT) 3 times/wk  -TL     Predicted Duration of Therapy Intervention (OT) 2 weeks  -TL     Row Name 10/11/21 1259          Vital Signs    Pre SpO2 (%) 92  92  -TL     O2 Delivery Pre Treatment supplemental O2  4L  -TL     Intra SpO2 (%) 94  -TL     O2 Delivery Intra Treatment supplemental O2  4L  -TL     Post SpO2 (%) 95  -TL     O2 Delivery Post Treatment supplemental O2  4L  -TL     Intra Patient Position Supine  -TL     Post Patient Position Supine  -TL     Row Name 10/11/21 1258          Positioning and Restraints    Pre-Treatment Position in bed  -TL     Post Treatment Position bed  -TL     In Bed call light within reach; encouraged to call for assist; with family/caregiver  -TL           User Key  (r) = Recorded By, (t) = Taken By, (c) = Cosigned  By    Initials Name Provider Type    Emily Kessler OTR Occupational Therapist               Outcome Measures     Row Name 10/11/21 1308          How much help from another is currently needed...    Putting on and taking off regular lower body clothing? 2  -TL     Bathing (including washing, rinsing, and drying) 2  -TL     Toileting (which includes using toilet bed pan or urinal) 2  -TL     Putting on and taking off regular upper body clothing 3  -TL     Taking care of personal grooming (such as brushing teeth) 3  -TL     Eating meals 4  -TL     AM-PAC 6 Clicks Score (OT) 16  -TL     Row Name 10/11/21 0857          How much help from another person do you currently need...    Turning from your back to your side while in flat bed without using bedrails? 3  -EW     Moving from lying on back to sitting on the side of a flat bed without bedrails? 3  -EW     Moving to and from a bed to a chair (including a wheelchair)? 3  -EW     Standing up from a chair using your arms (e.g., wheelchair, bedside chair)? 3  -EW     Climbing 3-5 steps with a railing? 1  -EW     To walk in hospital room? 2  -EW     AM-PAC 6 Clicks Score (PT) 15  -EW     Row Name 10/11/21 1308          Functional Assessment    Outcome Measure Options AM-PAC 6 Clicks Daily Activity (OT)  -TL           User Key  (r) = Recorded By, (t) = Taken By, (c) = Cosigned By    Initials Name Provider Type    Emily Kessler OTR Occupational Therapist    Angélica Ward, RN Registered Nurse                Occupational Therapy Education                 Title: PT OT SLP Therapies (In Progress)     Topic: Occupational Therapy (In Progress)     Point: ADL training (Not Started)     Description:   Instruct learner(s) on proper safety adaptation and remediation techniques during self care or transfers.   Instruct in proper use of assistive devices.              Learner Progress:  Not documented in this visit.          Point: Home exercise program (Done)     Description:    Instruct learner(s) on appropriate technique for monitoring, assisting and/or progressing therapeutic exercises/activities.              Learning Progress Summary           Patient Acceptance, E,D, DU,NR by TL at 10/11/2021 1309    Comment: Patient educated on B UE ROM exercises with focus on technique and proper body mechanics.  Discussed the importance of movement and breathing coordination with activity.   Family Acceptance, E,D, DU,NR by TL at 10/11/2021 1309    Comment: Patient educated on B UE ROM exercises with focus on technique and proper body mechanics.  Discussed the importance of movement and breathing coordination with activity.                   Point: Precautions (Done)     Description:   Instruct learner(s) on prescribed precautions during self-care and functional transfers.              Learning Progress Summary           Patient Acceptance, E,D, DU,NR by TL at 10/11/2021 1309    Comment: Patient educated on B UE ROM exercises with focus on technique and proper body mechanics.  Discussed the importance of movement and breathing coordination with activity.   Family Acceptance, E,D, DU,NR by TL at 10/11/2021 1309    Comment: Patient educated on B UE ROM exercises with focus on technique and proper body mechanics.  Discussed the importance of movement and breathing coordination with activity.                   Point: Body mechanics (Done)     Description:   Instruct learner(s) on proper positioning and spine alignment during self-care, functional mobility activities and/or exercises.              Learning Progress Summary           Patient Acceptance, E,D, DU,NR by TL at 10/11/2021 1309    Comment: Patient educated on B UE ROM exercises with focus on technique and proper body mechanics.  Discussed the importance of movement and breathing coordination with activity.   Family Acceptance, E,D, DU,NR by TL at 10/11/2021 1309    Comment: Patient educated on B UE ROM exercises with focus on technique and  proper body mechanics.  Discussed the importance of movement and breathing coordination with activity.                               User Key     Initials Effective Dates Name Provider Type Discipline    TL 06/16/21 -  Emily Flanagan OTR Occupational Therapist OT              OT Recommendation and Plan  Therapy Frequency (OT): 3 times/wk  Plan of Care Review  Plan of Care Reviewed With: patient, daughter  Progress: no change  Outcome Summary: Patient seen for OT initial eval.  Patient declined mobility and transfers.  Patient's B UE ROM was WFL's and B shoulder strength was 3+/5 and B elbow/wrist is 4/5.  Patient reports requiring assistance previously for bathing but was independent with dressing tasks.  Patient would benefit from skilled OT focusing on increased strength, mobility, and ADL performance.     Time Calculation:    Time Calculation- OT     Row Name 10/11/21 1311             Time Calculation- OT    OT Start Time 1040  -TL      OT Received On 10/11/21  -TL      OT Goal Re-Cert Due Date 10/25/21  -TL              Untimed Charges    OT Eval/Re-eval Minutes 26  -TL              Total Minutes    Untimed Charges Total Minutes 26  -TL       Total Minutes 26  -TL            User Key  (r) = Recorded By, (t) = Taken By, (c) = Cosigned By    Initials Name Provider Type    TL Emily Flanagan OTR Occupational Therapist              Therapy Charges for Today     Code Description Service Date Service Provider Modifiers Qty    [id number]  OT EVAL LOW COMPLEXITY 2 10/11/2021 Emily Flanagan OTR GO 1               CHIDI Sutton  10/11/2021

## 2021-10-11 NOTE — PROGRESS NOTES
[facility]    Name:  Andrew Hernandez   Age:  87 y.o.  Sex:  male  :  1934  MRN:  [id number]   Visit Number:  [id number]  Admission Date:  10/7/2021  Date Of Service:  10/11/21  Primary Care Physician:  Vermeesch, Marilyn K, MD     LOS: 4 days :    Chief Complaint:      Follow-up shortness of breath, weakness    Subjective:    Patient seen at bedside today.  Daughter at bedside.  Also discussed the plan of care with patient's son and daughter-in-law later on in the day.  He has generally been weak, not been doing well at home since his tongue surgery for cancer in August.  Has been having issues with    Hospital Course:    87-year-old gentleman with history of chronic systolic/diastolic heart failure, chronic hypoxic respiratory failure, atrial fibrillation, anemia, BPH, CAD, tongue cancer status post resection, prior CVA, carotid stenosis, hypertension, hyperlipidemia who presented to the emergency room for shortness of breath.  Initial work-up concerning for acute on chronic diastolic congestive heart failure in possible urinary tract infection.  Was admitted to the hospital service and started on IV diuretics with nephrology consultation.    Review of Systems:     All systems were reviewed and negative except as mentioned in subjective, assessment and plan.    Vital Signs:    Temp:  [97.5 °F (36.4 °C)-98 °F (36.7 °C)] 97.9 °F (36.6 °C)  Heart Rate:  [69-86] 86  Resp:  [18-20] 20  BP: ()/(30-55) 108/36    Intake and output:    I/O last 3 completed shifts:  In: 720 [P.O.:720]  Out: 1350 [Urine:1350]  I/O this shift:  In: 480 [P.O.:480]  Out: 325 [Urine:325]    Physical Examination:    General Appearance:  Alert and cooperative.  Chronically ill-appearing   Head:  Atraumatic and normocephalic.   Eyes: Conjunctivae and sclerae normal, no icterus. No pallor.   Throat: No oral lesions, no thrush, oral mucosa moist.   Neck: Supple, trachea midline, no  thyromegaly.   Lungs:   Breath sounds heard bilaterally equally.  Bilateral crackles primarily at the bases.  Nontachypneic.   Heart:  Normal S1 and S2, no murmur, no gallop, no rub. No JVD.   Abdomen:   Normal bowel sounds, no masses, no organomegaly. Soft, nontender, nondistended, no rebound tenderness.   Extremities: Supple, trace to 1+ lower edema, no cyanosis, no clubbing.   Skin: No bleeding or rash.   Neurologic: Alert and oriented x 3. No facial asymmetry. Moves all four limbs. No tremors.      Laboratory results:    Results from last 7 days   Lab Units 10/11/21  0615 10/10/21  0611 10/09/21  0937 10/08/21  0611 10/07/21  1841   SODIUM mmol/L 125* 124* 122*   < > 114*   POTASSIUM mmol/L 3.9 3.8 4.3   < > 5.2   CHLORIDE mmol/L 84* 83* 81*   < > 77*   CO2 mmol/L 31.6* 31.9* 31.4*   < > 27.8   BUN mg/dL 21 23 18   < > 22   CREATININE mg/dL 0.85 0.82 0.68*   < > 0.75*   CALCIUM mg/dL 8.3* 8.1* 8.5*   < > 8.7   BILIRUBIN mg/dL  --   --   --   --  0.4   ALK PHOS U/L  --   --   --   --  152*   ALT (SGPT) U/L  --   --   --   --  43*   AST (SGOT) U/L  --   --   --   --  31   GLUCOSE mg/dL 97 98 157*   < > 132*    < > = values in this interval not displayed.     Results from last 7 days   Lab Units 10/11/21  0615 10/08/21  0610 10/07/21  1841   WBC 10*3/mm3 6.48 9.12 9.02   HEMOGLOBIN g/dL 8.1* 9.0* 8.5*   HEMATOCRIT % 24.3* 27.0* 24.7*   PLATELETS 10*3/mm3 325 388 404         Results from last 7 days   Lab Units 10/07/21  1841   TROPONIN T ng/mL <0.010     Results from last 7 days   Lab Units 10/07/21  2014   URINECX  >100,000 CFU/mL Klebsiella pneumoniae ssp pneumoniae*         I have reviewed the patient's laboratory results.    Radiology results:    Adult Transthoracic Echo Complete W/ Cont if Necessary Per Protocol    Result Date: 10/11/2021  1.  Normal left ventricular size with mildly reduced LV systolic function, LVEF 40-45%. 2.  Moderate hypokinesis of the inferior wall. 3.  Grade 1 diastolic dysfunction. 4.   Normal right ventricular size and systolic function. 5.  Mildly increased left atrial volume index. 6.  Mild AI, MR, and TR. 7.  Trace PI. 8.  Moderate left pleural effusion.    XR Chest 1 View    Result Date: 10/11/2021  PROCEDURE: XR CHEST 1 VW-  HISTORY: CHF, dyspnea; I50.9-Heart failure, unspecified; E78.00-Pure hypercholesterolemia, unspecified; I65.29-Occlusion and stenosis of unspecified carotid artery  COMPARISON: 10/7/2020 1/9/2021.  FINDINGS: The heart is normal in size. The mediastinum is unremarkable. There is interstitial pulmonary edema with bilateral effusions and bibasilar opacities. There is no pneumothorax.  There are no acute osseous abnormalities.      Impression: No significant change in the appearance of the chest.  Continued followup is recommended.  This report was finalized on 10/11/2021 7:28 AM by Julieta Dai M.D..    I have reviewed the patient's radiology reports.    Medication Review:     I have reviewed the patient's active and prn medications.     Problem List:      Acute on chronic congestive heart failure (HCC)      Assessment:    1.  Acute exacerbation of chronic combined systolic and diastolic heart failure, present on admission  2.  Hyponatremia, chronic issue likely acutely worsened in the setting of #1  3.  Chronic hypoxic respiratory failure on 3 to 4 L of oxygen continuously  4.  Paroxysmal atrial fibrillation on Eliquis, currently in sinus rhythm  5.  Anemia  6. BPH  7. Recent diagnosis of COVID-19  8. Coronary artery disease  9. Tongue cancer status post resection  10. Previous CVA with complete right vision loss  11. Carotid artery stenosis status post stenting of right carotid  12. Hypertension  13. Hyperlipidemia   14. Hypothyroidism   15.  Aortic regurgitation    Plan:    Patient overall hemodynamically stable.  Afebrile.  Rate control in the 70s.  Blood pressures borderline low.  Hemoglobin of 8, platelets 325, sodium 125 with creatinine 0.85.  Talk with   Annabella, continue IV diuretics today.  Repeat chest x-ray with no significant changes.  Echocardiogram reviewed, with diastolic dysfunction, EF of 40 to 45%, and inferior wall hypokinesis.  PT and OT evaluations.  Continue with iron transfusion today.  Continue with current atrial fibrillation regimen per cardiology recommendations.    I did talk with patient's family about goals of care moving forward.  Discussed potential benefits of palliative care.  Also discussed that patient likely does have underlying CAD in addition to his above problems, patient did not previously wish to undergo heart catheterization.  We will talk with his cardiologist tomorrow.  Patient would likely benefit at care services moving forward as he has had overall decline in health in the last 2 months.  All questions were answered with patient's son and daughter-in-law today.    DVT Prophylaxis: Eliquis  Code Status: Full  Diet: Fluid/sodium restricted  Discharge Plan: Multiple days    Jeane Perez DO  10/11/21  18:17 EDT    Dictated utilizing Dragon dictation.

## 2021-10-11 NOTE — NURSING NOTE
Seen for wound consult. Patient is known to me from previous admission. Has red macular rash with satellite lesions consistent with yeast to scrotum and groin folds. Recommend Cleanse scrotal and groin folds with soap and water, pat dry, apply miconazole 2% powder lightly and gently rub in twice daily. Do not place briefs on patient. Cut a hole in the micah to place penis then fold micah over. Change PRN when wet. Prefer not to use condom catheter at this time due to yeast being in the area. Staff to monitor and call for assistance as needed. Has stage 2 pressure injury to right buttock that has worsened since previous admission. Can see pictures for reference. Recommend turning schedule, barrier twice daily and prn after cleansing, nutrition for dietary needs. Unable to retract penis foreskin to evaluate. He asked about getting a circumcision. Options discussed with Dr Perez. Decision to Twice daily Cleanse penis with soap and water, dry, then retract foreskin gently but as far as possible to apply small amount clobetasol to tip of penis. Replace foreskin. This should loosen skin gradually with consistent use. Care discussed and pictures shared with Daughter in law at bedside and care team. Thank you for the consult. IF further skin issues arise do not hesitate to contact me.

## 2021-10-11 NOTE — PLAN OF CARE
Goal Outcome Evaluation:           Progress: no change  Outcome Summary: VSS.  Oxygenation maintained on humidified 4L O2 via nasal cannula while awake and nonrebreather mask while sleeping.  Alert and oriented x4.  No complaints during shift.  No acute events noted during shift.  Will continue to monitor patient.

## 2021-10-11 NOTE — PROGRESS NOTES
[facility]    Name:  Andrew Hernandez   Age:  87 y.o.  Sex:  male  :  1934  MRN:  [id number]   Visit Number:  [id number]  Admission Date:  10/7/2021  Date Of Service:  10/10/21  Primary Care Physician:  Vermeesch, Marilyn K, MD     LOS: 4 days :    Chief Complaint:      Shortness of breath    Subjective:  Patient was seen and examined this morning. Patient is resting comfortably in his bed. He reports feeling better with stable respiratory status. Shortness of breath has been improving per his report. Pt denies any chest pain. Son In law is at bed side.     Hospital Course:    Patient is a very pleasant 87-year-old male with medical history of chronic systolic and diastolic heart failure, chronic hypoxic respiratory failure on 3 to 4 L of oxygen continuously, atrial fibrillation on Eliquis, anemia, BPH, recent diagnosis of COVID-19, coronary artery disease, tongue cancer status post resection, previous CVA with complete right vision loss, carotid artery stenosis status post stenting of right carotid, hypertension, hyperlipidemia and hypothyroidism who was brought into the ER today for evaluation of shortness of breath.  Patient reports feeling short of breath at baseline and has been hospitalized 2 times in the last 1 month for CHF exacerbation.  He states that his breathing has been worsening over the last few days with an increased cough productive of thick sputum.  His daughter-in-law reports that the patient has had difficulty expectorating due to loss of tongue mobility after his resection.  Patient reports feeling quite weak also.  Denies any fever or chills.  He denies any other complains of chest pain, chest pressure, abdominal pain, diarrhea or dysuria.  Patient does have a history of recurrent UTIs.  Patient has known history of atrial fibrillation which has been difficult to control per his daughter-in-law.  He has been started on digoxin just 3 days ago  and apparently just came out of atrial fibrillation yesterday.  She states that when he is in A. fib he tends to have worsening heart failure symptoms.  Patient apparently had been constipated for the last several days but has had 2 bowel movement since yesterday evening after receiving laxatives.     On arrival to the ER patient noted to have normal vitals and is oxygenating at 99% on 4 L of oxygen which she wears at home.  His labs are notable for proBNP 12,738, negative troponin.  Sodium 114.  Normal potassium and magnesium.  H&H 8.5/24.7 which is right around his baseline.  Urinalysis appears to be a contaminated sample with 7-12 squamous cells but does have 6-12 WBCs and 2+ bacteria.  Patient denies any urinary symptoms.  Chest x-ray on my read shows significant pulmonary edema with large left-sided pleural effusion.  Patient was given a dose of IV Lasix and gentamicin for possible UTI and is being admitted to the hospitalist service for further care.      Review of Systems:     All systems were reviewed and negative except as mentioned in subjective, assessment and plan.    Vital Signs:    Temp:  [97.4 °F (36.3 °C)-98.2 °F (36.8 °C)] 97.5 °F (36.4 °C)  Heart Rate:  [65-74] 69  Resp:  [18-20] 20  BP: ()/(24-55) 88/30    Intake and output:    I/O last 3 completed shifts:  In: 1440 [P.O.:1440]  Out: 2200 [Urine:2200]  I/O this shift:  In: -   Out: 200 [Urine:200]    Physical Examination:    General Appearance:  Alert and cooperative. Appears chronically ill.    Head:  Atraumatic and normocephalic.   Eyes: Conjunctivae and sclerae normal, no icterus. No pallor.   Throat: No oral lesions, no thrush, oral mucosa moist.   Neck: Supple, trachea midline, no thyromegaly.   Lungs:   Breath sounds heard bilaterally equally.  Bilateral rales. no crackles or wheezing. No Pleural rub or bronchial breathing.   Heart:  Normal S1 and S2, no murmur, no gallop, no rub. No JVD.   Abdomen:   Normal bowel sounds, no masses, no  organomegaly. Soft, nontender, nondistended, no rebound tenderness.   Extremities: Supple, +2 pitting edema in bilat LE, no cyanosis, no clubbing.   Skin: No bleeding or rash.   Neurologic: Alert and oriented x 3. No facial asymmetry. Moves all four limbs. No tremors.      Laboratory results:    Results from last 7 days   Lab Units 10/10/21  0611 10/09/21  0937 10/08/21  0611 10/07/21  1841 10/07/21  1841 10/04/21  1454   SODIUM mmol/L 124* 122* 118*   < > 114* 119*   POTASSIUM mmol/L 3.8 4.3 5.0   < > 5.2 4.9   CHLORIDE mmol/L 83* 81* 79*   < > 77* 79*   TOTAL CO2 mmol/L  --   --   --   --   --  27   CO2 mmol/L 31.9* 31.4* 30.7*   < > 27.8  --    BUN mg/dL 23 18 18   < > 22 14   CREATININE mg/dL 0.82 0.68* 0.67*   < > 0.75* 0.69*   CALCIUM mg/dL 8.1* 8.5* 8.4*   < > 8.7 8.3*   BILIRUBIN mg/dL  --   --   --   --  0.4 0.6   ALK PHOS U/L  --   --   --   --  152* 111   ALT (SGPT) U/L  --   --   --   --  43* 40   AST (SGOT) U/L  --   --   --   --  31 31   GLUCOSE mg/dL 98 157* 115*   < > 132*  --     < > = values in this interval not displayed.     Results from last 7 days   Lab Units 10/08/21  0610 10/07/21  1841 10/04/21  1454   WBC 10*3/mm3 9.12 9.02 8.8   HEMOGLOBIN g/dL 9.0* 8.5* 8.4*   HEMATOCRIT % 27.0* 24.7* 25.2*   PLATELETS 10*3/mm3 388 404 475*         Results from last 7 days   Lab Units 10/07/21  1841   TROPONIN T ng/mL <0.010     Results from last 7 days   Lab Units 10/07/21  2014   URINECX  >100,000 CFU/mL Klebsiella pneumoniae ssp pneumoniae*         I have reviewed the patient's laboratory results.    Radiology results:    No radiology results from the last 24 hrs  I have reviewed the patient's radiology reports.    Medication Review:     I have reviewed the patient's active and prn medications.     Problem List:      Acute on chronic congestive heart failure (HCC)      Assessment:    1.  Acute exacerbation of chronic combined systolic and diastolic heart failure, present on admission  2.  Hyponatremia,  chronic issue likely acutely worsened in the setting of #1  3.  Chronic hypoxic respiratory failure on 3 to 4 L of oxygen continuously  4.  Paroxysmal atrial fibrillation on Eliquis, currently in sinus rhythm  5.  Anemia  6. BPH  7. Recent diagnosis of COVID-19  8. Coronary artery disease  9. Tongue cancer status post resection  10. Previous CVA with complete right vision loss  11. Carotid artery stenosis status post stenting of right carotid  12. Hypertension  13. Hyperlipidemia   14. Hypothyroidism   15.  Aortic regurgitation    Plan:    Patient will be admitted to [facility] with telemetry.  He will be continued on IV Lasix 40 mg twice daily with close observation of his renal functions and electrolytes.  He may need up titration of Lasix as he already takes 40 mg orally twice at home.  No need to repeat echocardiogram as he just had one done 1 month ago.  Monitor ins and outs and obtain daily weights.  Patient's daughter-in-law did inform me that the patient had some difficulty voiding intermittently requiring in and out catheterization during his hospitalization.  She would like to avoid this as much as possible as it did give him recurrent UTIs.  We will place a condom catheter as needed.     Patient's urinalysis was suggestive of an UTI however it is contaminated and the patient has no symptoms.  We will repeat this and hold off on any further antibiotics at this time.  We will check a procalcitonin to rule out possible underlying pneumonia.     Patient has known chronic anemia which appears to be slowly worsening.  Will check iron profile, ferritin, B12 and folate.  Patient's daughter-in-law was requesting to give IV iron during this admission, informed her that that will add additional volume and would like to avoid this for the time being.  If needed, this can be arranged on an outpatient basis are given to the patient prior to discharge. We will consult PT and OT.    10/08: hyponatremia in the setting of  current Diuretic use and CHF. Nephrology consulted for recommendations.  Continue with Lasix twice daily.  Will consult cardiology for evaluation and recommendations.  Continue oxygen supportive treatment and try to titrate as tolerated.    10/09: Cardiology seen the patient. Recs changing Lopressor to Toprol-XL, starting Entresto, changing his digoxin to 0.125 mg every 72 hours, starting oral amiodarone 200 mg orally once per day. Cont Eliquis. discontinuation of Cardizem. Repeat echocardiogram ordered. Pt continues to be on 3 to 4 L on O2 which is baseline for him. We will start the patient on Bactrim to finish his UTI treatment. Patient had Gentamycin x1 in the ER.We will follow up on the urine culture. Nephrology consulted for Hyponatremia.     10/10: 2d echo pending. I contacted Nephrology and discussed the case recommended IV iron Venofer (which was given) and Sodium chloride tabs, Pt refused to take sodium tabs due to his cardiologist recommendations.   I discussed the case with the patient's POA (his daughter in law). All questions were answered to her satisfaction.She reports that patient's sodium has been chronically low in the 120's. We will continue to follow up closely and recheck AM labs with CXR.     DVT Prophylaxis: Eliquis  Code Status: Full  Diet: Cardiac with fluid and salt restriction  Discharge Plan: Pending    Turner Chavez MD  10/10/21  00:28 EDT    Dictated utilizing Dragon dictation.

## 2021-10-11 NOTE — PLAN OF CARE
Goal Outcome Evaluation:         Pt. had no acute episodes and vital signs have been stable throughout the shift. Received albumin and iron infusion today. POA has been at bedside and wants to speak with MD about why palliative care has been talked about. Wound care looked at coccyx pressure injury. Will continue to monitor.

## 2021-10-11 NOTE — THERAPY TREATMENT NOTE
Patient Name: Andrew Hernandez  : 1934    MRN: [id number]                              Today's Date: 10/11/2021       Admit Date: 10/7/2021    Visit Dx:     ICD-10-CM ICD-9-CM   1. Acute on chronic congestive heart failure, unspecified heart failure type ([facility])  I50.9 428.0   2. Hypercholesterolemia  E78.00 272.0   3. Carotid atherosclerosis, unspecified laterality  I65.29 433.10     Patient Active Problem List   Diagnosis   • Benign essential hypertension   • Gastroesophageal reflux disease without esophagitis   • BPH with obstruction/lower urinary tract symptoms   • Carotid atherosclerosis   • Hypercholesterolemia   • Acquired hypothyroidism   • Central retinal artery occlusion of right eye   • Cerebrovascular accident ([facility])   • Abnormal ECG   • Mitral regurgitation   • Nonrheumatic aortic valve insufficiency   • Abnormal stress echo   • Cerebral infarction due to embolism of right anterior cerebral artery    • Carotid stenosis, symptomatic w/o infarct   • Hyperkalemia   • Hyponatremia   • Encounter for Medicare annual wellness exam   • Tongue lesion   • Abdominal bruit   • Edema due to hypervolemia   • Acute on chronic diastolic (congestive) heart failure ([facility])   • Acute cystitis without hematuria   • Acute on chronic combined systolic and diastolic congestive heart failure ([facility])   • COVID-19 virus detected   • Chronic atrial fibrillation with RVR ([facility])   • Hyponatremia   • Late effects of CVA (cerebrovascular accident)   • Neoplasm, uncertain whether benign or malignant   • Chronic anticoagulation   • Acute and chronic respiratory failure with hypoxia ([facility])   • Blindness of right eye   • Pressure injury of coccygeal region, stage 2 ([facility])   • Physical deconditioning   • Impaired mobility and ADLs   • Acute on chronic congestive heart failure ([facility])     Past Medical History:   Diagnosis Date   • A-fib ([facility])    • Abnormal ECG    • Arrhythmia    • Arthritis    • Asthma Aug 2021   • Benign prostatic hyperplasia     • Cancer (HCC)     TONGUE   • Carotid stenosis     s/p right ICA stent x 2   • CHF (congestive heart failure) ([facility])    • Coronary artery disease    • COVID-19 09/2021   • Disease of thyroid gland    • Enlarged prostate    • Fracture    • Full dentures    • GERD (gastroesophageal reflux disease)    • Heart valve disease    • Hyperlipidemia    • Hypertension    • Hypothyroidism    • Impaired functional mobility, balance, gait, and endurance 09/03/2021   • Kidney infection    • Mitral regurgitation    • Myocardial infarction ([facility]) 8-16-21   • Nonrheumatic aortic valve insufficiency    • Renal calculi    • Stroke ([facility]) 03/2016    right retinal artery occlusion   • 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HCC)    • Wears glasses      Past Surgical History:   Procedure Laterality Date   • CAROTID ENDARTERECTOMY      X2-3/17, 9/17   • CAROTID STENT Right 03/18/2016    Carotid Wall Stent   • CAROTID STENT Right 03/04/2017    Zilver BANDAR for recurrent right ICA stenosis   • CATARACT EXTRACTION W/ INTRAOCULAR LENS IMPLANT Left 9/4/2018    Procedure: CATARACT PHACO EXTRACTION WITH INTRAOCULAR LENS IMPLANT LEFT, COMPLICATED WITH MALYUGIAN RING;  Surgeon: Paola Welch MD;  Location: [facility];  Service: Ophthalmology   • KIDNEY STONE SURGERY Right 2011    Shafron - open   • PATELLA FRACTURE SURGERY Left 1986   • OK TCAT IV STENT CRV CRTD ART EMBOLIC PROTECJ Right 3/4/2017    Placement of Zilver BANDAR right ICA 3/4/17   • TONGUE SURGERY  08/16/2021    cancer spot removed       General Information     Row Name 10/11/21 1041          Physical Therapy Time and Intention    Document Type therapy note (daily note)  -JR     Mode of Treatment physical therapy  -JR     Row Name 10/11/21 1041          General Information    Patient Profile Reviewed yes  -JR           User Key  (r) = Recorded By, (t) = Taken By, (c) = Cosigned By    Initials Name Provider Type    JR Sade Medina PT Physical Therapist               Mobility     Row Name 10/11/21 1044           Bed Mobility    Comment (Bed Mobility) Patient declined getting out of bed, but agrees to doing exercises  -           User Key  (r) = Recorded By, (t) = Taken By, (c) = Cosigned By    Initials Name Provider Type    Sade Pardo PT Physical Therapist               Obj/Interventions     [facility] Name 10/11/21 1041          Motor Skills    Motor Skills therapeutic exercise  -     Therapeutic Exercise hip; knee; ankle  -[facility] Name 10/11/21 1041          Hip (Therapeutic Exercise)    Hip (Therapeutic Exercise) strengthening exercise  -     Hip Strengthening (Therapeutic Exercise) bilateral; heel slides; aBduction; aDduction; 10 repetitions  -[facility] Name 10/11/21 1041          Knee (Therapeutic Exercise)    Knee (Therapeutic Exercise) strengthening exercise  -     Knee Strengthening (Therapeutic Exercise) bilateral; SLR (straight leg raise); 10 repetitions  -[facility] Name 10/11/21 1041          Ankle (Therapeutic Exercise)    Ankle (Therapeutic Exercise) strengthening exercise  -     Ankle Strengthening (Therapeutic Exercise) bilateral; dorsiflexion; plantarflexion; 10 repetitions  -           User Key  (r) = Recorded By, (t) = Taken By, (c) = Cosigned By    Initials Name Provider Type    Sade Pardo PT Physical Therapist               Goals/Plan    No documentation.                Clinical Impression     [facility] Name 10/11/21 1041          Pain    Additional Documentation Pain Scale: Numbers Pre/Post-Treatment (Group)  -JR     Row Name 10/11/21 1041          Pain Scale: Numbers Pre/Post-Treatment    Pretreatment Pain Rating 0/10 - no pain  -     Posttreatment Pain Rating 0/10 - no pain  -[facility] Name 10/11/21 1041          Plan of Care Review    Plan of Care Reviewed With patient; daughter  -     Progress improving  -     Outcome Summary Patient demonstrates improving strength as seen by increased tolerance to exercises.  He declined performing mobility activites.  Plan to  continue PT per POC  -JR     Row Name 10/11/21 1041          Positioning and Restraints    Pre-Treatment Position in bed  -JR     Post Treatment Position bed  -JR     In Bed supine; call light within reach; encouraged to call for assist; with family/caregiver  -           User Key  (r) = Recorded By, (t) = Taken By, (c) = Cosigned By    Initials Name Provider Type    Sade Pardo, PT Physical Therapist               Outcome Measures     Row Name 10/11/21 1041 10/11/21 0857       How much help from another person do you currently need...    Turning from your back to your side while in flat bed without using bedrails? 3  -JR 3  -EW    Moving from lying on back to sitting on the side of a flat bed without bedrails? 3  -JR 3  -EW    Moving to and from a bed to a chair (including a wheelchair)? 3  -JR 3  -EW    Standing up from a chair using your arms (e.g., wheelchair, bedside chair)? 3  -JR 3  -EW    Climbing 3-5 steps with a railing? 1  -JR 1  -EW    To walk in hospital room? 2  -JR 2  -EW    AM-PAC 6 Clicks Score (PT) 15  -JR 15  -EW    Row Name 10/11/21 1308 10/11/21 1041       Functional Assessment    Outcome Measure Options AM-PAC 6 Clicks Daily Activity (OT)  -TL AM-PAC 6 Clicks Basic Mobility (PT)  -JR          User Key  (r) = Recorded By, (t) = Taken By, (c) = Cosigned By    Initials Name Provider Type    Sade Pardo, PT Physical Therapist    Emily Kessler OTR Occupational Therapist    Angélica Ward RN Registered Nurse                             Physical Therapy Education                 Title: PT OT SLP Therapies (In Progress)     Topic: Physical Therapy (In Progress)     Point: Mobility training (Done)     Learning Progress Summary           Patient Acceptance, E,D, DU by TW at 10/10/2021 1130    Comment: Pt education on transfer technique for improving safety and independence.    Acceptance, E,D, VU,NR by TW at 10/9/2021 1202    Comment: Pt education for improving bed mobility technique  and for purpose and goals for PT POC.                   Point: Home exercise program (Done)     Learning Progress Summary           Patient Acceptance, E,TB, VU by  at 10/11/2021 1952    Comment: BLE exercises                   Point: Body mechanics (Not Started)     Learner Progress:  Not documented in this visit.          Point: Precautions (Not Started)     Learner Progress:  Not documented in this visit.                      User Key     Initials Effective Dates Name Provider Type Discipline     06/16/21 -  Sade Medina, PT Physical Therapist PT    TW 06/16/21 -  Judy Barrientos PT Physical Therapist PT              PT Recommendation and Plan     Plan of Care Reviewed With: patient, daughter  Progress: improving  Outcome Summary: Patient demonstrates improving strength as seen by increased tolerance to exercises.  He declined performing mobility activites.  Plan to continue PT per POC     Time Calculation:    PT Charges     Row Name 10/11/21 1953             Time Calculation    Start Time 1041  -      Stop Time 1051  -      Time Calculation (min) 10 min  -      PT Received On 10/11/21  -      PT Goal Re-Cert Due Date 10/19/21  -              Timed Charges    61409 - PT Therapeutic Exercise Minutes 10  -JR              Total Minutes    Timed Charges Total Minutes 10  -JR       Total Minutes 10  -JR            User Key  (r) = Recorded By, (t) = Taken By, (c) = Cosigned By    Initials Name Provider Type     Sade Medina, PT Physical Therapist              Therapy Charges for Today     Code Description Service Date Service Provider Modifiers Qty    52115819624 HC PT THER PROC EA 15 MIN 10/11/2021 Sade Medina, PT GP 1          PT G-Codes  Outcome Measure Options: AM-PAC 6 Clicks Daily Activity (OT)  AM-PAC 6 Clicks Score (PT): 15  AM-PAC 6 Clicks Score (OT): 16    Sade Medina PT  10/11/2021

## 2021-10-11 NOTE — PROGRESS NOTES
[facility] Progress Note  [facility]. [place]        Patient Name: Andrew Hernandez  : 1934  MRN: [id number]   LOS: 4 days    Patient Care Team:  Vermeesch, Marilyn K, MD as PCP - General (Internal Medicine)  Dirk De Leon MD as Consulting Physician (Ophthalmology)  Oziel Mcnair MD as Consulting Physician (Cardiology)  Harsh Huerta MD as Consulting Physician (Urology)    Chief Complaint:    Chief Complaint   Patient presents with   • Shortness of Breath     Primary Care Physician:  Vermeesch, Marilyn K, MD  Date of admission: 10/7/2021    Subjective     Interval History:   Patient is lot more awake alert and interactive this morning he said he feels better.  No other family members around.  His POA Roshni has left a message for me to call her.  There was some concerns about salt restriction versus giving salt tablets.  Since I prescribe it she has been taking it.  Review of Systems:   As noted above    Objective     Vitals:   Temp:  [97.5 °F (36.4 °C)-98.2 °F (36.8 °C)] 97.9 °F (36.6 °C)  Heart Rate:  [65-84] 77  Resp:  [18-20] 18  BP: ()/(24-55) 120/42  Flow (L/min):  [4] 4    Intake/Output Summary (Last 24 hours) at 10/11/2021 0819  Last data filed at 10/11/2021 0400  Gross per 24 hour   Intake 720 ml   Output 1000 ml   Net -280 ml       Physical Exam:    General Appearance: alert, no acute distress   Skin: warm and dry  HEENT: oral mucosa normal, nonicteric sclera  Neck: supple, no JVD  Lungs: CTA, except left lower chest has no breath sounds.  Heart: RRR, normal S1 and S2  Abdomen: soft, nontender, nondistended  : no palpable bladder  Extremities: no edema, cyanosis or clubbing  Neuro: normal speech and grossly nonfocal.    Scheduled Meds:     Current Facility-Administered Medications   Medication Dose Route Frequency Provider Last Rate Last Admin   • acetaminophen (TYLENOL) tablet 650 mg  650 mg Oral Q4H PRN Edgardo Ly MD        Or   •  acetaminophen (TYLENOL) 160 MG/5ML solution 650 mg  650 mg Oral Q4H PRN Edgardo Ly MD        Or   • acetaminophen (TYLENOL) suppository 650 mg  650 mg Rectal Q4H PRN Edgardo Ly MD       • amiodarone (PACERONE) tablet 200 mg  200 mg Oral Q24H Ishmael Nieves MD   200 mg at 10/10/21 0901   • apixaban (ELIQUIS) tablet 5 mg  5 mg Oral Q12H Edgardo Ly MD   5 mg at 10/10/21 2014   • ascorbic acid (VITAMIN C) tablet 500 mg  500 mg Oral Daily Edgardo Ly MD   500 mg at 10/10/21 0901   • aspirin EC tablet 81 mg  81 mg Oral Daily Edgardo Ly MD   81 mg at 10/10/21 0901   • atorvastatin (LIPITOR) tablet 20 mg  20 mg Oral Nightly Edgardo Ly MD   20 mg at 10/10/21 2014   • sennosides-docusate (PERICOLACE) 8.6-50 MG per tablet 2 tablet  2 tablet Oral BID Edgardo Ly MD   2 tablet at 10/10/21 0901    And   • polyethylene glycol (MIRALAX) packet 17 g  17 g Oral Daily PRN Edgardo Ly MD        And   • bisacodyl (DULCOLAX) EC tablet 5 mg  5 mg Oral Daily PRN Edgardo Ly MD        And   • bisacodyl (DULCOLAX) suppository 10 mg  10 mg Rectal Daily PRN Edgardo Ly MD       • [START ON 10/12/2021] digoxin (LANOXIN) tablet 125 mcg  125 mcg Oral Q3 Days Ishmael Nieves MD       • famotidine (PEPCID) tablet 20 mg  20 mg Oral BID Edgardo Ly MD   20 mg at 10/10/21 2014   • ferrous sulfate EC tablet 324 mg  324 mg Oral Daily With Breakfast Edgardo Ly MD   324 mg at 10/10/21 0901   • furosemide (LASIX) tablet 40 mg  40 mg Oral BID Estuardo Winchester MD, FASN   40 mg at 10/10/21 1729   • levothyroxine (SYNTHROID, LEVOTHROID) tablet 50 mcg  50 mcg Oral Daily With Breakfast Edgardo Ly MD   50 mcg at 10/10/21 0901   • megestrol (MEGACE) tablet 40 mg  40 mg Oral BID Edgardo Ly MD   40 mg at 10/10/21 2014   • melatonin tablet 5 mg  5 mg Oral Nightly PRN Edgardo Ly MD       • metoprolol succinate XL (TOPROL-XL) 24 hr tablet 50 mg  50 mg Oral Q24H  Ishmael Nieves MD   50 mg at 10/10/21 0901   • nitroglycerin (NITROSTAT) SL tablet 0.4 mg  0.4 mg Sublingual Q5 Min PRN Edgardo Ly MD       • ondansetron (ZOFRAN) injection 4 mg  4 mg Intravenous Q6H PRN Edgardo Ly MD       • PRO-STAT oral liquid 30 mL  30 mL Oral BID Turner Chavez MD   30 mL at 10/10/21 2014   • sacubitril-valsartan (ENTRESTO) 24-26 MG tablet 1 tablet  1 tablet Oral Q12H Ishmael Nieves MD   1 tablet at 10/10/21 2014   • sodium chloride 0.9 % flush 10 mL  10 mL Intravenous PRN Edgardo Ly MD       • sodium chloride 0.9 % flush 3 mL  3 mL Intravenous Q12H Edgardo Ly MD   3 mL at 10/10/21 2014   • sodium chloride 0.9 % flush 3-10 mL  3-10 mL Intravenous PRN Edgardo Ly MD       • sodium chloride tablet 1 g  1 g Oral BID With Meals Estuardo Winchester MD, FASN   1 g at 10/10/21 1729   • sulfamethoxazole-trimethoprim (BACTRIM DS,SEPTRA DS) 800-160 MG per tablet 1 tablet  1 tablet Oral Q12H Turner Chavez MD   1 tablet at 10/11/21 0101   • tamsulosin (FLOMAX) 24 hr capsule 0.4 mg  0.4 mg Oral Nightly Edgardo Ly MD   0.4 mg at 10/10/21 2014       amiodarone, 200 mg, Oral, Q24H  apixaban, 5 mg, Oral, Q12H  vitamin C, 500 mg, Oral, Daily  aspirin, 81 mg, Oral, Daily  atorvastatin, 20 mg, Oral, Nightly  [START ON 10/12/2021] digoxin, 125 mcg, Oral, Q3 Days  famotidine, 20 mg, Oral, BID  ferrous sulfate, 324 mg, Oral, Daily With Breakfast  furosemide, 40 mg, Oral, BID  levothyroxine, 50 mcg, Oral, Daily With Breakfast  megestrol, 40 mg, Oral, BID  metoprolol succinate XL, 50 mg, Oral, Q24H  PRO-STAT, 30 mL, Oral, BID  sacubitril-valsartan, 1 tablet, Oral, Q12H  senna-docusate sodium, 2 tablet, Oral, BID  sodium chloride, 3 mL, Intravenous, Q12H  sodium chloride, 1 g, Oral, BID With Meals  sulfamethoxazole-trimethoprim, 1 tablet, Oral, Q12H  tamsulosin, 0.4 mg, Oral, Nightly        IV Meds:        Results Reviewed:   I have personally reviewed the results from  the time of this admission to 10/11/2021 08:19 EDT     Results from last 7 days   Lab Units 10/11/21  0615 10/10/21  0611 10/09/21  0937 10/08/21  0611 10/07/21  1841 10/04/21  1454   SODIUM mmol/L 125* 124* 122*   < > 114* 119*   POTASSIUM mmol/L 3.9 3.8 4.3   < > 5.2 4.9   CHLORIDE mmol/L 84* 83* 81*   < > 77* 79*   TOTAL CO2 mmol/L  --   --   --   --   --  27   CO2 mmol/L 31.6* 31.9* 31.4*   < > 27.8  --    BUN mg/dL 21 23 18   < > 22 14   CREATININE mg/dL 0.85 0.82 0.68*   < > 0.75* 0.69*   CALCIUM mg/dL 8.3* 8.1* 8.5*   < > 8.7 8.3*   BILIRUBIN mg/dL  --   --   --   --  0.4 0.6   ALK PHOS U/L  --   --   --   --  152* 111   ALT (SGPT) U/L  --   --   --   --  43* 40   AST (SGOT) U/L  --   --   --   --  31 31   GLUCOSE mg/dL 97 98 157*   < > 132*  --     < > = values in this interval not displayed.       Estimated Creatinine Clearance: 61.9 mL/min (by C-G formula based on SCr of 0.85 mg/dL).    Results from last 7 days   Lab Units 10/07/21  1841   MAGNESIUM mg/dL 2.1       Results from last 7 days   Lab Units 10/09/21  0937 10/08/21  0611   URIC ACID mg/dL 6.7 6.6       Results from last 7 days   Lab Units 10/11/21  0615 10/08/21  0610 10/07/21  1841 10/04/21  1454   WBC 10*3/mm3 6.48 9.12 9.02 8.8   HEMOGLOBIN g/dL 8.1* 9.0* 8.5* 8.4*   PLATELETS 10*3/mm3 325 388 404 475*             Brief Urine Lab Results  (Last result in the past 365 days)      Color   Clarity   Blood   Leuk Est   Nitrite   Protein   CREAT   Urine HCG        10/08/21 0242 Yellow   Clear   Negative   Negative   Negative   Negative                 No results found for: UTPCR    Imaging Results (Last 24 Hours)     Procedure Component Value Units Date/Time    XR Chest 1 View [[id number]] Collected: 10/11/21 0727     Updated: 10/11/21 0730    Narrative:      PROCEDURE: XR CHEST 1 VW-     HISTORY: CHF, dyspnea; I50.9-Heart failure, unspecified; E78.00-Pure  hypercholesterolemia, unspecified; I65.29-Occlusion and stenosis of  unspecified carotid  artery     COMPARISON: 10/7/2020 1/9/2021.     FINDINGS: The heart is normal in size. The mediastinum is unremarkable.  There is interstitial pulmonary edema with bilateral effusions and  bibasilar opacities. There is no pneumothorax.  There are no acute  osseous abnormalities.       Impression:      No significant change in the appearance of the chest.     Continued followup is recommended.     This report was finalized on 10/11/2021 7:28 AM by Julieta Dai M.D..              Assessment / Plan     ASSESSMENT:    1. 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  POA is fairly clear does not want any aggressive measures and keep him comfortable.  She is okay to give medications and do blood work.  I will check serum and urine osmolality, with the diuretics I am not sure if we can get the good number.  She is okay with the salt tablets.  It will be started.  His TSH is normal.  2. Acute on chronic congestive heart failure (HCC): As per cardiology service currently on twice a day IV diuretics and can be continued.  Rest of the treatment as per cardiology services.  3. Hypertension blood pressures on the low side he is not on a lot of blood pressure medications.  4. A. fib: Rate controlled and anticoagulated.  5. Anemia: Check iron stores and start the oral iron.  6. Hypothyroidism: Last TSH is within normal limits on Synthroid.  7. UTI: He was started on Bactrim.    PLAN:  · He has been still getting Bactrim I will go ahead and stop it.  No signs of infection.  · Serum sodium is slightly better, it is about where he lives.  Continue with 1 g twice a day of sodium chloride tablets, if it stabilizes will decrease it to once a day.    · He still has fairly significant pleural effusion, I will going give him some albumin and change his p.o. Lasix to IV Lasix 40 mg 3 times a day.  I  think it will also help with his sodium.  · He did receive 1 dose of Venofer will change it to daily for 3 days.  · Details were discussed with the patient as well as his POA Roshni over the phone.  She had multiple questions about salt intake heart failure as well as pleural effusion.  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  She was satisfied with all the answers and plan is to continue salt tablets as well as diuretics.  · Details were also discussed with the hospitalist service, and the nursing staff.  · Continue with rest of the current treatment plan and surveillance labs.  · Further recommendations will depend on clinical course of the patient during the current hospitalization.     Thank you for involving us in the care of Andrew Hernandez.  Please feel free to call with any questions.    Estuardo Winchester MD, ANUEL  10/11/21  08:19 EDT    [facility]  [phone]      Much of this encounter note is an electronic transcription/translation of spoken language to printed text. The electronic translation of spoken language may permit erroneous, or at times, nonsensical words or phrases to be inadvertently transcribed; Although I have reviewed the note for such errors, some may still exist.

## 2021-10-12 NOTE — CASE MANAGEMENT/SOCIAL WORK
Continued Stay Note   Joao     Patient Name: Andrew Hernandez  MRN: [id number]  Today's Date: 10/12/2021    Admit Date: 10/7/2021     Discharge Plan     Row Name 10/12/21 1241       Plan    Plan Spoke to pt and daughter at Bedside .plan is to return home with family assistance explained IMM               Discharge Codes    No documentation.                     Jackelyn Hunt RN

## 2021-10-12 NOTE — PROGRESS NOTES
[facility]    Name:  Andrew Hernandez   Age:  87 y.o.  Sex:  male  :  1934  MRN:  [id number]   Visit Number:  [id number]  Admission Date:  10/7/2021  Date Of Service:  10/12/21  Primary Care Physician:  Vermeesch, Marilyn K, MD     LOS: 5 days :    Chief Complaint:      Follow-up shortness of breath, weakness    Subjective:    Patient seen again today.  No acute changes overnight.  Patient has been eating some..  He remains weak.  Still has a harsh cough.  I did discuss with him palliative care, he does not wish to consider that at this point.  I also talked with him and daughter-in-law about my discussion with his cardiologist this morning.    Hospital Course:    87-year-old gentleman with history of chronic systolic/diastolic heart failure, chronic hypoxic respiratory failure, atrial fibrillation, anemia, BPH, CAD, tongue cancer status post resection, prior CVA, carotid stenosis, hypertension, hyperlipidemia who presented to the emergency room for shortness of breath.  Initial work-up concerning for acute on chronic diastolic congestive heart failure in possible urinary tract infection.  Was admitted to the hospital service and started on IV diuretics with nephrology consultation.    Review of Systems:     All systems were reviewed and negative except as mentioned in subjective, assessment and plan.    Vital Signs:    Temp:  [97.8 °F (36.6 °C)-98.1 °F (36.7 °C)] 98 °F (36.7 °C)  Heart Rate:  [60-75] 69  Resp:  [18-20] 18  BP: ()/(30-38) 97/30    Intake and output:    I/O last 3 completed shifts:  In: 480 [P.O.:480]  Out: 1475 [Urine:1475]  I/O this shift:  In: 240 [P.O.:240]  Out: 470 [Urine:470]    Physical Examination:    General Appearance:  Alert and cooperative.  Chronically ill-appearing   Head:  Atraumatic and normocephalic.   Eyes: Conjunctivae and sclerae normal, no icterus. No pallor.   Throat: No oral lesions, no thrush, oral mucosa moist.    Neck: Supple, trachea midline, no thyromegaly.   Lungs:   Breath sounds heard bilaterally equally.  Bilateral crackles primarily at the bases.  Nontachypneic.  Slightly labored   Heart:  Normal S1 and S2, no murmur, no gallop, no rub. No JVD.   Abdomen:   Normal bowel sounds, no masses, no organomegaly. Soft, nontender, nondistended, no rebound tenderness.   Extremities: Supple,  1+ lower edema, no cyanosis, no clubbing.   Skin: No bleeding or rash.   Neurologic: Alert and oriented x 3. No facial asymmetry. Moves all four limbs. No tremors.      Laboratory results:    Results from last 7 days   Lab Units 10/12/21  0641 10/11/21  0615 10/10/21  0611 10/08/21  0611 10/07/21  1841   SODIUM mmol/L 130* 125* 124*   < > 114*   POTASSIUM mmol/L 3.7 3.9 3.8   < > 5.2   CHLORIDE mmol/L 87* 84* 83*   < > 77*   CO2 mmol/L 34.8* 31.6* 31.9*   < > 27.8   BUN mg/dL 19 21 23   < > 22   CREATININE mg/dL 0.89 0.85 0.82   < > 0.75*   CALCIUM mg/dL 8.3* 8.3* 8.1*   < > 8.7   BILIRUBIN mg/dL  --   --   --   --  0.4   ALK PHOS U/L  --   --   --   --  152*   ALT (SGPT) U/L  --   --   --   --  43*   AST (SGOT) U/L  --   --   --   --  31   GLUCOSE mg/dL 89 97 98   < > 132*    < > = values in this interval not displayed.     Results from last 7 days   Lab Units 10/12/21  0641 10/11/21  0615 10/08/21  0610   WBC 10*3/mm3 6.55 6.48 9.12   HEMOGLOBIN g/dL 7.9* 8.1* 9.0*   HEMATOCRIT % 24.5* 24.3* 27.0*   PLATELETS 10*3/mm3 320 325 388         Results from last 7 days   Lab Units 10/07/21  1841   TROPONIN T ng/mL <0.010     Results from last 7 days   Lab Units 10/07/21  2014   URINECX  >100,000 CFU/mL Klebsiella pneumoniae ssp pneumoniae*         I have reviewed the patient's laboratory results.    Radiology results:    Adult Transthoracic Echo Complete W/ Cont if Necessary Per Protocol    Result Date: 10/11/2021  1.  Normal left ventricular size with mildly reduced LV systolic function, LVEF 40-45%. 2.  Moderate hypokinesis of the inferior  wall. 3.  Grade 1 diastolic dysfunction. 4.  Normal right ventricular size and systolic function. 5.  Mildly increased left atrial volume index. 6.  Mild AI, MR, and TR. 7.  Trace PI. 8.  Moderate left pleural effusion.    XR Chest 1 View    Result Date: 10/11/2021  PROCEDURE: XR CHEST 1 VW-  HISTORY: CHF, dyspnea; I50.9-Heart failure, unspecified; E78.00-Pure hypercholesterolemia, unspecified; I65.29-Occlusion and stenosis of unspecified carotid artery  COMPARISON: 10/7/2020 1/9/2021.  FINDINGS: The heart is normal in size. The mediastinum is unremarkable. There is interstitial pulmonary edema with bilateral effusions and bibasilar opacities. There is no pneumothorax.  There are no acute osseous abnormalities.      Impression: No significant change in the appearance of the chest.  Continued followup is recommended.  This report was finalized on 10/11/2021 7:28 AM by Julieta Dai M.D..    I have reviewed the patient's radiology reports.    Medication Review:     I have reviewed the patient's active and prn medications.     Problem List:      Acute on chronic congestive heart failure (HCC)      Assessment:    1.  Acute exacerbation of chronic combined systolic and diastolic heart failure, present on admission  2.  Hyponatremia, chronic issue likely acutely worsened in the setting of #1  3.  Chronic hypoxic respiratory failure on 3 to 4 L of oxygen continuously  4.  Paroxysmal atrial fibrillation on Eliquis, currently in sinus rhythm  5.  Anemia  6. BPH  7. Recent diagnosis of COVID-19  8. Coronary artery disease  9. Tongue cancer status post resection  10. Previous CVA with complete right vision loss  11. Carotid artery stenosis status post stenting of right carotid  12. Hypertension  13. Hyperlipidemia   14. Hypothyroidism   15.  Aortic regurgitation    Plan:  Patient overall stable.  Borderline low blood pressures unchanged.  He is on guideline directed therapy per cardiology recommendations.  Still requires  nasal cannula oxygen with 4 to 5 L.  Sodium has improved somewhat.  His white count is stable at 6500 hemoglobin of 7.9.  Receiving iron transfusion day 3.  Holding on blood transfusion at this point.    Talked with nephrology.  Hold on diuretics today due to alkalosis.  Will need to reevaluate volume status tomorrow.  Encourage patient to work with physical therapy.  I did talk with Dr. Mcnair, his primary cardiologist, this morning.  He was in agreement with our current plan of care, noting that if patient did not improve with current measures, he would be recommending palliative measures as well.    DVT Prophylaxis: Eliquis  Code Status: Full  Diet: Fluid/sodium restricted  Discharge Plan: Multiple days    Jeane Peerz DO  10/12/21  15:49 EDT    Dictated utilizing Dragon dictation.

## 2021-10-12 NOTE — PROGRESS NOTES
[facility] Progress Note  [facility]. [place]        Patient Name: Andrew Hernandez  : 1934  MRN: [id number]   LOS: 5 days    Patient Care Team:  Vermeesch, Marilyn K, MD as PCP - General (Internal Medicine)  Dirk De Leon MD as Consulting Physician (Ophthalmology)  Oziel Mcnair MD as Consulting Physician (Cardiology)  Harsh Huerta MD as Consulting Physician (Urology)    Chief Complaint:    Chief Complaint   Patient presents with   • Shortness of Breath     Primary Care Physician:  Vermeesch, Marilyn K, MD  Date of admission: 10/7/2021    Subjective     Interval History:   Patient is lot more awake alert and interactive this morning he said he feels better.  No other family members around.  Patient had breakfast this morning and said he has been coughing some.  He is requiring 6 L of oxygen and saturation is still around 87 to 88%.  This appears to be new.  Otherwise he said he feels pretty good.  Review of Systems:   As noted above    Objective     Vitals:   Temp:  [97.8 °F (36.6 °C)-98.1 °F (36.7 °C)] 98.1 °F (36.7 °C)  Heart Rate:  [60-86] 60  Resp:  [18-20] 18  BP: (108-140)/(31-38) 123/36  Flow (L/min):  [4] 4    Intake/Output Summary (Last 24 hours) at 10/12/2021 0817  Last data filed at 10/12/2021 0554  Gross per 24 hour   Intake 480 ml   Output 1125 ml   Net -645 ml       Physical Exam:    General Appearance: alert, no acute distress   Skin: warm and dry  HEENT: oral mucosa normal, nonicteric sclera  Neck: supple, no JVD  Lungs: CTA, except left lower chest has no breath sounds.  Heart: RRR, normal S1 and S2  Abdomen: soft, nontender, nondistended  : no palpable bladder  Extremities: no edema, cyanosis or clubbing  Neuro: normal speech and grossly nonfocal.    Scheduled Meds:     Current Facility-Administered Medications   Medication Dose Route Frequency Provider Last Rate Last Admin   • acetaminophen (TYLENOL) tablet 650 mg  650 mg Oral Q4H PRN  Edgardo Ly MD        Or   • acetaminophen (TYLENOL) 160 MG/5ML solution 650 mg  650 mg Oral Q4H PRN Edgardo Ly MD        Or   • acetaminophen (TYLENOL) suppository 650 mg  650 mg Rectal Q4H PRN Edgardo Ly MD       • amiodarone (PACERONE) tablet 200 mg  200 mg Oral Q24H Ishmael Nieves MD   200 mg at 10/11/21 0855   • apixaban (ELIQUIS) tablet 5 mg  5 mg Oral Q12H Edgardo Ly MD   5 mg at 10/11/21 2101   • ascorbic acid (VITAMIN C) tablet 500 mg  500 mg Oral Daily Edgardo Ly MD   500 mg at 10/11/21 0854   • aspirin EC tablet 81 mg  81 mg Oral Daily Edgardo Ly MD   81 mg at 10/11/21 0855   • atorvastatin (LIPITOR) tablet 20 mg  20 mg Oral Nightly Edgardo Ly MD   20 mg at 10/11/21 2101   • sennosides-docusate (PERICOLACE) 8.6-50 MG per tablet 2 tablet  2 tablet Oral BID Edgardo Ly MD   2 tablet at 10/10/21 0901    And   • polyethylene glycol (MIRALAX) packet 17 g  17 g Oral Daily PRN Edgardo Ly MD        And   • bisacodyl (DULCOLAX) EC tablet 5 mg  5 mg Oral Daily PRN Edgardo Ly MD        And   • bisacodyl (DULCOLAX) suppository 10 mg  10 mg Rectal Daily PRN Edgardo Ly MD       • clobetasol (TEMOVATE) 0.05 % cream 1 application  1 application Topical Q12H Jeane Perez DO   1 application at 10/11/21 1608   • digoxin (LANOXIN) tablet 125 mcg  125 mcg Oral Q3 Days Ishmael Nieves MD       • famotidine (PEPCID) tablet 20 mg  20 mg Oral BID Edgardo Ly MD   20 mg at 10/11/21 2101   • ferrous sulfate EC tablet 324 mg  324 mg Oral Daily With Breakfast Edgardo Ly MD   324 mg at 10/11/21 0855   • iron sucrose (VENOFER) 200 mg in sodium chloride 0.9 % 100 mL IVPB  200 mg Intravenous Daily Estuardo Winchester MD, FASN 220 mL/hr at 10/11/21 1202 200 mg at 10/11/21 1202   • levothyroxine (SYNTHROID, LEVOTHROID) tablet 50 mcg  50 mcg Oral Daily With Breakfast Edgardo Ly MD   50 mcg at 10/11/21 0854   • megestrol (MEGACE)  tablet 40 mg  40 mg Oral BID Edgardo Ly MD   40 mg at 10/11/21 2101   • melatonin tablet 5 mg  5 mg Oral Nightly PRN Edgardo Ly MD       • metoprolol succinate XL (TOPROL-XL) 24 hr tablet 50 mg  50 mg Oral Q24H Ishmael Nieves MD   50 mg at 10/11/21 0854   • nitroglycerin (NITROSTAT) SL tablet 0.4 mg  0.4 mg Sublingual Q5 Min PRN Edgardo Ly MD       • ondansetron (ZOFRAN) injection 4 mg  4 mg Intravenous Q6H PRN Edgardo Ly MD       • PRO-STAT oral liquid 30 mL  30 mL Oral BID Turner Chavez MD   30 mL at 10/11/21 2107   • sacubitril-valsartan (ENTRESTO) 24-26 MG tablet 1 tablet  1 tablet Oral Q12H Ishmael Nieves MD   1 tablet at 10/11/21 2100   • sodium chloride 0.9 % flush 10 mL  10 mL Intravenous PRN Edgardo Ly MD       • sodium chloride 0.9 % flush 3 mL  3 mL Intravenous Q12H Edgardo Ly MD   3 mL at 10/11/21 2103   • sodium chloride 0.9 % flush 3-10 mL  3-10 mL Intravenous PRN Edgardo Ly MD       • sodium chloride tablet 1 g  1 g Oral BID With Meals Estuardo Winchester MD, FASN   1 g at 10/11/21 1819   • tamsulosin (FLOMAX) 24 hr capsule 0.4 mg  0.4 mg Oral Nightly Edgardo Ly MD   0.4 mg at 10/11/21 2100       amiodarone, 200 mg, Oral, Q24H  apixaban, 5 mg, Oral, Q12H  vitamin C, 500 mg, Oral, Daily  aspirin, 81 mg, Oral, Daily  atorvastatin, 20 mg, Oral, Nightly  clobetasol, 1 application, Topical, Q12H  digoxin, 125 mcg, Oral, Q3 Days  famotidine, 20 mg, Oral, BID  ferrous sulfate, 324 mg, Oral, Daily With Breakfast  iron sucrose (VENOFER) IVPB, 200 mg, Intravenous, Daily  levothyroxine, 50 mcg, Oral, Daily With Breakfast  megestrol, 40 mg, Oral, BID  metoprolol succinate XL, 50 mg, Oral, Q24H  PRO-STAT, 30 mL, Oral, BID  sacubitril-valsartan, 1 tablet, Oral, Q12H  senna-docusate sodium, 2 tablet, Oral, BID  sodium chloride, 3 mL, Intravenous, Q12H  sodium chloride, 1 g, Oral, BID With Meals  tamsulosin, 0.4 mg, Oral, Nightly        IV Meds:         Results Reviewed:   I have personally reviewed the results from the time of this admission to 10/12/2021 08:17 EDT     Results from last 7 days   Lab Units 10/12/21  0641 10/11/21  0615 10/10/21  0611 10/08/21  0611 10/07/21  1841   SODIUM mmol/L 130* 125* 124*   < > 114*   POTASSIUM mmol/L 3.7 3.9 3.8   < > 5.2   CHLORIDE mmol/L 87* 84* 83*   < > 77*   CO2 mmol/L 34.8* 31.6* 31.9*   < > 27.8   BUN mg/dL 19 21 23   < > 22   CREATININE mg/dL 0.89 0.85 0.82   < > 0.75*   CALCIUM mg/dL 8.3* 8.3* 8.1*   < > 8.7   BILIRUBIN mg/dL  --   --   --   --  0.4   ALK PHOS U/L  --   --   --   --  152*   ALT (SGPT) U/L  --   --   --   --  43*   AST (SGOT) U/L  --   --   --   --  31   GLUCOSE mg/dL 89 97 98   < > 132*    < > = values in this interval not displayed.       Estimated Creatinine Clearance: 60.4 mL/min (by C-G formula based on SCr of 0.89 mg/dL).    Results from last 7 days   Lab Units 10/12/21  0641 10/07/21  1841   MAGNESIUM mg/dL  --  2.1   PHOSPHORUS mg/dL 3.7  --        Results from last 7 days   Lab Units 10/09/21  0937 10/08/21  0611   URIC ACID mg/dL 6.7 6.6       Results from last 7 days   Lab Units 10/12/21  0641 10/11/21  0615 10/08/21  0610 10/07/21  1841   WBC 10*3/mm3 6.55 6.48 9.12 9.02   HEMOGLOBIN g/dL 7.9* 8.1* 9.0* 8.5*   PLATELETS 10*3/mm3 320 325 388 404             Brief Urine Lab Results  (Last result in the past 365 days)      Color   Clarity   Blood   Leuk Est   Nitrite   Protein   CREAT   Urine HCG        10/08/21 0242 Yellow   Clear   Negative   Negative   Negative   Negative                 No results found for: UTPCR    Imaging Results (Last 24 Hours)     ** No results found for the last 24 hours. **              Assessment / Plan     ASSESSMENT:    1. 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  POA is fairly clear does not want any aggressive measures  and keep him comfortable.  She is okay to give medications and do blood work.  I will check serum and urine osmolality, with the diuretics I am not sure if we can get the good number.  She is okay with the salt tablets.  It will be started.  His TSH is normal.  2. Acute on chronic congestive heart failure (HCC): As per cardiology service currently on twice a day IV diuretics and can be continued.  Rest of the treatment as per cardiology services.  3. Hypertension blood pressures on the low side he is not on a lot of blood pressure medications.  4. A. fib: Rate controlled and anticoagulated.  5. Anemia: Check iron stores and start the oral iron.  6. Hypothyroidism: Last TSH is within normal limits on Synthroid.  7. UTI: He was started on Bactrim.    PLAN:  · He has become more alkalotic today after higher doses of diuretics.  I will go ahead and hold off giving any diuretics today.  · Continue with oral sodium tablets.  Will consider restarting diuretics again tomorrow.  · He did receive 1 dose of Venofer will change it to daily for 3 days.  Decreased hemoglobin noted, if he has any further worsening of his hemoglobin will consider blood transfusion.  · Details were discussed with the patient.  · Continue with rest of the current treatment plan and surveillance labs.  · Further recommendations will depend on clinical course of the patient during the current hospitalization.     Thank you for involving us in the care of Andrew Hernandez.  Please feel free to call with any questions.    Estuardo Winchester MD, FASN  10/12/21  08:17 EDT    [facility]  [phone]      Much of this encounter note is an electronic transcription/translation of spoken language to printed text. The electronic translation of spoken language may permit erroneous, or at times, nonsensical words or phrases to be inadvertently transcribed; Although I have reviewed the note for such errors, some may still exist.

## 2021-10-12 NOTE — PLAN OF CARE
Goal Outcome Evaluation:  Plan of Care Reviewed With: patient        Progress: improving  Outcome Summary: Pt resting well. Diastolic blood pressure is still low. Pt is non-symptomatic of this. IV lasix administered as ordered. Urine output has been adequate. Pt still maintaining on 4 liters nasal cannula.

## 2021-10-12 NOTE — PLAN OF CARE
Goal Outcome Evaluation:  Plan of Care Reviewed With: patient        Progress: improving  Outcome Summary: PT treatment completed this am with pt declining to get up to chair but in full agreement for LE ther ex (see flowsheet for details) and sitting on EOB x 5 min. Once pt on EOB, he did agree to stand to allow for drawsheets to be changed with min assist and then mod assist for balance x 60 sec. No c/o shortness of breath or dizziness during session. Continue current PT POC.

## 2021-10-12 NOTE — PROGRESS NOTES
"[facility] Progress note     LOS: 5 days   Patient Care Team:  Vermeesch, Marilyn K, MD as PCP - General (Internal Medicine)  Dirk De Leon MD as Consulting Physician (Ophthalmology)  Oziel Mcnair MD as Consulting Physician (Cardiology)  Harsh Huerta MD as Consulting Physician (Urology)    Chief Complaint: Shortness of breath    Subjective:    Interval History:     Patient Complaints: none  Patient Denies:   Chest pain, shortness of breath, orthopnea, peripheral edema, palpitations, cough, sputum production, hemoptysis, abdominal pain, nausea, vomiting, diarrhea, fevers chills or night sweats  History taken from: patient    Review of Systems:   All systems were reviewed and negative       Objective:    Vital Sign Min/Max for last 24 hours  Temp  Min: 97.8 °F (36.6 °C)  Max: 98.1 °F (36.7 °C)   BP  Min: 101/31  Max: 123/36   Pulse  Min: 60  Max: 86   Resp  Min: 18  Max: 20   SpO2  Min: 92 %  Max: 96 %   Flow (L/min)  Min: 4  Max: 4   Weight  Min: 73 kg (160 lb 15 oz)  Max: 73 kg (160 lb 15 oz)     Flowsheet Rows      First Filed Value   Admission Height 180.3 cm (71\") Documented at 10/07/2021 1813   Admission Weight 72.1 kg (159 lb) Documented at 10/07/2021 1813          Physical Exam:     General Appearance:    Alert, cooperative, in no acute distress   Head:    Normocephalic, without obvious abnormality, atraumatic   Eyes:            Lids and lashes normal, conjunctivae and sclerae normal, no   icterus, no pallor, corneas clear, PERRLA   Ears:    Ears appear intact with no abnormalities noted   Throat:   No oral lesions, no thrush, oral mucosa moist   Neck:   No adenopathy, supple, trachea midline, no thyromegaly, no   carotid bruit, no JVD   Back:     No kyphosis present, no scoliosis present, no skin lesions,      erythema or scars, no tenderness to percussion or                   palpation,   range of motion normal   Lungs:     Clear to auscultation,respirations regular, even and                  " unlabored    Heart:    Regular rhythm and normal rate, normal S1 and S2, no            murmur, no gallop, no rub, no click   Chest Wall:    No abnormalities observed   Abdomen:     Normal bowel sounds, no masses, no organomegaly, soft        non-tender, non-distended, no guarding, no rebound                tenderness   Rectal:     Deferred   Extremities:   Moves all extremities well, no edema, no cyanosis, no             redness   Pulses:   Pulses palpable and equal bilaterally   Skin:   No bleeding, bruising or rash   Lymph nodes:   No palpable adenopathy   Neurologic:   Cranial nerves 2 - 12 grossly intact, sensation intact, DTR       present and equal bilaterally        Results Review:     I reviewed the patient's new clinical results.      Results from last 7 days   Lab Units 10/12/21  0641   SODIUM mmol/L 130*   POTASSIUM mmol/L 3.7   CHLORIDE mmol/L 87*   CO2 mmol/L 34.8*   BUN mg/dL 19   CREATININE mg/dL 0.89   GLUCOSE mg/dL 89   CALCIUM mg/dL 8.3*     Results from last 7 days   Lab Units 10/12/21  0641 10/11/21  0615 10/08/21  0610   WBC 10*3/mm3 6.55 6.48 9.12   HEMOGLOBIN g/dL 7.9* 8.1* 9.0*   HEMATOCRIT % 24.5* 24.3* 27.0*   PLATELETS 10*3/mm3 320 325 388         Echo EF Estimated  Lab Results   Component Value Date    ECHOEFEST 50 09/09/2016       Physical Exam    Medication Review: yes    Assessment/Plan:      Acute on chronic congestive heart failure (HCC)      Tolerating guideline directed medical therapy for heart failure with reduced ejection fraction well.  Overall patient appears to be symptomatically improved but remains edematous.  Serum sodium has improved.  Hemoglobin and hematocrit have drifted downward significantly.        Ishmael Nieves MD  10/12/21  13:57 EDT

## 2021-10-12 NOTE — PLAN OF CARE
Goal Outcome Evaluation:  Plan of Care Reviewed With: patient, other (see comments) (Daughter-in-law)        Progress: improving  Outcome Summary: VSS. Patient continues to be slightly hypotensive. O2 sats 89-94% on 4L NC. Normal sinus rhythm on telemetry. Cardiology and nephrology consulted. Family at bedside.

## 2021-10-12 NOTE — THERAPY TREATMENT NOTE
Attempted to see patient for OT but patient refused stating he just worked with PT.  Will attempt to provide OT at a later time as appropriate.

## 2021-10-12 NOTE — THERAPY TREATMENT NOTE
Patient Name: Andrew Hernandez  : 1934    MRN: [id number]                              Today's Date: 10/12/2021       Admit Date: 10/7/2021    Visit Dx:     ICD-10-CM ICD-9-CM   1. Acute on chronic congestive heart failure, unspecified heart failure type ([facility])  I50.9 428.0   2. Hypercholesterolemia  E78.00 272.0   3. 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    • Carotid stenosis, symptomatic w/o infarct   • Hyperkalemia   • Hyponatremia   • Encounter for Medicare annual wellness exam   • Tongue lesion   • Abdominal bruit   • Edema due to hypervolemia   • Acute on chronic diastolic (congestive) heart failure ([facility])   • Acute cystitis without hematuria   • Acute on chronic combined systolic and diastolic congestive heart failure ([facility])   • COVID-19 virus detected   • Chronic atrial fibrillation with RVR ([facility])   • Hyponatremia   • Late effects of CVA (cerebrovascular accident)   • Neoplasm, uncertain whether benign or malignant   • Chronic anticoagulation   • Acute and chronic respiratory failure with hypoxia ([facility])   • Blindness of right eye   • Pressure injury of coccygeal region, stage 2 ([facility])   • Physical deconditioning   • Impaired mobility and ADLs   • Acute on chronic congestive heart failure ([facility])     Past Medical History:   Diagnosis Date   • A-fib ([facility])    • Abnormal ECG    • Arrhythmia    • Arthritis    • Asthma Aug 2021   • Benign prostatic hyperplasia     • Cancer (HCC)     TONGUE   • Carotid stenosis     s/p right ICA stent x 2   • CHF (congestive heart failure) ([facility])    • Coronary artery disease    • COVID-19 09/2021   • Disease of thyroid gland    • Enlarged prostate    • Fracture    • Full dentures    • GERD (gastroesophageal reflux disease)    • Heart valve disease    • Hyperlipidemia    • Hypertension    • Hypothyroidism    • Impaired functional mobility, balance, gait, and endurance 09/03/2021   • Kidney infection    • Mitral regurgitation    • Myocardial infarction ([facility]) 8-16-21   • Nonrheumatic aortic valve insufficiency    • Renal calculi    • Stroke ([facility]) 03/2016    right retinal artery occlusion   • 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;  Service: Ophthalmology   • KIDNEY STONE SURGERY Right 2011    [facility] - open   • PATELLA FRACTURE SURGERY Left 1986   • CO TCAT IV STENT CRV CRTD ART EMBOLIC PROTECJ Right 3/4/2017    Placement of [place]il[place] BANDAR right ICA 3/4/17   • TONGUE SURGERY  08/16/2021    cancer spot removed       General Information     Row Name 10/12/21 1152          Physical Therapy Time and Intention    Document Type therapy note (daily note)  -TW     Mode of Treatment physical therapy  -TW     Row Name 10/12/21 1152          Cognition    Orientation Status (Cognition) oriented to; person; place; situation  -TW     Row Name 10/12/21 1152          Safety Issues, Functional Mobility    Impairments Affecting Function (Mobility) balance; endurance/activity tolerance; strength   -           User Key  (r) = Recorded By, (t) = Taken By, (c) = Cosigned By    Initials Name Provider Type    Judy Sharif PT Physical Therapist               Mobility     Row Name 10/12/21 1152          Bed Mobility    Bed Mobility supine-sit; sit-supine; scooting/bridging  -TW     Scooting/Bridging Trade (Bed Mobility) moderate assist (50% patient effort)  -TW     Supine-Sit Trade (Bed Mobility) contact guard; verbal cues  -TW     Sit-Supine Trade (Bed Mobility) contact guard; verbal cues  -TW     Assistive Device (Bed Mobility) head of bed elevated; draw sheet; bed rails  -TW     Comment (Bed Mobility) Pt self initiated coming to EOB and was able to complete task with CGA.  -     Row Name 10/12/21 1152          Transfers    Comment (Transfers) Pt declined to get up to chair but did agree to stand with therapist so that nursing could change draw sheet. Pt stood with mod assist with gt belt and HHA for 60 seconds. Once standing, pt does need mod assist for balance.  -     Row Name 10/12/21 1152          Sit-Stand Transfer    Sit-Stand Trade (Transfers) not tested  -     Row Name 10/12/21 1152          Gait/Stairs (Locomotion)    Trade Level (Gait) not tested  -           User Key  (r) = Recorded By, (t) = Taken By, (c) = Cosigned By    Initials Name Provider Type    Judy Sharif PT Physical Therapist               Obj/Interventions     Row Name 10/12/21 1152          Motor Skills    Therapeutic Exercise knee; ankle; hip  1 x 10 BLE including: heelslides, hip abd/add, SAQs, hip add sets, and ankle pumps.  -     Row Name 10/12/21 1152          Balance    Balance Assessment sitting static balance; sitting dynamic balance; standing static balance; standing dynamic balance  -     Static Sitting Balance WFL; unsupported; sitting, edge of bed  -TW     Dynamic Sitting Balance WFL; unsupported; sitting, edge of bed  -TW     Static Standing Balance moderate  impairment; supported  -TW           User Key  (r) = Recorded By, (t) = Taken By, (c) = Cosigned By    Initials Name Provider Type    TW Judy Barrientos, PT Physical Therapist               Goals/Plan    No documentation.                Clinical Impression     Row Name 10/12/21 1152          Pain    Additional Documentation Pain Scale: Numbers Pre/Post-Treatment (Group)  -TW     Row Name 10/12/21 1152          Pain Scale: Numbers Pre/Post-Treatment    Pretreatment Pain Rating 0/10 - no pain  -TW     Posttreatment Pain Rating 0/10 - no pain  -TW     Pain Intervention(s) Ambulation/increased activity  -TW     Row Name 10/12/21 1152          Plan of Care Review    Plan of Care Reviewed With patient  -TW     Progress improving  -TW     Outcome Summary PT treatment completed this am with pt declining to get up to chair but in full agreement for LE ther ex (see flowsheet for details) and sitting on EOB x 5 min. Once pt on EOB, he did agree to stand to allow for drawsheets to be changed with min assist and then mod assist for balance x 60 sec. No c/o shortness of breath or dizziness during session. Continue current PT POC.  -TW     Row Name 10/12/21 1152          Vital Signs    Pre SpO2 (%) 93  -TW     O2 Delivery Pre Treatment nasal cannula  4 L  -TW     Intra SpO2 (%) 94  -TW     O2 Delivery Intra Treatment nasal cannula  4 L  -TW     Post SpO2 (%) 94  -TW     O2 Delivery Post Treatment nasal cannula  4 L  -TW     Pre Patient Position Supine  -TW     Intra Patient Position Standing  -TW     Post Patient Position Supine  -TW     Row Name 10/12/21 1152          Positioning and Restraints    Pre-Treatment Position in bed  -TW     Post Treatment Position bed  -TW     In Bed call light within reach; encouraged to call for assist; exit alarm on; with family/caregiver  -TW           User Key  (r) = Recorded By, (t) = Taken By, (c) = Cosigned By    Initials Name Provider Type    TW Judy Barrientos, PT Physical Therapist                Outcome Measures     Row Name 10/12/21 1052          How much help from another person do you currently need...    Turning from your back to your side while in flat bed without using bedrails? 3  -TW     Moving from lying on back to sitting on the side of a flat bed without bedrails? 3  -TW     Moving to and from a bed to a chair (including a wheelchair)? 3  -TW     Standing up from a chair using your arms (e.g., wheelchair, bedside chair)? 3  -TW     Climbing 3-5 steps with a railing? 1  -TW     To walk in hospital room? 2  -TW     AM-PAC 6 Clicks Score (PT) 15  -TW     Row Name 10/12/21 1052          Functional Assessment    Outcome Measure Options AM-PAC 6 Clicks Basic Mobility (PT)  -TW           User Key  (r) = Recorded By, (t) = Taken By, (c) = Cosigned By    Initials Name Provider Type    Judy Sharif, PT Physical Therapist                             Physical Therapy Education                 Title: PT OT SLP Therapies (In Progress)     Topic: Physical Therapy (In Progress)     Point: Mobility training (Done)     Learning Progress Summary           Patient Acceptance, D,E, DU by  at 10/12/2021 1152    Comment: Pt education for LE ther ex technique and for improving technique for coming to EOB and standing.    Acceptance, E,D, DU by  at 10/10/2021 1130    Comment: Pt education on transfer technique for improving safety and independence.    Acceptance, E,D, VU,NR by  at 10/9/2021 1202    Comment: Pt education for improving bed mobility technique and for purpose and goals for PT POC.                   Point: Home exercise program (Done)     Learning Progress Summary           Patient Acceptance, D,E, DU by  at 10/12/2021 1152    Comment: Pt education for LE ther ex technique and for improving technique for coming to EOB and standing.    Acceptance, E,TB, VU by  at 10/11/2021 1952    Comment: BLE exercises                   Point: Body mechanics (Not Started)     Learner Progress:  Not  documented in this visit.          Point: Precautions (Not Started)     Learner Progress:  Not documented in this visit.                      User Key     Initials Effective Dates Name Provider Type Discipline    JR 06/16/21 -  Sade Medina PT Physical Therapist PT    TW 06/16/21 -  Judy Barrientos PT Physical Therapist PT              PT Recommendation and Plan     Plan of Care Reviewed With: patient  Progress: improving  Outcome Summary: PT treatment completed this am with pt declining to get up to chair but in full agreement for LE ther ex (see flowsheet for details) and sitting on EOB x 5 min. Once pt on EOB, he did agree to stand to allow for drawsheets to be changed with min assist and then mod assist for balance x 60 sec. No c/o shortness of breath or dizziness during session. Continue current PT POC.     Time Calculation:    PT Charges     Row Name 10/12/21 1152             Time Calculation    Start Time 1131  -TW      Stop Time 1152  -TW      Time Calculation (min) 21 min  -TW      PT Received On 10/12/21  -TW              Timed Charges    79268 - PT Therapeutic Exercise Minutes 11  -TW      56245 - PT Therapeutic Activity Minutes 10  -TW              Total Minutes    Timed Charges Total Minutes 21  -TW       Total Minutes 21  -TW            User Key  (r) = Recorded By, (t) = Taken By, (c) = Cosigned By    Initials Name Provider Type    TW Judy Barrientos PT Physical Therapist              Therapy Charges for Today     Code Description Service Date Service Provider Modifiers Qty    [id number] HC PT THER PROC EA 15 MIN 10/12/2021 Judy Barrientos PT GP 1          PT G-Codes  Outcome Measure Options: AM-PAC 6 Clicks Basic Mobility (PT)  AM-PAC 6 Clicks Score (PT): 15  AM-PAC 6 Clicks Score (OT): 16    Judy Barrientos PT  10/12/2021

## 2021-10-13 NOTE — THERAPY EVALUATION
Acute Care - Speech Language Pathology   Swallow Initial Evaluation  Joao     Patient Name: Andrew Hernandez  : 1934  MRN: [id number]  Today's Date: 10/13/2021               Admit Date: 10/7/2021    Visit Dx:     ICD-10-CM ICD-9-CM   1. Acute on chronic congestive heart failure, unspecified heart failure type (Formerly [facility])  I50.9 428.0   2. Hypercholesterolemia  E78.00 272.0   3. 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    • Carotid stenosis, symptomatic w/o infarct   • Hyperkalemia   • Hyponatremia   • Encounter for Medicare annual wellness exam   • Tongue lesion   • Abdominal bruit   • Edema due to hypervolemia   • Acute on chronic diastolic (congestive) heart failure (Formerly [facility])   • Acute cystitis without hematuria   • Acute on chronic combined systolic and diastolic congestive heart failure (Formerly [facility])   • COVID-19 virus detected   • Chronic atrial fibrillation with RVR (Formerly [facility])   • Hyponatremia   • Late effects of CVA (cerebrovascular accident)   • Neoplasm, uncertain whether benign or malignant   • Chronic anticoagulation   • Acute and chronic respiratory failure with hypoxia (Formerly [facility])   • Blindness of right eye   • Pressure injury of coccygeal region, stage 2 (Formerly [facility])   • Physical deconditioning   • Impaired mobility and ADLs   • Acute on chronic congestive heart failure (Formerly [facility])     Past Medical History:   Diagnosis Date   • A-fib (Formerly [facility])    • Abnormal ECG    • Arrhythmia    •  Arthritis    • Asthma Aug 2021   • Benign prostatic hyperplasia    • Cancer (HCC)     TONGUE   • Carotid stenosis     s/p right ICA stent x 2   • CHF (congestive heart failure) ([facility])    • Coronary artery disease    • COVID-19 09/2021   • Disease of thyroid gland    • Enlarged prostate    • Fracture    • Full dentures    • GERD (gastroesophageal reflux disease)    • Heart valve disease    • Hyperlipidemia    • Hypertension    • Hypothyroidism    • Impaired functional mobility, balance, gait, and endurance 09/03/2021   • Kidney infection    • Mitral regurgitation    • Myocardial infarction ([facility]) 8-16-21   • Nonrheumatic aortic valve insufficiency    • Renal calculi    • Stroke ([facility]) 03/2016    right retinal artery occlusion   • 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[facility])    • Wears glasses      Past Surgical History:   Procedure Laterality Date   • CAROTID ENDARTERECTOMY      X2-3/17, 9/17   • CAROTID STENT Right 03/18/2016    Carotid Wall Stent   • CAROTID STENT Right 03/04/2017    Zilver BANDAR for recurrent right ICA stenosis   • CATARACT EXTRACTION W/ INTRAOCULAR LENS IMPLANT Left 9/4/2018    Procedure: CATARACT PHACO EXTRACTION WITH INTRAOCULAR LENS IMPLANT LEFT, COMPLICATED WITH MALYUGIAN RING;  Surgeon: Paola Welch MD;  Location: [facility];  Service: Ophthalmology   • KIDNEY STONE SURGERY Right 2011    [facility] - open   • PATELLA FRACTURE SURGERY Left 1986   • [place] TCAT IV STENT CRV CRTD ART EMBOLIC PROTECJ Right 3/4/2017    Placement of Zilver BANDAR right ICA 3/4/17   • TONGUE SURGERY  08/16/2021    cancer spot removed        SLP Recommendation and Plan  SLP Swallowing Diagnosis: mod-severe, oral dysphagia, esophageal dysphagia (10/13/21 1040)  SLP Diet Recommendation: puree, thin liquids, water between meals after oral care, with supervision, other (see comments) (no ice per pt. request) (10/13/21 1040)  Recommended Precautions and Strategies: upright posture during/after eating, small bites of food and sips of liquid, reflux  precautions, general aspiration precautions, no straw (10/13/21 1040)  SLP Rec. for Method of Medication Administration: meds whole, meds crushed, with pudding or applesauce, as tolerated (10/13/21 1040)     Monitor for Signs of Aspiration: notify SLP if any concerns, yes, cough, gurgly voice, throat clearing, right lower lobe infiltrates (10/13/21 1040)  Recommended Diagnostics: No further SLP services recommended (10/13/21 1040)     Anticipated Discharge Disposition (SLP): unknown (10/13/21 1040)     Therapy Frequency (Swallow): evaluation only (10/13/21 1040)                            Progress: improving  Outcome Summary: Bedside eval of swallow completed with pt. seated upright in bed for po trials.  He has previous hx of CVA and tongue CA with resection affecting left ROM and general weakness further affecting motor speech as pt. had dysarthria.  He was edentulous. Pt.is currently on modified diet and denied diffiulty or dysphagia on it.  He was given trials of puree and thin liquids.  Oral phase was WFL with trials presented.  No overt s/s aspiration with any trial.  He did exhibit belching post swallows of thin, however, indicative of third stage dysphagia (he has dx of GERD already) which may increase his risk of aspiration from refluxate if not well managed.  Recommend:  1. continue pureed diet with thin liq as norman,  2. meds whole in pudding/applesauce as norman,  3. aspiration precautions,  4. reflux precautions,  5. MD may wish to review reflux meds to ensure optimal dosage,  6. no straws.      SWALLOW EVALUATION (last 72 hours)     SLP Adult Swallow Evaluation     Row Name 10/13/21 1040                   Rehab Evaluation    Document Type evaluation  -TM        Subjective Information no complaints  -TM        Patient Observations alert; cooperative  -TM        Patient/Family/Caregiver Comments/Observations no family present  -TM        Patient Effort good  -TM                  General Information    Patient  Profile Reviewed yes  -TM        Pertinent History Of Current Problem tongue CA w/resection, GERD, cardiac, hx CVA w/R vision deficits  -TM        Current Method of Nutrition pureed; thin liquids  -TM        Precautions/Limitations, Vision vision impairment, right  -TM        Precautions/Limitations, Hearing WFL; for purposes of eval  -TM        Prior Level of Function-Communication WFL  -TM        Prior Level of Function-Swallowing puree; esophageal concerns; thin liquids; other (see comments)  no ice, no straws  -TM        Plans/Goals Discussed with patient; other (see comments)  RN  -TM        Barriers to Rehab medically complex; previous functional deficit  -TM        Patient's Goals for Discharge patient did not state  denied dysphagia with diet modifications  -TM                  Pain    Additional Documentation Pain Scale: Numbers Pre/Post-Treatment (Group)  -TM                  Pain Scale: Numbers Pre/Post-Treatment    Pretreatment Pain Rating 0/10 - no pain  -TM        Posttreatment Pain Rating 0/10 - no pain  -TM                  Oral Motor Structure and Function    Oral Lesions or Structural Abnormalities and/or variants limited tongue ROM s/p resection  -TM        Dentition Assessment edentulous  -TM        Secretion Management WNL/WFL  -TM        Mucosal Quality moist, healthy  -TM        Volitional Cough WFL  -TM                  Oral Musculature and Cranial Nerve Assessment    Oral Motor General Assessment lingual impairment; other (see comments)  impaired tongue ROM (maame. to L) s/p resection  -TM        Lingual Impairment, Detail. Cranial Nerves IX, XII (Glossopharyngeal and Hypoglossal) reduced lingual ROM; reduced strength; reduced strength left  -TM                  General Eating/Swallowing Observations    Respiratory Support Currently in Use nasal cannula  -TM        Eating/Swallowing Skills fed by SLP  -TM        Positioning During Eating upright in bed  -TM        Utensils Used spoon; cup  -TM         Consistencies Trialed pureed; thin liquids  -TM        Pre SpO2 (%) 92  -TM        Post SpO2 (%) 92  -TM                  Respiratory    Respiratory Status WFL; during swallowing/eating  -TM                  Clinical Swallow Eval    Oral Prep Phase WFL  -TM        Oral Transit WFL  -TM        Oral Residue WFL  -TM        Pharyngeal Phase no overt signs/symptoms of pharyngeal impairment  -TM        Esophageal Phase suspected esophageal impairment  -TM        Clinical Swallow Evaluation Summary Bedside eval of swallow completed with pt. seated upright in bed for po trials.  He has previous hx of CVA and tongue CA with resection affecting left ROM and general weakness further affecting motor speech as pt. had dysarthria.  He was edentulous. Pt.is currently on modified diet and denied diffiulty or dysphagia on it.  He was given trials of puree and thin liquids.  Oral phase was WFL with trials presented.  No overt s/s aspiration with any trial.  He did exhibit belching post swallows of thin, however, indicative of third stage dysphagia (he has dx of GERD already) which may increase his risk of aspiration from refluxate if not well managed.  Recommend:  1. continue pureed diet with thin liq as normna,  2. meds whole in pudding/applesauce as norman,  3. aspiration precautions,  4. reflux precautions,  5. MD may wish to review reflux meds to ensure optimal dosage,  6. no straws.  -TM                  Esophageal Phase Concerns    Esophageal Phase Concerns belching  -TM        Belching thin  -TM        Esophageal Phase Concerns, Comment third stage dysphagia with increased risk of aspiration from refluxate  -TM                  Clinical Impression    Barriers to Overall Progress (SLP) previous functional deficit, medical complexity  -TM        SLP Swallowing Diagnosis mod-severe; oral dysphagia; esophageal dysphagia  -TM        Functional Impact risk of aspiration/pneumonia; risk of malnutrition  -TM                   Recommendations    Therapy Frequency (Swallow) evaluation only  -        SLP Diet Recommendation puree; thin liquids; water between meals after oral care, with supervision; other (see comments)  no ice per pt. request  -        Recommended Diagnostics No further SLP services recommended  -        Recommended Precautions and Strategies upright posture during/after eating; small bites of food and sips of liquid; reflux precautions; general aspiration precautions; no straw  -        Oral Care Recommendations Oral Care BID/PRN  -TM        SLP Rec. for Method of Medication Administration meds whole; meds crushed; with pudding or applesauce; as tolerated  -        Monitor for Signs of Aspiration notify SLP if any concerns; yes; cough; gurgly voice; throat clearing; right lower lobe infiltrates  -        Anticipated Discharge Disposition (SLP) unknown  -              User Key  (r) = Recorded By, (t) = Taken By, (c) = Cosigned By    Initials Name Effective Dates    TM Alexia Stern 06/16/21 -                 EDUCATION  The patient has been educated in the following areas:   Dysphagia (Swallowing Impairment) Oral Care/Hydration Modified Diet Instruction.              Time Calculation:    Time Calculation- SLP     Row Name 10/13/21 1143             Time Calculation- SLP    SLP Start Time 1040  -TM      SLP Received On 10/13/21  -              Untimed Charges    SLP Eval/Re-eval  ST Eval Oral Pharyng Swallow - [id number]  -            User Key  (r) = Recorded By, (t) = Taken By, (c) = Cosigned By    Initials Name Provider Type     Alexia Stern Speech and Language Pathologist                Therapy Charges for Today     Code Description Service Date Service Provider Modifiers Qty    [id number] HC ST EVAL ORAL PHARYNG SWALLOW 4 10/13/2021 Alexia Stern GN 1               Alexia Stern  10/13/2021

## 2021-10-13 NOTE — PLAN OF CARE
"Goal Outcome Evaluation:               Dr Perez reported concern of pt declining condition and remaining a Full Code status.  He related attempting to discuss condition with pt and he reported pt was adamant he did not want to discuss Palliative Care.     I know pt's daughter in law, Roshni Hernandez,  and offered to speak with her as to what approach she would recommend speaking with pt.   He agreed.    I contacted Roshni who requested a family meeting since pt's daughter was here from [facility].   We planned to meet at 1600.  Updated Dr Perez who related would try to stop by if he could.    Pt's family:  2 daughters, 2 sons, grandson, 2 daughters in law and 2 sons via telephone met with PC nurse in the 4th floor waiting area.    I explained I was with the Palliative Care team and Dr Perez had spoken with me re: his concern for pt.   I explained his concern at this time is that pt is currently a Full Code--meaning if his heart stops or he suddenly stops breathing, we would perform a full resuscitation.    I related many people will say they want a Full Code--\"everything done\" but do not understand what that entails.  I explained it meant chest compressions (which could crack ribs), possible shocks, intubation and being placed on a breathing machine.  Daughter related if pt was on a breathing machine, at some point they may have to make the decision to \"take him off the machine\".    I explained the chances of surviving a resuscitation if you have some \"reserve\" and  somewhat healthy to begin with are fairly good.  I explained someone with multiple chronic illnesses, like this pt, would have a slim change of survival and if they did would not likely be the same after.    I explained I am meeting with pt's family so they will be able to assist in explaining what being a Full Code means and help pt make a decision.  One son reported thinking if his father's heart stopped, his father would want them to\"et him go\".   There was a " "long discussion regarding what options were available but they did not want to talk about Hospice.    I explained this meeting was not about Hospice but to focus on resuscitation only.  I explained many pts decide they do not want resuscitation and choose No CPR.  The other determination would be what \"Interventions\" they would or would not want.  Many who are No CPR also decide on No Intubation, No Cardioversion, No Defibrillation. The other options of IVF, Medications, transfusions, O2, BiPAP, etc are still available.  After a long discussion, the consensus was Pt should be a No CPR, No Intubation, No Cardioversion, No Defibrillation.  They did all agree, Pt should be the one making the decision but they did not want to speak with him about it tonight for fear it would frighten him.    I agreed it should be the pt making the decision however, he will likely turn to them for their opinion.    I encouraged them to talk about it among themselves, write down questions and plan to discuss with pt tomorrow.    Questions were asked mainly if pt is declining at home, for example, cannot breathe what would they do.   I informed them if he did not have Hospice, bring him to the hospital or call 911.   I stressed DNR does NOT mean DO NOT TREAT.  Family voiced appreciation for the discussion.   Roshni David confirmed with family they understood what we discussed.  They agreed they would gradually initiate the conversation with pt tomorrow.  They did request that no one with Palliative Care discuss anything with pt unless a family member is there.  I informed them I would pass the message on to the PC nurse tomorrow.  PC will continue to follow.  "

## 2021-10-13 NOTE — PROGRESS NOTES
[facility]    Name:  Andrew Hernandez   Age:  87 y.o.  Sex:  male  :  1934  MRN:  [id number]   Visit Number:  [id number]  Admission Date:  10/7/2021  Date Of Service:  10/13/21  Primary Care Physician:  Vermeesch, Marilyn K, MD     LOS: 6 days :    Chief Complaint:      Follow-up shortness of breath, weakness    Subjective:    Patient seen once again today.  Daughter from [place] at bedside.  Patient overall feels about the same.  Still with a cough.  No fevers or chills.  Still very weak.  Talk with him about goals of care again as well as potential need for thoracentesis.  He will consider this.  Pulmonology consultation will be requested.    Hospital Course:    87-year-old gentleman with history of chronic systolic/diastolic heart failure, chronic hypoxic respiratory failure, atrial fibrillation, anemia, BPH, CAD, tongue cancer status post resection, prior CVA, carotid stenosis, hypertension, hyperlipidemia who presented to the emergency room for shortness of breath.  Initial work-up concerning for acute on chronic diastolic congestive heart failure in possible urinary tract infection.  Was admitted to the hospital service and started on IV diuretics with nephrology consultation.  Patient with no significant improvement in bilateral pleural effusions.  Pulmonology consultation requested on 10/13/2021.    Review of Systems:     All systems were reviewed and negative except as mentioned in subjective, assessment and plan.    Vital Signs:    Temp:  [97 °F (36.1 °C)-98.1 °F (36.7 °C)] 97 °F (36.1 °C)  Heart Rate:  [69-80] 71  Resp:  [14-22] 14  BP: ()/(30-45) 128/30    Intake and output:    I/O last 3 completed shifts:  In: 480 [P.O.:480]  Out: 1920 [Urine:1920]  I/O this shift:  In: 490 [P.O.:240; IV Piggyback:250]  Out: 600 [Urine:600]    Physical Examination:    General Appearance:  Alert and cooperative.  Chronically ill-appearing   Head:   Atraumatic and normocephalic.   Eyes: Conjunctivae and sclerae normal, no icterus. No pallor.   Throat: No oral lesions, no thrush, oral mucosa moist.   Neck: Supple, trachea midline, no thyromegaly.   Lungs:    Diminished breath sounds at the bases.  Scattered crackles as well.  Mildly labored.   Heart:  Normal S1 and S2, no murmur, no gallop, no rub. No JVD.   Abdomen:   Normal bowel sounds, no masses, no organomegaly. Soft, nontender, nondistended, no rebound tenderness.   Extremities: Supple,  1+ lower edema, no cyanosis, no clubbing.   Skin: No bleeding or rash.   Neurologic: Alert and oriented x 3. No facial asymmetry. Moves all four limbs. No tremors.      Laboratory results:    Results from last 7 days   Lab Units 10/13/21  0544 10/12/21  0641 10/11/21  0615 10/08/21  0611 10/07/21  1841   SODIUM mmol/L 130* 130* 125*   < > 114*   POTASSIUM mmol/L 3.7 3.7 3.9   < > 5.2   CHLORIDE mmol/L 89* 87* 84*   < > 77*   CO2 mmol/L 32.2* 34.8* 31.6*   < > 27.8   BUN mg/dL 22 19 21   < > 22   CREATININE mg/dL 0.90 0.89 0.85   < > 0.75*   CALCIUM mg/dL 8.4* 8.3* 8.3*   < > 8.7   BILIRUBIN mg/dL  --   --   --   --  0.4   ALK PHOS U/L  --   --   --   --  152*   ALT (SGPT) U/L  --   --   --   --  43*   AST (SGOT) U/L  --   --   --   --  31   GLUCOSE mg/dL 96 89 97   < > 132*    < > = values in this interval not displayed.     Results from last 7 days   Lab Units 10/13/21  0544 10/12/21  0641 10/11/21  0615 10/08/21  0610 10/08/21  0610   WBC 10*3/mm3  --  6.55 6.48  --  9.12   HEMOGLOBIN g/dL 7.7* 7.9* 8.1*   < > 9.0*   HEMATOCRIT % 23.7* 24.5* 24.3*   < > 27.0*   PLATELETS 10*3/mm3  --  320 325  --  388    < > = values in this interval not displayed.         Results from last 7 days   Lab Units 10/07/21  1841   TROPONIN T ng/mL <0.010     Results from last 7 days   Lab Units 10/07/21  2014   URINECX  >100,000 CFU/mL Klebsiella pneumoniae ssp pneumoniae*         I have reviewed the patient's laboratory results.    Radiology  results:    CT Chest Without Contrast Diagnostic    Result Date: 10/13/2021  PROCEDURE: CT CHEST WO CONTRAST DIAGNOSTIC-  HISTORY: Abnormal xray - pleural effusion; I50.9-Heart failure, unspecified; E78.00-Pure hypercholesterolemia, unspecified; I65.29-Occlusion and stenosis of unspecified carotid artery  COMPARISON:  None .  PROCEDURE:  Multiple axial CT images were obtained from the thoracic inlet through the upper abdomen without the use of contrast.  FINDINGS: Soft tissue windows reveal no axillary, hilar or mediastinal adenopathy. Heart size is normal. The aorta is normal in caliber. There are moderate bilateral pleural effusions. There is no pericardial effusion.. Lung windows reveal bilateral upper lobe airspace disease felt to reflect pneumonia. There is compressive atelectasis in the lung bases. Within the visualized upper abdomen there is a partially imaged right renal cyst. The upper abdomen is otherwise unremarkable. Bone windows reveal no acute osseous abnormalities.      Impression: Bilateral pleural effusions with airspace disease in the upper lobes felt to reflect a pneumonia.  485.26 mGy.cm   This study was performed with techniques to keep radiation doses as low as reasonably achievable (ALARA). Individualized dose reduction techniques using automated exposure control or adjustment of mA and/or kV according to the patient size were employed.  This report was finalized on 10/13/2021 9:33 AM by Julieta Dai M.D..    I have reviewed the patient's radiology reports.    Medication Review:     I have reviewed the patient's active and prn medications.     Problem List:      Acute on chronic congestive heart failure (HCC)      Assessment:    1.  Acute exacerbation of chronic combined systolic and diastolic heart failure, present on admission  2.  Hyponatremia, chronic issue likely acutely worsened in the setting of #1  3.  Chronic hypoxic respiratory failure on 3 to 4 L of oxygen continuously  4.   Paroxysmal atrial fibrillation on Eliquis, currently in sinus rhythm  5.  Anemia  6. BPH  7. Recent diagnosis of COVID-19  8. Coronary artery disease  9. Tongue cancer status post resection  10. Previous CVA with complete right vision loss  11. Carotid artery stenosis status post stenting of right carotid  12. Hypertension  13. Hyperlipidemia   14. Hypothyroidism   15.  Aortic regurgitation    Plan:    Patient overall hemodynamically stable on 4 to 5 L of oxygen.  His kidney function is stable and sodium is stable.  Hemoglobin continues to get some down to 7.7 today.  He has received 3 iron infusions.  Did ask for a CT scan of his chest today without contrast which does demonstrate known fairly large to moderate bilateral pleural effusions, there was also compressive atelectasis.  Also evidence of some scattered airspace disease in the upper lobes.    We will place patient on meropenem for antibiotic coverage for now due to hospitalization as well as extensive allergy history.  Majority of symptoms are still likely related to congestive heart failure.  Will place pulmonology consultation to evaluate further, he may benefit from thoracentesis.  We will talk with nephrology about potential blood transfusion in regards to his volume status and symptomatology.    Continue PT and OT evaluations.  Speech therapy reevaluation today, will continue with current diet after their recommendations.    Overall patient's long-term prognosis is guarded secondary to the above.  Did attempt to talk about palliative care with patient, however currently not receptive.  We will continue to discuss goals of care.    DVT Prophylaxis: Eliquis  Code Status: Full  Diet: Fluid/sodium restricted  Discharge Plan: Multiple days    Jeane Perez DO  10/13/21  13:15 EDT    Dictated utilizing Dragon dictation.

## 2021-10-13 NOTE — PLAN OF CARE
Goal Outcome Evaluation:  Plan of Care Reviewed With: patient        Progress: improving  Outcome Summary: Pt resting well. VSS. Pt using the non rebreather while resting. While awake pt requiring 4-5 liters nasal cannula. No acute events to note.

## 2021-10-13 NOTE — PROGRESS NOTES
"G-Cardiology Progress note     LOS: 6 days   Patient Care Team:  Vermeesch, Marilyn K, MD as PCP - General (Internal Medicine)  Dirk De Leon MD as Consulting Physician (Ophthalmology)  Oziel Mcnair MD as Consulting Physician (Cardiology)  Harsh Huerta MD as Consulting Physician (Urology)    Chief Complaint: Shortness of breath    Subjective:    Interval History:     Patient Complaints: none  Patient Denies:   Chest pain, shortness of breath, orthopnea, peripheral edema, palpitations, cough, sputum production, hemoptysis, abdominal pain, nausea, vomiting, diarrhea, fevers chills or night sweats  History taken from: patient    Review of Systems:   All systems were reviewed and negative       Objective:    Vital Sign Min/Max for last 24 hours  Temp  Min: 97 °F (36.1 °C)  Max: 98.1 °F (36.7 °C)   BP  Min: 94/27  Max: 128/30   Pulse  Min: 69  Max: 80   Resp  Min: 14  Max: 22   SpO2  Min: 91 %  Max: 100 %   Flow (L/min)  Min: 4  Max: 10   Weight  Min: 72.7 kg (160 lb 4.4 oz)  Max: 72.7 kg (160 lb 4.4 oz)     Flowsheet Rows      First Filed Value   Admission Height 180.3 cm (71\") Documented at 10/07/2021 1813   Admission Weight 72.1 kg (159 lb) Documented at 10/07/2021 1813          Physical Exam:     General Appearance:    Alert, cooperative, in no acute distress   Head:    Normocephalic, without obvious abnormality, atraumatic   Eyes:            Lids and lashes normal, conjunctivae and sclerae normal, no   icterus, no pallor, corneas clear, PERRLA   Ears:    Ears appear intact with no abnormalities noted   Throat:   No oral lesions, no thrush, oral mucosa moist   Neck:   No adenopathy, supple, trachea midline, no thyromegaly, no   carotid bruit, no JVD   Back:     No kyphosis present, no scoliosis present, no skin lesions,      erythema or scars, no tenderness to percussion or                   palpation,   range of motion normal   Lungs:    Decreased breath sounds in the bases bilaterally,respirations " regular, even and unlabored    Heart:    Regular rhythm and normal rate, normal S1 and S2, no            murmur, no gallop, no rub, no click   Chest Wall:    No abnormalities observed   Abdomen:     Normal bowel sounds, no masses, no organomegaly, soft        non-tender, non-distended, no guarding, no rebound                tenderness   Rectal:     Deferred   Extremities:   Moves all extremities well, no edema, no cyanosis, no             redness   Pulses:   Pulses palpable and equal bilaterally   Skin:   No bleeding, bruising or rash   Lymph nodes:   No palpable adenopathy   Neurologic:   Cranial nerves 2 - 12 grossly intact, sensation intact, DTR       present and equal bilaterally        Results Review:     I reviewed the patient's new clinical results.      Results from last 7 days   Lab Units 10/13/21  0544   SODIUM mmol/L 130*   POTASSIUM mmol/L 3.7   CHLORIDE mmol/L 89*   CO2 mmol/L 32.2*   BUN mg/dL 22   CREATININE mg/dL 0.90   GLUCOSE mg/dL 96   CALCIUM mg/dL 8.4*     Results from last 7 days   Lab Units 10/13/21  0544 10/12/21  0641 10/11/21  0615 10/08/21  0610 10/08/21  0610   WBC 10*3/mm3  --  6.55 6.48  --  9.12   HEMOGLOBIN g/dL 7.7* 7.9* 8.1*   < > 9.0*   HEMATOCRIT % 23.7* 24.5* 24.3*   < > 27.0*   PLATELETS 10*3/mm3  --  320 325  --  388    < > = values in this interval not displayed.         Echo EF Estimated  Lab Results   Component Value Date    ECHOEFEST 50 09/09/2016         Physical Exam    Medication Review: yes    Assessment/Plan:      Acute on chronic congestive heart failure (HCC).  Heart failure with reduced ejection fraction.  Stage C.  New York Heart Association functional class III symptoms.  Euvolemic.  Left ventricular ejection fraction: 40%.    The patient has responded favorably to initiation of guideline directed medical therapy.  I agree with the decision not to perform bilateral thoracenteses.  This will only reaccumulate given the underlying etiology of congestive heart  failure.  I agree with plans for transfusion.  Each unit of packed red blood cells should be followed with parenteral loop diuretics.  The patient's peripheral edema has now resolved.  Lungs demonstrate improved aeration.  There is no elevation in the central venous pressure.  The patient appears to have developed a contraction alkalosis.  Presumptive diagnosis of pneumonia based on CT scan of the chest findings.  No fever.  No leukocytosis.  No cough.  No sputum production.  Hyponatremia has now improved to greater than his usual baseline of serum sodium: 126 mg/dL.              Ishmael Nieves MD  10/13/21  18:14 EDT

## 2021-10-13 NOTE — CONSULTS
Date of consultation:   October 13, 2021    Requested by:   Hospitalist Service.     PCP: Vermeesch, Marilyn K, MD    Reason:  Acute Respiratory Failure. Pleural Effusions.      History of Present Illness:  87 y.o. male with past medical history significant for atrial fibrillation, previous CVA congestive heart failure and coronary disease who actually underwent surgery for tongue cancer and apparently since then has had some issues with shortness of breath.    The patient's daughter is the main source of history as the patient has mild dysarthria and sometimes difficult to understand.  The patient did however contribute somewhat throughout the history as well.    The patient's family member and the patient say that over the past few days he has been more short of breath and they noticed he was increasingly producing clear to yellowish thick sputum.  The patient denies any significant fever or chills.  The patient's family members also mention lower extremity edema which has been an ongoing issue for the past few weeks/months.    The patient's daughter denies any knowledge of sick contacts.    The patient actually came to the emergency room with shortness of breath and was felt to have a acute exacerbation of chronic systolic and diastolic heart failure and was also found to have significant hyponatremia.  During the work-up the patient was found to have bilateral effusions.    Pulmonary Consultation was requested for further recommendations.     Review of System: All other review of systems negative except indicated in HPI.     Past Medical History:  Past Medical History:   Diagnosis Date   • A-fib (HCC)    • Abnormal ECG    • Arrhythmia    • Arthritis    • Asthma Aug 2021   • Benign prostatic hyperplasia    • Cancer (HCC)     TONGUE   • Carotid stenosis     s/p right ICA stent x 2   • CHF (congestive heart failure) (HCC)    • Coronary artery disease    • COVID-19 09/2021   • Disease of thyroid gland    • Enlarged  prostate    • Fracture    • Full dentures    • GERD (gastroesophageal reflux disease)    • Heart valve disease    • Hyperlipidemia    • Hypertension    • Hypothyroidism    • Impaired functional mobility, balance, gait, and endurance 2021   • Kidney infection    • Mitral regurgitation    • Myocardial infarction (HCC) 21   • Nonrheumatic aortic valve insufficiency    • Renal calculi    • Stroke (HCC) 2016    right retinal artery occlusion   • 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HCC)    • Wears glasses          Past Surgical History:  Past Surgical History:   Procedure Laterality Date   • CAROTID ENDARTERECTOMY      X2-3/17,    • CAROTID STENT Right 2016    Carotid Wall Stent   • CAROTID STENT Right 2017    Zilver BANDAR for recurrent right ICA stenosis   • CATARACT EXTRACTION W/ INTRAOCULAR LENS IMPLANT Left 2018    Procedure: CATARACT PHACO EXTRACTION WITH INTRAOCULAR LENS IMPLANT LEFT, COMPLICATED WITH MALYUGIAN RING;  Surgeon: Paola Welch MD;  Location: [facility];  Service: Ophthalmology   • KIDNEY STONE SURGERY Right     Shafron - open   • PATELLA FRACTURE SURGERY Left    • NV TCAT IV STENT CRV CRTD ART EMBOLIC PROTECJ Right 3/4/2017    Placement of Zilver BANDAR right ICA 3/4/17   • TONGUE SURGERY  2021    cancer spot removed          Family History:  Family History   Problem Relation Age of Onset   • Heart disease Mother    • Hyperlipidemia Mother    • Hypertension Mother    • Prostate cancer Father    • Cancer Father    • Heart attack Brother    • Heart disease Brother    • Hyperlipidemia Brother    • Hypertension Brother          Social History:  Social History     Socioeconomic History   • Marital status:    Tobacco Use   • Smoking status: Former Smoker     Packs/day: 1.00     Years: 55.00     Pack years: 55.00     Types: Cigarettes     Start date:      Quit date:      Years since quittin.7   • Smokeless tobacco: Former User     Types: Chew     Quit  "date: 2005   Vaping Use   • Vaping Use: Never used   Substance and Sexual Activity   • Alcohol use: Not Currently     Alcohol/week: 0.0 standard drinks     Comment: Quit 1983   • Drug use: No   • Sexual activity: Not Currently     Partners: Female         Physical Exam:  BP (!) 128/30 (BP Location: Left arm, Patient Position: Lying) Comment: DR notified  Pulse 71   Temp 97 °F (36.1 °C) (Axillary)   Resp 14   Ht 180.3 cm (71\")   Wt 72.7 kg (160 lb 4.4 oz)   SpO2 96%   BMI 22.35 kg/m²     Constitutional:            Vital signs reviewed                     General: No acute distress noted. Able to communicate appropriately.    Eyes:            Extraocular movement was intact.            Pupils appeared equal            Conjunctiva: Pink    ENT:             Hearing was mildly impaired             No nasal erythema noted.              Oropharynx was moist. No lesions noted.     Neck:             Supple. No JVD noted.              Thyroid gland did not seem to be enlarged    Cardiovascular:              S1 + S2. Regular     Lungs/Respiratory:            Respiratory effort was mostly adequate             Dullness to percussion, bilateral.            Good Air Entry Bilaterally with crackles heard.    Abdomen/GI:            Soft             Bowel sounds sluggishly positive            No obvious organomegaly noted     Musculoskeletal/Extremities:             Gait could not be assessed at this time.             No clubbing, cyanosis noted in the upper extremities.             1-2+ edema noted in the lower extremities bilaterally.    Neurologic:             Awake, alert and oriented x 3.             Able to follow simple commands.             Hearing was slightly impaired.     Psychiatric:             Affect appeared fair.             Awake, alert and oriented x 3.    Skin:             No rash noted.             Warm and dry      Labs: Reviewed. Pertinent labs were noted.     Results from last 7 days   Lab Units " 10/13/21  0544 10/12/21  0641 10/11/21  0615 10/08/21  0610 10/07/21  1841   WBC 10*3/mm3  --  6.55 6.48 9.12 9.02   HEMOGLOBIN g/dL 7.7* 7.9* 8.1* 9.0* 8.5*   HEMATOCRIT % 23.7* 24.5* 24.3* 27.0* 24.7*   PLATELETS 10*3/mm3  --  320 325 388 404   NEUTROPHIL % %  --  69.8 71.4 85.1* 80.9*   NEUTROS ABS 10*3/mm3  --  4.57 4.62 7.76* 7.30*   EOSINOPHIL % %  --  9.3* 8.3* 2.2 2.9   EOS ABS 10*3/mm3  --  0.61* 0.54* 0.20 0.26   LYMPHOCYTE % %  --  11.5* 10.3* 4.9* 7.4*   LYMPHS ABS 10*3/mm3  --  0.75 0.67* 0.45* 0.67*       Results from last 7 days   Lab Units 10/13/21  0544 10/12/21  0641 10/11/21  0615 10/08/21  0611 10/07/21  1841   SODIUM mmol/L 130* 130* 125*   < > 114*   POTASSIUM mmol/L 3.7 3.7 3.9   < > 5.2   CHLORIDE mmol/L 89* 87* 84*   < > 77*   CO2 mmol/L 32.2* 34.8* 31.6*   < > 27.8   BUN mg/dL 22 19 21   < > 22   CREATININE mg/dL 0.90 0.89 0.85   < > 0.75*   CALCIUM mg/dL 8.4* 8.3* 8.3*   < > 8.7   EGFR IF NONAFRICN AM mL/min/1.73 80 81 85   < > 99   ANION GAP mmol/L 8.8 8.2 9.4   < > 9.2   BILIRUBIN mg/dL  --   --   --   --  0.4   ALK PHOS U/L  --   --   --   --  152*   ALT (SGPT) U/L  --   --   --   --  43*   AST (SGOT) U/L  --   --   --   --  31   GLUCOSE mg/dL 96 89 97   < > 132*   TOTAL PROTEIN g/dL  --   --   --   --  6.2   ALBUMIN g/dL 2.70* 2.80*  --   --  2.70*    < > = values in this interval not displayed.       Results from last 7 days   Lab Units 10/13/21  0544 10/12/21  0641 10/07/21  1841   MAGNESIUM mg/dL  --   --  2.1   PHOSPHORUS mg/dL 3.0 3.7  --        Lab Results   Component Value Date    PROBNP 12,738.0 (H) 10/07/2021    PROBNP 19,283.0 (H) 09/23/2021    PROBNP 18,307.0 (H) 09/22/2021       Lab Results   Component Value Date    TSH 1.310 09/26/2021    TSH 4.890 (H) 07/06/2021    TSH 4.520 (H) 07/02/2020       Lab Results   Component Value Date    FREET4 1.61 07/06/2021    FREET4 1.49 07/02/2020    FREET4 1.42 04/29/2019       Lab Results   Component Value Date    INR 0.89 03/18/2016     INR 1.0 03/18/2016       Lab Results   Component Value Date    PROCALCITO 0.08 10/07/2021    PROCALCITO 0.18 09/02/2021       No results found for: CRP    No results found for: SEDRATE    ABG: Reviewed  Lab Results   Component Value Date    PHART 7.441 09/20/2021    SFJ5GDP 35.1 09/20/2021    PO2ART 66.3 (L) 09/20/2021    J2JIWRJB 93.9 (L) 09/20/2021    CARBOXYHGB 1.5 09/20/2021         Micro: As of October 13, 2021   No results found for: RESPCX  Lab Results   Component Value Date    BLOODCX No growth at 5 days 09/02/2021    BLOODCX No growth at 5 days 09/02/2021     Lab Results   Component Value Date    URINECX (A) 10/07/2021     >100,000 CFU/mL Klebsiella pneumoniae ssp pneumoniae    URINECX Final report 09/13/2021    URINECX Final report (A) 04/05/2018     No results found for: MRSACX  No results found for: MRSAPCR  No results found for: URCX  No components found for: LOWRESPCF  No results found for: THROATCX  No results found for: CULTURES  No components found for: STREPBCX  No results found for: STREPPNEUAG  No results found for: LEGIONELLA  No results found for: MYCOPLASCX  No results found for: GCCX  No results found for: WOUNDCX  No results found for: BODYFLDCX    No results found for: FLU    Lab Results   Component Value Date    ADENOVIRUS Not Detected 09/02/2021     Lab Results   Component Value Date    XV947X Not Detected 09/02/2021     Lab Results   Component Value Date    CVHKU1 Not Detected 09/02/2021     Lab Results   Component Value Date    CVNL63 Not Detected 09/02/2021     Lab Results   Component Value Date    CVOC43 Not Detected 09/02/2021     Lab Results   Component Value Date    HUMETPNEVS Not Detected 09/02/2021     Lab Results   Component Value Date    HURVEV Not Detected 09/02/2021     Lab Results   Component Value Date    FLUBPCR Not Detected 09/02/2021     Lab Results   Component Value Date    PARAINFLUE Not Detected 09/02/2021     Lab Results   Component Value Date    PARAFLUV2 Not  Detected 09/02/2021     Lab Results   Component Value Date    PARAFLUV3 Not Detected 09/02/2021     Lab Results   Component Value Date    PARAFLUV4 Not Detected 09/02/2021     Lab Results   Component Value Date    BPERTPCR Not Detected 09/02/2021     No results found for: [id number]  Lab Results   Component Value Date    CPNEUPCR Not Detected 09/02/2021     Lab Results   Component Value Date    MPNEUMO Not Detected 09/02/2021     Lab Results   Component Value Date    FLUAPCR Not Detected 09/02/2021     No results found for: FLUAH3  No results found for: FLUAH1  Lab Results   Component Value Date    RSV Not Detected 09/02/2021     Lab Results   Component Value Date    BPARAPCR Not Detected 09/02/2021       COVID 19:  Lab Results   Component Value Date    COVID19 Detected (C) 09/15/2021     No results found for: THCURSCR  No results found for: PCPUR  No results found for: COCAINEUR  No results found for: METAMPSCNUR  No results found for: LABOPIASCN  No results found for: AMPHETSCREEN  No results found for: LABBENZSCN  No results found for: TRICYCLICSCN  No results found for: LABMETHSCN  No results found for: BARBITSCNUR  No results found for: OXYCODONESCN  No results found for: PROPOXSCN  No results found for: BUPRENORSCNU  No results found for: ETHANOLMGDL  No results found for: ETOHPCT    Pharmacy to Dose meropenem (MERREM),         Imaging Study: Latest imaging studies was reviewed personally.   Imaging Results (Last 72 Hours)     Procedure Component Value Units Date/Time    CT Chest Without Contrast Diagnostic [[id number]] Collected: 10/13/21 0931     Updated: 10/13/21 0935    Narrative:      PROCEDURE: CT CHEST WO CONTRAST DIAGNOSTIC-     HISTORY: Abnormal xray - pleural effusion; I50.9-Heart failure,  unspecified; E78.00-Pure hypercholesterolemia, unspecified;  I65.29-Occlusion and stenosis of unspecified carotid artery     COMPARISON:  None .     PROCEDURE:  Multiple axial CT images were obtained from the  thoracic  inlet through the upper abdomen without the use of contrast.      FINDINGS:   Soft tissue windows reveal no axillary, hilar or mediastinal adenopathy.  Heart size is normal. The aorta is normal in caliber. There are moderate  bilateral pleural effusions. There is no pericardial effusion.. Lung  windows reveal bilateral upper lobe airspace disease felt to reflect  pneumonia. There is compressive atelectasis in the lung bases. Within  the visualized upper abdomen there is a partially imaged right renal  cyst. The upper abdomen is otherwise unremarkable. Bone windows reveal  no acute osseous abnormalities.       Impression:      Bilateral pleural effusions with airspace disease in the  upper lobes felt to reflect a pneumonia.     485.26 mGy.cm        This study was performed with techniques to keep radiation doses as low  as reasonably achievable (ALARA). Individualized dose reduction  techniques using automated exposure control or adjustment of mA and/or  kV according to the patient size were employed.      This report was finalized on 10/13/2021 9:33 AM by Julieta Dai M.D..    XR Chest 1 View [[id number]] Collected: 10/11/21 0727     Updated: 10/11/21 0730    Narrative:      PROCEDURE: XR CHEST 1 VW-     HISTORY: CHF, dyspnea; I50.9-Heart failure, unspecified; E78.00-Pure  hypercholesterolemia, unspecified; I65.29-Occlusion and stenosis of  unspecified carotid artery     COMPARISON: 10/7/2020 1/9/2021.     FINDINGS: The heart is normal in size. The mediastinum is unremarkable.  There is interstitial pulmonary edema with bilateral effusions and  bibasilar opacities. There is no pneumothorax.  There are no acute  osseous abnormalities.       Impression:      No significant change in the appearance of the chest.     Continued followup is recommended.     This report was finalized on 10/11/2021 7:28 AM by Julieta Dai M.D..            ECHO:  Results for orders placed during the hospital encounter  of 10/07/21    Adult Transthoracic Echo Complete W/ Cont if Necessary Per Protocol    Interpretation Summary  1.  Normal left ventricular size with mildly reduced LV systolic function, LVEF 40-45%.  2.  Moderate hypokinesis of the inferior wall.  3.  Grade 1 diastolic dysfunction.  4.  Normal right ventricular size and systolic function.  5.  Mildly increased left atrial volume index.  6.  Mild AI, MR, and TR.  7.  Trace PI.  8.  Moderate left pleural effusion.      Results for orders placed during the hospital encounter of 09/02/21    Adult Transthoracic Echo Complete w/ Color, Spectral and Contrast if necessary per protocol    Interpretation Summary  1.  Normal left ventricular size with low normal LV systolic function, LVEF 50-55%.  2.  Grade 1 diastolic dysfunction.  3.  Normal right ventricular size and systolic function.  4.  Normal left atrial volume index.  5.  Moderate aortic regurgitation.  6.  Mild MR and TR.      Results for orders placed in visit on 08/30/16    Adult stress echo with color and doppler    Interpretation Summary  Small area of stress-induced distal inferior and apical akinesis.  No ischemic EKG changes.  Overall Global increase in left ventricular ejection fraction as well as decrease in left ventricular systolic volumes.  This represents a low risk abnormal treadmill stress echo.          Assessment:  1.  Shortness of breath  2.  Bilateral pleural effusions  3.  Decompensated diastolic congestive heart failure  4.  Anemia  5.  Atrial fibrillation  6.  Recent resection of tongue cancer  7.  Hyponatremia    Discussion/Recommendations:   I reviewed the images with the patient and his daughter in great detail.    The CT of the chest does confirm bilateral pleural effusions along with airspace disease in both upper lobes.    His initial BNP was significantly elevated which does suggest the likelihood of decompensated diastolic heart failure.    His procalcitonin level was unremarkable on  admission which does go against pneumonia but his bilateral upper lobe/midlung zone infiltrates do suggest the possibility of pneumonia.    Given the fact that he recently underwent lung surgery, aspiration will need to be considered.    As per the nurse, speech and swallow evaluation has been requested and is pending.    Given the fact that the pleural effusions are bilateral, not entirely clear if thoracentesis would be beneficial.  Even if thoracentesis is considered, we will have to hold his anticoagulation for at least 36 hours.    I would suggest considering thoracentesis only if he has no significant improvement in his symptoms over the next few days.    I also believe that the patient may be in decompensated diastolic heart failure because of high output possibly due to anemia and have discussed the possibility of transfusion with the patient, his daughter and Dr. Perez.    Chest x-ray will be repeated in the morning.    Nephrology is following the patient closely.    The plan was discussed with the patient & his daughter.  I have also discussed the case with the nursing staff.    Recommendations were also discussed with the referring provider.     I would like to thank you for the opportunity to participate in the care of this patient.  We will communicate changes and recommendations, if and when necessary.      This document was electronically signed by Jeremiah Ghotra MD on 10/13/21 at 12:47 EDT      Dictated utilizing Dragon dictation.

## 2021-10-13 NOTE — PROGRESS NOTES
[facility] Progress Note  [facility]. [place]        Patient Name: Andrew Hernandez  : 1934  MRN: [id number]   LOS: 6 days    Patient Care Team:  Vermeesch, Marilyn K, MD as PCP - General (Internal Medicine)  Dirk De Leon MD as Consulting Physician (Ophthalmology)  Oziel Mcnair MD as Consulting Physician (Cardiology)  Harsh Huerta MD as Consulting Physician (Urology)    Chief Complaint:    Chief Complaint   Patient presents with   • Shortness of Breath     Primary Care Physician:  Vermeesch, Marilyn K, MD  Date of admission: 10/7/2021    Subjective     Interval History:   Patient is lot more awake alert and interactive this morning he said he feels better.  No other family members around.  While talking to me he has he has been coughing some.  Does appear that he required BiPAP overnight, has been on nonrebreather and a CT of the chest has been ordered.  Clinically does not look any worse, does appear to have some increased shortness of breath.  Review of Systems:   As noted above    Objective     Vitals:   Temp:  [97.8 °F (36.6 °C)-98.1 °F (36.7 °C)] 98.1 °F (36.7 °C)  Heart Rate:  [69-80] 75  Resp:  [14-22] 14  BP: ()/(30-45) 120/40  Flow (L/min):  [5-10] 10    Intake/Output Summary (Last 24 hours) at 10/13/2021 0814  Last data filed at 10/13/2021 0606  Gross per 24 hour   Intake 480 ml   Output 1120 ml   Net -640 ml       Physical Exam:    General Appearance: alert, no acute distress   Skin: warm and dry  HEENT: oral mucosa normal, nonicteric sclera  Neck: supple, no JVD  Lungs: CTA, except left lower chest has no breath sounds.  Heart: RRR, normal S1 and S2  Abdomen: soft, nontender, nondistended  : no palpable bladder  Extremities: no edema, cyanosis or clubbing  Neuro: normal speech and grossly nonfocal.    Scheduled Meds:     Current Facility-Administered Medications   Medication Dose Route Frequency Provider Last Rate Last Admin   •  acetaminophen (TYLENOL) tablet 650 mg  650 mg Oral Q4H PRN Edgardo Ly MD        Or   • acetaminophen (TYLENOL) 160 MG/5ML solution 650 mg  650 mg Oral Q4H PRN Edgardo Ly MD        Or   • acetaminophen (TYLENOL) suppository 650 mg  650 mg Rectal Q4H PRN Edgardo Ly MD       • amiodarone (PACERONE) tablet 200 mg  200 mg Oral Q24H Ishmael Nieves MD   200 mg at 10/12/21 0838   • apixaban (ELIQUIS) tablet 5 mg  5 mg Oral Q12H Edgardo Ly MD   5 mg at 10/12/21 2116   • ascorbic acid (VITAMIN C) tablet 500 mg  500 mg Oral Daily Edgardo Ly MD   500 mg at 10/12/21 0839   • aspirin EC tablet 81 mg  81 mg Oral Daily Edgardo Ly MD   81 mg at 10/12/21 0838   • atorvastatin (LIPITOR) tablet 20 mg  20 mg Oral Nightly Edgardo Ly MD   20 mg at 10/12/21 2116   • sennosides-docusate (PERICOLACE) 8.6-50 MG per tablet 2 tablet  2 tablet Oral BID Edgardo Ly MD   2 tablet at 10/12/21 2116    And   • polyethylene glycol (MIRALAX) packet 17 g  17 g Oral Daily PRN Edgardo Ly MD        And   • bisacodyl (DULCOLAX) EC tablet 5 mg  5 mg Oral Daily PRN Edgardo Ly MD        And   • bisacodyl (DULCOLAX) suppository 10 mg  10 mg Rectal Daily PRN Edgardo Ly MD       • clobetasol (TEMOVATE) 0.05 % cream 1 application  1 application Topical Q12H Jeane Perez DO   1 application at 10/12/21 2117   • digoxin (LANOXIN) tablet 125 mcg  125 mcg Oral Q3 Days Ishmael Nieves MD   125 mcg at 10/12/21 1143   • famotidine (PEPCID) tablet 20 mg  20 mg Oral BID Edgardo Ly MD   20 mg at 10/12/21 2117   • ferrous sulfate EC tablet 324 mg  324 mg Oral Daily With Breakfast Edgardo Ly MD   324 mg at 10/12/21 0838   • iron sucrose (VENOFER) 200 mg in sodium chloride 0.9 % 100 mL IVPB  200 mg Intravenous Daily Estuardo Winchester MD, FASN 220 mL/hr at 10/12/21 0914 200 mg at 10/12/21 0914   • levothyroxine (SYNTHROID, LEVOTHROID) tablet 50 mcg  50 mcg Oral Daily With  Breakfast Edgardo Ly MD   50 mcg at 10/12/21 0839   • megestrol (MEGACE) tablet 40 mg  40 mg Oral BID Edgardo Ly MD   40 mg at 10/12/21 2117   • melatonin tablet 5 mg  5 mg Oral Nightly PRN Edgardo Ly MD       • metoprolol succinate XL (TOPROL-XL) 24 hr tablet 50 mg  50 mg Oral Q24H Ishmael Nieves MD   50 mg at 10/12/21 0838   • nitroglycerin (NITROSTAT) SL tablet 0.4 mg  0.4 mg Sublingual Q5 Min PRN Edgardo Ly MD       • ondansetron (ZOFRAN) injection 4 mg  4 mg Intravenous Q6H PRN Edgardo Ly MD       • PRO-STAT oral liquid 30 mL  30 mL Oral BID Turner Chavez MD   30 mL at 10/12/21 2117   • sacubitril-valsartan (ENTRESTO) 24-26 MG tablet 1 tablet  1 tablet Oral Q12H Ishmael Nieves MD   1 tablet at 10/12/21 2116   • sodium chloride 0.9 % flush 10 mL  10 mL Intravenous PRN Edgardo Ly MD       • sodium chloride 0.9 % flush 3 mL  3 mL Intravenous Q12H Edgardo Ly MD   3 mL at 10/12/21 2119   • sodium chloride 0.9 % flush 3-10 mL  3-10 mL Intravenous PRN Edgardo Ly MD       • sodium chloride 0.9 % nebulizer solution 3 mL  3 mL Nebulization Q6H Jeane Perez DO   3 mL at 10/12/21 1925   • sodium chloride tablet 1 g  1 g Oral BID With Meals Estuardo Winchester MD, FASN   1 g at 10/12/21 1733   • tamsulosin (FLOMAX) 24 hr capsule 0.4 mg  0.4 mg Oral Nightly Edgardo Ly MD   0.4 mg at 10/12/21 2117       amiodarone, 200 mg, Oral, Q24H  apixaban, 5 mg, Oral, Q12H  vitamin C, 500 mg, Oral, Daily  aspirin, 81 mg, Oral, Daily  atorvastatin, 20 mg, Oral, Nightly  clobetasol, 1 application, Topical, Q12H  digoxin, 125 mcg, Oral, Q3 Days  famotidine, 20 mg, Oral, BID  ferrous sulfate, 324 mg, Oral, Daily With Breakfast  iron sucrose (VENOFER) IVPB, 200 mg, Intravenous, Daily  levothyroxine, 50 mcg, Oral, Daily With Breakfast  megestrol, 40 mg, Oral, BID  metoprolol succinate XL, 50 mg, Oral, Q24H  PRO-STAT, 30 mL, Oral, BID  sacubitril-valsartan, 1  tablet, Oral, Q12H  senna-docusate sodium, 2 tablet, Oral, BID  sodium chloride, 3 mL, Intravenous, Q12H  sodium chloride, 3 mL, Nebulization, Q6H  sodium chloride, 1 g, Oral, BID With Meals  tamsulosin, 0.4 mg, Oral, Nightly        IV Meds:        Results Reviewed:   I have personally reviewed the results from the time of this admission to 10/13/2021 08:14 EDT     Results from last 7 days   Lab Units 10/13/21  0544 10/12/21  0641 10/11/21  0615 10/08/21  0611 10/07/21  1841   SODIUM mmol/L 130* 130* 125*   < > 114*   POTASSIUM mmol/L 3.7 3.7 3.9   < > 5.2   CHLORIDE mmol/L 89* 87* 84*   < > 77*   CO2 mmol/L 32.2* 34.8* 31.6*   < > 27.8   BUN mg/dL 22 19 21   < > 22   CREATININE mg/dL 0.90 0.89 0.85   < > 0.75*   CALCIUM mg/dL 8.4* 8.3* 8.3*   < > 8.7   BILIRUBIN mg/dL  --   --   --   --  0.4   ALK PHOS U/L  --   --   --   --  152*   ALT (SGPT) U/L  --   --   --   --  43*   AST (SGOT) U/L  --   --   --   --  31   GLUCOSE mg/dL 96 89 97   < > 132*    < > = values in this interval not displayed.       Estimated Creatinine Clearance: 59.5 mL/min (by C-G formula based on SCr of 0.9 mg/dL).    Results from last 7 days   Lab Units 10/13/21  0544 10/12/21  0641 10/07/21  1841   MAGNESIUM mg/dL  --   --  2.1   PHOSPHORUS mg/dL 3.0 3.7  --        Results from last 7 days   Lab Units 10/09/21  0937 10/08/21  0611   URIC ACID mg/dL 6.7 6.6       Results from last 7 days   Lab Units 10/13/21  0544 10/12/21  0641 10/11/21  0615 10/08/21  0610 10/07/21  1841   WBC 10*3/mm3  --  6.55 6.48 9.12 9.02   HEMOGLOBIN g/dL 7.7* 7.9* 8.1* 9.0* 8.5*   PLATELETS 10*3/mm3  --  320 325 388 404             Brief Urine Lab Results  (Last result in the past 365 days)      Color   Clarity   Blood   Leuk Est   Nitrite   Protein   CREAT   Urine HCG        10/08/21 0242 Yellow   Clear   Negative   Negative   Negative   Negative                 No results found for: UTPCR    Imaging Results (Last 24 Hours)     ** No results found for the last 24  hours. **              Assessment / Plan     ASSESSMENT:    1. 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  POA is fairly clear does not want any aggressive measures and keep him comfortable.  She is okay to give medications and do blood work.  I will check serum and urine osmolality, with the diuretics I am not sure if we can get the good number.  She is okay with the salt tablets.  It will be started.  His TSH is normal.  2. Acute on chronic congestive heart failure (HCC): As per cardiology service currently on twice a day IV diuretics and can be continued.  Rest of the treatment as per cardiology services.  3. Hypertension blood pressures on the low side he is not on a lot of blood pressure medications.  4. A. fib: Rate controlled and anticoagulated.  5. Anemia: Check iron stores and start the oral iron.  6. Hypothyroidism: Last TSH is within normal limits on Synthroid.  7. UTI: He was started on Bactrim.    PLAN:  · He has some improvement in alkalosis, will go ahead and give him 40 mg of Lasix IV along with Diamox 250 mg IV and see if we can manage his fluid status slightly better.  · Continue with oral sodium tablets.  Sodium has been stable at 130 will decrease the dose to once a day.  It may go down after the diuretics.  Will have to be assessed on daily basis.  · Patient may be developing a pneumonia CT of the chest has been ordered awaiting results.  · If no significant improvement in hemoglobin we will plan on doing a blood transfusion in the morning.  · He has received 1 g of IV iron which should be good enough to replenish the iron stores.  · Details were discussed with the patient.  · Continue with rest of the current treatment plan and surveillance labs.  · Further recommendations will depend on clinical course of the patient during the current hospitalization.      Thank you for involving us in the care of Andrew Hernandez.  Please feel free to call with any questions.    Estuardo Winchester MD, FASN  10/13/21  08:14 EDT    [facility]  [phone]      Much of this encounter note is an electronic transcription/translation of spoken language to printed text. The electronic translation of spoken language may permit erroneous, or at times, nonsensical words or phrases to be inadvertently transcribed; Although I have reviewed the note for such errors, some may still exist.

## 2021-10-13 NOTE — PLAN OF CARE
Goal Outcome Evaluation:           Progress: improving  Outcome Summary: Bedside eval of swallow completed with pt. seated upright in bed for po trials.  He has previous hx of CVA and tongue CA with resection affecting left ROM and general weakness further affecting motor speech as pt. had dysarthria.  He was edentulous. Pt.is currently on modified diet and denied diffiulty or dysphagia on it.  He was given trials of puree and thin liquids.  Oral phase was WFL with trials presented.  No overt s/s aspiration with any trial.  He did exhibit belching post swallows of thin, however, indicative of third stage dysphagia (he has dx of GERD already) which may increase his risk of aspiration from refluxate if not well managed.  Recommend:  1. continue pureed diet with thin liq as norman,  2. meds whole in pudding/applesauce as norman,  3. aspiration precautions,  4. reflux precautions,  5. MD may wish to review reflux meds to ensure optimal dosage,  6. no straws.

## 2021-10-13 NOTE — THERAPY TREATMENT NOTE
Pt declined PTx this date. Pt reports fatigue limiting his ability to participate. Encouraged there exer in supine throughout the day. Pt agreeable. Will f/u tomorrow.

## 2021-10-14 NOTE — PROGRESS NOTES
[facility]    Name:  Andrew Hernandez   Age:  87 y.o.  Sex:  male  :  1934  MRN:  [id number]   Visit Number:  [id number]  Admission Date:  10/7/2021  Date Of Service:  10/14/21  Primary Care Physician:  Vermeesch, Marilyn K, MD     LOS: 7 days :    Chief Complaint:      Follow-up shortness of breath, weakness    Subjective:    Patient seen and evaluated this morning.  No acute events overnight.  Some better still with pallor.  No fevers or chills.  Family at bedside.  Hospital Course:    87-year-old gentleman with history of chronic systolic/diastolic heart failure, chronic hypoxic respiratory failure, atrial fibrillation, anemia, BPH, CAD, tongue cancer status post resection, prior CVA, carotid stenosis, hypertension, hyperlipidemia who presented to the emergency room for shortness of breath.  Initial work-up concerning for acute on chronic diastolic congestive heart failure in possible urinary tract infection.  Was admitted to the hospital service and started on IV diuretics with nephrology consultation.  Patient with no significant improvement in bilateral pleural effusions.  Pulmonology consultation requested on 10/13/2021.    Review of Systems:     All systems were reviewed and negative except as mentioned in subjective, assessment and plan.    Vital Signs:    Temp:  [97.4 °F (36.3 °C)-98 °F (36.7 °C)] 97.7 °F (36.5 °C)  Heart Rate:  [] 106  Resp:  [16-20] 17  BP: ()/(27-40) 113/40    Intake and output:    I/O last 3 completed shifts:  In: 740 [P.O.:440; IV Piggyback:300]  Out: 2000 [Urine:2000]  I/O this shift:  In: 170 [P.O.:170]  Out: 200 [Urine:200]    Physical Examination:    General Appearance:  Alert and cooperative.  Chronically ill-appearing   Head:  Atraumatic and normocephalic.   Eyes: Conjunctivae and sclerae normal, no icterus. No pallor.   Throat: No oral lesions, no thrush, oral mucosa moist.   Neck: Supple, trachea midline, no  thyromegaly.   Lungs:    Breath sounds diminished at the bases.   Heart:  Normal S1 and S2, no murmur, no gallop, no rub. No JVD.   Abdomen:   Normal bowel sounds, no masses, no organomegaly. Soft, nontender, nondistended, no rebound tenderness.   Extremities: Supple,  1+ lower edema, no cyanosis, no clubbing.   Skin: No bleeding or rash.   Neurologic: Alert and oriented x 3. No facial asymmetry. Moves all four limbs. No tremors.      Laboratory results:    Results from last 7 days   Lab Units 10/14/21  0503 10/13/21  0544 10/12/21  0641 10/08/21  0611 10/07/21  1841   SODIUM mmol/L 130* 130* 130*   < > 114*   POTASSIUM mmol/L 3.6 3.7 3.7   < > 5.2   CHLORIDE mmol/L 94* 89* 87*   < > 77*   CO2 mmol/L 28.9 32.2* 34.8*   < > 27.8   BUN mg/dL 19 22 19   < > 22   CREATININE mg/dL 0.95 0.90 0.89   < > 0.75*   CALCIUM mg/dL 8.2* 8.4* 8.3*   < > 8.7   BILIRUBIN mg/dL  --   --   --   --  0.4   ALK PHOS U/L  --   --   --   --  152*   ALT (SGPT) U/L  --   --   --   --  43*   AST (SGOT) U/L  --   --   --   --  31   GLUCOSE mg/dL 92 96 89   < > 132*    < > = values in this interval not displayed.     Results from last 7 days   Lab Units 10/14/21  0503 10/13/21  0544 10/12/21  0641 10/11/21  0615 10/11/21  0615   WBC 10*3/mm3 6.49  --  6.55  --  6.48   HEMOGLOBIN g/dL 8.3* 7.7* 7.9*   < > 8.1*   HEMATOCRIT % 25.8* 23.7* 24.5*   < > 24.3*   PLATELETS 10*3/mm3 280  --  320  --  325    < > = values in this interval not displayed.         Results from last 7 days   Lab Units 10/07/21  1841   TROPONIN T ng/mL <0.010     Results from last 7 days   Lab Units 10/07/21  2014   URINECX  >100,000 CFU/mL Klebsiella pneumoniae ssp pneumoniae*         I have reviewed the patient's laboratory results.    Radiology results:    CT Chest Without Contrast Diagnostic    Result Date: 10/13/2021  PROCEDURE: CT CHEST WO CONTRAST DIAGNOSTIC-  HISTORY: Abnormal xray - pleural effusion; I50.9-Heart failure, unspecified; E78.00-Pure hypercholesterolemia,  unspecified; I65.29-Occlusion and stenosis of unspecified carotid artery  COMPARISON:  None .  PROCEDURE:  Multiple axial CT images were obtained from the thoracic inlet through the upper abdomen without the use of contrast.  FINDINGS: Soft tissue windows reveal no axillary, hilar or mediastinal adenopathy. Heart size is normal. The aorta is normal in caliber. There are moderate bilateral pleural effusions. There is no pericardial effusion.. Lung windows reveal bilateral upper lobe airspace disease felt to reflect pneumonia. There is compressive atelectasis in the lung bases. Within the visualized upper abdomen there is a partially imaged right renal cyst. The upper abdomen is otherwise unremarkable. Bone windows reveal no acute osseous abnormalities.      Impression: Bilateral pleural effusions with airspace disease in the upper lobes felt to reflect a pneumonia.  485.26 mGy.cm   This study was performed with techniques to keep radiation doses as low as reasonably achievable (ALARA). Individualized dose reduction techniques using automated exposure control or adjustment of mA and/or kV according to the patient size were employed.  This report was finalized on 10/13/2021 9:33 AM by Julieta Dai M.D..    XR Chest 1 View    Result Date: 10/14/2021  PROCEDURE: XR CHEST 1 VW-    HISTORY: Resp Failure.; I50.9-Heart failure, unspecified; E78.00-Pure hypercholesterolemia, unspecified; I65.29-Occlusion and stenosis of unspecified carotid artery  COMPARISON: October 11, 2021.  FINDINGS: The heart is normal in size. The mediastinum is unremarkable. There is interstitial edema. Left greater than right pleural effusions are unchanged. There is no pneumothorax. There are no acute osseous abnormalities.      Impression: Interstitial edema.  Left greater than right pleural effusions are unchanged.    Images were reviewed, interpreted, and dictated by Dr. Julieta Dai M.D. Transcribed by Yessica Merino PA-C.  This  report was finalized on 10/14/2021 12:04 PM by Julieta Dai M.D..    I have reviewed the patient's radiology reports.    Medication Review:     I have reviewed the patient's active and prn medications.     Problem List:      Acute on chronic congestive heart failure (HCC)      Assessment:    1.  Acute exacerbation of chronic combined systolic and diastolic heart failure, present on admission  2.  Hyponatremia, chronic issue likely acutely worsened in the setting of #1  3.  Chronic hypoxic respiratory failure on 3 to 4 L of oxygen continuously  4.  Paroxysmal atrial fibrillation on Eliquis, currently in sinus rhythm  5.  Anemia  6. BPH  7. Recent diagnosis of COVID-19  8. Coronary artery disease  9. Tongue cancer status post resection  10. Previous CVA with complete right vision loss  11. Carotid artery stenosis status post stenting of right carotid  12. Hypertension  13. Hyperlipidemia   14. Hypothyroidism   15.  Aortic regurgitation    Plan:    Patient remains stable on 4 to 5 L nasal cannula oxygen.  His kidney function and sodium have been stable.  His hemoglobin is also stable.  Blood transfusion today.  Continue with meropenem for now.  Diuretics per nephrology.  Continue with PT and OT evaluations, patient has been hesitant to participate particularly with ambulation.  Encourage oral intake.  No plans for thoracentesis at this time.    Goals of care will be continued to be discussed with patient and family.  Long-term and short-term prognosis is overall poor.    DVT Prophylaxis: Eliquis  Code Status: Full  Diet: Fluid/sodium restricted  Discharge Plan: Multiple days    Jeane Perez DO  10/14/21  16:42 EDT    Dictated utilizing Dragon dictation.

## 2021-10-14 NOTE — PROGRESS NOTES
"Date of service:  October 14, 2021    Reason:  Acute Respiratory Failure. Pleural Effusions.    Subjective: Patient was able to rest comfortably overnight.  Patient states he feels about the same.  Denies any significant shortness of breath.      Review of System: All other review of systems negative except indicated in HPI.       Intake/Output Summary (Last 24 hours) at 10/14/2021 1533  Last data filed at 10/14/2021 1300  Gross per 24 hour   Intake 300 ml   Output 750 ml   Net -450 ml         Physical Exam:  /40 (BP Location: Right arm, Patient Position: Lying)   Pulse 89   Temp 97.8 °F (36.6 °C) (Axillary)   Resp 16   Ht 180.3 cm (71\")   Wt 72 kg (158 lb 11.7 oz)   SpO2 97%   BMI 22.14 kg/m²                  General: No acute distress noted.  Able to answer questions appropriately, appears stated age, appears chronically ill  Eyes: Extra ocular muscles are intact, no eye discharge  ENT: Oropharynx is moist  Neck: Supple, no significant JVD appreciated  Cardiovascular: S1 + S2. Regular rate  Lungs/Respiratory: Good air movement, minimal bibasilar crackles  Abdomen/GI: Soft, bowel sounds noted, no obvious tenderness to palpation  Musculoskeletal/Extremities: Diffuse deconditioning and sarcopenia, trace pedal edema, no clubbing  Neurologic: Awake, alert and oriented x3, lying in bed and therefore full exam could not be done, mildly hard of hearing  Psychiatric: Does not appear anxious on exam  Skin: No rashes appreciated on exposed skin, pale, no petechiae noted            Labs: Reviewed. Pertinent labs were noted.     Results from last 7 days   Lab Units 10/14/21  0503 10/13/21  0544 10/12/21  0641 10/11/21  0615 10/08/21  0610 10/07/21  1841 10/07/21  1841   WBC 10*3/mm3 6.49  --  6.55 6.48 9.12  --  9.02   HEMOGLOBIN g/dL 8.3* 7.7* 7.9* 8.1* 9.0*   < > 8.5*   HEMATOCRIT % 25.8* 23.7* 24.5* 24.3* 27.0*   < > 24.7*   PLATELETS 10*3/mm3 280  --  320 325 388  --  404   NEUTROPHIL % %  --   --  69.8 71.4 " 85.1*  --  80.9*   NEUTROS ABS 10*3/mm3 4.67  --  4.57 4.62 7.76*  --  7.30*   EOSINOPHIL % %  --   --  9.3* 8.3* 2.2  --  2.9   EOS ABS 10*3/mm3 0.39  --  0.61* 0.54* 0.20  --  0.26   LYMPHOCYTE % %  --   --  11.5* 10.3* 4.9*  --  7.4*   LYMPHS ABS 10*3/mm3  --   --  0.75 0.67* 0.45*  --  0.67*    < > = values in this interval not displayed.       Results from last 7 days   Lab Units 10/14/21  0503 10/13/21  0544 10/12/21  0641 10/08/21  0611 10/07/21  1841   SODIUM mmol/L 130* 130* 130*   < > 114*   POTASSIUM mmol/L 3.6 3.7 3.7   < > 5.2   CHLORIDE mmol/L 94* 89* 87*   < > 77*   CO2 mmol/L 28.9 32.2* 34.8*   < > 27.8   BUN mg/dL 19 22 19   < > 22   CREATININE mg/dL 0.95 0.90 0.89   < > 0.75*   CALCIUM mg/dL 8.2* 8.4* 8.3*   < > 8.7   EGFR IF NONAFRICN AM mL/min/1.73 75 80 81   < > 99   ANION GAP mmol/L 7.1 8.8 8.2   < > 9.2   BILIRUBIN mg/dL  --   --   --   --  0.4   ALK PHOS U/L  --   --   --   --  152*   ALT (SGPT) U/L  --   --   --   --  43*   AST (SGOT) U/L  --   --   --   --  31   GLUCOSE mg/dL 92 96 89   < > 132*   TOTAL PROTEIN g/dL  --   --   --   --  6.2   ALBUMIN g/dL 2.50* 2.70* 2.80*  --  2.70*    < > = values in this interval not displayed.       Results from last 7 days   Lab Units 10/14/21  0503 10/13/21  0544 10/12/21  0641 10/07/21  1841   MAGNESIUM mg/dL  --   --   --  2.1   PHOSPHORUS mg/dL 3.0 3.0 3.7  --        Lab Results   Component Value Date    PROBNP 12,738.0 (H) 10/07/2021    PROBNP 19,283.0 (H) 09/23/2021    PROBNP 18,307.0 (H) 09/22/2021       Lab Results   Component Value Date    TSH 1.310 09/26/2021    TSH 4.890 (H) 07/06/2021    TSH 4.520 (H) 07/02/2020       Lab Results   Component Value Date    FREET4 1.61 07/06/2021    FREET4 1.49 07/02/2020    FREET4 1.42 04/29/2019       Lab Results   Component Value Date    INR 0.89 03/18/2016    INR 1.0 03/18/2016       Lab Results   Component Value Date    PROCALCITO 0.08 10/07/2021    PROCALCITO 0.18 09/02/2021       No results found for:  CRP    No results found for: SEDRATE    ABG: Reviewed  Lab Results   Component Value Date    PHART 7.441 09/20/2021    ZPB1PRO 35.1 09/20/2021    PO2ART 66.3 (L) 09/20/2021    Z6MBNDLD 93.9 (L) 09/20/2021    CARBOXYHGB 1.5 09/20/2021         Micro: As of October 14, 2021   No results found for: RESPCX  Lab Results   Component Value Date    BLOODCX No growth at 5 days 09/02/2021    BLOODCX No growth at 5 days 09/02/2021     Lab Results   Component Value Date    URINECX (A) 10/07/2021     >100,000 CFU/mL Klebsiella pneumoniae ssp pneumoniae    URINECX Final report 09/13/2021    URINECX Final report (A) 04/05/2018     No results found for: MRSACX  No results found for: MRSAPCR  No results found for: URCX  No components found for: LOWRESPCF  No results found for: THROATCX  No results found for: CULTURES  No components found for: STREPBCX  No results found for: STREPPNEUAG  No results found for: LEGIONELLA  No results found for: MYCOPLASCX  No results found for: GCCX  No results found for: WOUNDCX  No results found for: BODYFLDCX    No results found for: FLU    Lab Results   Component Value Date    ADENOVIRUS Not Detected 09/02/2021     Lab Results   Component Value Date    QL187L Not Detected 09/02/2021     Lab Results   Component Value Date    CVHKU1 Not Detected 09/02/2021     Lab Results   Component Value Date    CVNL63 Not Detected 09/02/2021     Lab Results   Component Value Date    CVOC43 Not Detected 09/02/2021     Lab Results   Component Value Date    HUMETPNEVS Not Detected 09/02/2021     Lab Results   Component Value Date    HURVEV Not Detected 09/02/2021     Lab Results   Component Value Date    FLUBPCR Not Detected 09/02/2021     Lab Results   Component Value Date    PARAINFLUE Not Detected 09/02/2021     Lab Results   Component Value Date    PARAFLUV2 Not Detected 09/02/2021     Lab Results   Component Value Date    PARAFLUV3 Not Detected 09/02/2021     Lab Results   Component Value Date    PARAFLUV4 Not  Detected 09/02/2021     Lab Results   Component Value Date    BPERTPCR Not Detected 09/02/2021     No results found for: [id number]  Lab Results   Component Value Date    CPNEUPCR Not Detected 09/02/2021     Lab Results   Component Value Date    MPNEUMO Not Detected 09/02/2021     Lab Results   Component Value Date    FLUAPCR Not Detected 09/02/2021     No results found for: FLUAH3  No results found for: FLUAH1  Lab Results   Component Value Date    RSV Not Detected 09/02/2021     Lab Results   Component Value Date    BPARAPCR Not Detected 09/02/2021       COVID 19:  Lab Results   Component Value Date    COVID19 Detected (C) 09/15/2021     No results found for: THCURSCR  No results found for: PCPUR  No results found for: COCAINEUR  No results found for: METAMPSCNUR  No results found for: LABOPIASCN  No results found for: AMPHETSCREEN  No results found for: LABBENZSCN  No results found for: TRICYCLICSCN  No results found for: LABMETHSCN  No results found for: BARBITSCNUR  No results found for: OXYCODONESCN  No results found for: PROPOXSCN  No results found for: BUPRENORSCNU  No results found for: ETHANOLMGDL  No results found for: ETOHPCT    Pharmacy to Dose meropenem (MERREM),         Imaging Study: Latest imaging studies was reviewed personally.   Imaging Results (Last 72 Hours)     Procedure Component Value Units Date/Time    XR Chest 1 View [[id number]] Collected: 10/14/21 1038     Updated: 10/14/21 1039    Narrative:      PROCEDURE: XR CHEST 1 VW-        HISTORY: Resp Failure.; I50.9-Heart failure, unspecified; E78.00-Pure  hypercholesterolemia, unspecified; I65.29-Occlusion and stenosis of  unspecified carotid artery     COMPARISON: October 11, 2021.     FINDINGS: The heart is normal in size. The mediastinum is unremarkable.  There is interstitial edema. Left greater than right pleural effusions  are unchanged. There is no pneumothorax. There are no acute osseous  abnormalities.       Impression:       Interstitial edema.     Left greater than right pleural effusions are unchanged.           Images were reviewed, interpreted, and dictated by Dr. Julieta Dai M.D.  Transcribed by Yessica Merino PA-C.    CT Chest Without Contrast Diagnostic [[id number]] Collected: 10/13/21 0931     Updated: 10/13/21 0935    Narrative:      PROCEDURE: CT CHEST WO CONTRAST DIAGNOSTIC-     HISTORY: Abnormal xray - pleural effusion; I50.9-Heart failure,  unspecified; E78.00-Pure hypercholesterolemia, unspecified;  I65.29-Occlusion and stenosis of unspecified carotid artery     COMPARISON:  None .     PROCEDURE:  Multiple axial CT images were obtained from the thoracic  inlet through the upper abdomen without the use of contrast.      FINDINGS:   Soft tissue windows reveal no axillary, hilar or mediastinal adenopathy.  Heart size is normal. The aorta is normal in caliber. There are moderate  bilateral pleural effusions. There is no pericardial effusion.. Lung  windows reveal bilateral upper lobe airspace disease felt to reflect  pneumonia. There is compressive atelectasis in the lung bases. Within  the visualized upper abdomen there is a partially imaged right renal  cyst. The upper abdomen is otherwise unremarkable. Bone windows reveal  no acute osseous abnormalities.       Impression:      Bilateral pleural effusions with airspace disease in the  upper lobes felt to reflect a pneumonia.     485.26 mGy.cm        This study was performed with techniques to keep radiation doses as low  as reasonably achievable (ALARA). Individualized dose reduction  techniques using automated exposure control or adjustment of mA and/or  kV according to the patient size were employed.      This report was finalized on 10/13/2021 9:33 AM by Julieta Dai M.D..            ECHO:  Results for orders placed during the hospital encounter of 10/07/21    Adult Transthoracic Echo Complete W/ Cont if Necessary Per Protocol    Interpretation Summary  1.   Normal left ventricular size with mildly reduced LV systolic function, LVEF 40-45%.  2.  Moderate hypokinesis of the inferior wall.  3.  Grade 1 diastolic dysfunction.  4.  Normal right ventricular size and systolic function.  5.  Mildly increased left atrial volume index.  6.  Mild AI, MR, and TR.  7.  Trace PI.  8.  Moderate left pleural effusion.      Results for orders placed during the hospital encounter of 09/02/21    Adult Transthoracic Echo Complete w/ Color, Spectral and Contrast if necessary per protocol    Interpretation Summary  1.  Normal left ventricular size with low normal LV systolic function, LVEF 50-55%.  2.  Grade 1 diastolic dysfunction.  3.  Normal right ventricular size and systolic function.  4.  Normal left atrial volume index.  5.  Moderate aortic regurgitation.  6.  Mild MR and TR.      Results for orders placed in visit on 08/30/16    Adult stress echo with color and doppler    Interpretation Summary  Small area of stress-induced distal inferior and apical akinesis.  No ischemic EKG changes.  Overall Global increase in left ventricular ejection fraction as well as decrease in left ventricular systolic volumes.  This represents a low risk abnormal treadmill stress echo.          Assessment:  1.  Abnormal CT scan of the chest   Patient with bilateral effusions with plan as detailed below.  Also with airspace disease noted bilaterally in the upper and midlung zones concerning for possible pneumonia.  No white count noted and procalcitonin level was normal on admission.  However, given his multiple comorbidities including high risk of aspiration given history of tongue cancer has been placed on empiric antibiotics.  Day #2 of meropenem.  I would only give a short course of 5 days unless cultures become positive    2.  Bilateral pleural effusions  Most likely related to his heart failure given bilateral nature.  I discussed with patient and family at bedside which was his daughter-in-law,  Roshni, that removing the fluid would be a temporary measure as it will reaccumulate due to his heart failure.  At this point our goals are to treat the things that can be treated and will try medical management of the heart failure.      3.  Decompensated diastolic congestive heart failure  Cardiology following.  Will defer management to primary service and cardiology    4.  Anemia  Status post blood transfusion with mild rise in hemoglobin.    5.  Atrial fibrillation  Currently rate controlled    6.  Recent resection of tongue cancer  Puts him at high risk for chronic microaspiration   Reviewed speech therapy notes and they recommend him to continue on puréed diet with thin liquids as tolerated    7.  Hyponatremia  Continues to improve    Discussion/Recommendations:   I discussed the case with Dr. White and Dr. Perez.    At this point, pulmonary will sign off the case.  Please feel free to call with any questions that you may have.    This document was electronically signed by Melodie Overton MD on 10/14/21 at 10:44 EDT      Dictated utilizing Dragon dictation.

## 2021-10-14 NOTE — PROGRESS NOTES
[facility] Progress Note  [facility]. [place]        Patient Name: Andrew Hernandez  : 1934  MRN: [id number]   LOS: 7 days    Patient Care Team:  Vermeesch, Marilyn K, MD as PCP - General (Internal Medicine)  Dirk De Leon MD as Consulting Physician (Ophthalmology)  Oziel Mcnair MD as Consulting Physician (Cardiology)  Harsh Huerta MD as Consulting Physician (Urology)    Chief Complaint:    Chief Complaint   Patient presents with   • Shortness of Breath     Primary Care Physician:  Vermeesch, Marilyn K, MD  Date of admission: 10/7/2021    Subjective     Interval History:   Events noted from last 24 hours.  Patient denies having any more cough today.  He said he feels better.  Patient is lot more awake alert and interactive this morning he said he feels better.  His oxygen requirement has improved significantly he is only on 4 L nasal cannula.  Review of Systems:   As noted above    Objective     Vitals:   Temp:  [97 °F (36.1 °C)-98 °F (36.7 °C)] 97.8 °F (36.6 °C)  Heart Rate:  [] 89  Resp:  [14-20] 16  BP: ()/(27-40) 105/40  Flow (L/min):  [4-5] 5    Intake/Output Summary (Last 24 hours) at 10/14/2021 0803  Last data filed at 10/14/2021 0125  Gross per 24 hour   Intake 660 ml   Output 1350 ml   Net -690 ml       Physical Exam:    General Appearance: alert, no acute distress   Skin: warm and dry  HEENT: oral mucosa normal, nonicteric sclera  Neck: supple, no JVD  Lungs: CTA, except left lower chest has no breath sounds.  Heart: RRR, normal S1 and S2  Abdomen: soft, nontender, nondistended  : no palpable bladder  Extremities: no edema, cyanosis or clubbing  Neuro: normal speech and grossly nonfocal.    Scheduled Meds:     Current Facility-Administered Medications   Medication Dose Route Frequency Provider Last Rate Last Admin   • acetaminophen (TYLENOL) tablet 650 mg  650 mg Oral Q4H PRN Edgardo Ly MD        Or   • acetaminophen (TYLENOL)  160 MG/5ML solution 650 mg  650 mg Oral Q4H PRN Edgardo Ly MD        Or   • acetaminophen (TYLENOL) suppository 650 mg  650 mg Rectal Q4H PRN Edgardo Ly MD       • amiodarone (PACERONE) tablet 200 mg  200 mg Oral Q24H Ishmael Nieves MD   200 mg at 10/13/21 0828   • apixaban (ELIQUIS) tablet 5 mg  5 mg Oral Q12H Edgardo Ly MD   5 mg at 10/13/21 2032   • ascorbic acid (VITAMIN C) tablet 500 mg  500 mg Oral Daily Edgardo Ly MD   500 mg at 10/13/21 0829   • aspirin EC tablet 81 mg  81 mg Oral Daily Edgardo Ly MD   81 mg at 10/13/21 0829   • atorvastatin (LIPITOR) tablet 20 mg  20 mg Oral Nightly Edgardo Ly MD   20 mg at 10/13/21 2032   • sennosides-docusate (PERICOLACE) 8.6-50 MG per tablet 2 tablet  2 tablet Oral BID Edgardo Ly MD   2 tablet at 10/13/21 2031    And   • polyethylene glycol (MIRALAX) packet 17 g  17 g Oral Daily PRN Edgardo Ly MD        And   • bisacodyl (DULCOLAX) EC tablet 5 mg  5 mg Oral Daily PRN Edgardo Ly MD        And   • bisacodyl (DULCOLAX) suppository 10 mg  10 mg Rectal Daily PRN Edgardo Ly MD       • clobetasol (TEMOVATE) 0.05 % cream 1 application  1 application Topical Q12H Jeane Perez DO   1 application at 10/13/21 2032   • digoxin (LANOXIN) tablet 125 mcg  125 mcg Oral Q3 Days Ishmael Nieves MD   125 mcg at 10/12/21 1143   • ferrous sulfate EC tablet 324 mg  324 mg Oral Daily With Breakfast Edgardo Ly MD   324 mg at 10/13/21 0829   • levothyroxine (SYNTHROID, LEVOTHROID) tablet 50 mcg  50 mcg Oral Daily With Breakfast Edgardo Ly MD   50 mcg at 10/13/21 0828   • megestrol (MEGACE) tablet 40 mg  40 mg Oral BID Edgardo Ly MD   40 mg at 10/13/21 2031   • melatonin tablet 5 mg  5 mg Oral Nightly PRN Edgardo Ly MD   5 mg at 10/13/21 2147   • meropenem in sodium chloride 0.9% 50 mL (MERREM) IVPB 1 g  1 g Intravenous Q8H Jeane Perez DO   1 g at 10/14/21 0125   •  metoprolol succinate XL (TOPROL-XL) 24 hr tablet 50 mg  50 mg Oral Q24H Ishmael Nieves MD   50 mg at 10/13/21 0911   • nitroglycerin (NITROSTAT) SL tablet 0.4 mg  0.4 mg Sublingual Q5 Min PRN Edgardo Ly MD       • ondansetron (ZOFRAN) injection 4 mg  4 mg Intravenous Q6H PRN Edgardo Ly MD       • pantoprazole (PROTONIX) EC tablet 40 mg  40 mg Oral Q AM Jeane Perez DO   40 mg at 10/14/21 0529   • Pharmacy to Dose meropenem (MERREM)   Does not apply Continuous PRN Jeane Perez DO       • PRO-STAT oral liquid 30 mL  30 mL Oral BID Turner Chavez MD   30 mL at 10/13/21 0843   • sacubitril-valsartan (ENTRESTO) 24-26 MG tablet 1 tablet  1 tablet Oral Q12H Ishmael Nieves MD   1 tablet at 10/13/21 2031   • sodium chloride 0.9 % flush 10 mL  10 mL Intravenous PRN Edgardo Ly MD       • sodium chloride 0.9 % flush 3 mL  3 mL Intravenous Q12H Edgardo Ly MD   3 mL at 10/13/21 2030   • sodium chloride 0.9 % flush 3-10 mL  3-10 mL Intravenous PRN Edgardo Ly MD       • sodium chloride 0.9 % nebulizer solution 3 mL  3 mL Nebulization Q6H While Awake - RT Jeane Perez DO   3 mL at 10/14/21 0635   • sodium chloride tablet 1 g  1 g Oral Daily Estuardo Winchester MD, FASN       • tamsulosin (FLOMAX) 24 hr capsule 0.4 mg  0.4 mg Oral Nightly Edgardo Ly MD   0.4 mg at 10/13/21 2031       amiodarone, 200 mg, Oral, Q24H  apixaban, 5 mg, Oral, Q12H  vitamin C, 500 mg, Oral, Daily  aspirin, 81 mg, Oral, Daily  atorvastatin, 20 mg, Oral, Nightly  clobetasol, 1 application, Topical, Q12H  digoxin, 125 mcg, Oral, Q3 Days  ferrous sulfate, 324 mg, Oral, Daily With Breakfast  levothyroxine, 50 mcg, Oral, Daily With Breakfast  megestrol, 40 mg, Oral, BID  meropenem, 1 g, Intravenous, Q8H  metoprolol succinate XL, 50 mg, Oral, Q24H  pantoprazole, 40 mg, Oral, Q AM  PRO-STAT, 30 mL, Oral, BID  sacubitril-valsartan, 1 tablet, Oral, Q12H  senna-docusate sodium, 2 tablet,  Oral, BID  sodium chloride, 3 mL, Intravenous, Q12H  sodium chloride, 3 mL, Nebulization, Q6H While Awake - RT  sodium chloride, 1 g, Oral, Daily  tamsulosin, 0.4 mg, Oral, Nightly        IV Meds:   Pharmacy to Dose meropenem (MERREM),         Results Reviewed:   I have personally reviewed the results from the time of this admission to 10/14/2021 08:03 EDT     Results from last 7 days   Lab Units 10/14/21  0503 10/13/21  0544 10/12/21  0641 10/08/21  0611 10/07/21  1841   SODIUM mmol/L 130* 130* 130*   < > 114*   POTASSIUM mmol/L 3.6 3.7 3.7   < > 5.2   CHLORIDE mmol/L 94* 89* 87*   < > 77*   CO2 mmol/L 28.9 32.2* 34.8*   < > 27.8   BUN mg/dL 19 22 19   < > 22   CREATININE mg/dL 0.95 0.90 0.89   < > 0.75*   CALCIUM mg/dL 8.2* 8.4* 8.3*   < > 8.7   BILIRUBIN mg/dL  --   --   --   --  0.4   ALK PHOS U/L  --   --   --   --  152*   ALT (SGPT) U/L  --   --   --   --  43*   AST (SGOT) U/L  --   --   --   --  31   GLUCOSE mg/dL 92 96 89   < > 132*    < > = values in this interval not displayed.       Estimated Creatinine Clearance: 55.8 mL/min (by C-G formula based on SCr of 0.95 mg/dL).    Results from last 7 days   Lab Units 10/14/21  0503 10/13/21  0544 10/12/21  0641 10/07/21  1841   MAGNESIUM mg/dL  --   --   --  2.1   PHOSPHORUS mg/dL 3.0 3.0 3.7  --        Results from last 7 days   Lab Units 10/09/21  0937 10/08/21  0611   URIC ACID mg/dL 6.7 6.6       Results from last 7 days   Lab Units 10/14/21  0503 10/13/21  0544 10/12/21  0641 10/11/21  0615 10/08/21  0610 10/07/21  1841 10/07/21  1841   WBC 10*3/mm3 6.49  --  6.55 6.48 9.12  --  9.02   HEMOGLOBIN g/dL 8.3* 7.7* 7.9* 8.1* 9.0*   < > 8.5*   PLATELETS 10*3/mm3 280  --  320 325 388  --  404    < > = values in this interval not displayed.             Brief Urine Lab Results  (Last result in the past 365 days)      Color   Clarity   Blood   Leuk Est   Nitrite   Protein   CREAT   Urine HCG        10/08/21 0242 Yellow   Clear   Negative   Negative   Negative    Negative                 No results found for: [facility]    Imaging Results (Last 24 Hours)     Procedure Component Value Units Date/Time    XR Chest 1 View [[id number]] Resulted: 10/14/21 0523     Updated: 10/14/21 0523    CT Chest Without Contrast Diagnostic [[id number]] Collected: 10/13/21 0931     Updated: 10/13/21 0935    Narrative:      PROCEDURE: CT CHEST WO CONTRAST DIAGNOSTIC-     HISTORY: Abnormal xray - pleural effusion; I50.9-Heart failure,  unspecified; E78.00-Pure hypercholesterolemia, unspecified;  I65.29-Occlusion and stenosis of unspecified carotid artery     COMPARISON:  None .     PROCEDURE:  Multiple axial CT images were obtained from the thoracic  inlet through the upper abdomen without the use of contrast.      FINDINGS:   Soft tissue windows reveal no axillary, hilar or mediastinal adenopathy.  Heart size is normal. The aorta is normal in caliber. There are moderate  bilateral pleural effusions. There is no pericardial effusion.. Lung  windows reveal bilateral upper lobe airspace disease felt to reflect  pneumonia. There is compressive atelectasis in the lung bases. Within  the visualized upper abdomen there is a partially imaged right renal  cyst. The upper abdomen is otherwise unremarkable. Bone windows reveal  no acute osseous abnormalities.       Impression:      Bilateral pleural effusions with airspace disease in the  upper lobes felt to reflect a pneumonia.     485.26 mGy.cm        This study was performed with techniques to keep radiation doses as low  as reasonably achievable (ALARA). Individualized dose reduction  techniques using automated exposure control or adjustment of mA and/or  kV according to the patient size were employed.      This report was finalized on 10/13/2021 9:33 AM by Julieta Dai M.D..              Assessment / Plan     ASSESSMENT:    1. Hyponatremia: Patient with chronic hyponatremia likely has reset osmole stat, currently appears to be volume overloaded and on  diuretics with some improvement in his serum sodium.  POA is fairly clear does not want any aggressive measures and keep him comfortable.  She is okay to give medications and do blood work.  I will check serum and urine osmolality, with the diuretics I am not sure if we can get the good number.  She is okay with the salt tablets.  It will be started.  His TSH is normal.  2. Acute on chronic congestive heart failure (HCC): As per cardiology service currently on twice a day IV diuretics and can be continued.  Rest of the treatment as per cardiology services.  3. Hypertension blood pressures on the low side he is not on a lot of blood pressure medications.  4. A. fib: Rate controlled and anticoagulated.  5. Anemia: Check iron stores and start the oral iron.  6. Hypothyroidism: Last TSH is within normal limits on Synthroid.      PLAN:  · His hemoglobin did improve, I will go ahead and give him 25 g of albumin and a dose of Lasix after that, his electrolytes are fairly stable.  His alkalosis has resolved.  · If hemoglobin drops below 8 we will transfuse him 1 unit of blood, otherwise I think we will see some improvement in his hemoglobin after the iron infusion.  · Clinically does appear to have improved with IV antibiotics and will be continued.  · Details were discussed with the patient.  I called the POA yesterday evening and had at length discussion about his clinical condition.  Palliative care has been consulted.  · Continue with rest of the current treatment plan and surveillance labs.  · Further recommendations will depend on clinical course of the patient during the current hospitalization.     Thank you for involving us in the care of Andrew Hernandez.  Please feel free to call with any questions.    Estuardo Winchester MD, RALPHN  10/14/21  08:03 EDT    [facility]  [phone]      Much of this encounter note is an electronic transcription/translation of spoken language to printed text. The  electronic translation of spoken language may permit erroneous, or at times, nonsensical words or phrases to be inadvertently transcribed; Although I have reviewed the note for such errors, some may still exist.

## 2021-10-14 NOTE — THERAPY TREATMENT NOTE
Patient Name: Andrew Hernandez  : 1934    MRN: [id number]                              Today's Date: 10/14/2021       Admit Date: 10/7/2021    Visit Dx:     ICD-10-CM ICD-9-CM   1. Acute on chronic congestive heart failure, unspecified heart failure type ([facility])  I50.9 428.0   2. Hypercholesterolemia  E78.00 272.0   3. 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    • Carotid stenosis, symptomatic w/o infarct   • Hyperkalemia   • Hyponatremia   • Encounter for Medicare annual wellness exam   • Tongue lesion   • Abdominal bruit   • Edema due to hypervolemia   • Acute on chronic diastolic (congestive) heart failure ([facility])   • Acute cystitis without hematuria   • Acute on chronic combined systolic and diastolic congestive heart failure ([facility])   • COVID-19 virus detected   • Chronic atrial fibrillation with RVR ([facility])   • Hyponatremia   • Late effects of CVA (cerebrovascular accident)   • Neoplasm, uncertain whether benign or malignant   • Chronic anticoagulation   • Acute and chronic respiratory failure with hypoxia ([facility])   • Blindness of right eye   • Pressure injury of coccygeal region, stage 2 ([facility])   • Physical deconditioning   • Impaired mobility and ADLs   • Acute on chronic congestive heart failure ([facility])     Past Medical History:   Diagnosis Date   • A-fib ([facility])    • Abnormal ECG    • Arrhythmia    • Arthritis    • Asthma Aug 2021   • Benign prostatic hyperplasia     • Cancer (HCC)     TONGUE   • Carotid stenosis     s/p right ICA stent x 2   • CHF (congestive heart failure) ([facility])    • Coronary artery disease    • COVID-19 09/2021   • Disease of thyroid gland    • Enlarged prostate    • Fracture    • Full dentures    • GERD (gastroesophageal reflux disease)    • Heart valve disease    • Hyperlipidemia    • Hypertension    • Hypothyroidism    • Impaired functional mobility, balance, gait, and endurance 09/03/2021   • Kidney infection    • Mitral regurgitation    • Myocardial infarction ([facility]) 8-16-21   • Nonrheumatic aortic valve insufficiency    • Renal calculi    • Stroke ([facility]) 03/2016    right retinal artery occlusion   • 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HCC)    • Wears glasses      Past Surgical History:   Procedure Laterality Date   • CAROTID ENDARTERECTOMY      X2-3/17, 9/17   • CAROTID STENT Right 03/18/2016    Carotid Wall Stent   • CAROTID STENT Right 03/04/2017    Zilver BANDAR for recurrent right ICA stenosis   • CATARACT EXTRACTION W/ INTRAOCULAR LENS IMPLANT Left 9/4/2018    Procedure: CATARACT PHACO EXTRACTION WITH INTRAOCULAR LENS IMPLANT LEFT, COMPLICATED WITH MALYUGIAN RING;  Surgeon: Paola Welch MD;  Location: [facility];  Service: Ophthalmology   • KIDNEY STONE SURGERY Right 2011    Shafron - open   • PATELLA FRACTURE SURGERY Left 1986   • ID TCAT IV STENT CRV CRTD ART EMBOLIC PROTECJ Right 3/4/2017    Placement of Zilver BANDAR right ICA 3/4/17   • TONGUE SURGERY  08/16/2021    cancer spot removed       General Information     Row Name 10/14/21 1449          OT Time and Intention    Document Type therapy note (daily note) (P)   -SP     Mode of Treatment occupational therapy (P)   -SP     Row Name 10/14/21 1449          General Information    Patient Profile Reviewed yes (P)   -SP           User Key  (r) = Recorded By, (t) = Taken By, (c) = Cosigned By    Initials Name Provider Type    SP Lora Vital OT Student OT Student                 Mobility/ADL's    No  documentation.                Obj/Interventions     Row Name 10/14/21 1450          Shoulder (Therapeutic Exercise)    Shoulder (Therapeutic Exercise) strengthening exercise (P)   -SP     Shoulder Strengthening (Therapeutic Exercise) bilateral; horizontal aBduction/aDduction; resistance band; yellow (P)   -SP     Row Name 10/14/21 1450          Elbow/Forearm (Therapeutic Exercise)    Elbow/Forearm (Therapeutic Exercise) strengthening exercise (P)   -SP     Elbow/Forearm Strengthening (Therapeutic Exercise) right; flexion; extension; 10 repetitions; resistance tubing (P)   -SP     Row Name 10/14/21 1450          Therapeutic Exercise    Therapeutic Exercise shoulder; elbow/forearm (P)   -SP           User Key  (r) = Recorded By, (t) = Taken By, (c) = Cosigned By    Initials Name Provider Type    Lora Morales OT Student OT Student               Goals/Plan    No documentation.                Clinical Impression     Row Name 10/14/21 1450          Pain Scale: Numbers Pre/Post-Treatment    Pretreatment Pain Rating 0/10 - no pain (P)   -SP     Posttreatment Pain Rating 0/10 - no pain (P)   -SP     Row Name 10/14/21 1450          Plan of Care Review    Plan of Care Reviewed With patient (P)   -SP     Progress improving (P)   -SP     Outcome Summary Pt willing to participate in bedside exercises with OT this date. Pt tolerated bilateral shoulder strengthening with yellow resistence band x10 reps and R bicep/tricep exercises x10 reps. Pt unable to exercise L bicep/tricep due to IV placement. Pt will continue to benefit from skilled OT services to address deficits outlined in POC. (P)   -SP     Row Name 10/14/21 1450          Vital Signs    O2 Delivery Pre Treatment nasal cannula (P)   4L  -SP     O2 Delivery Intra Treatment nasal cannula (P)   -SP     O2 Delivery Post Treatment nasal cannula (P)   -SP     Pre Patient Position Supine (P)   -SP     Intra Patient Position Supine (P)   -SP     Post Patient Position Supine (P)    -SP     Row Name 10/14/21 8010          Positioning and Restraints    Pre-Treatment Position in bed (P)   -SP     Post Treatment Position bed (P)   -SP     In Bed supine; call light within reach; encouraged to call for assist; exit alarm on (P)   -SP           User Key  (r) = Recorded By, (t) = Taken By, (c) = Cosigned By    Initials Name Provider Type    SP Lora Vital, OT Student OT Student               Outcome Measures    No documentation.                 Occupational Therapy Education                 Title: PT OT SLP Therapies (In Progress)     Topic: Occupational Therapy (In Progress)     Point: ADL training (Not Started)     Description:   Instruct learner(s) on proper safety adaptation and remediation techniques during self care or transfers.   Instruct in proper use of assistive devices.              Learner Progress:  Not documented in this visit.          Point: Home exercise program (Done)     Description:   Instruct learner(s) on appropriate technique for monitoring, assisting and/or progressing therapeutic exercises/activities.              Learning Progress Summary           Patient Acceptance, E,TB, VU by SP at 10/14/2021 1823    Comment: Pt educated on bedside exercises.    Acceptance, E,D, DU,NR by TL at 10/11/2021 1309    Comment: Patient educated on B UE ROM exercises with focus on technique and proper body mechanics.  Discussed the importance of movement and breathing coordination with activity.   Family Acceptance, E,D, DU,NR by TL at 10/11/2021 1309    Comment: Patient educated on B UE ROM exercises with focus on technique and proper body mechanics.  Discussed the importance of movement and breathing coordination with activity.                   Point: Precautions (Done)     Description:   Instruct learner(s) on prescribed precautions during self-care and functional transfers.              Learning Progress Summary           Patient Acceptance, E,D, DU,NR by TL at 10/11/2021 1306     Comment: Patient educated on B UE ROM exercises with focus on technique and proper body mechanics.  Discussed the importance of movement and breathing coordination with activity.   Family Acceptance, E,D, DU,NR by TL at 10/11/2021 1309    Comment: Patient educated on B UE ROM exercises with focus on technique and proper body mechanics.  Discussed the importance of movement and breathing coordination with activity.                   Point: Body mechanics (Done)     Description:   Instruct learner(s) on proper positioning and spine alignment during self-care, functional mobility activities and/or exercises.              Learning Progress Summary           Patient Acceptance, E,D, DU,NR by TL at 10/11/2021 1309    Comment: Patient educated on B UE ROM exercises with focus on technique and proper body mechanics.  Discussed the importance of movement and breathing coordination with activity.   Family Acceptance, E,D, DU,NR by TL at 10/11/2021 1309    Comment: Patient educated on B UE ROM exercises with focus on technique and proper body mechanics.  Discussed the importance of movement and breathing coordination with activity.                               User Key     Initials Effective Dates Name Provider Type Discipline    TL 06/16/21 -  Emily Flanagan OTR Occupational Therapist OT    SP 06/21/21 -  Lora Vital OT Student OT Student OT              OT Recommendation and Plan     Plan of Care Review  Plan of Care Reviewed With: (P) patient  Progress: (P) improving  Outcome Summary: (P) Pt willing to participate in bedside exercises with OT this date. Pt tolerated bilateral shoulder strengthening with yellow resistence band x10 reps and R bicep/tricep exercises x10 reps. Pt unable to exercise L bicep/tricep due to IV placement. Pt will continue to benefit from skilled OT services to address deficits outlined in POC.     Time Calculation:    Time Calculation- OT     Row Name 10/14/21 3832             Time Calculation- OT     OT Start Time 1311 (P)   -SP      OT Stop Time 1323 (P)   -SP      OT Time Calculation (min) 12 min (P)   -SP      OT Received On 10/14/21 (P)   -SP      OT Goal Re-Cert Due Date 10/25/21 (P)   -SP              Timed Charges    79140 - OT Therapeutic Exercise Minutes 12 (P)   -SP              Total Minutes    Timed Charges Total Minutes 12 (P)   -SP       Total Minutes 12 (P)   -SP            User Key  (r) = Recorded By, (t) = Taken By, (c) = Cosigned By    Initials Name Provider Type    SP Lora Vital OT Student OT Student              Therapy Charges for Today     Code Description Service Date Service Provider Modifiers Qty    38883404049 HC OT THER PROC EA 15 MIN 10/14/2021 Lora Vital OT Student GO 1               NICHOLE Sam  10/14/2021

## 2021-10-14 NOTE — PLAN OF CARE
Visited pt in his room; his son (Tony) was at bedside.  Pt was sitting up in bed receiving a breathing treatment.  Spoke to son outside of room.  Tony shares that pt had a stroke several years ago due to blocked arteries.  However, he only had minimal eye deficits that lingered.  Since that time, he shares that pt has been pretty healthy until his oral cancer surgery in August.  The son reports that he had a heart attack during surgery and hasn't been able to fully recovery since that time.  Pt lives in an apartment by his son (Abhay).  Tony reports that they are a close family and have strong [employer] beliefs.  Abhay is the POA, but they all communicate about patient's care. He states they will plan discuss plan for pt's future care.  Contact information provided.  Palliative/supportive services will continue to follow and assist as needed.

## 2021-10-14 NOTE — PLAN OF CARE
Goal Outcome Evaluation:           Progress: no change  Outcome Summary: Patient rested comfortably through the night. Patient was only alert to self around bedtime. Reorientation given. Interventions completed as apropriate. Will continue to monitor.

## 2021-10-14 NOTE — THERAPY TREATMENT NOTE
"Per pt report,\" I have already done a lot of stuff today. I'm too tired.\" Will f/u with pt tomorrow.      "

## 2021-10-14 NOTE — CASE MANAGEMENT/SOCIAL WORK
Continued Stay Note   [facility]     Patient Name: Andrew Hernandez  MRN: [id number]  Today's Date: 10/14/2021    Admit Date: 10/7/2021     Discharge Plan     Row Name 10/14/21 0855       Plan    Plan Plan is to return home with assistance of family               Discharge Codes    No documentation.                     Jackelyn Hunt RN

## 2021-10-14 NOTE — PLAN OF CARE
Goal Outcome Evaluation:  Plan of Care Reviewed With: (P) patient        Progress: (P) improving  Outcome Summary: (P) Pt willing to participate in bedside exercises with OT this date. Pt tolerated bilateral shoulder strengthening with yellow resistence band x10 reps and R bicep/tricep exercises x10 reps. Pt unable to exercise L bicep/tricep due to IV placement. Pt will continue to benefit from skilled OT services to address deficits outlined in POC.

## 2021-10-15 NOTE — PLAN OF CARE
Goal Outcome Evaluation:           Progress: improving  Outcome Summary: Vital signs stable. Patient sats maintained above 93% on 5L NC. Patient disoriented to place, time, and situation once during shift. Patient reoriented. No other acute changes noted during shift. Interventions completed as appropriate. Will continue to monitor.

## 2021-10-15 NOTE — PROGRESS NOTES
[facility]    Name:  Andrew Hernandez   Age:  87 y.o.  Sex:  male  :  1934  MRN:  [id number]   Visit Number:  [id number]  Admission Date:  10/7/2021  Date Of Service:  10/15/21  Primary Care Physician:  Vermeesch, Marilyn K, MD     LOS: 8 days :    Chief Complaint:      Follow-up shortness of breath, weakness    Subjective:  Patient seen again today.  Daughter-in-law at bedside.  Patient states still with some cough, albeit he feels overall okay.  Discussed plan for 1 unit PRBCs.  He was agreeable.  Is hoping to go home soon.    Hospital Course:    87-year-old gentleman with history of chronic systolic/diastolic heart failure, chronic hypoxic respiratory failure, atrial fibrillation, anemia, BPH, CAD, tongue cancer status post resection, prior CVA, carotid stenosis, hypertension, hyperlipidemia who presented to the emergency room for shortness of breath.  Initial work-up concerning for acute on chronic diastolic congestive heart failure in possible urinary tract infection.  Was admitted to the hospital service and started on IV diuretics with nephrology consultation.  Patient with no significant improvement in bilateral pleural effusions.  Pulmonology consultation requested on 10/13/2021.    Review of Systems:     All systems were reviewed and negative except as mentioned in subjective, assessment and plan.    Vital Signs:    Temp:  [97.3 °F (36.3 °C)-97.9 °F (36.6 °C)] 97.5 °F (36.4 °C)  Heart Rate:  [] 78  Resp:  [18-20] 20  BP: ()/(40-45) 118/44    Intake and output:    I/O last 3 completed shifts:  In: 450 [P.O.:350; IV Piggyback:100]  Out: 500 [Urine:500]  I/O this shift:  In: 688.8 [P.O.:400; Blood:288.8]  Out: -     Physical Examination:    General Appearance:  Alert and cooperative.  Chronically ill-appearing   Head:  Atraumatic and normocephalic.   Eyes: Conjunctivae and sclerae normal, no icterus. No pallor.   Throat: No oral lesions, no  thrush, oral mucosa moist.   Neck: Supple, trachea midline, no thyromegaly.   Lungs:    Breath sounds diminished at the bases.   Heart:  Normal S1 and S2, no murmur, no gallop, no rub. No JVD.   Abdomen:   Normal bowel sounds, no masses, no organomegaly. Soft, nontender, nondistended, no rebound tenderness.   Extremities: Supple,  1+ lower edema unchanged, no cyanosis, no clubbing.   Skin: No bleeding or rash.   Neurologic: Alert and oriented x 3. No facial asymmetry. Moves all four limbs. No tremors.  Generally weak     Laboratory results:    Results from last 7 days   Lab Units 10/15/21  0521 10/14/21  0503 10/13/21  0544   SODIUM mmol/L 131* 130* 130*   POTASSIUM mmol/L 3.7 3.6 3.7   CHLORIDE mmol/L 96* 94* 89*   CO2 mmol/L 28.0 28.9 32.2*   BUN mg/dL 21 19 22   CREATININE mg/dL 0.93 0.95 0.90   CALCIUM mg/dL 8.4* 8.2* 8.4*   GLUCOSE mg/dL 97 92 96     Results from last 7 days   Lab Units 10/15/21  0521 10/14/21  0503 10/13/21  0544 10/12/21  0641 10/12/21  0641 10/11/21  0615 10/11/21  0615   WBC 10*3/mm3  --  6.49  --   --  6.55  --  6.48   HEMOGLOBIN g/dL 7.5* 8.3* 7.7*   < > 7.9*   < > 8.1*   HEMATOCRIT % 23.0* 25.8* 23.7*   < > 24.5*   < > 24.3*   PLATELETS 10*3/mm3  --  280  --   --  320  --  325    < > = values in this interval not displayed.                     I have reviewed the patient's laboratory results.    Radiology results:    XR Chest 1 View    Result Date: 10/14/2021  PROCEDURE: XR CHEST 1 VW-    HISTORY: Resp Failure.; I50.9-Heart failure, unspecified; E78.00-Pure hypercholesterolemia, unspecified; I65.29-Occlusion and stenosis of unspecified carotid artery  COMPARISON: October 11, 2021.  FINDINGS: The heart is normal in size. The mediastinum is unremarkable. There is interstitial edema. Left greater than right pleural effusions are unchanged. There is no pneumothorax. There are no acute osseous abnormalities.      Impression: Interstitial edema.  Left greater than right pleural effusions are  unchanged.    Images were reviewed, interpreted, and dictated by Dr. Julieta Dai M.D. Transcribed by Yessica Merino PA-C.  This report was finalized on 10/14/2021 12:04 PM by Julieta Dai M.D..    I have reviewed the patient's radiology reports.    Medication Review:     I have reviewed the patient's active and prn medications.     Problem List:      Acute on chronic congestive heart failure (HCC)      Assessment:    1.  Acute exacerbation of chronic combined systolic and diastolic heart failure, present on admission  2.  Hyponatremia, chronic issue likely acutely worsened in the setting of #1  3.  Chronic hypoxic respiratory failure on 3 to 4 L of oxygen continuously  4.  Paroxysmal atrial fibrillation on Eliquis, currently in sinus rhythm  5.  Anemia  6. BPH  7. Recent diagnosis of COVID-19  8. Coronary artery disease  9. Tongue cancer status post resection  10. Previous CVA with complete right vision loss  11. Carotid artery stenosis status post stenting of right carotid  12. Hypertension  13. Hyperlipidemia   14. Hypothyroidism   15.  Aortic regurgitation    Plan:    Patient remained stable on 4 L nasal cannula oxygen.  His kidney function and sodium have been stable.  He will receive 1 unit of PRBCs per nephrology recommendations with IV diuretics thereafter.  We will continue the meropenem through today and then discontinue thereafter.  Continue to encourage PT and OT.  Encourage dietary intake.  Discussed with daughter-in-law at bedside and patient.  Could potentially discharge over the weekend if patient feels well and progresses.    DVT Prophylaxis: Eliquis  Code Status: Full  Diet: Fluid/sodium restricted  Discharge Plan: Multiple days    Jeane Perez DO  10/15/21  17:15 EDT    Dictated utilizing Dragon dictation.

## 2021-10-15 NOTE — PLAN OF CARE
Goal Outcome Evaluation:  Plan of Care Reviewed With: patient, durable power of          Patient received one unit of PRBC with no adverse reaction noted. Participated with therapy today. Discharge plans include home with family and home health. Possible discharge tomorrow if stable. Continue to monitor patient progress.

## 2021-10-15 NOTE — THERAPY TREATMENT NOTE
Patient Name: Andrew Hernandez  : 1934    MRN: [id number]                              Today's Date: 10/15/2021       Admit Date: 10/7/2021    Visit Dx:     ICD-10-CM ICD-9-CM   1. Acute on chronic congestive heart failure, unspecified heart failure type ([facility])  I50.9 428.0   2. Hypercholesterolemia  E78.00 272.0   3. 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    • Carotid stenosis, symptomatic w/o infarct   • Hyperkalemia   • Hyponatremia   • Encounter for Medicare annual wellness exam   • Tongue lesion   • Abdominal bruit   • Edema due to hypervolemia   • Acute on chronic diastolic (congestive) heart failure ([facility])   • Acute cystitis without hematuria   • Acute on chronic combined systolic and diastolic congestive heart failure ([facility])   • COVID-19 virus detected   • Chronic atrial fibrillation with RVR ([facility])   • Hyponatremia   • Late effects of CVA (cerebrovascular accident)   • Neoplasm, uncertain whether benign or malignant   • Chronic anticoagulation   • Acute and chronic respiratory failure with hypoxia ([facility])   • Blindness of right eye   • Pressure injury of coccygeal region, stage 2 ([facility])   • Physical deconditioning   • Impaired mobility and ADLs   • Acute on chronic congestive heart failure ([facility])     Past Medical History:   Diagnosis Date   • A-fib ([facility])    • Abnormal ECG    • Arrhythmia    • Arthritis    • Asthma Aug 2021   • Benign prostatic hyperplasia     • Cancer (HCC)     TONGUE   • Carotid stenosis     s/p right ICA stent x 2   • CHF (congestive heart failure) ([facility])    • Coronary artery disease    • COVID-19 09/2021   • Disease of thyroid gland    • Enlarged prostate    • Fracture    • Full dentures    • GERD (gastroesophageal reflux disease)    • Heart valve disease    • Hyperlipidemia    • Hypertension    • Hypothyroidism    • Impaired functional mobility, balance, gait, and endurance 09/03/2021   • Kidney infection    • Mitral regurgitation    • Myocardial infarction ([facility]) 8-16-21   • Nonrheumatic aortic valve insufficiency    • Renal calculi    • Stroke ([facility]) 03/2016    right retinal artery occlusion   • 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[facility])    • Wears glasses      Past Surgical History:   Procedure Laterality Date   • CAROTID ENDARTERECTOMY      X2-3/17, 9/17   • CAROTID STENT Right 03/18/2016    Carotid Wall Stent   • CAROTID STENT Right 03/04/2017    [place] BANDAR for recurrent right ICA stenosis   • CATARACT EXTRACTION W/ INTRAOCULAR LENS IMPLANT Left 9/4/2018    Procedure: CATARACT PHACO EXTRACTION WITH INTRAOCULAR LENS IMPLANT LEFT, COMPLICATED WITH MALYUGIAN RING;  Surgeon: Paola Welch MD;  Location: [facility];  Service: Ophthalmology   • KIDNEY STONE SURGERY Right 2011    [facility] - open   • PATELLA FRACTURE SURGERY Left 1986   • DE TCAT IV STENT CRV CRTD ART EMBOLIC PROTECJ Right 3/4/2017    Placement of [place] BANDAR right ICA 3/4/17   • TONGUE SURGERY  08/16/2021    cancer spot removed       General Information     Row Name 10/15/21 1323          Physical Therapy Time and Intention    Document Type therapy note (daily note)  -RM     Mode of Treatment physical therapy  -RM     Row Name 10/15/21 1324          General Information    Patient Profile Reviewed yes  -RM     Existing Precautions/Restrictions cardiac; fall; oxygen therapy device and L/min  5 lpm  -RM     Row Name 10/15/21 6915          Cognition    Orientation Status (Cognition) oriented to; person;  place; situation  -     Row Name 10/15/21 1329          Safety Issues, Functional Mobility    Safety Issues Affecting Function (Mobility) safety precautions follow-through/compliance; steps too close to assistive device; positioning of assistive device  -RM     Impairments Affecting Function (Mobility) balance; endurance/activity tolerance; strength  -RM           User Key  (r) = Recorded By, (t) = Taken By, (c) = Cosigned By    Initials Name Provider Type     Abhay Euceda, DIANE Physical Therapy Assistant               Mobility     Row Name 10/15/21 1329          Bed Mobility    Supine-Sit [place] (Bed Mobility) contact guard; verbal cues; minimum assist (75% patient effort)  -     Assistive Device (Bed Mobility) head of bed elevated; draw sheet; bed rails  -     Row Name 10/15/21 1329          Sit-Stand Transfer    Sit-Stand [place] (Transfers) contact guard; minimum assist (75% patient effort); verbal cues  -RM     Assistive Device (Sit-Stand Transfers) walker, front-wheeled  -RM     Row Name 10/15/21 1329          Gait/Stairs (Locomotion)    [place] Level (Gait) contact guard; minimum assist (75% patient effort); verbal cues  -RM     Assistive Device (Gait) walker, front-wheeled  -RM     Distance in Feet (Gait) 3  -RM     Deviations/Abnormal Patterns (Gait) festinating/shuffling; base of support, narrow  -RM           User Key  (r) = Recorded By, (t) = Taken By, (c) = Cosigned By    Initials Name Provider Type    Abhay Dover, DIANE Physical Therapy Assistant               Obj/Interventions    No documentation.                Goals/Plan    No documentation.                Clinical Impression     Row Name 10/15/21 7593          Pain Scale: Numbers Pre/Post-Treatment    Pretreatment Pain Rating 0/10 - no pain  -RM     Posttreatment Pain Rating 0/10 - no pain  -RM     Row Name 10/15/21 8943          Plan of Care Review    Plan of Care Reviewed With patient; son  -     Progress  improving  -RM     Outcome Summary Pt tolerates treatment well.  Pt performed bed mobility and transfers with cga min a. Pt also required cga/min a to ambulate 3' with rw.  Pt was left [facility] reclined with call light at hand. See flowsheet for details  -RM     Row Name 10/15/21 1330          Vital Signs    Pre SpO2 (%) 95  -RM     O2 Delivery Pre Treatment supplemental O2  -RM     Intra SpO2 (%) 91  -RM     O2 Delivery Intra Treatment supplemental O2  -RM     Post SpO2 (%) 93  -RM     O2 Delivery Post Treatment supplemental O2  -RM     Pre Patient Position Supine  -RM     Intra Patient Position Standing  -RM     Post Patient Position Sitting  -RM     Row Name 10/15/21 1330          Positioning and Restraints    Pre-Treatment Position in bed  -RM     Post Treatment Position chair  -RM     In Chair reclined; call light within reach; encouraged to call for assist; exit alarm on; notified nsg  -RM           User Key  (r) = Recorded By, (t) = Taken By, (c) = Cosigned By    Initials Name Provider Type    Abhay Dover, DIANE Physical Therapy Assistant               Outcome Measures     Row Name 10/15/21 4775          How much help from another person do you currently need...    Turning from your back to your side while in flat bed without using bedrails? 3  -RM     Moving from lying on back to sitting on the side of a flat bed without bedrails? 3  -RM     Moving to and from a bed to a chair (including a wheelchair)? 3  -RM     Standing up from a chair using your arms (e.g., wheelchair, bedside chair)? 3  -RM     Climbing 3-5 steps with a railing? 1  -RM     To walk in hospital room? 2  -RM     AM-PAC 6 Clicks Score (PT) 15  -RM     Row Name 10/15/21 8571          Functional Assessment    Outcome Measure Options AM-PAC 6 Clicks Basic Mobility (PT)  -RM           User Key  (r) = Recorded By, (t) = Taken By, (c) = Cosigned By    Initials Name Provider Type    Abhay Dover, DIANE Physical Therapy Assistant                              Physical Therapy Education                 Title: PT OT SLP Therapies (In Progress)     Topic: Physical Therapy (In Progress)     Point: Mobility training (Done)     Learning Progress Summary           Patient Acceptance, E,TB,D, VU,NR by  at 10/15/2021 1335    Comment: safety with activity and AAD positioning    Acceptance, D,E, DU by  at 10/12/2021 1152    Comment: Pt education for LE ther ex technique and for improving technique for coming to EOB and standing.    Acceptance, E,D, DU by  at 10/10/2021 1130    Comment: Pt education on transfer technique for improving safety and independence.    Acceptance, E,D, VU,NR by  at 10/9/2021 1202    Comment: Pt education for improving bed mobility technique and for purpose and goals for PT POC.                   Point: Home exercise program (Done)     Learning Progress Summary           Patient Acceptance, D,E, DU by  at 10/12/2021 1152    Comment: Pt education for LE ther ex technique and for improving technique for coming to EOB and standing.    Acceptance, E,TB, VU by  at 10/11/2021 1952    Comment: BLE exercises                   Point: Body mechanics (Not Started)     Learner Progress:  Not documented in this visit.          Point: Precautions (Not Started)     Learner Progress:  Not documented in this visit.                      User Key     Initials Effective Dates Name Provider Type Discipline     06/16/21 -  Sade Medina, PT Physical Therapist PT     06/16/21 -  Abhay Euceda, PTA Physical Therapy Assistant PT     06/16/21 -  Judy Barrientos, PT Physical Therapist PT              PT Recommendation and Plan     Plan of Care Reviewed With: patient, son  Progress: improving  Outcome Summary: Pt tolerates treatment well.  Pt performed bed mobility and transfers with cga min a. Pt also required cga/min a to ambulate 3' with rw.  Pt was left uic reclined with call light at hand. See flowsheet for details     Time Calculation:    PT  Charges     Row Name 10/15/21 1336             Time Calculation    Start Time 1110  -RM      Stop Time 1130  -RM      Time Calculation (min) 20 min  -RM      PT Received On 10/15/21  -RM      PT Goal Re-Cert Due Date 10/19/21  -RM              Time Calculation- PT    Total Timed Code Minutes- PT 20 minute(s)  -RM              Timed Charges    07503 - PT Therapeutic Activity Minutes 20  -RM              Total Minutes    Timed Charges Total Minutes 20  -RM       Total Minutes 20  -RM            User Key  (r) = Recorded By, (t) = Taken By, (c) = Cosigned By    Initials Name Provider Type    Abhay Dover, DIANE Physical Therapy Assistant              Therapy Charges for Today     Code Description Service Date Service Provider Modifiers Qty    01563923870 HC PT THERAPEUTIC ACT EA 15 MIN 10/15/2021 Abhay Euceda PTA GP 1          PT G-Codes  Outcome Measure Options: AM-PAC 6 Clicks Basic Mobility (PT)  AM-PAC 6 Clicks Score (PT): 15  AM-PAC 6 Clicks Score (OT): 16    Abhay Euceda PTA  10/15/2021

## 2021-10-15 NOTE — PROGRESS NOTES
[facility] Progress Note  [facility]. [place]        Patient Name: Andrew Hernandez  : 1934  MRN: [id number]   LOS: 8 days    Patient Care Team:  Vermeesch, Marilyn K, MD as PCP - General (Internal Medicine)  Dirk De Leon MD as Consulting Physician (Ophthalmology)  Oziel Mcnair MD as Consulting Physician (Cardiology)  Harsh Huerta MD as Consulting Physician (Urology)    Chief Complaint:    Chief Complaint   Patient presents with   • Shortness of Breath     Primary Care Physician:  Vermeesch, Marilyn K, MD  Date of admission: 10/7/2021    Subjective     Interval History:   Events noted from last 24 hours.  Patient is complaining of some cough today, although he did not cough during my visit.  He said he feels better.  Patient is lot more awake alert and interactive this morning he said he feels better.  His oxygen requirement has improved significantly he is only on 4 L nasal cannula.  Review of Systems:   As noted above    Objective     Vitals:   Temp:  [97.3 °F (36.3 °C)-97.9 °F (36.6 °C)] 97.6 °F (36.4 °C)  Heart Rate:  [] 86  Resp:  [17-18] 18  BP: ()/(32-45) 117/45  Flow (L/min):  [4-5] 5    Intake/Output Summary (Last 24 hours) at 10/15/2021 0929  Last data filed at 10/15/2021 0117  Gross per 24 hour   Intake 270 ml   Output --   Net 270 ml       Physical Exam:    General Appearance: alert, no acute distress   Skin: warm and dry  HEENT: oral mucosa normal, nonicteric sclera  Neck: supple, no JVD  Lungs: CTA, except left lower chest has no breath sounds.  Heart: RRR, normal S1 and S2  Abdomen: soft, nontender, nondistended  : no palpable bladder  Extremities: no edema, cyanosis or clubbing  Neuro: normal speech and grossly nonfocal.    Scheduled Meds:     Current Facility-Administered Medications   Medication Dose Route Frequency Provider Last Rate Last Admin   • acetaminophen (TYLENOL) tablet 650 mg  650 mg Oral Q4H PRN Edgardo Ly MD         Or   • acetaminophen (TYLENOL) 160 MG/5ML solution 650 mg  650 mg Oral Q4H PRN Edgardo Ly MD        Or   • acetaminophen (TYLENOL) suppository 650 mg  650 mg Rectal Q4H PRN Edgardo Ly MD       • amiodarone (PACERONE) tablet 200 mg  200 mg Oral Q24H Ishmael Nieves MD   200 mg at 10/15/21 0828   • apixaban (ELIQUIS) tablet 5 mg  5 mg Oral Q12H Edgardo Ly MD   5 mg at 10/15/21 0828   • ascorbic acid (VITAMIN C) tablet 500 mg  500 mg Oral Daily Edgardo Ly MD   500 mg at 10/15/21 0828   • aspirin EC tablet 81 mg  81 mg Oral Daily Edgardo Ly MD   81 mg at 10/15/21 0829   • atorvastatin (LIPITOR) tablet 20 mg  20 mg Oral Nightly Edgardo Ly MD   20 mg at 10/14/21 2002   • sennosides-docusate (PERICOLACE) 8.6-50 MG per tablet 2 tablet  2 tablet Oral BID Edgardo Ly MD   2 tablet at 10/15/21 0829    And   • polyethylene glycol (MIRALAX) packet 17 g  17 g Oral Daily PRN Edgardo Ly MD        And   • bisacodyl (DULCOLAX) EC tablet 5 mg  5 mg Oral Daily PRN Edgardo Ly MD        And   • bisacodyl (DULCOLAX) suppository 10 mg  10 mg Rectal Daily PRN Edgardo Ly MD       • clobetasol (TEMOVATE) 0.05 % cream 1 application  1 application Topical Q12H Jeane Perez DO   1 application at 10/15/21 0841   • digoxin (LANOXIN) tablet 125 mcg  125 mcg Oral Q3 Days Ishmael Nieves MD   125 mcg at 10/12/21 1143   • ferrous sulfate EC tablet 324 mg  324 mg Oral Daily With Breakfast Edgardo Ly MD   324 mg at 10/15/21 0828   • levothyroxine (SYNTHROID, LEVOTHROID) tablet 50 mcg  50 mcg Oral Daily With Breakfast Edgardo Ly MD   50 mcg at 10/15/21 0828   • megestrol (MEGACE) tablet 40 mg  40 mg Oral BID Edgardo Ly MD   40 mg at 10/15/21 0828   • melatonin tablet 5 mg  5 mg Oral Nightly PRN Edgardo Ly MD   5 mg at 10/13/21 7166   • meropenem in sodium chloride 0.9% 50 mL (MERREM) IVPB 1 g  1 g Intravenous Q8H Jeane Perez  DO Ronnie   1 g at 10/15/21 0829   • metoprolol succinate XL (TOPROL-XL) 24 hr tablet 25 mg  25 mg Oral Q24H Jeane Perez DO   25 mg at 10/15/21 0829   • nitroglycerin (NITROSTAT) SL tablet 0.4 mg  0.4 mg Sublingual Q5 Min PRN Edgardo Ly MD       • ondansetron (ZOFRAN) injection 4 mg  4 mg Intravenous Q6H PRN Edgardo Ly MD       • pantoprazole (PROTONIX) EC tablet 40 mg  40 mg Oral Q AM Jeane Perez DO   40 mg at 10/15/21 0514   • Pharmacy to Dose meropenem (MERREM)   Does not apply Continuous PRN Jeane Perez DO       • PRO-STAT oral liquid 30 mL  30 mL Oral BID Turner Chavez MD   30 mL at 10/15/21 0828   • sacubitril-valsartan (ENTRESTO) 24-26 MG tablet 1 tablet  1 tablet Oral Q12H Ishmael Nieves MD   1 tablet at 10/15/21 0828   • sodium chloride 0.9 % flush 10 mL  10 mL Intravenous PRN Edgardo Ly MD       • sodium chloride 0.9 % flush 3 mL  3 mL Intravenous Q12H Edgardo Ly MD   3 mL at 10/15/21 0829   • sodium chloride 0.9 % flush 3-10 mL  3-10 mL Intravenous PRN Edgardo Ly MD       • sodium chloride tablet 1 g  1 g Oral Daily Estuardo Winchester MD, FASN   1 g at 10/15/21 0829   • tamsulosin (FLOMAX) 24 hr capsule 0.4 mg  0.4 mg Oral Nightly Edgardo Ly MD   0.4 mg at 10/14/21 2002       amiodarone, 200 mg, Oral, Q24H  apixaban, 5 mg, Oral, Q12H  vitamin C, 500 mg, Oral, Daily  aspirin, 81 mg, Oral, Daily  atorvastatin, 20 mg, Oral, Nightly  clobetasol, 1 application, Topical, Q12H  digoxin, 125 mcg, Oral, Q3 Days  ferrous sulfate, 324 mg, Oral, Daily With Breakfast  levothyroxine, 50 mcg, Oral, Daily With Breakfast  megestrol, 40 mg, Oral, BID  meropenem, 1 g, Intravenous, Q8H  metoprolol succinate XL, 25 mg, Oral, Q24H  pantoprazole, 40 mg, Oral, Q AM  PRO-STAT, 30 mL, Oral, BID  sacubitril-valsartan, 1 tablet, Oral, Q12H  senna-docusate sodium, 2 tablet, Oral, BID  sodium chloride, 3 mL, Intravenous, Q12H  sodium chloride, 1 g,  Oral, Daily  tamsulosin, 0.4 mg, Oral, Nightly        IV Meds:   Pharmacy to Dose meropenem (MERREM),         Results Reviewed:   I have personally reviewed the results from the time of this admission to 10/15/2021 09:29 EDT     Results from last 7 days   Lab Units 10/15/21  0521 10/14/21  0503 10/13/21  0544   SODIUM mmol/L 131* 130* 130*   POTASSIUM mmol/L 3.7 3.6 3.7   CHLORIDE mmol/L 96* 94* 89*   CO2 mmol/L 28.0 28.9 32.2*   BUN mg/dL 21 19 22   CREATININE mg/dL 0.93 0.95 0.90   CALCIUM mg/dL 8.4* 8.2* 8.4*   GLUCOSE mg/dL 97 92 96       Estimated Creatinine Clearance: 56.4 mL/min (by C-G formula based on SCr of 0.93 mg/dL).    Results from last 7 days   Lab Units 10/15/21  0521 10/14/21  0503 10/13/21  0544   PHOSPHORUS mg/dL 2.4* 3.0 3.0       Results from last 7 days   Lab Units 10/09/21  0937   URIC ACID mg/dL 6.7       Results from last 7 days   Lab Units 10/15/21  0521 10/14/21  0503 10/13/21  0544 10/12/21  0641 10/11/21  0615   WBC 10*3/mm3  --  6.49  --  6.55 6.48   HEMOGLOBIN g/dL 7.5* 8.3* 7.7* 7.9* 8.1*   PLATELETS 10*3/mm3  --  280  --  320 325             Brief Urine Lab Results  (Last result in the past 365 days)      Color   Clarity   Blood   Leuk Est   Nitrite   Protein   CREAT   Urine HCG        10/08/21 0242 Yellow   Clear   Negative   Negative   Negative   Negative                 No results found for: UTPCR    Imaging Results (Last 24 Hours)     Procedure Component Value Units Date/Time    XR Chest 1 View [[id number]] Collected: 10/14/21 1038     Updated: 10/14/21 1206    Narrative:      PROCEDURE: XR CHEST 1 VW-        HISTORY: Resp Failure.; I50.9-Heart failure, unspecified; E78.00-Pure  hypercholesterolemia, unspecified; I65.29-Occlusion and stenosis of  unspecified carotid artery     COMPARISON: October 11, 2021.     FINDINGS: The heart is normal in size. The mediastinum is unremarkable.  There is interstitial edema. Left greater than right pleural effusions  are unchanged. There is no  pneumothorax. There are no acute osseous  abnormalities.       Impression:      Interstitial edema.     Left greater than right pleural effusions are unchanged.           Images were reviewed, interpreted, and dictated by Dr. Julieta Dai M.D.  Transcribed by Yessica Merino PA-C.     This report was finalized on 10/14/2021 12:04 PM by Julieta Dai M.D..              Assessment / Plan     ASSESSMENT:    1. 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  POA is fairly clear does not want any aggressive measures and keep him comfortable.  She is okay to give medications and do blood work.  I will check serum and urine osmolality, with the diuretics I am not sure if we can get the good number.  She is okay with the salt tablets.  It will be started.  His TSH is normal.  2. Acute on chronic congestive heart failure (HCC): As per cardiology service currently on twice a day IV diuretics and can be continued.  Rest of the treatment as per cardiology services.  3. Hypertension blood pressures on the low side he is not on a lot of blood pressure medications.  4. A. fib: Rate controlled and anticoagulated.  5. Anemia: Check iron stores and start the oral iron.  6. Hypothyroidism: Last TSH is within normal limits on Synthroid.      PLAN:  · His hemoglobin did not improve, I will go ahead and give him 1 unit of blood today and will give Lasix after that, his electrolytes are fairly stable.  His alkalosis has resolved.  A serum sodium is fairly stable and will switch him to half a tablet of salt in a 20 of Lasix daily.  If he goes home he likely will need a 40 mg of Lasix on daily basis along with 500 mg of sodium chloride tablet.  · Hemoglobin did drop again, will go ahead and transfuse him 1 unit of blood that likely will help with everything, including his heart, renal  function,  · Clinically does appear to have improved with IV antibiotics and will be continued.  He may need to continue some oral antibiotics if he goes home tomorrow.  · Details were discussed with the patient as well as the POA this morning. I had at length discussion about his clinical condition.  Palliative care has been consulted.  · Continue with rest of the current treatment plan and surveillance labs.  · Further recommendations will depend on clinical course of the patient during the current hospitalization.     Thank you for involving us in the care of Andrew Hernandez.  Please feel free to call with any questions.    Estuardo Winchester MD, FASN  10/15/21  09:29 EDT    [facility]  [phone]      Much of this encounter note is an electronic transcription/translation of spoken language to printed text. The electronic translation of spoken language may permit erroneous, or at times, nonsensical words or phrases to be inadvertently transcribed; Although I have reviewed the note for such errors, some may still exist.

## 2021-10-16 NOTE — PLAN OF CARE
Goal Outcome Evaluation:  Plan of Care Reviewed With: patient, durable power of          Stable for discharge per MD. Discharged home with family.

## 2021-10-16 NOTE — PLAN OF CARE
Goal Outcome Evaluation:  Plan of Care Reviewed With: patient        Progress: improving  Outcome Summary: Pt performed B LE ecx in sitting AP, LAQ, hip abd, marching x15 reps each. Pt required decreased A for transfers, amb, and bed mob. Pt performed bed mob with SBA/S and supine to sit with bed flat. Pt amb with RW 45 +60 feet with SBA/CGA. Con't with PT POC and progress as tolerated

## 2021-10-16 NOTE — THERAPY TREATMENT NOTE
Patient Name: Andrew Hernandez  : 1934    MRN: [id number]                              Today's Date: 10/16/2021       Admit Date: 10/7/2021    Visit Dx:     ICD-10-CM ICD-9-CM   1. Acute on chronic congestive heart failure, unspecified heart failure type ([facility])  I50.9 428.0   2. Hypercholesterolemia  E78.00 272.0   3. 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    • Carotid stenosis, symptomatic w/o infarct   • Hyperkalemia   • Hyponatremia   • Encounter for Medicare annual wellness exam   • Tongue lesion   • Abdominal bruit   • Edema due to hypervolemia   • Acute on chronic diastolic (congestive) heart failure ([facility])   • Acute cystitis without hematuria   • Acute on chronic combined systolic and diastolic congestive heart failure ([facility])   • COVID-19 virus detected   • Chronic atrial fibrillation with RVR ([facility])   • Hyponatremia   • Late effects of CVA (cerebrovascular accident)   • Neoplasm, uncertain whether benign or malignant   • Chronic anticoagulation   • Acute and chronic respiratory failure with hypoxia ([facility])   • Blindness of right eye   • Pressure injury of coccygeal region, stage 2 ([facility])   • Physical deconditioning   • Impaired mobility and ADLs   • Acute on chronic congestive heart failure ([facility])     Past Medical History:   Diagnosis Date   • A-fib ([facility])    • Abnormal ECG    • Arrhythmia    • Arthritis    • Asthma Aug 2021   • Benign prostatic hyperplasia     • Cancer (HCC)     TONGUE   • Carotid stenosis     s/p right ICA stent x 2   • CHF (congestive heart failure) ([facility])    • Coronary artery disease    • COVID-19 09/2021   • Disease of thyroid gland    • Enlarged prostate    • Fracture    • Full dentures    • GERD (gastroesophageal reflux disease)    • Heart valve disease    • Hyperlipidemia    • Hypertension    • Hypothyroidism    • Impaired functional mobility, balance, gait, and endurance 09/03/2021   • Kidney infection    • Mitral regurgitation    • Myocardial infarction ([facility]) 8-16-21   • Nonrheumatic aortic valve insufficiency    • Renal calculi    • Stroke ([facility]) 03/2016    right retinal artery occlusion   • 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[facility])    • Wears glasses      Past Surgical History:   Procedure Laterality Date   • CAROTID ENDARTERECTOMY      X2-3/17, 9/17   • CAROTID STENT Right 03/18/2016    Carotid Wall Stent   • CAROTID STENT Right 03/04/2017    Zilver BANDAR for recurrent right ICA stenosis   • CATARACT EXTRACTION W/ INTRAOCULAR LENS IMPLANT Left 9/4/2018    Procedure: CATARACT PHACO EXTRACTION WITH INTRAOCULAR LENS IMPLANT LEFT, COMPLICATED WITH MALYUGIAN RING;  Surgeon: Paola Welch MD;  Location: [facility];  Service: Ophthalmology   • KIDNEY STONE SURGERY Right 2011    Shafron - open   • PATELLA FRACTURE SURGERY Left 1986   • GA TCAT IV STENT CRV CRTD ART EMBOLIC PROTECJ Right 3/4/2017    Placement of Zilver BANDAR right ICA 3/4/17   • TONGUE SURGERY  08/16/2021    cancer spot removed       General Information     Row Name 10/16/21 0901          Physical Therapy Time and Intention    Document Type therapy note (daily note)  -CC     Mode of Treatment physical therapy  -CC     Row Name 10/16/21 0901          General Information    Patient Profile Reviewed yes  -CC     Existing Precautions/Restrictions cardiac; fall; oxygen therapy device and L/min  -CC     Row Name 10/16/21 0901          Safety Issues, Functional Mobility    Safety Issues Affecting Function  (Mobility) positioning of assistive device; safety precautions follow-through/compliance  -CC     Impairments Affecting Function (Mobility) balance; endurance/activity tolerance; strength  -CC           User Key  (r) = Recorded By, (t) = Taken By, (c) = Cosigned By    Initials Name Provider Type    Cele Sarkar PTA Physical Therapy Assistant               Mobility     Row Name 10/16/21 0901          Bed Mobility    Bed Mobility supine-sit; scooting/bridging  -CC     Scooting/Bridging [place] (Bed Mobility) contact guard  -CC     Supine-Sit [place] (Bed Mobility) verbal cues; standby assist  -CC     Assistive Device (Bed Mobility) bed rails  -CC     Comment (Bed Mobility) scooting to EOB  -CC     Row Name 10/16/21 0901          Sit-Stand Transfer    Sit-Stand [place] (Transfers) contact guard; standby assist  -CC     Assistive Device (Sit-Stand Transfers) walker, front-wheeled  -CC     Row Name 10/16/21 0901          Gait/Stairs (Locomotion)    [place] Level (Gait) contact guard; standby assist  -CC     Assistive Device (Gait) walker, front-wheeled  -CC     Distance in Feet (Gait) 45 +60  -CC     Deviations/Abnormal Patterns (Gait) stride length decreased; gait speed decreased  -CC           User Key  (r) = Recorded By, (t) = Taken By, (c) = Cosigned By    Initials Name Provider Type    Cele Sarkar PTA Physical Therapy Assistant               Obj/Interventions     Row Name 10/16/21 0901          Strength Comprehensive (MMT)    Comment, General Manual Muscle Testing (MMT) Assessment Pt performed B LE ecx in sitting AP, LAQ, hip abd, marching x15 reps each  -CC           User Key  (r) = Recorded By, (t) = Taken By, (c) = Cosigned By    Initials Name Provider Type    Cele Sarkar PTA Physical Therapy Assistant               Goals/Plan    No documentation.                Clinical Impression     Row Name 10/16/21 0901          Pain Scale: Numbers Pre/Post-Treatment     Pretreatment Pain Rating 0/10 - no pain  -CC     Posttreatment Pain Rating 0/10 - no pain  -CC     Row Name 10/16/21 0901          Plan of Care Review    Plan of Care Reviewed With patient  -CC     Progress improving  -CC     Outcome Summary Pt performed B LE ecx in sitting AP, LAQ, hip abd, marching x15 reps each. Pt required decreased A for transfers, amb, and bed mob. Pt performed bed mob with SBA/S and supine to sit with bed flat. Pt amb with RW 45 +60 feet with SBA/CGA. Con't with PT POC and progress as tolerated  -CC     Row Name 10/16/21 0901          Positioning and Restraints    Pre-Treatment Position in bed  -CC     Post Treatment Position chair  -CC     In Chair reclined; call light within reach; encouraged to call for assist; exit alarm on; with family/caregiver  -CC           User Key  (r) = Recorded By, (t) = Taken By, (c) = Cosigned By    Initials Name Provider Type    Cele Sarkar PTA Physical Therapy Assistant               Outcome Measures     Row Name 10/16/21 0901          How much help from another person do you currently need...    Turning from your back to your side while in flat bed without using bedrails? 4  -CC     Moving from lying on back to sitting on the side of a flat bed without bedrails? 3  -CC     Moving to and from a bed to a chair (including a wheelchair)? 3  -CC     Standing up from a chair using your arms (e.g., wheelchair, bedside chair)? 3  -CC     Climbing 3-5 steps with a railing? 2  -CC     To walk in hospital room? 3  -CC     AM-PAC 6 Clicks Score (PT) 18  -CC     Row Name 10/16/21 0901          Functional Assessment    Outcome Measure Options AM-PAC 6 Clicks Basic Mobility (PT)  -CC           User Key  (r) = Recorded By, (t) = Taken By, (c) = Cosigned By    Initials Name Provider Type    Cele Sarkar PTA Physical Therapy Assistant                             Physical Therapy Education                 Title: PT OT SLP Therapies (In Progress)     Topic:  Physical Therapy (In Progress)     Point: Mobility training (Done)     Learning Progress Summary           Patient Acceptance, E,TB, VU by  at 10/16/2021 1201    Comment: Importance of increasing mobility daily    Acceptance, E,TB,D, VU,NR by  at 10/15/2021 1335    Comment: safety with activity and AAD positioning    Acceptance, D,E, DU by  at 10/12/2021 1152    Comment: Pt education for LE ther ex technique and for improving technique for coming to EOB and standing.    Acceptance, E,D, DU by  at 10/10/2021 1130    Comment: Pt education on transfer technique for improving safety and independence.    Acceptance, E,D, VU,NR by  at 10/9/2021 1202    Comment: Pt education for improving bed mobility technique and for purpose and goals for PT POC.                   Point: Home exercise program (Done)     Learning Progress Summary           Patient Acceptance, D,E, DU by  at 10/12/2021 1152    Comment: Pt education for LE ther ex technique and for improving technique for coming to EOB and standing.    Acceptance, E,TB, VU by  at 10/11/2021 1952    Comment: BLE exercises                   Point: Body mechanics (Not Started)     Learner Progress:  Not documented in this visit.          Point: Precautions (Not Started)     Learner Progress:  Not documented in this visit.                      User Key     Initials Effective Dates Name Provider Type Discipline     06/16/21 -  Sade Medina, PT Physical Therapist PT     06/16/21 -  Cele Pastrana PTA Physical Therapy Assistant PT     06/16/21 -  Abhay Euceda, PTA Physical Therapy Assistant PT     06/16/21 -  Judy Barrientos PT Physical Therapist PT              PT Recommendation and Plan     Plan of Care Reviewed With: patient  Progress: improving  Outcome Summary: Pt performed B LE ecx in sitting AP, LAQ, hip abd, marching x15 reps each. Pt required decreased A for transfers, amb, and bed mob. Pt performed bed mob with SBA/S and supine to sit with  bed flat. Pt amb with RW 45 +60 feet with SBA/CGA. Con't with PT POC and progress as tolerated     Time Calculation:    PT Charges     Row Name 10/16/21 1201             Time Calculation    Start Time 0901  -CC      Stop Time 0946  -CC      Time Calculation (min) 45 min  -CC      PT Received On 10/16/21  -CC      PT Goal Re-Cert Due Date 10/19/21  -CC              Timed Charges    10704 - PT Therapeutic Exercise Minutes 15  -CC      81044 - Gait Training Minutes  20  -CC      20186 - PT Therapeutic Activity Minutes 10  -CC              Total Minutes    Timed Charges Total Minutes 45  -CC       Total Minutes 45  -CC            User Key  (r) = Recorded By, (t) = Taken By, (c) = Cosigned By    Initials Name Provider Type    CC Cele Pastrana PTA Physical Therapy Assistant              Therapy Charges for Today     Code Description Service Date Service Provider Modifiers Qty    29682086856 HC PT THER PROC EA 15 MIN 10/16/2021 Cele Pastrana, PTA GP 1    16302199023 HC GAIT TRAINING EA 15 MIN 10/16/2021 Cele Pastrana, DIANE GP 1    06934937034 HC PT THERAPEUTIC ACT EA 15 MIN 10/16/2021 Cele Pastrana, DIANE GP 1          PT G-Codes  Outcome Measure Options: AM-PAC 6 Clicks Basic Mobility (PT)  AM-PAC 6 Clicks Score (PT): 18  AM-PAC 6 Clicks Score (OT): 16    Cele Pastrana PTA  10/16/2021

## 2021-10-16 NOTE — PROGRESS NOTES
[facility] Progress Note  [facility]. [place]        Patient Name: Andrew Hernandez  : 1934  MRN: [id number]   LOS: 9 days    Patient Care Team:  Vermeesch, Marilyn K, MD as PCP - General (Internal Medicine)  Dirk De Leon MD as Consulting Physician (Ophthalmology)  Oziel Mcnair MD as Consulting Physician (Cardiology)  Harsh Huerta MD as Consulting Physician (Urology)    Chief Complaint:    Chief Complaint   Patient presents with   • Shortness of Breath     Primary Care Physician:  Vermeesch, Marilyn K, MD  Date of admission: 10/7/2021    Subjective     Interval History:   Events noted from last 24 hours.  Patient is complaining of some cough today, although he did not cough during my visit.  He said he feels better.  Patient is lot more awake alert and interactive this morning he said he feels better.  His oxygen requirement has improved significantly he is only on 4 L nasal cannula.  Review of Systems:   As noted above    Objective     Vitals:   Temp:  [97.4 °F (36.3 °C)-97.9 °F (36.6 °C)] 97.6 °F (36.4 °C)  Heart Rate:  [71-80] 71  Resp:  [18-22] 22  BP: (109-137)/(40-44) 137/44  Flow (L/min):  [5] 5    Intake/Output Summary (Last 24 hours) at 10/16/2021 0829  Last data filed at 10/15/2021 1623  Gross per 24 hour   Intake 808.75 ml   Output --   Net 808.75 ml       Physical Exam:    General Appearance: alert, no acute distress   Skin: warm and dry  HEENT: oral mucosa normal, nonicteric sclera  Neck: supple, no JVD  Lungs: CTA, except left lower chest has no breath sounds.  Heart: RRR, normal S1 and S2  Abdomen: soft, nontender, nondistended  : no palpable bladder  Extremities: no edema, cyanosis or clubbing  Neuro: normal speech and grossly nonfocal.    Scheduled Meds:     Current Facility-Administered Medications   Medication Dose Route Frequency Provider Last Rate Last Admin   • acetaminophen (TYLENOL) tablet 650 mg  650 mg Oral Q4H PRN Malachi  MD Edgardo        Or   • acetaminophen (TYLENOL) 160 MG/5ML solution 650 mg  650 mg Oral Q4H PRN Edgardo Ly MD        Or   • acetaminophen (TYLENOL) suppository 650 mg  650 mg Rectal Q4H PRN Edgardo Ly MD       • amiodarone (PACERONE) tablet 200 mg  200 mg Oral Q24H Ishmael Nieves MD   200 mg at 10/15/21 0828   • apixaban (ELIQUIS) tablet 5 mg  5 mg Oral Q12H Edgardo Ly MD   5 mg at 10/15/21 2000   • ascorbic acid (VITAMIN C) tablet 500 mg  500 mg Oral Daily Edgardo Ly MD   500 mg at 10/15/21 0828   • aspirin EC tablet 81 mg  81 mg Oral Daily Edgardo Ly MD   81 mg at 10/15/21 0829   • atorvastatin (LIPITOR) tablet 20 mg  20 mg Oral Nightly Edgardo Ly MD   20 mg at 10/15/21 2001   • sennosides-docusate (PERICOLACE) 8.6-50 MG per tablet 2 tablet  2 tablet Oral BID Edgardo Ly MD   2 tablet at 10/15/21 2000    And   • polyethylene glycol (MIRALAX) packet 17 g  17 g Oral Daily PRN Edgardo Ly MD        And   • bisacodyl (DULCOLAX) EC tablet 5 mg  5 mg Oral Daily PRN Edgardo Ly MD        And   • bisacodyl (DULCOLAX) suppository 10 mg  10 mg Rectal Daily PRN Edgardo Ly MD       • clobetasol (TEMOVATE) 0.05 % cream 1 application  1 application Topical Q12H Jeane Perez DO   1 application at 10/15/21 2001   • digoxin (LANOXIN) tablet 125 mcg  125 mcg Oral Q3 Days Ishmael Nieves MD   125 mcg at 10/15/21 1110   • ferrous sulfate EC tablet 324 mg  324 mg Oral Daily With Breakfast Edgardo Ly MD   324 mg at 10/15/21 0828   • furosemide (LASIX) tablet 20 mg  20 mg Oral Daily Estuardo Winchester MD, FASN   20 mg at 10/15/21 1448   • levothyroxine (SYNTHROID, LEVOTHROID) tablet 50 mcg  50 mcg Oral Daily With Breakfast Edgardo Ly MD   50 mcg at 10/15/21 0828   • megestrol (MEGACE) tablet 40 mg  40 mg Oral BID Edgardo Ly MD   40 mg at 10/15/21 2001   • melatonin tablet 5 mg  5 mg Oral Nightly PRN Edgardo Ly MD   5 mg at  10/13/21 2147   • metoprolol succinate XL (TOPROL-XL) 24 hr tablet 25 mg  25 mg Oral Q24H SloanJeane anthony, DO   25 mg at 10/15/21 0829   • nitroglycerin (NITROSTAT) SL tablet 0.4 mg  0.4 mg Sublingual Q5 Min PRN Edgardo Ly MD       • ondansetron (ZOFRAN) injection 4 mg  4 mg Intravenous Q6H PRN Edgardo Ly MD       • pantoprazole (PROTONIX) EC tablet 40 mg  40 mg Oral Q AM Jeane Perez, DO   40 mg at 10/16/21 0518   • PRO-STAT oral liquid 30 mL  30 mL Oral BID Turner Chavez MD   30 mL at 10/15/21 2000   • sacubitril-valsartan (ENTRESTO) 24-26 MG tablet 1 tablet  1 tablet Oral Q12H Ishmael Nieves MD   1 tablet at 10/15/21 2000   • sodium chloride 0.9 % flush 10 mL  10 mL Intravenous PRN Edgardo Ly MD       • sodium chloride 0.9 % flush 3 mL  3 mL Intravenous Q12H Edgardo Ly MD   3 mL at 10/15/21 2001   • sodium chloride 0.9 % flush 3-10 mL  3-10 mL Intravenous PRN Edgardo Ly MD       • sodium chloride tablet 0.5 g  0.5 g Oral Daily Estuardo Winchester MD, FASN       • tamsulosin (FLOMAX) 24 hr capsule 0.4 mg  0.4 mg Oral Nightly Edgardo Ly MD   0.4 mg at 10/15/21 2000       amiodarone, 200 mg, Oral, Q24H  apixaban, 5 mg, Oral, Q12H  vitamin C, 500 mg, Oral, Daily  aspirin, 81 mg, Oral, Daily  atorvastatin, 20 mg, Oral, Nightly  clobetasol, 1 application, Topical, Q12H  digoxin, 125 mcg, Oral, Q3 Days  ferrous sulfate, 324 mg, Oral, Daily With Breakfast  furosemide, 20 mg, Oral, Daily  levothyroxine, 50 mcg, Oral, Daily With Breakfast  megestrol, 40 mg, Oral, BID  metoprolol succinate XL, 25 mg, Oral, Q24H  pantoprazole, 40 mg, Oral, Q AM  PRO-STAT, 30 mL, Oral, BID  sacubitril-valsartan, 1 tablet, Oral, Q12H  senna-docusate sodium, 2 tablet, Oral, BID  sodium chloride, 3 mL, Intravenous, Q12H  sodium chloride, 0.5 g, Oral, Daily  tamsulosin, 0.4 mg, Oral, Nightly        IV Meds:        Results Reviewed:   I have personally reviewed the results from the time  of this admission to 10/16/2021 08:29 EDT     Results from last 7 days   Lab Units 10/16/21  0513 10/15/21  0521 10/14/21  0503   SODIUM mmol/L 130* 131* 130*   POTASSIUM mmol/L 3.5 3.7 3.6   CHLORIDE mmol/L 95* 96* 94*   CO2 mmol/L 26.2 28.0 28.9   BUN mg/dL 23 21 19   CREATININE mg/dL 0.78 0.93 0.95   CALCIUM mg/dL 8.6 8.4* 8.2*   GLUCOSE mg/dL 93 97 92       Estimated Creatinine Clearance: 68.5 mL/min (by C-G formula based on SCr of 0.78 mg/dL).    Results from last 7 days   Lab Units 10/16/21  0513 10/15/21  0521 10/14/21  0503   PHOSPHORUS mg/dL 2.0* 2.4* 3.0       Results from last 7 days   Lab Units 10/09/21  0937   URIC ACID mg/dL 6.7       Results from last 7 days   Lab Units 10/16/21  0513 10/15/21  0521 10/14/21  0503 10/13/21  0544 10/12/21  0641 10/11/21  0615 10/11/21  0615   WBC 10*3/mm3  --   --  6.49  --  6.55  --  6.48   HEMOGLOBIN g/dL 9.9* 7.5* 8.3* 7.7* 7.9*   < > 8.1*   PLATELETS 10*3/mm3  --   --  280  --  320  --  325    < > = values in this interval not displayed.             Brief Urine Lab Results  (Last result in the past 365 days)      Color   Clarity   Blood   Leuk Est   Nitrite   Protein   CREAT   Urine HCG        10/08/21 0242 Yellow   Clear   Negative   Negative   Negative   Negative                 No results found for: UTPCR    Imaging Results (Last 24 Hours)     ** No results found for the last 24 hours. **              Assessment / Plan     ASSESSMENT:    1. 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  POA is fairly clear does not want any aggressive measures and keep him comfortable.  She is okay to give medications and do blood work.    She is okay with the salt tablets.  It will be started.  His TSH is normal.  2. Acute on chronic congestive heart failure (HCC): As per cardiology service currently on twice a day IV diuretics and can be  continued.  Rest of the treatment as per cardiology services.  3. Hypertension blood pressures on the low side he is not on a lot of blood pressure medications.  4. A. fib: Rate controlled and anticoagulated.  5. Anemia: Check iron stores and start the oral iron.  6. Hypothyroidism: Last TSH is within normal limits on Synthroid.      PLAN:  ·  A serum sodium is fairly stable and will continue him to half a tablet of salt in a 20 of Lasix daily.  If he goes home he likely will need a 40 mg of Lasix on daily basis along with 500 mg of sodium chloride tablet.  · Details were discussed with the patient as well as the POA this morning. I had at length discussion about his clinical condition.  Palliative care has been consulted.  · Continue with rest of the current treatment plan and surveillance labs.  · Further recommendations will depend on clinical course of the patient during the current hospitalization.     Thank you for involving us in the care of Andrew Hernandez.  Please feel free to call with any questions.    Pippa Palacio MD  10/16/21  08:29 EDT    [facility]  [phone]      Much of this encounter note is an electronic transcription/translation of spoken language to printed text. The electronic translation of spoken language may permit erroneous, or at times, nonsensical words or phrases to be inadvertently transcribed; Although I have reviewed the note for such errors, some may still exist.

## 2021-10-16 NOTE — OUTREACH NOTE
Prep Survey      Responses   [facility] patient discharged from? [facility]   Is LACE score < 7 ? No   Emergency Room discharge w/ pulse ox? 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 CHF   Does the patient have Home health ordered? Yes   What is the Home health agency?  [facility]   Is there a DME ordered? No   Prep survey completed? Yes          Sade Christianson RN

## 2021-10-16 NOTE — PLAN OF CARE
Goal Outcome Evaluation:  Plan of Care Reviewed With: patient        Progress: no change  Outcome Summary: Vital signs stable. Sats maintained above 95% on 5L NC. Patient rested comfortably through the night. No change in patient condition. Will continue to monitor.

## 2021-10-16 NOTE — CASE MANAGEMENT/SOCIAL WORK
Case Management Discharge Note       Faxed home health order to [facility] for resumption of care.          Selected Continued Care - Admitted Since 10/7/2021     Destination    No services have been selected for the patient.              Durable Medical Equipment     Service Provider Selected Services Address Phone Fax Patient Preferred    [facility]  Durable Medical Equipment [address], [facility] [address] [phone] --       Internal Comment last updated by Cathy Macario, RN 10/8/2021 1915    Current oxygen provider 3-4L ATC per NC.                     Dialysis/Infusion    No services have been selected for the patient.              Home Medical Care     Service Provider Selected Services Address Phone Fax Patient Preferred    [facility], [facility] [address] [phone] --       Internal Comment last updated by Cathy Macario, RN 10/8/2021 1915    Current HH provider                     Therapy    No services have been selected for the patient.              Community Resources    No services have been selected for the patient.              Community & DME    No services have been selected for the patient.                Selected Continued Care - Prior Encounters Includes selections from prior encounters from 7/9/2021 to 10/16/2021    Discharged on 9/26/2021 Admission date: 9/20/2021 - Discharge disposition: Home or Self Care    Home Medical Care     Service Provider Selected Services Address Phone Fax Patient Preferred    [facility]  [address], [facility] [address] [phone] --                Discharged on 9/5/2021 Admission date: 9/2/2021 - Discharge disposition: Home or Self Care    Durable Medical Equipment     Service Provider Selected Services Address Phone Fax Patient Preferred    [facility]  Durable  Medical Equipment [address] [place] [id number] [phone] --       Internal Comment last updated by Cathy Macario RN 9/5/2021 4215    Provider of oxygen at time of admission per notes-  Previously written that patient uses 2-3L O2 at nighttime but has been using continuously. Private pays.  [facility] confirmed that the order they currently have is 2L continuously.                                  Transportation Services  Private: Car    Final Discharge Disposition Code: 06 - home with home health care

## 2021-10-17 NOTE — DISCHARGE SUMMARY
[facility]   DISCHARGE SUMMARY      Name:  Andrew Hernandez   Age:  87 y.o.  Sex:  male  :  1934  MRN:  [id number]   Visit Number:  [id number]    Admission Date:  10/7/2021  Date of Discharge:  10/17/2021  Primary Care Physician:  Vermeesch, Marilyn K, MD    Important issues to note:    Continue current medication regimen as discussed with daughter-in-law at bedside.  AVS has been printed off as well.  Short-term follow-up with Dr. Mcnair next week.  Dietary recommendations, fluid restrictions, sodium restrictions were discussed at length.    Of note, attempted to discuss palliative care measures with patient during hospitalization, however he does not significantly open to proceeding in that direction at this time.  This will need to be discussed further by his PCP, cardiologist, and family.    Discharge Diagnoses:     1.  Acute exacerbation of chronic combined systolic and diastolic heart failure, present on admission  2.  Hyponatremia, chronic issue likely acutely worsened in the setting of #1  3.  Chronic hypoxic respiratory failure on 3 to 4 L of oxygen continuously  4.  Paroxysmal atrial fibrillation on Eliquis, currently in sinus rhythm  5.  Anemia  6. BPH  7. Recent diagnosis of COVID-19  8. Coronary artery disease  9. Tongue cancer status post resection  10. Previous CVA with complete right vision loss  11. Carotid artery stenosis status post stenting of right carotid  12. Hypertension  13. Hyperlipidemia   14. Hypothyroidism   15.  Aortic regurgitation    Problem List:     Active Hospital Problems    Diagnosis  POA   • Acute on chronic congestive heart failure (HCC) [I50.9]  Yes      Resolved Hospital Problems   No resolved problems to display.     Presenting Problem:    Chief Complaint   Patient presents with   • Shortness of Breath      Consults:     Consulting Physician(s)  Chat With All Active Members    Provider Relationship Specialty    Estuardo Winchester MD, ANUEL   Consulting Physician Nephrology    Ishmael Nieves MD  Consulting Physician Cardiology    Jeremiah Ghotra MD  Consulting Physician Pulmonary Disease        Procedures Performed:        History of presenting illness/Hospital Course:    87-year-old gentleman with history of chronic systolic/diastolic heart failure, chronic hypoxic respiratory failure, atrial fibrillation, anemia, BPH, CAD, tongue cancer status post resection, prior CVA, carotid stenosis, hypertension, hyperlipidemia who presented to the emergency room for shortness of breath and weakness.  Initial work-up concerning for acute on chronic diastolic/systolic congestive heart failure and possible urinary tract infection.  Was admitted to the hospital service and started on IV diuretics with nephrology consultation.    He was also seen by cardiology service.  Patient was started on aggressive IV diuretics as much his blood pressure and kidney function can tolerate.  He also had significant hyponatremia which was managed with sodium tablets.  He unfortunately had no significant improvement in bilateral effusions.  CT imaging did demonstrate much at the same as chest x-ray did, however was some mention of possible pneumonia he did receive 3 days of meropenem.  He was afebrile, with stable white count, and normal procalcitonin, arguing against pneumonia during hospitalization.    Did request pulmonology consultation.  Felt most likely pleural effusions were related to heart failure and would not benefit from thoracentesis, for which I agree.  I did talk with patient's primary cardiologist, Dr. Mcnair who did recommend proceeding with the current plan of care with diuretic therapy.  Was no recommendation for any ischemic/invasive work-up at this point, and if patient did not improve, would be recommending palliative measures at that time.    Patient also with evidence of iron deficiency anemia.  He did receive 1 unit PRBCs in addition to Venofer per  nephrology recommendations.    Patient overall was not willing to entertain palliative care despite multiple discussions had with him.  Family did talk with them, but also wanted to honor patient's wishes.  Patient was started on guideline directed therapy including Entresto and Toprol XL.  Atrial fibrillation controlled with amiodarone.  Is also placed on low-dose digoxin.    Patiently placed on fluid/sodium restrictions upon discharge.  Discussed daily weights, medications, potential side effects, and return precautions.  Strongly encourage further discussions about palliative care moving forward as based off of patient's recent decline, I can only anticipate further decline moving forward.  Home health services will be continued.    Vital Signs:         Physical Exam:    General Appearance:  Alert and cooperative.  Frail   Head:  Atraumatic and normocephalic.   Eyes: Conjunctivae and sclerae normal, no icterus. No pallor.   Ears:  Ears with no abnormalities noted.   Throat: No oral lesions, no thrush, oral mucosa moist.   Neck: Supple, trachea midline, no thyromegaly.   Back:   No kyphoscoliosis present. No tenderness to palpation.   Lungs:    Diminished breath sounds at the bases   Heart:  Normal S1 and S2, no murmur, no gallop, no rub. No JVD.   Abdomen:   Normal bowel sounds, no masses, no organomegaly. Soft, nontender, nondistended, no rebound tenderness.   Extremities: Supple, no significant lower edema, no cyanosis, no clubbing.   Pulses: Pulses palpable bilaterally.   Skin: No bleeding or rash.   Neurologic: Alert and oriented x 3. No facial asymmetry. Moves all four limbs. No tremors.  Generally weak     Pertinent Lab Results:     Results from last 7 days   Lab Units 10/16/21  0513 10/15/21  0521 10/14/21  0503   SODIUM mmol/L 130* 131* 130*   POTASSIUM mmol/L 3.5 3.7 3.6   CHLORIDE mmol/L 95* 96* 94*   CO2 mmol/L 26.2 28.0 28.9   BUN mg/dL 23 21 19   CREATININE mg/dL 0.78 0.93 0.95   CALCIUM mg/dL 8.6  8.4* 8.2*   GLUCOSE mg/dL 93 97 92     Results from last 7 days   Lab Units 10/16/21  0513 10/15/21  0521 10/14/21  0503 10/13/21  0544 10/12/21  0641 10/11/21  0615 10/11/21  0615   WBC 10*3/mm3  --   --  6.49  --  6.55  --  6.48   HEMOGLOBIN g/dL 9.9* 7.5* 8.3*   < > 7.9*   < > 8.1*   HEMATOCRIT % 31.2* 23.0* 25.8*   < > 24.5*   < > 24.3*   PLATELETS 10*3/mm3  --   --  280  --  320  --  325    < > = values in this interval not displayed.                                   Pertinent Radiology Results:    Imaging Results (All)     Procedure Component Value Units Date/Time    XR Chest 1 View [[id number]] Collected: 10/14/21 1038     Updated: 10/14/21 1206    Narrative:      PROCEDURE: XR CHEST 1 VW-        HISTORY: Resp Failure.; I50.9-Heart failure, unspecified; E78.00-Pure  hypercholesterolemia, unspecified; I65.29-Occlusion and stenosis of  unspecified carotid artery     COMPARISON: October 11, 2021.     FINDINGS: The heart is normal in size. The mediastinum is unremarkable.  There is interstitial edema. Left greater than right pleural effusions  are unchanged. There is no pneumothorax. There are no acute osseous  abnormalities.       Impression:      Interstitial edema.     Left greater than right pleural effusions are unchanged.           Images were reviewed, interpreted, and dictated by Dr. Julieta Dai M.D.  Transcribed by Yessica Merino PA-C.     This report was finalized on 10/14/2021 12:04 PM by Julieta Dai M.D..    CT Chest Without Contrast Diagnostic [[id number]] Collected: 10/13/21 0931     Updated: 10/13/21 0935    Narrative:      PROCEDURE: CT CHEST WO CONTRAST DIAGNOSTIC-     HISTORY: Abnormal xray - pleural effusion; I50.9-Heart failure,  unspecified; E78.00-Pure hypercholesterolemia, unspecified;  I65.29-Occlusion and stenosis of unspecified carotid artery     COMPARISON:  None .     PROCEDURE:  Multiple axial CT images were obtained from the thoracic  inlet through the upper abdomen  without the use of contrast.      FINDINGS:   Soft tissue windows reveal no axillary, hilar or mediastinal adenopathy.  Heart size is normal. The aorta is normal in caliber. There are moderate  bilateral pleural effusions. There is no pericardial effusion.. Lung  windows reveal bilateral upper lobe airspace disease felt to reflect  pneumonia. There is compressive atelectasis in the lung bases. Within  the visualized upper abdomen there is a partially imaged right renal  cyst. The upper abdomen is otherwise unremarkable. Bone windows reveal  no acute osseous abnormalities.       Impression:      Bilateral pleural effusions with airspace disease in the  upper lobes felt to reflect a pneumonia.     485.26 mGy.cm        This study was performed with techniques to keep radiation doses as low  as reasonably achievable (ALARA). Individualized dose reduction  techniques using automated exposure control or adjustment of mA and/or  kV according to the patient size were employed.      This report was finalized on 10/13/2021 9:33 AM by Julieta Dai M.D..    XR Chest 1 View [[id number]] Collected: 10/11/21 0727     Updated: 10/11/21 0730    Narrative:      PROCEDURE: XR CHEST 1 VW-     HISTORY: CHF, dyspnea; I50.9-Heart failure, unspecified; E78.00-Pure  hypercholesterolemia, unspecified; I65.29-Occlusion and stenosis of  unspecified carotid artery     COMPARISON: 10/7/2020 1/9/2021.     FINDINGS: The heart is normal in size. The mediastinum is unremarkable.  There is interstitial pulmonary edema with bilateral effusions and  bibasilar opacities. There is no pneumothorax.  There are no acute  osseous abnormalities.       Impression:      No significant change in the appearance of the chest.     Continued followup is recommended.     This report was finalized on 10/11/2021 7:28 AM by Julieta Dai M.D..    XR Chest 1 View [[id number]] Collected: 10/08/21 0703     Updated: 10/08/21 0706    Narrative:      PROCEDURE: XR  CHEST 1 VW-     HISTORY: [facility]A Triage Protocol     COMPARISON: 09/20/2021.     FINDINGS: The heart is normal in size. The mediastinum is unremarkable.  There is interstitial edema with bilateral pleural effusions and basilar  opacities. There is no pneumothorax.  There are no acute osseous  abnormalities.       Impression:      Interstitial pulmonary edema with bilateral effusions and  basilar opacities.     Continued followup is recommended.     This report was finalized on 10/8/2021 7:03 AM by Julieta Dai M.D..          Echo:    Results for orders placed during the hospital encounter of 10/07/21    Adult Transthoracic Echo Complete W/ Cont if Necessary Per Protocol    Interpretation Summary  1.  Normal left ventricular size with mildly reduced LV systolic function, LVEF 40-45%.  2.  Moderate hypokinesis of the inferior wall.  3.  Grade 1 diastolic dysfunction.  4.  Normal right ventricular size and systolic function.  5.  Mildly increased left atrial volume index.  6.  Mild AI, MR, and TR.  7.  Trace PI.  8.  Moderate left pleural effusion.    Condition on Discharge:      Stable.    Code status during the hospital stay:    Code Status and Medical Interventions:   Ordered at: 10/07/21 2341     Level Of Support Discussed With:    Patient     Code Status:    CPR     Medical Interventions (Level of Support Prior to Arrest):    Full     Discharge Disposition:    Home or Self Care    Discharge Medications:       Discharge Medications      New Medications      Instructions Start Date   amiodarone 200 MG tablet  Commonly known as: PACERONE   200 mg, Oral, Every 24 Hours Scheduled      metoprolol succinate XL 25 MG 24 hr tablet  Commonly known as: TOPROL-XL   25 mg, Oral, Every 24 Hours Scheduled      potassium chloride 10 MEQ CR tablet   10 mEq, Oral, Daily, Taken with lasix      sacubitril-valsartan 24-26 MG tablet  Commonly known as: ENTRESTO   1 tablet, Oral, Every 12 Hours Scheduled      sodium chloride 0.9 %  nebulizer solution   3 mL, Nebulization, As Needed      sodium chloride 1 g tablet   0.5 g, Oral, Daily         Changes to Medications      Instructions Start Date   apixaban 5 MG tablet tablet  Commonly known as: ELIQUIS  What changed:   · medication strength  · how much to take   5 mg, Oral, Every 12 Hours Scheduled      digoxin 125 MCG tablet  Commonly known as: LANOXIN  What changed: when to take this   125 mcg, Oral, Every 3 Days   Start Date: October 18, 2021     furosemide 20 MG tablet  Commonly known as: LASIX  What changed:   · medication strength  · how much to take  · how to take this  · when to take this  · additional instructions   20 mg, Oral, Daily, Can take 1/2 tablet in evening if weight gain >3-5 lbs in 3 day period      levothyroxine 50 MCG tablet  Commonly known as: SYNTHROID, LEVOTHROID  What changed: when to take this   50 mcg, Oral, Daily      tamsulosin 0.4 MG capsule 24 hr capsule  Commonly known as: FLOMAX  What changed: when to take this   0.4 mg, Oral, Daily         Continue These Medications      Instructions Start Date   aspirin 81 MG EC tablet   81 mg, Oral, Daily      atorvastatin 20 MG tablet  Commonly known as: LIPITOR   20 mg, Oral, Nightly      ferrous sulfate 140 (45 Fe) MG tablet controlled-release tablet   Oral, Daily With Breakfast, SLOW RELEASE IRON 160/50       multivitamin with minerals tablet tablet   1 tablet, Oral, Daily      nitroglycerin 0.4 MG SL tablet  Commonly known as: NITROSTAT   Place 1 tablet under the tongue Every 5 (Five) Minutes As Needed for Chest Pain for up to 3 doses. Take no more than 3 doses in 15 minutes.      PEPCID PO   20 mg, 2 Times Daily      vitamin C 250 MG tablet  Commonly known as: ASCORBIC ACID   500 mg, Oral, Daily         Stop These Medications    dilTIAZem  MG 24 hr capsule  Commonly known as: CARDIZEM CD     megestrol 40 MG tablet  Commonly known as: MEGACE     metoprolol tartrate 25 MG tablet  Commonly known as: LOPRESSOR      potassium citrate 10 MEQ (1080 MG) CR tablet  Commonly known as: UROCIT-K          Discharge Diet:   Renal/cardiac    Activity at Discharge:   As tolerated    Follow-up Appointments:    Additional Instructions for the Follow-ups that You Need to Schedule     Ambulatory Referral to Home Health   As directed      Resumption of    Order Comments: Resumption of     Face to Face Visit Date: 10/16/2021    Follow-up provider for Plan of Care?: I treated the patient in an acute care facility and will not continue treatment after discharge.    Follow-up provider: VERMEESCH, MARILYN K [[id number]]    Reason/Clinical Findings: weakness, chf    Describe mobility limitations that make leaving home difficult: weakness, chf    Nursing/Therapeutic Services Requested: Skilled Nursing Physical Therapy Occupational Therapy    Skilled nursing orders: Medication education O2 instruction    PT orders: Strengthening Therapeutic exercise    Occupational orders: Activities of daily living Strengthening    Frequency: 1 Week 1            Contact information for follow-up providers     Vermeesch, Marilyn K, MD .    Specialty: Internal Medicine  Contact information:  [address]  [facility] [place] [id number] [phone]             Oziel Mcnair MD. Call.    Specialty: Cardiology  Why: already scheduled for next week  Contact information:  [address]  [address]  [facility] [address]  [phone]                   Contact information for after-discharge care     Durable Medical Equipment     [facility] .    Service: Durable Medical Equipment  Contact information:  [address]  [facility] [address]  [phone]                 Home Medical Care     [facility] [address] [facility]    Service: Home Health Services  Contact information:  [address]  [facility] [id number] [phone]                           Future Appointments   Date Time Provider Department Center   10/20/2021  9:15  AM Oziel Mcnair MD [facility]   11/1/2021  1:45 PM Vermeesch, Marilyn K, MD [facility]   1/10/2022  8:00 AM Vermeesch, Marilyn K, MD [facility]   3/21/2022  1:15 PM Oziel Mcnair MD [facility]     Test Results Pending at Discharge:           Jeane Perez DO  10/17/21  13:58 EDT    Time: I spent 45 minutes on this discharge activity which included: face-to-face encounter with the patient, reviewing the data in the system, coordination of the care with the nursing staff as well as consultants, documentation, and entering orders.     Dictated utilizing Dragon dictation.

## 2021-10-18 NOTE — OUTREACH NOTE
Call Center TCM Note      Responses   [facility] patient discharged from? [facility]   Does the patient have one of the following disease processes/diagnoses(primary or secondary)? CHF   TCM attempt successful? Yes   Call end time 1529   Person spoke with today (if not patient) and relationship Amrit Solitario reviewed with patient/caregiver? Yes   Is the patient having any side effects they believe may be caused by any medication additions or changes? No   Does the patient have all medications ordered at discharge? Yes   Is the patient taking all medications as directed (includes completed medication regime)? Yes   Does the patient have a primary care provider?  Yes   Does the patient have an appointment with their PCP within 7 days of discharge? Yes   Has the patient kept scheduled appointments due by today? N/A   Comments Has appt with Dr. Vermeesch tomorrow and will see cardiology on Wed.     What is the Home health agency?  [facility]   Has home health visited the patient within 72 hours of discharge? Yes   Pulse Ox monitoring Intermittent   Pulse Ox device source Patient   O2 Sat comments States oxygen levels have always been good   Psychosocial issues? No   Did the patient receive a copy of their discharge instructions? Yes   Nursing interventions Reviewed instructions with patient   What is the patient's perception of their health status since discharge? Same   Nursing interventions Nurse provided patient education   Is the patient weighing daily? Yes   Does the patient have scales? Yes   Daily weight interventions Education provided on importance of daily weight   Is the patient able to teach back Heart Failure diet management? Yes   Is the patient able to teach back Heart Failure Zones? Yes   Is the patient able to teach back signs and symptoms of worsening condition? (i.e. weight gain, shortness of air, etc.) Yes   If the patient is a current smoker, are they able to teach back resources for  cessation? Not a smoker   Is the patient/caregiver able to teach back the hierarchy of who to call/visit for symptoms/problems? PCP, Specialist, Home health nurse, Urgent Care, ED, 911 Yes   Additional teach back comments States he is doing about the same as he was in the hospital.  Taking medications as directed.  She montiors bp, oxygen levels, weight, intake and out put closely.  Will see his PCP tomorrow.     TCM call completed? Yes   Wrap up additional comments Denies questions or needs at this time           Maida London LPN    10/18/2021, 15:31 EDT

## 2021-10-19 PROBLEM — E87.1 HYPONATREMIA: Status: RESOLVED | Noted: 2021-01-01 | Resolved: 2021-01-01

## 2021-10-19 NOTE — PROGRESS NOTES
Please call daughter-in-law and tell her that there is trace protein and trace leukocytes.  Please send urine for culture, send me order and I will sign.  No antibiotics unless culture is positive.  Thank you

## 2021-10-19 NOTE — PROGRESS NOTES
[facility] Cardiology  Office Progress Note  Andrew Hernandez  1934  [address] [facility] [address]       Visit Date: 10/20/21    PCP: Vermeesch, Marilyn K, MD  [address] [facility] [address]  [facility] [address]    IDENTIFICATION: A 87 y.o. male retired . Roshni Hernandez's uncle.    PROBLEM LIST:   1. NSTEMI  1.  perioperative tongue surgery [facility]. Peak at bedtime troponin greater than 300 Patient deferred ischemic evaluation  echocardiogram EF 45%  2. Mixed systolic/diastolic congestive heart failure  1. 10/21 2D echo: LVEF =40-45%.  Moderate hypokinesis of inferior wall.  Grade 1 diastolic dysfunction.  Normal right ventricular size and systolic function.  Mildly increased LA volume.  Mild AI, mild MR, mild TR.  Trace PI.  Moderate left pleural effusion.  3. PAF  1.  holter 2% afib 56/75/178  2. 8 recurrent periop tongue flap sx [facility]  4. HTN  5. Dyspnea on exertion-angina equivalent  1.  SE wnl  2.  echo EF 50% mild AI , mild + LVH  6. 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   1. 3/17 PRITESH restented- Given  2.  CUS PRITESH patent  3. Residual left nonobstructive internal carotid artery stenosis 50-69%  4.  Carotid US: Right stent imagin-49%. Left ICA 50-69%.  7. Abdominal Bruit  . : Aortic diameter 1.8 cm. NO evidence of AAA.  8. Hypothyroidism  9. Gastroesophageal reflux disease  10. Hypercholesterolemia  1.  151/51/84/57  2.  138/60/78/47  3. 10/21 85/46/37/36  11. Hyponatremia    1. 10/17 134   2.  130  3. 8 126  12. Tongue Cancer- invasive squamous cell [facility]  13.  COVID-19  Hospitalized/concomitant CHF [facility]  14. 10/7/2021 [facility] admission for acute on chronic systolic/diastolic heart failure with respiratory failure.         CC:   Chief Complaint   Patient presents with   • Coronary Artery Disease       Allergies  Allergies    Allergen Reactions   • Levaquin [Levofloxacin] Shortness Of Breath   • Amoxicillin Rash   • Rocephin [Ceftriaxone] Hives       Current Medications    Current Outpatient Medications:   •  amiodarone (PACERONE) 200 MG tablet, Take 1 tablet by mouth Daily for 30 days., Disp: 30 tablet, Rfl: 0  •  apixaban (ELIQUIS) 5 MG tablet tablet, Take 1 tablet by mouth Every 12 (Twelve) Hours., Disp: , Rfl:   •  aspirin 81 MG EC tablet, Take 81 mg by mouth Daily., Disp: , Rfl:   •  atorvastatin (LIPITOR) 20 MG tablet, Take 1 tablet by mouth Every Night., Disp: 90 tablet, Rfl: 3  •  digoxin (LANOXIN) 125 MCG tablet, Take 1 tablet by mouth Every 3 (Three) Days., Disp: , Rfl:   •  Famotidine (PEPCID PO), Take 20 mg by mouth 2 (Two) Times a Day., Disp: , Rfl:   •  ferrous sulfate 140 (45 Fe) MG tablet controlled-release tablet, Take  by mouth Daily With Breakfast. SLOW RELEASE IRON 160/50, Disp: , Rfl:   •  furosemide (LASIX) 20 MG tablet, Take 1 tablet by mouth Daily. Can take 1/2 tablet in evening if weight gain >3-5 lbs in 3 day period (Patient taking differently: Take 40 mg by mouth Daily.), Disp: , Rfl:   •  levothyroxine (SYNTHROID, LEVOTHROID) 50 MCG tablet, Take 1 tablet by mouth Daily. (Patient taking differently: Take 50 mcg by mouth Daily With Breakfast.), Disp: 90 tablet, Rfl: 3  •  metoprolol succinate XL (TOPROL-XL) 25 MG 24 hr tablet, Take 1 tablet by mouth Daily for 30 days., Disp: 30 tablet, Rfl: 0  •  multivitamin with minerals (CENTRUM SILVER PO), Take 1 tablet by mouth Daily., Disp: , Rfl:   •  nitroglycerin (NITROSTAT) 0.4 MG SL tablet, Place 1 tablet under the tongue Every 5 (Five) Minutes As Needed for Chest Pain for up to 3 doses. Take no more than 3 doses in 15 minutes., Disp: 100 tablet, Rfl: 11  •  potassium chloride 10 MEQ CR tablet, Take 1 tablet by mouth Daily for 30 days. Taken with lasix, Disp: 30 tablet, Rfl: 0  •  sacubitril-valsartan (ENTRESTO) 24-26 MG tablet, Take 1 tablet by mouth Every 12  "(Twelve) Hours for 30 days., Disp: 60 tablet, Rfl: 0  •  sodium chloride 0.9 % nebulizer solution, Take 3 mL by nebulization As Needed for Wheezing., Disp: 15 mL, Rfl: 0  •  sodium chloride 1 g tablet, Take 0.5 tablets by mouth Daily for 30 days., Disp: 15 tablet, Rfl: 0  •  tamsulosin (FLOMAX) 0.4 MG capsule 24 hr capsule, Take 1 capsule by mouth Daily. (Patient taking differently: Take 1 capsule by mouth Every Night.), Disp: 90 capsule, Rfl: 3  •  vitamin C (ASCORBIC ACID) 250 MG tablet, Take 500 mg by mouth Daily., Disp: , Rfl:       History of Present Illness   Andrew Hernandez is a 87 y.o. year old male here for follow up.    Patient is slow to rally following his admission to [facility] with Covid complications and CHF.  He was noted hyponatremic anemic hypoalbuminemic with third spacing.  His niece states that he is improving and his appetite is improving.  He remains borderline hypotensive.  He is weaning from 4 L of oxygen    OBJECTIVE:  Vitals:    10/20/21 0914   BP: 98/58   BP Location: Right arm   Patient Position: Sitting   Pulse: 75   SpO2: 95%   Weight: 69.4 kg (153 lb)   Height: 180.3 cm (71\")     Body mass index is 21.34 kg/m².    Constitutional:       Appearance: Not in distress. Frail. Chronically ill-appearing.   Neck:      Vascular: No JVR. JVD normal.   Pulmonary:      Effort: Pulmonary effort is normal.      Breath sounds: Normal breath sounds. No wheezing. No rhonchi. No rales.   Chest:      Chest wall: Not tender to palpatation.   Cardiovascular:      PMI at left midclavicular line. Normal rate. Regular rhythm. Normal S1. Normal S2.      Murmurs: There is no murmur.      No gallop. No click. No rub.   Pulses:     Intact distal pulses.   Edema:     Peripheral edema absent.   Abdominal:      General: Bowel sounds are normal.      Palpations: Abdomen is soft.      Tenderness: There is no abdominal tenderness.   Musculoskeletal: Normal range of motion.         General: No tenderness. " Skin:     General: Skin is warm and dry.   Neurological:      General: No focal deficit present.      Mental Status: Alert and oriented to person, place and time.         Diagnostic Data:  Procedures      ASSESSMENT:   Diagnosis Plan   1. Chronic systolic congestive heart failure (HCC)     2. Primary hypertension     3. Mixed hyperlipidemia         PLAN:    Acute on chronic CHF.  Patient not candidate for invasive ischemic evaluation given his frail state.  Continued medical management    Hypertension to hold Entresto in a 12-hour systolic pressure less than 100    Dyslipidemia on statin therapy    Paroxysmal atrial fibrillation maintaining sinus mechanism on amiodarone    Hypotension with anemia hypoalbuminemia to follow-up H&H in 2 weeks he is status post transfusion while hospitalized 1 units to hemoglobin of 9.9        Oziel Mcnair MD, [facility]

## 2021-10-19 NOTE — PROGRESS NOTES
Transitional Care Follow Up Visit  Subjective     Andrew Hernandez is a 87 y.o. male who presents for a transitional care management visit.    Within 48 business hours after discharge our office contacted him via telephone to coordinate his care and needs.      I reviewed and discussed the details of that call along with the discharge summary, hospital problems, inpatient lab results, inpatient diagnostic studies, and consultation reports with Andrew.     Current outpatient and discharge medications have been reconciled for the patient.  Reviewed by: Marilyn K Vermeesch, MD      Date of TCM Phone Call 10/16/2021   [facility]   Date of Admission 10/7/2021   Date of Discharge 10/16/2021   Risk for Readmission (LACE Score) -   Discharge Disposition Home or Self Care     Risk for Readmission (LACE) Score: 13 (10/16/2021  6:01 AM)      History of Present Illness   Course During Hospital Stay: See hospital course and discharge summary below.    History of presenting illness/Hospital Course:     87-year-old gentleman with history of chronic systolic/diastolic heart failure, chronic hypoxic respiratory failure, atrial fibrillation, anemia, BPH, CAD, tongue cancer status post resection, prior CVA, carotid stenosis, hypertension, hyperlipidemia who presented to the emergency room for shortness of breath and weakness.  Initial work-up concerning for acute on chronic diastolic/systolic congestive heart failure and possible urinary tract infection.  Was admitted to the hospital service and started on IV diuretics with nephrology consultation.    He was also seen by cardiology service.  Patient was started on aggressive IV diuretics as much his blood pressure and kidney function can tolerate.  He also had significant hyponatremia which was managed with sodium tablets.  He unfortunately had no significant improvement in bilateral effusions.  CT imaging did demonstrate much at the same as chest x-ray did, however  was some mention of possible pneumonia he did receive 3 days of meropenem.  He was afebrile, with stable white count, and normal procalcitonin, arguing against pneumonia during hospitalization.     Did request pulmonology consultation.  Felt most likely pleural effusions were related to heart failure and would not benefit from thoracentesis, for which I agree.  I did talk with patient's primary cardiologist, Dr. Mcnair who did recommend proceeding with the current plan of care with diuretic therapy.  Was no recommendation for any ischemic/invasive work-up at this point, and if patient did not improve, would be recommending palliative measures at that time.     Patient also with evidence of iron deficiency anemia.  He did receive 1 unit PRBCs in addition to Venofer per nephrology recommendations.     Patient overall was not willing to entertain palliative care despite multiple discussions had with him.  Family did talk with them, but also wanted to honor patient's wishes.  Patient was started on guideline directed therapy including Entresto and Toprol XL.  Atrial fibrillation controlled with amiodarone.  Is also placed on low-dose digoxin.     Patiently placed on fluid/sodium restrictions upon discharge.  Discussed daily weights, medications, potential side effects, and return precautions.  Strongly encourage further discussions about palliative care moving forward as based off of patient's recent decline, I can only anticipate further decline moving forward.  Home health services will be continued.    He is doing very well since he got home.  He is eating well without use of megace.  His weight is staying stable.  He has not had a BM yet, but he denies his stomach is upset, he will get an exlax or Ornelas tab today for this.  He is on 4 liters of oxygen and oxygen is usually at 96 or 97%.  He is on suction at home as needed, thick white and frothy sputum on occasion.  Minimal edema.  His digoxin is every third day.   He is on amiodarone now and has been in normal rhythm.  Now on entresto also.  He has not been sleeping well though.  Got melatonin 5 mg past few nights and says last night was better.  His UO is about the same or less than input.  He is trying to stay under 1200 cc a day.  He denies any GERD sxs.  He is on iron once a day with iron once a day.  He remains on NaCl 500 mg once a day.     The following portions of the patient's history were reviewed and updated as appropriate: allergies, current medications, past family history, past medical history, past social history, past surgical history and problem list.    Review of Systems   Constitutional: Positive for fatigue. Negative for activity change and appetite change.        Improved appetite since home   HENT: Positive for hearing loss.    Eyes: Negative.    Respiratory: Positive for cough and shortness of breath. Negative for wheezing.    Cardiovascular: Negative for chest pain, palpitations and leg swelling.   Gastrointestinal: Positive for constipation. Negative for abdominal pain.   Endocrine: Negative.    Genitourinary: Positive for dysuria.   Musculoskeletal: Positive for gait problem.   Allergic/Immunologic: Negative.    Neurological: Positive for weakness. Negative for headaches.   Hematological: Bruises/bleeds easily.   Psychiatric/Behavioral: Positive for sleep disturbance.       Objective   Physical Exam  Vitals and nursing note reviewed.   Constitutional:       General: He is not in acute distress.     Appearance: He is well-developed. He is ill-appearing. He is not toxic-appearing.      Comments: Kind and pleasant elderly man, appears stated age and in NAD today, on oxygen at 4 L per nasal cannula, seated in wheelchair   HENT:      Head: Normocephalic and atraumatic.      Right Ear: External ear normal.      Left Ear: External ear normal.   Eyes:      General:         Right eye: No discharge.         Left eye: No discharge.      Extraocular Movements:  Extraocular movements intact.   Neck:      Thyroid: No thyromegaly.      Comments: No thyromegaly or mass  Cardiovascular:      Rate and Rhythm: Normal rate and regular rhythm.      Pulses: Normal pulses.      Heart sounds: Murmur heard.        Comments: Systolic murmur grade 1/6 heard over precordium  Pulmonary:      Effort: Pulmonary effort is normal. No respiratory distress.      Breath sounds: Rales present. No wheezing.      Comments: Decreased breath sounds at bases with few faint crackles heard approximately senior care up lower lung fields posteriorly  Abdominal:      General: Bowel sounds are normal. There is no distension.      Palpations: Abdomen is soft.      Tenderness: There is no abdominal tenderness.   Musculoskeletal:         General: Normal range of motion.      Cervical back: Normal range of motion and neck supple.      Right lower leg: No edema.      Left lower leg: No edema.   Lymphadenopathy:      Cervical: No cervical adenopathy.   Skin:     General: Skin is warm.      Findings: No rash.   Neurological:      General: No focal deficit present.      Mental Status: He is alert and oriented to person, place, and time. Mental status is at baseline.      Cranial Nerves: No cranial nerve deficit.      Motor: Weakness present.      Coordination: Coordination normal.      Gait: Gait abnormal.      Comments: Significant weakness, unable to ambulate and currently in wheelchair   Psychiatric:         Mood and Affect: Mood normal.         Behavior: Behavior normal.         Thought Content: Thought content normal.         Judgment: Judgment normal.      Comments: Improved mood, brighter affect noted today, able to answer questions         Assessment/Plan   Diagnoses and all orders for this visit:    1. Acute on chronic combined systolic and diastolic congestive heart failure (HCC) (Primary)  -     Basic Metabolic Panel    2. Hyponatremia  -     Basic Metabolic Panel    Patient is markedly improved since recent  hospital stay and adjustments in medications  No changes made and all medications reviewed with family/caregiver  Labs as noted  Patient with intermittent burning on urination, sample cup provided and request that family bring this back tomorrow for urinalysis  Continue Lasix 20 mg daily, if patient has 3 pound weight gain overnight, add additional 20 mg of Lasix  Appetite is markedly improved since he is feeling better with no additional Megace to stimulate appetite  Follow-up with cardiologist tomorrow as scheduled  I will repeat CBC and basic metabolic panel in 6 weeks on follow-up visit  Son requests FMLA paperwork be completed as he is helping his father intermittently and requests occasional leave from work    Return to clinic in 6 weeks or as needed

## 2021-10-20 NOTE — PROGRESS NOTES
Please call family and tell them that electrolytes are much improved with sodium level of 135 and chloride of 96.  Calcium level is slightly low and I would like them to supplement with Tums 1 tablet in a.m. and p.m. fine

## 2021-10-27 NOTE — OUTREACH NOTE
CHF Week 2 Survey      Responses   [facility] patient discharged from? [facility]   Does the patient have one of the following disease processes/diagnoses(primary or secondary)? CHF   Week 2 attempt successful? Yes   Call start time 1020   Call end time 1036   Medication alerts for this patient sodium improved to 135.   Meds reviewed with patient/caregiver? Yes   Is the patient taking all medications as directed (includes completed medication regime)? Yes   Has the patient kept scheduled appointments due by today? Yes   Has home health visited the patient within 72 hours of discharge? Yes   Pulse Ox monitoring Intermittent   What is the patient's perception of their health status since discharge? Improving   Is the patient weighing daily? Yes   CHF Week 2 call completed? Yes   Wrap up additional comments Pt's daughter reports pt is improving. Daughter manages pt's care. HH and PT are visiting. Pt has follow up next week.          Sheree Galeas RN

## 2021-10-29 NOTE — TELEPHONE ENCOUNTER
----- Message from Tiny Enriquez RN sent at 10/25/2021  1:36 PM EDT -----  Regarding: FW: Low Blood pressure     ----- Message -----  From: Andrew Hernandez  Sent: 10/25/2021  12:02 PM EDT  To: [facility] Clinical Pool  Subject: Low Blood pressure                               Andrew’s BP has been low over the weekend 92/38 - 124/51. Now today it’s even lower at 71/29 - 98/37 since 10am.  The entresto was given at 8am and bp at that time was 120/47.  Please advise.    Thank you  Roshni  [phone]

## 2021-11-01 PROBLEM — N30.00 ACUTE CYSTITIS WITHOUT HEMATURIA: Status: RESOLVED | Noted: 2021-01-01 | Resolved: 2021-01-01

## 2021-11-01 NOTE — PROGRESS NOTES
Chief Complaint   Patient presents with   • Follow-up     for hypothyroidism, GERD, and CHF.      Subjective   Andrew Hernandez is a 87 y.o. male.     Here today for follow-up of HTN, HLD, CHF, tongue cancer, hypothyroidism, CVA, GERD, BPH, UTI and recent COVID-19 pneumonia.  BP and HR are well controlled, but have been low lately.  His family has been holding entresto due to low BP.  Weight has been going down daily.   Has been held for past 5 days.  His BP has been running as low as 70/36 and then was stopped and BP has been OK since.  BP is running higher than 90s/40s.  He feels weak and tired and is dizzy at times.  He had a HA a few days ago and was given tylenol.  His lasix was held a few days last week and he still had good output.  On digoxin every third day.   He denies GERD.  He does have some constipation.  He is taking exlax as needed.  He is taking two senna a day also.   No painful urination.    No cough or wheezing.  No SOA.    Takes levothyroxine in AM before all other meds.    His tongue is healing well. His appetite is fair, he eats well though.   He is sleeping well with use of melatonin.           The following portions of the patient's history were reviewed and updated as appropriate: allergies, current medications, past family history, past medical history, past social history, past surgical history and problem list.    Review of Systems   Constitutional: Positive for fatigue. Negative for activity change, appetite change and unexpected weight change.   Eyes: Negative for visual disturbance.   Respiratory: Negative for shortness of breath.    Cardiovascular: Negative for chest pain, palpitations and leg swelling.   Gastrointestinal: Positive for constipation. Negative for abdominal pain.   Genitourinary: Negative for difficulty urinating, dysuria and hematuria.   Musculoskeletal: Positive for gait problem. Negative for back pain.   Neurological: Positive for dizziness, weakness and headaches.  "  Psychiatric/Behavioral: Negative for dysphoric mood and sleep disturbance. The patient is not nervous/anxious.        Objective   /52   Pulse 70   Temp 97.3 °F (36.3 °C)   Ht 180.3 cm (71\")   Wt 65.9 kg (145 lb 5 oz)   SpO2 99%   BMI 20.27 kg/m²   Body mass index is 20.27 kg/m².  Physical Exam  Vitals and nursing note reviewed.   Constitutional:       General: He is not in acute distress.     Appearance: Normal appearance. He is well-developed. He is not ill-appearing.      Comments: Kind and pleasant elderly man, appears stated age and in NAD today   HENT:      Head: Normocephalic and atraumatic.      Right Ear: External ear normal.      Left Ear: External ear normal.      Mouth/Throat:      Comments: Right posterior tongue incision has healed nicely  Eyes:      General:         Right eye: No discharge.         Left eye: No discharge.      Extraocular Movements: Extraocular movements intact.      Conjunctiva/sclera: Conjunctivae normal.      Pupils: Pupils are equal, round, and reactive to light.   Neck:      Thyroid: No thyromegaly.      Vascular: No carotid bruit.      Comments: No thyromegaly or mass  Cardiovascular:      Rate and Rhythm: Normal rate and regular rhythm.      Pulses: Normal pulses.      Heart sounds: Murmur heard.        Comments: Faint systolic murmur grade 1/6 noted, currently in sinus rhythm  Pulmonary:      Effort: Pulmonary effort is normal. No respiratory distress.      Breath sounds: No wheezing.      Comments: Decreased breath sounds noted over left base, but no current crackles or rhonchi  Abdominal:      General: Bowel sounds are normal. There is no distension.      Palpations: Abdomen is soft.      Tenderness: There is no abdominal tenderness.   Musculoskeletal:         General: No swelling or tenderness. Normal range of motion.      Cervical back: Normal range of motion and neck supple.      Right lower leg: No edema.      Left lower leg: No edema.   Lymphadenopathy:     "  Cervical: No cervical adenopathy.   Skin:     General: Skin is warm.      Findings: No rash.   Neurological:      General: No focal deficit present.      Mental Status: He is alert and oriented to person, place, and time. Mental status is at baseline.      Cranial Nerves: No cranial nerve deficit.      Motor: Weakness present.      Coordination: Coordination normal.      Gait: Gait abnormal.      Comments: Seated in wheelchair today   Psychiatric:         Mood and Affect: Mood normal.         Behavior: Behavior normal.         Thought Content: Thought content normal.         Judgment: Judgment normal.         Assessment/Plan   Andrew Hernandez is here today and the following problems have been addressed:      Diagnoses and all orders for this visit:    1. Benign essential hypertension (Primary)  -     CBC & Differential  -     Comprehensive Metabolic Panel    2. Hypercholesterolemia    3. Acquired hypothyroidism    4. Gastroesophageal reflux disease without esophagitis    5. Hyponatremia    6. Acute on chronic combined systolic and diastolic congestive heart failure (HCC)    Currently doing very well on all medications as noted  Agree with holding Entresto when blood pressure is less than 90/60  He is diuresing well and family has had to hold Lasix due to low blood pressure  No need to limit input at this time  Labs as noted today  Follow-up with cardiology as scheduled  No symptoms of urinary tract infection at this time  Continue daily vitamin C and iron due to anemia, will check CBC today  Currently in normal sinus rhythm on exam  Tongue is healing well since surgery a few months ago  Family is no longer having to purée foods, he is eating soft diet now    Return to clinic in 2 months as scheduled or as needed      Please note that portions of this note were completed with a voice recognition program.  Efforts were made to edit dictation, but occasionally words are mistranscribed.  Answers for HPI/ROS submitted  by the patient on 10/29/2021  What is the primary reason for your visit?: Shortness of Breath

## 2021-11-02 NOTE — PROGRESS NOTES
I contacted SANJUANITA Barakat regarding abnormal labs today.  I also placed repeat orders for CBC and BMP to be done in approximately 10 days per home health nurse.  Please contact home health and send these orders to them or ask them to please draw these labs in 10 days.  Thank you

## 2021-11-04 NOTE — OUTREACH NOTE
CHF Week 3 Survey      Responses   [facility] patient discharged from? [facility]   Does the patient have one of the following disease processes/diagnoses(primary or secondary)? CHF   Week 3 attempt successful? 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 Yes   Is the patient taking all medications as directed (includes completed medication regime)? Yes   Has the patient kept scheduled appointments due by today? Yes   What is the Home health agency?  [facility]   Has home health visited the patient within 72 hours of discharge? Yes   Pulse Ox monitoring Intermittent   Pulse Ox device source Patient   O2 Sat comments 100% RA   What is the patient's perception of their health status since discharge? Improving   Is the patient weighing daily? Yes   Is the patient able to teach back Heart Failure Zones? Yes   CHF Week 3 call completed? Yes   Wrap up additional comments /44 sitting, 77/38 standing.  BP meds have been adjusted by MD, taking metoprolol 12.5mg once daily now.  No edema.  eiht 142 lb.  Losing weight.  Appetite good.  Hgb and sodium both drifting downward again. Daughter is messaging MD now about an issue. Staying in close contact.           Triny Cabrera, RN

## 2021-11-11 NOTE — OUTREACH NOTE
CHF Week 4 Survey      Responses   [facility] patient discharged from? [facility]   Does the patient have one of the following disease processes/diagnoses(primary or secondary)? CHF   Week 4 attempt successful? 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 Yes   Is the patient having any side effects they believe may be caused by any medication additions or changes? No   Is the patient taking all medications as directed (includes completed medication regime)? Yes   Has the patient kept scheduled appointments due by today? Yes   Is the patient still receiving Home Health Services? Yes  [PT has 2 more visits]   Pulse Ox monitoring Intermittent   Pulse Ox device source Patient   O2 Sat comments 97% on 2L O2   O2 Sat: education provided Sat levels,  Monitoring frequency   Psychosocial issues? No   What is the patient's perception of their health status since discharge? Improving   Nursing interventions Nurse provided patient education   Is the patient weighing daily? Yes   Does the patient have scales? Yes   Daily weight interventions Education provided on importance of daily weight   Is the patient able to teach back Heart Failure diet management? Yes   Is the patient able to teach back Heart Failure Zones? Yes   Is the patient able to teach back signs and symptoms of worsening condition? (i.e. weight gain, shortness of air, etc.) Yes   Is the patient/caregiver able to teach back the hierarchy of who to call/visit for symptoms/problems? PCP, Specialist, Home health nurse, Urgent Care, ED, 911 Yes   Week 4 Call Completed? Yes   Would the patient like one additional call? No   Graduated Yes   Is the patient interested in additional calls from an ambulatory ?  NOTE:  applies to high risk patients  requiring additional follow-up. Yes   Did the patient feel the follow up calls were helpful during their recovery period? Yes   Was the number of calls appropriate? Yes          MARCIAL ALCARAZ RN

## 2021-11-12 NOTE — PROGRESS NOTES
Please tell Roshni that CBC reveals hemoglobin and hematocrit of 8.8 and 25.9.  He is almost near needing transfusion, I am sure he feels as though he needs one, as he is likely short of breath and weak.  I as I have explained to her before hematocrit of 25 is when we give transfusion and he is about 1 g over this.  I will likely arrange for transfusion early next week.  Please tell her this.

## 2021-11-13 NOTE — ED PROVIDER NOTES
Subjective   History of Present Illness   Patient is an 87-year-old male with history of A. fib, tongue cancer status post resection, CHF, hyponatremia, and multiple comorbidities presenting to the ER as advised by his PCP for hyponatremia and anemia.  Patient had a heart attack after his tongue resection and then subsequently developed Covid and has declined since then and required multiple hospitalizations for CHF.  Patient has chronic hyponatremia and anemia but per PCPs note, hemoglobin and hematocrit level are nearing need for transfusion.  Patient's hemoglobin performed earlier today was 8.8 with hematocrit of 25.9.  Patient's sodium was 119. Patient is currently denying symptoms other than weakness. Denies melena and rectal bleeding. Patient's daughter-in-law is at bedside and states he is doing much better as far as CHF goes.  She states his symptom requirements have decreased to 2 to 3 L at home.  She states he has been able to climb the stairs at his home.  Patient denies additional symptoms or complaints at this time.    Review of Systems   Neurological: Positive for weakness.   All other systems reviewed and are negative.      Past Medical History:   Diagnosis Date   • A-fib ([facility])    • Abnormal ECG    • Arrhythmia    • Arthritis    • Asthma Aug 2021   • Benign prostatic hyperplasia    • Cancer (HCC)     TONGUE   • Carotid stenosis     s/p right ICA stent x 2   • CHF (congestive heart failure) ([facility])    • Coronary artery disease    • COVID-19 09/2021   • Disease of thyroid gland    • Enlarged prostate    • Fracture    • Full dentures    • GERD (gastroesophageal reflux disease)    • Heart valve disease    • Hyperlipidemia    • Hypertension    • Hypothyroidism    • Impaired functional mobility, balance, gait, and endurance 09/03/2021   • Kidney infection    • Mitral regurgitation    • Myocardial infarction ([facility]) 8-16-21   • Nonrheumatic aortic valve insufficiency    • Renal calculi    • Stroke ([facility]) 03/2016     right retinal artery occlusion   • 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HCC)    • Wears glasses        Allergies   Allergen Reactions   • Levaquin [Levofloxacin] Shortness Of Breath   • Amoxicillin Rash   • Rocephin [Ceftriaxone] Hives       Past Surgical History:   Procedure Laterality Date   • CAROTID ENDARTERECTOMY      X2-3/17,    • CAROTID STENT Right 2016    Carotid Wall Stent   • CAROTID STENT Right 2017    Zilver BANDAR for recurrent right ICA stenosis   • CATARACT EXTRACTION W/ INTRAOCULAR LENS IMPLANT Left 2018    Procedure: CATARACT PHACO EXTRACTION WITH INTRAOCULAR LENS IMPLANT LEFT, COMPLICATED WITH MALYUGIAN RING;  Surgeon: Paola Welch MD;  Location: [facility];  Service: Ophthalmology   • KIDNEY STONE SURGERY Right     Shafron - open   • PATELLA FRACTURE SURGERY Left    • MO TCAT IV STENT CRV CRTD ART EMBOLIC PROTECJ Right 3/4/2017    Placement of Zilver BANDAR right ICA 3/4/17   • TONGUE SURGERY  2021    cancer spot removed        Family History   Problem Relation Age of Onset   • Heart disease Mother    • Hyperlipidemia Mother    • Hypertension Mother    • Prostate cancer Father    • Cancer Father    • Heart attack Brother    • Heart disease Brother    • Hyperlipidemia Brother    • Hypertension Brother        Social History     Socioeconomic History   • Marital status:    Tobacco Use   • Smoking status: Former Smoker     Packs/day: 1.00     Years: 55.00     Pack years: 55.00     Types: Cigarettes     Start date:      Quit date:      Years since quittin.8   • Smokeless tobacco: Former User     Types: Chew     Quit date:    Vaping Use   • Vaping Use: Never used   Substance and Sexual Activity   • Alcohol use: Not Currently     Alcohol/week: 0.0 standard drinks     Comment: Quit    • Drug use: No   • Sexual activity: Not Currently     Partners: Female           Objective   Physical Exam  Vitals and nursing note reviewed.   Constitutional:        General: He is not in acute distress.     Appearance: He is not toxic-appearing.      Comments: Chronically ill-appearing   HENT:      Head: Normocephalic and atraumatic.      Right Ear: External ear normal.      Left Ear: External ear normal.      Nose: Nose normal.   Eyes:      Extraocular Movements: Extraocular movements intact.      Conjunctiva/sclera: Conjunctivae normal.   Cardiovascular:      Rate and Rhythm: Normal rate.      Heart sounds: Normal heart sounds.   Pulmonary:      Effort: Pulmonary effort is normal. No respiratory distress.      Breath sounds: Normal breath sounds. No wheezing.   Abdominal:      General: There is no distension.      Palpations: Abdomen is soft.      Tenderness: There is no abdominal tenderness.   Musculoskeletal:         General: Normal range of motion.      Cervical back: Normal range of motion and neck supple.   Skin:     General: Skin is warm and dry.   Neurological:      General: No focal deficit present.      Mental Status: He is alert and oriented to person, place, and time.   Psychiatric:         Mood and Affect: Mood normal.         Behavior: Behavior normal.         Procedures           ED Course  ED Course as of 11/12/21 2108 Fri Nov 12, 2021 1958 proBNP(!): 2,899.0 [AP]   1959 Glucose(!): 108 [AP]   1959 BUN: 9 [AP]   1959 Creatinine: 0.80 [AP]   1959 Sodium(!!): 118 [AP]   1959 Potassium: 4.7 [AP]   1959 Chloride(!): 83 [AP]   1959 CO2: 28.0 [AP]   1959 Calcium(!): 8.5 [AP]   1959 Total Protein: 6.4 [AP]   1959 Albumin: 3.50 [AP]   1959 ALT (SGPT): 17 [AP]   1959 AST (SGOT): 21 [AP]   1959 Alkaline Phosphatase: 91 [AP]   1959 Total Bilirubin: 0.4 [AP]   1959 BUN/Creatinine Ratio: 11.3 [AP]   1959 Anion Gap: 7.0 [AP]   1959 Protime: 14.0 [AP]   1959 INR: 1.03 [AP]   1959 Magnesium: 2.0 [AP]   1959 WBC: 5.76 [AP]   1959 RBC(!): 3.45 [AP]   1959 Hemoglobin(!): 9.7 [AP]   1959 Hematocrit(!): 29.0 [AP]   1959 Platelets: 201 [AP]   1959 Color, UA: Yellow [AP]   1959  Appearance, UA: Clear [AP]   1959 pH, UA: 7.0 [AP]   1959 Specific Gravity, UA: 1.007 [AP]   1959 Glucose: Negative [AP]   1959 Ketones, UA: Negative [AP]   1959 Bilirubin, UA: Negative [AP]   1959 Blood, UA: Negative [AP]   1959 Protein, UA: Negative [AP]   1959 Leukocytes, UA: Negative [AP]   1959 Nitrite, UA: Negative [AP]   1959 Urobilinogen, UA: 0.2 E.U./dL [AP]   2000 TSH Baseline(!): 9.900 [AP]   2000 ABO Type: O [AP]   2000 RH type: Positive [AP]   2000 Antibody Screen: Negative [AP]   2000 PTT(!): 59.2 [AP]   2000 Phosphorus: 3.8 [AP]   2000 Digoxin(!): 0.37 [AP]   2006 Hospitalist, Dr. Shrestha, was contacted and reports she will come to the ER to evaluate the patient and discuss options.  [AP]   2058 Free T4: 1.48 [AP]      ED Course User Index  [AP] Cecile Lyon, AAYUSH                                           [facility]   Patient was evaluated in the ER for generalized weakness and anemia and hyponatremia discovered by PCP on labs drawn today.  Patient's vitals are within normal limits upon arrival to the ER.  Work-up is remarkable for elevated TSH, elevated BNP, hyponatremia, hypochloremia, anemia.  Patient has chronic hyponatremia and anemia and has been admitted multiple times for CHF exacerbations with hyponatremia.  CHF symptoms seem to be well controlled today with BNP that is lower than the patient's normal level, no shortness of breath, no pitting edema on lower extremities. Hgb is 9.7 with Hct of 29, does not meet criteria for blood transfusion. Patient is not having any current symptoms other than generalized weakness.  Hospitalist was consulted and came to the ER to discuss options with the patient and his daughter-in-law.  They are agreeable with discharge and PCP follow-up. Hospitalist advised the patient to double his thyroid medication due to elevated TSH.  Patient was given precautions to return to the ER for any new or worsening symptoms.    Final diagnoses:   Hyponatremia   Weakness   Anemia,  unspecified type       ED Disposition  ED Disposition     ED Disposition Condition Comment    Discharge Stable           Vermeesch, Marilyn K, MD  [address]  [place] [address] [phone]    Schedule an appointment as soon as possible for a visit   for re-evaluation of today's complaint    [facility] Emergency Department  [address] [address] [phone]  Go to   As needed, If symptoms worsen         Medication List      Changed    levothyroxine 50 MCG tablet  Commonly known as: SYNTHROID, LEVOTHROID  Take 1 tablet by mouth Daily.  What changed: when to take this     metoprolol succinate XL 25 MG 24 hr tablet  Commonly known as: TOPROL-XL  Take 1 tablet by mouth Daily.  What changed: how much to take     tamsulosin 0.4 MG capsule 24 hr capsule  Commonly known as: FLOMAX  Take 1 capsule by mouth Daily.  What changed: when to take this             Cecile Lyon PA-C  11/12/21 [id number]

## 2021-11-13 NOTE — ED NOTES
Dr. Shrestha called per AAYUSH Huber with answer. Call transferred.      Shakeel Connolly  11/12/21 1956

## 2021-11-16 NOTE — PROGRESS NOTES
Please call Roshni with labs.  Sodium level has increased to 124, up from 118 last week.  His hemoglobin and hematocrit have decreased however they are not at levels for transfusion yet.  Please tell her to continue with plan discussed yesterday.  Thank you

## 2021-11-19 NOTE — OUTREACH NOTE
Ambulatory Case Management Note    Patient Outreach    RN-ACM outreach to patient.  Conversation this date with patient's daughter/caregiver.  Patient recently completed Readmission Management with [facility].  Case returned for HRCM.  Daughter indicated patient continues to improve with weight having stabilized.  Patient's children take turns staying with him.  Notes indicate patient is following closely with PCP and has next appointment 12/01/21.  Daughter denied unmet needs/questions/concerns for RN-ACM to address.         Niru Thorpe RN  Ambulatory Case Management    11/19/2021, 16:25 EST

## 2021-12-01 PROBLEM — R11.2 NAUSEA AND VOMITING: Status: ACTIVE | Noted: 2021-01-01

## 2021-12-01 NOTE — TELEPHONE ENCOUNTER
Yes please, I was going to ask you to call and change it to a video visit because I think he should stay home from now on and try not to get out.  Please tell Roshni we can ask home health nurses to do labs anytime we need them.  Thank you

## 2021-12-01 NOTE — PROGRESS NOTES
Chief Complaint   Patient presents with   • Follow-up     for HTN, GERD, hypothyroidism.      Subjective   Andrew Hernandez is a 87 y.o. male.     Telemedicine video visit today for follow-up of HTN, HLD, CHF.  For past few days he has been dizzy and nauseous and vomited this morning.   His feet have been edematous for past 3 days and BP is elevated past few days.  Several days ago blood pressure had been running systolic in  range, however in past 1 to 2 days it has increased to 140-160s and he has developed edema in legs.  His family states that he crackles at left base and is starting on right side also.    He does have some mild stomach pain.   He has been taking iron daily.  He denies any significant constipation issues.  Urine output late last week was approximately 150 to 250 cc, and over past 1 to 2 days has decreased to approximately 100 cc at a time.  Daughter is concerned that urine is also looking more dark.  Over past 3 days she has given him Lasix 40 mg daily due to worsening edema.  Despite increased dose of Lasix he has had decrease in urine output.  He denies any significant chest pain but is chronically short of breath.  Daughter states for past 1 day however she has taken him off oxygen and he is maintaining saturations greater than 93%, usually hangs around 95 to 98% as long as he is sitting still.  We discontinued fluid restriction a few weeks ago and he has been drinking anywhere between 1500- 2000 cc of fluid including 1-2  8 ounce glasses of Pedialyte daily.  He remains off Entresto for chronic congestive heart failure due to side effects in the past.  He is on digoxin every third day.  Daughter states it appears as though he is remaining in normal sinus rhythm with use of amiodarone daily.       The following portions of the patient's history were reviewed and updated as appropriate: allergies, current medications, past family history, past medical history, past social history, past  surgical history and problem list.    Review of Systems   Constitutional: Positive for fatigue.   HENT: Negative.    Eyes: Negative.    Respiratory: Positive for shortness of breath.    Cardiovascular: Positive for leg swelling.   Gastrointestinal: Positive for nausea and vomiting.   Endocrine: Negative.    Genitourinary: Positive for decreased urine volume.   Musculoskeletal: Positive for gait problem.   Neurological: Positive for dizziness and weakness.       Objective   /50   Pulse 56   Temp 98.1 °F (36.7 °C)   SpO2 93% Comment: without O2  There is no height or weight on file to calculate BMI.  Physical Exam  Vitals and nursing note reviewed.   Constitutional:       General: He is not in acute distress.     Appearance: He is ill-appearing.      Comments: Pleasant elderly gentleman, seated in his chair and covered in a blanket, currently off oxygen, appears slightly pale today but is in no respiratory distress   HENT:      Head: Normocephalic and atraumatic.      Right Ear: External ear normal.      Left Ear: External ear normal.   Eyes:      General:         Right eye: No discharge.         Left eye: No discharge.      Extraocular Movements: Extraocular movements intact.   Pulmonary:      Effort: Pulmonary effort is normal. No respiratory distress.      Comments: Able to speak in full sentences and appears in no respiratory distress  Musculoskeletal:      Right lower leg: Edema present.      Left lower leg: Edema present.      Comments: +1 pitting edema in right lower extremity to mid shin, +1 edema in left dorsum of foot   Neurological:      Mental Status: He is alert and oriented to person, place, and time. Mental status is at baseline.      Cranial Nerves: No cranial nerve deficit.      Motor: Weakness present.   Psychiatric:         Behavior: Behavior normal.         Thought Content: Thought content normal.         Assessment/Plan   Andrew Hernandez is here today and the following problems have been  addressed:      Diagnoses and all orders for this visit:    1. Benign essential hypertension (Primary)  -     CBC & Differential  -     Basic Metabolic Panel    2. Acute on chronic diastolic (congestive) heart failure (HCC)  -     Basic Metabolic Panel  -     BNP    3. Hyponatremia  -     Basic Metabolic Panel    4. Nausea and vomiting, intractability of vomiting not specified, unspecified vomiting type  -     POC Urinalysis Dipstick, Automated    Other orders  -     apixaban (ELIQUIS) 2.5 MG tablet tablet; Take 1 tablet by mouth Every 12 (Twelve) Hours.  Dispense: 30 tablet; Refill: 5        Follow heart healthy/low salt diet  Monitor blood pressure, heart rate, input and output daily  Take all medications as discussed  Limit fluid intake to 1200 cc daily  Labs as noted-we will contact family with results and further plan of care or changes in medications if needed  Decrease Eliquis to 2.5 mg every 12 hours due to age  No Entresto at this time  Continue Lasix 40 mg for 1 more day, then revert back to alternating 40 mg with 20 mg every other day unless he continues to have significant edema or shortness of breath      Return in about 4 weeks (around 12/29/2021) for Next scheduled follow up.      Marilyn K. Vermeesch, MD      Please note that portions of this note were completed with a voice recognition program.  Efforts were made to edit dictation, but occasionally words are mistranscribed.You have chosen to receive care through a telehealth visit.  Do you consent to use a video/audio connection for your medical care today? Yes  Active Parties: Marilyn Vermeesch (PCP), Romy Sparrow ([facility]), Roshni (daughter in law), and Andrew.  Video visit done via [email or web address]

## 2021-12-02 NOTE — TELEPHONE ENCOUNTER
Please tell Roshni that UA is negative.  I am concerned that he may be getting orthostatic hypotension from 40 mg of Lasix daily last several days or he may be very anemic or in kidney failure.  We will contact her with labs as soon as available.

## 2021-12-03 NOTE — PROGRESS NOTES
Please tell them if they want to purchase over-the-counter Dramamine and use that as needed for dizziness they may do so.  Thank you

## 2021-12-03 NOTE — PROGRESS NOTES
Please call Roshni and tell her that all labs are improved.  BNP is in normal range suggesting no heart failure.  I would like her to alternate Lasix 40 mg with 20 mg every other day.  Hemoglobin and hematocrit are 10.8 and 32.1 respectively suggesting his anemia is improved.  No evidence of kidney failure at this time and sodium level is up to 125.  Please tell her his dizziness may be due to orthostatic hypotension from 40 mg of Lasix daily recently.  Please tell them to continue Pedialyte 8 ounces once daily and I recommend a 1500 cc fluid limit daily.  Thank you

## 2021-12-09 PROBLEM — R00.1 SYMPTOMATIC BRADYCARDIA: Status: ACTIVE | Noted: 2021-01-01

## 2021-12-09 NOTE — H&P
[facility]   HISTORY AND PHYSICAL      Name:  Andrew Hernandez   Age:  87 y.o.  Sex:  male  :  1934  MRN:  [id number]   Visit Number:  [id number]  Admission Date:  2021  Date Of Service:  21  Primary Care Physician:  Vermeesch, Marilyn K, MD    Chief Complaint:     Generalized weakness, fatigue    History Of Presenting Illness:      This is a pleasant and chronically ill 87-year-old  male who presents to the ER today with his daughter with complaints of generalized weakness and fatigue. The patient has a history of Atrial fibrillation, tongue cancer with partial removal, congestive heart failure, CAD, hypothyroidism, GERD, Heart valve disease, HTN, hyperlipidemia, and myocardial infarction, and stroke. His caregiver reports that she checked his HR at home and noted to be in the 20's and when she checked his blood pressure, the systolic reading was in the 70's. She brought him to the ER for evaluation. He has not had any CP or new shortness of breath. He denies cough. He denies fever. He reports no vomiting or diarrhea. He does complain of feeling lightheaded and has been ongoing for a few days. The daughter reports that in the last 6 weeks, the patient was started on Amiodarone and his last TSH was elevated which required an increase in his dosage of Synthroid.    The patient was brought to the ER and was bradycardic in the 40's. He was mildly symptomatic with fatigue. Negative for dizziness. His labs were remarkable for hyponatremia, which is chronic for him, and noted to be 123. His chloride was 88, and his calcium was 8.1. His WBC is normal. H/H 11.1/33.5. Troponin level 0.018 and .2. Magnesium was 1.9. His dioxin level was 0.50. Cardiology was consulted by ER physician and it was recommended to admit patient for observation and to hold cardiac medications and antiarrythmics at this time. Hospital medicine was contacted for admission to floor for  further evaluation and treatment.     Review Of Systems:    All systems were reviewed and negative except as mentioned in history of presenting illness, assessment and plan.    Past Medical History: Patient  has a past medical history of A-fib (HCC), Abnormal ECG, Arrhythmia, Arthritis, Asthma (Aug 2021), Benign prostatic hyperplasia, Cancer ([facility]), Carotid stenosis, CHF (congestive heart failure) ([facility]), Coronary artery disease, COVID-19 (09/2021), Disease of thyroid gland, Enlarged prostate, Fracture, Full dentures, GERD (gastroesophageal reflux disease), Heart valve disease, Hyperlipidemia, Hypertension, Hypothyroidism, Impaired functional mobility, balance, gait, and endurance (09/03/2021), Kidney infection, Mitral regurgitation, Myocardial infarction (HCC) (8-16-21), Nonrheumatic aortic valve insufficiency, Renal calculi, Stroke ([facility]) (03/2016), 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[facility]), and Wears glasses.    Past Surgical History: Patient  has a past surgical history that includes Patella fracture surgery (Left, 1986); Kidney stone surgery (Right, 2011); pr tcat iv stent crv crtd art embolic protecj (Right, 3/4/2017); Carotid stent (Right, 03/18/2016); Carotid stent (Right, 03/04/2017); Carotid endarterectomy; Cataract extraction w/ intraocular lens implant (Left, 9/4/2018); and Tongue surgery (08/16/2021).    Social History: Patient  reports that he quit smoking about 16 years ago. His smoking use included cigarettes. He started smoking about 71 years ago. He has a 55.00 pack-year smoking history. He quit smokeless tobacco use about 16 years ago.  His smokeless tobacco use included chew. He reports previous alcohol use. He reports that he does not use drugs.    Family History: Patient's family history includes Cancer in his father; Heart attack in his brother; Heart disease in his brother and mother; Hyperlipidemia in his brother and mother; Hypertension in his brother and mother; Prostate cancer in his father.    Allergies:       Levaquin [levofloxacin], Amoxicillin, and Rocephin [ceftriaxone]    Home Medications:    Prior to Admission Medications     Prescriptions Last Dose Informant Patient Reported? Taking?    amiodarone (PACERONE) 200 MG tablet   No No    Take 1 tablet by mouth Daily.    apixaban (ELIQUIS) 2.5 MG tablet tablet   No No    Take 1 tablet by mouth Every 12 (Twelve) Hours.    aspirin 81 MG EC tablet   Yes No    Take 81 mg by mouth Daily.    atorvastatin (LIPITOR) 20 MG tablet   No No    Take 1 tablet by mouth Every Night.    Calcium Carbonate Antacid (TUMS PO)   Yes No    Take  by mouth.    digoxin (LANOXIN) 125 MCG tablet   No No    Take 1 tablet by mouth Every 3 (Three) Days.    Famotidine (PEPCID PO)   Yes No    Take 20 mg by mouth 2 (Two) Times a Day.    ferrous sulfate 140 (45 Fe) MG tablet controlled-release tablet   Yes No    Take  by mouth Daily With Breakfast. SLOW RELEASE IRON 160/50    furosemide (LASIX) 40 MG tablet   No No    Alternate 20 mg every other day with 40 mg every other day.    levothyroxine (SYNTHROID, LEVOTHROID) 100 MCG tablet   No No    Take 1 tablet by mouth Daily.    metoprolol succinate XL (TOPROL-XL) 25 MG 24 hr tablet   No No    Take 1 tablet by mouth Daily.    Patient taking differently:  Take 12.5 mg by mouth Daily.    multivitamin with minerals (CENTRUM SILVER PO)   Yes No    Take 1 tablet by mouth Daily.    ondansetron (Zofran) 4 MG tablet   No No    Take 1 tablet by mouth Every 8 (Eight) Hours As Needed for Nausea or Vomiting.    potassium chloride 10 MEQ CR tablet   No No    Take 1 tablet by mouth Daily. Taken with lasix    sodium chloride 0.9 % nebulizer solution   No No    Take 3 mL by nebulization As Needed for Wheezing.    sodium chloride 1 g tablet   No No    Take 1 tablet by mouth Daily    tamsulosin (FLOMAX) 0.4 MG capsule 24 hr capsule   No No    Take 1 capsule by mouth Daily.    Patient taking differently:  Take 1 capsule by mouth Every Night.    vitamin C (ASCORBIC ACID) 250  MG tablet   Yes No    Take 500 mg by mouth Daily.        ED Medications:    Medications   sodium chloride 0.9 % flush 10 mL (has no administration in time range)     Vital Signs:  Temp:  [97.6 °F (36.4 °C)] 97.6 °F (36.4 °C)  Heart Rate:  [35-59] 56  Resp:  [16] 16  BP: ()/(30-61) 143/44        12/09/21  1254   Weight: 65.3 kg (144 lb)     Body mass index is 20.08 kg/m².    Physical Exam:     General Appearance:  Alert and cooperative. Afebrile. Chronically ill appearing.   Head:  Atraumatic and normocephalic.   Eyes: Conjunctivae and sclerae normal, no icterus. No pallor.   Ears:  Ears with no abnormalities noted.   Throat: No oral lesions, no thrush, oral mucosa moist.   Neck: Supple, trachea midline, no thyromegaly.   Back:   No kyphoscoliosis present. No tenderness to palpation.   Lungs:   Breath sounds heard bilaterally equally.  Faint crackles to lower bases bilaterally. No wheezing. No Pleural rub or bronchial breathing. RR even, unlabored. 2L NC in place. Sp02 99%.    Heart:  Normal S1 and S2, no murmur, no gallop, no rub. No JVD. Irregular rate and rhythm apically and radially.    Abdomen:   Normal bowel sounds, no masses, no organomegaly. Soft, nontender, nondistended, no rebound tenderness.   Extremities: Supple,  no cyanosis, no clubbing. Mild edema to left foot near toes and RLE mid to lower shin. 1-2+ pitting.    Pulses: Pulses palpable bilaterally and equal, 2+   Skin: No bleeding or rash. Skin is warm and dry. Skin is dried and scaly.    Neurologic: Alert and oriented x 3. No facial asymmetry. Moves all four limbs. No tremors. Speech is clear.  are equal.      Laboratory data:    I have reviewed the labs done in the emergency room.    Results from last 7 days   Lab Units 12/09/21  1309   SODIUM mmol/L 123*   POTASSIUM mmol/L 4.6   CHLORIDE mmol/L 88*   CO2 mmol/L 25.2   BUN mg/dL 12   CREATININE mg/dL 0.94   CALCIUM mg/dL 8.1*   BILIRUBIN mg/dL 0.3   ALK PHOS U/L 92   ALT (SGPT) U/L 10    AST (SGOT) U/L 17   GLUCOSE mg/dL 102*     Results from last 7 days   Lab Units 12/09/21  1309   WBC 10*3/mm3 7.18   HEMOGLOBIN g/dL 11.1*   HEMATOCRIT % 33.5*   PLATELETS 10*3/mm3 270         Results from last 7 days   Lab Units 12/09/21  1309   TROPONIN T ng/mL 0.018                           Invalid input(s): USDES,  BLOODU, NITRITITE, BACT, EP    Pain Management Panel    There is no flowsheet data to display.         EKG:      Reviewed by me, and noted to be sinus bradycardia with occasional PVCs    A second EKG was repeated while I was in the ER and noted for sinus rhythm with sinus arrythmia. No ST elevation or ischemia.    Radiology:    XR Chest 1 View    Result Date: 12/9/2021  PROCEDURE: XR CHEST 1 VW-  HISTORY: Weak/Dizzy/AMS triage protocol  COMPARISON:  11/12/2021  FINDINGS:  Portable view of the chest demonstrates moderate right pleural effusion increased from prior exam with small left pleural effusion. Chronic interstitial changes are present. Compressive atelectasis is seen of the right lung base. Left basilar atelectasis has improved. The mediastinum is unremarkable.  The heart size is normal.      Moderate size right pleural effusion increased with stable small left pleural effusion. Bibasilar atelectasis, worse on the right and improved on left.  This report was finalized on 12/9/2021 1:33 PM by Marin Dennis MD.      Assessment:    1. Symptomatic bradycardia, POA  2. Generalized weakness  3. Essential Hypertension  4. Hypothyroidism  5. Hyperlipidemia  6. GERD  7. Bilateral pleural effusions, R > L, POA  8. Chronic anticoagulation therapy      Plan:    Plan to admit to telemetry with continuous monitoring.  Monitor for bradycardia with symptoms and will treat if needed.  Hold metoprolol, digoxin, amiodarone per recommendation from Cardiology. Cardiology consulted for ongoing management and care  Orders placed for TSH for evaluation of hypothyroidism  Labs ordered for the am (CBC, CMP)  Serial  troponins ordered for this evening.   Home medications ordered as needed.  Repeat EKG as needed for any new CP or worsening symptoms  Consult Speech for evaluation of diet modification (daughter reports patient is now eating soft foods at home and doing well)      Risk Assessment: high  DVT Prophylaxis: Eliquis  Code Status: Full  Diet: Cardiac, pureed with thin liquids    Advance Care Planning   ACP discussion was held with the patient during this visit. Patient has an advance directive in EMR which is still valid.       RUBEN Lu  12/09/21  16:08 EST    Dictated utilizing Dragon dictation.

## 2021-12-09 NOTE — ED PROVIDER NOTES
Subjective   87-year-old male presents to the ED with caregiver for chief complaint of generalized weakness and fatigue.  Caregiver took his heart rate and blood pressure at home and noticed that his heart rate was in the 20s and his blood pressure was in the 70s systolic.  Brought him to the ED for evaluation.  No nausea vomiting diarrhea or abdominal pain.  No chest pain or shortness of breath.  No cough or wheeze.  No other complaints at this time.          Review of Systems   Constitutional: Positive for fatigue.   Cardiovascular:        Bradycardia, hypotension, weakness   Neurological: Positive for weakness.   All other systems reviewed and are negative.      Past Medical History:   Diagnosis Date   • A-fib ([facility])    • Abnormal ECG    • Arrhythmia    • Arthritis    • Asthma Aug 2021   • Benign prostatic hyperplasia    • Cancer ([facility])     TONGUE   • Carotid stenosis     s/p right ICA stent x 2   • CHF (congestive heart failure) ([facility])    • Coronary artery disease    • COVID-19 09/2021   • Disease of thyroid gland    • Enlarged prostate    • Fracture    • Full dentures    • GERD (gastroesophageal reflux disease)    • Heart valve disease    • Hyperlipidemia    • Hypertension    • Hypothyroidism    • Impaired functional mobility, balance, gait, and endurance 09/03/2021   • Kidney infection    • Mitral regurgitation    • Myocardial infarction ([facility]) 8-16-21   • Nonrheumatic aortic valve insufficiency    • Renal calculi    • Stroke ([facility]) 03/2016    right retinal artery occlusion   • 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[facility])    • Wears glasses        Allergies   Allergen Reactions   • Levaquin [Levofloxacin] Shortness Of Breath   • Amoxicillin Rash   • Rocephin [Ceftriaxone] Hives       Past Surgical History:   Procedure Laterality Date   • CAROTID ENDARTERECTOMY      X2-3/17, 9/17   • CAROTID STENT Right 03/18/2016    Carotid Wall Stent   • CAROTID STENT Right 03/04/2017    Zilver BANDAR for recurrent right ICA stenosis   • CATARACT EXTRACTION  W/ INTRAOCULAR LENS IMPLANT Left 2018    Procedure: CATARACT PHACO EXTRACTION WITH INTRAOCULAR LENS IMPLANT LEFT, COMPLICATED WITH MALYUGIAN RING;  Surgeon: Paola Welch MD;  Location: [facility];  Service: Ophthalmology   • KIDNEY STONE SURGERY Right     Shafron - open   • PATELLA FRACTURE SURGERY Left    • TN TCAT IV STENT CRV CRTD ART EMBOLIC PROTECJ Right 3/4/2017    Placement of Zilver BANDAR right ICA 3/4/17   • TONGUE SURGERY  2021    cancer spot removed        Family History   Problem Relation Age of Onset   • Heart disease Mother    • Hyperlipidemia Mother    • Hypertension Mother    • Prostate cancer Father    • Cancer Father    • Heart attack Brother    • Heart disease Brother    • Hyperlipidemia Brother    • Hypertension Brother        Social History     Socioeconomic History   • Marital status:    Tobacco Use   • Smoking status: Former Smoker     Packs/day: 1.00     Years: 55.00     Pack years: 55.00     Types: Cigarettes     Start date:      Quit date:      Years since quittin.9   • Smokeless tobacco: Former User     Types: Chew     Quit date:    Vaping Use   • Vaping Use: Never used   Substance and Sexual Activity   • Alcohol use: Not Currently     Alcohol/week: 0.0 standard drinks     Comment: Quit    • Drug use: No   • Sexual activity: Not Currently     Partners: Female           Objective   Physical Exam  Vitals and nursing note reviewed.   Constitutional:       General: He is not in acute distress.     Appearance: He is well-developed. He is not diaphoretic.      Comments: Chronically ill-appearing   HENT:      Head: Normocephalic and atraumatic.      Nose: Nose normal.   Eyes:      Conjunctiva/sclera: Conjunctivae normal.      Pupils: Pupils are equal, round, and reactive to light.   Cardiovascular:      Rate and Rhythm: Regular rhythm. Bradycardia present.      Pulses: Normal pulses.   Pulmonary:      Effort: Pulmonary effort is normal. No  respiratory distress.      Breath sounds: Normal breath sounds.   Abdominal:      General: There is no distension.      Palpations: Abdomen is soft.      Tenderness: There is no abdominal tenderness.   Musculoskeletal:         General: No deformity.   Neurological:      Mental Status: He is alert and oriented to person, place, and time.      Cranial Nerves: No cranial nerve deficit.      Coordination: Coordination normal.         Procedures           ED Course                                       Critical Care  Performed by: Diaz Gama DO  Authorized by: Diaz Gama DO     Critical care provider statement:     Critical care time (minutes): 35    Critical care time was exclusive of:  Separately billable procedures and treating other patients    Critical care was necessary to treat or prevent imminent or life-threatening deterioration of the following conditions: Symptomatic bradycardia, severe bradycardia,    Critical care was time spent personally by me on the following activities:  Ordering and performing treatments and interventions, development of treatment plan with patient or surrogate, discussions with consultants, evaluation of patient's response to treatment, examination of patient, ordering and review of laboratory studies, ordering and review of radiographic studies, pulse oximetry, re-evaluation of patient's condition and review of old charts            MDM  PULSE OXIMETRY INTERPRETATION  Patient had a pulse ox of 95% on room air. This is a normal pulse oximetry reading.    EKG interpreted by me.  Sinus rhythm.  Bradycardic.  Rate of 55.  Occasional PVCs.  Abnormal EKG    Patient presents to the ED with chief complaint of generalized weakness.  Patient is bradycardic.  Discussed with Dr. Nieves, will hold his antihypertensives and rate controlling medications at this time.  Blood pressure remained stable.  Discussed with hospitalist, patient will be admitted for further evaluation medical  management.      Final diagnoses:   Symptomatic bradycardia       ED Disposition  ED Disposition     ED Disposition Condition Comment    Decision to Admit  Level of Care: Telemetry [5]   Diagnosis: Symptomatic bradycardia [254879]   Admitting Physician: LEONA MCGRAW [[id number]]   Attending Physician: LEONA MCGRAW [[id number]]   Isolate for COVID?: No [0]   Certification: I Certify That Inpatient Hospital Services Are Medically Necessary For Greater Than 2 Midnights            Vermeesch, Marilyn K, MD  [address] [place] [address]  [phone]          [place] [id number] [phone]             Medication List      Changed    metoprolol succinate XL 25 MG 24 hr tablet  Commonly known as: TOPROL-XL  Take 1 tablet by mouth Daily.  What changed: how much to take     tamsulosin 0.4 MG capsule 24 hr capsule  Commonly known as: FLOMAX  Take 1 capsule by mouth Daily.  What changed:   · when to take this  · additional instructions        Stop    sodium chloride 0.9 % nebulizer solution             Diaz Gama, DO  12/13/21 0657

## 2021-12-09 NOTE — PLAN OF CARE
Goal Outcome Evaluation:  Plan of Care Reviewed With: patient, durable power of         Progress: no change   Admit from ED with bradycardia. VSS. Continue to monitor patient progress. SANJUANITA Barakat phone number [phone].

## 2021-12-09 NOTE — ED NOTES
Dr. Gama at bedside, notified patient and family of admission. Patient stated he needed nothing else at this time.      Tash Fabian RN  12/09/21 [id number]

## 2021-12-09 NOTE — ED NOTES
[facility]-- pt c/o chest pain when hospitalist APRN at bedside. Repeat EKG taken. She stated it was okay to send patient on upstairs.      Tash Fabian RN  12/09/21 [id number]

## 2021-12-10 NOTE — THERAPY EVALUATION
Patient Name: Andrew Hernandez  : 1934    MRN: [id number]                              Today's Date: 12/10/2021       Admit Date: 2021    Visit Dx:     ICD-10-CM ICD-9-CM   1. 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    • Carotid stenosis, symptomatic w/o infarct   • Hyperkalemia   • Hyponatremia   • Encounter for Medicare annual wellness exam   • Tongue lesion   • Abdominal bruit   • Edema due to hypervolemia   • Acute on chronic diastolic (congestive) heart failure (HCC)   • Acute on chronic combined systolic and diastolic congestive heart failure (HCC)   • COVID-19 virus detected   • Chronic atrial fibrillation with RVR ([facility])   • Late effects of CVA (cerebrovascular accident)   • Neoplasm, uncertain whether benign or malignant   • Chronic anticoagulation   • Acute and chronic respiratory failure with hypoxia ([facility])   • Blindness of right eye   • Pressure injury of coccygeal region, stage 2 ([facility])   • Physical deconditioning   • Impaired mobility and ADLs   • Acute on chronic congestive heart failure (HCC)   • Nausea and vomiting   • Symptomatic bradycardia     Past Medical History:   Diagnosis Date   • A-fib (HCC)    • Abnormal ECG    • Arrhythmia    • Arthritis    • Asthma Aug 2021   • Benign prostatic hyperplasia    • Cancer (HCC)     TONGUE   • Carotid stenosis     s/p right ICA stent x 2   • CHF (congestive heart failure) ([facility])    • Coronary artery disease    • COVID-19  09/2021   • Disease of thyroid gland    • Enlarged prostate    • Fracture    • Full dentures    • GERD (gastroesophageal reflux disease)    • Heart valve disease    • Hyperlipidemia    • Hypertension    • Hypothyroidism    • Impaired functional mobility, balance, gait, and endurance 09/03/2021   • Kidney infection    • Mitral regurgitation    • Myocardial infarction (HCC) 8-16-21   • Nonrheumatic aortic valve insufficiency    • Renal calculi    • Stroke (HCC) 03/2016    right retinal artery occlusion   • 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HCC)    • Wears glasses      Past Surgical History:   Procedure Laterality Date   • CAROTID ENDARTERECTOMY      X2-3/17, 9/17   • CAROTID STENT Right 03/18/2016    Carotid Wall Stent   • CAROTID STENT Right 03/04/2017    [place] BANDAR for recurrent right ICA stenosis   • CATARACT EXTRACTION W/ INTRAOCULAR LENS IMPLANT Left 9/4/2018    Procedure: CATARACT PHACO EXTRACTION WITH INTRAOCULAR LENS IMPLANT LEFT, COMPLICATED WITH MALYUGIAN RING;  Surgeon: Paola Welch MD;  Location: [facility];  Service: Ophthalmology   • KIDNEY STONE SURGERY Right 2011    [facility] - open   • PATELLA FRACTURE SURGERY Left 1986   • FL TCAT IV STENT CRV CRTD ART EMBOLIC PROTECJ Right 3/4/2017    Placement of [place]ilver BANDAR right ICA 3/4/17   • TONGUE SURGERY  08/16/2021    cancer spot removed       General Information     Row Name 12/10/21 1414          Physical Therapy Time and Intention    Document Type evaluation  -JR     Mode of Treatment physical therapy  -JR     Row Name 12/10/21 1414          General Information    Patient Profile Reviewed yes  -JR     Prior Level of Function independent:; all household mobility  -JR     Existing Precautions/Restrictions fall  -JR     Barriers to Rehab medically complex  -JR     Row Name 12/10/21 1414          Living Environment    Lives With alone  pt lives in an apartment over a garage on his son's property  -JR     Row Name 12/10/21 1414          Home Main Entrance    Number of  Stairs, Main Entrance other (see comments)  flight of 16 steps  -JR     Stair Railings, Main Entrance railings on both sides of stairs  -     Row Name 12/10/21 1414          Cognition    Orientation Status (Cognition) oriented x 4  -     Row Name 12/10/21 1414          Safety Issues, Functional Mobility    Safety Issues Affecting Function (Mobility) safety precautions follow-through/compliance; positioning of assistive device; insight into deficits/self-awareness  -     Impairments Affecting Function (Mobility) strength; balance; endurance/activity tolerance  -           User Key  (r) = Recorded By, (t) = Taken By, (c) = Cosigned By    Initials Name Provider Type    Sade Pardo, PT Physical Therapist               Mobility     Row Name 12/10/21 1414          Bed Mobility    Bed Mobility supine-sit  -     Supine-Sit [place] (Bed Mobility) modified independence  -     Assistive Device (Bed Mobility) head of bed elevated  -     Row Name 12/10/21 1414          Sit-Stand Transfer    Sit-Stand [place] (Transfers) contact guard  -     Assistive Device (Sit-Stand Transfers) --  gait belt  -     Row Name 12/10/21 1414          Gait/Stairs (Locomotion)    [place] Level (Gait) contact guard  -     Assistive Device (Gait) --  gait belt  -     Distance in Feet (Gait) 130  -JR     Deviations/Abnormal Patterns (Gait) base of support, wide; festinating/shuffling; stride length decreased; weight shifting decreased  -JR     Bilateral Gait Deviations leans right  -           User Key  (r) = Recorded By, (t) = Taken By, (c) = Cosigned By    Initials Name Provider Type    Sade Pardo, PT Physical Therapist               Obj/Interventions     Row Name 12/10/21 1414          Range of Motion Comprehensive    General Range of Motion bilateral lower extremity ROM WFL  -     Row Name 12/10/21 1414          Strength Comprehensive (MMT)    Comment, General Manual Muscle Testing (MMT) Assessment  BLE= 3+/5  -JR     Row Name 12/10/21 1414          Balance    Balance Assessment sitting static balance; sitting dynamic balance; standing static balance; standing dynamic balance  -JR     Static Sitting Balance WFL  -JR     Dynamic Sitting Balance WFL  -JR     Static Standing Balance WFL  -JR     Dynamic Standing Balance mild impairment  -JR           User Key  (r) = Recorded By, (t) = Taken By, (c) = Cosigned By    Initials Name Provider Type    Sade Pardo, PT Physical Therapist               Goals/Plan     Row Name 12/10/21 1414          Bed Mobility Goal 1 (PT)    Activity/Assistive Device (Bed Mobility Goal 1, PT) bed mobility activities, all  -JR     [place] Level/Cues Needed (Bed Mobility Goal 1, PT) modified independence  -JR     Time Frame (Bed Mobility Goal 1, PT) short term goal (STG)  -JR     Progress/Outcomes (Bed Mobility Goal 1, PT) goal ongoing  -     Row Name 12/10/21 1414          Transfer Goal 1 (PT)    Activity/Assistive Device (Transfer Goal 1, PT) sit-to-stand/stand-to-sit; bed-to-chair/chair-to-bed  -JR     [place] Level/Cues Needed (Transfer Goal 1, PT) standby assist  -JR     Time Frame (Transfer Goal 1, PT) short term goal (STG)  -JR     Progress/Outcome (Transfer Goal 1, PT) goal ongoing  -     Row Name 12/10/21 1414          Gait Training Goal 1 (PT)    Activity/Assistive Device (Gait Training Goal 1, PT) gait (walking locomotion)  -JR     [place] Level (Gait Training Goal 1, PT) standby assist  -JR     Distance (Gait Training Goal 1, PT) 400  -JR     Time Frame (Gait Training Goal 1, PT) short term goal (STG)  -JR     Progress/Outcome (Gait Training Goal 1, PT) goal ongoing  -     Row Name 12/10/21 1414          Stairs Goal 1 (PT)    Activity/Assistive Device (Stairs Goal 1, PT) ascending stairs; descending stairs  -JR     [place] Level/Cues Needed (Stairs Goal 1, PT) standby assist  -JR     Number of Stairs (Stairs Goal 1, PT) 16  -JR     Time Frame  (Stairs Goal 1, PT) short term goal (STG)  -JR     Progress/Outcome (Stairs Goal 1, PT) goal ongoing  -JR     Row Name 12/10/21 1414          Patient Education Goal (PT)    Activity (Patient Education Goal, PT) Perform HEP x 20  -JR     [place]/Cues/Accuracy (Memory Goal 2, PT) demonstrates adequately  -JR     Time Frame (Patient Education Goal, PT) short term goal (STG)  -JR     Progress/Outcome (Patient Education Goal, PT) goal ongoing  -JR           User Key  (r) = Recorded By, (t) = Taken By, (c) = Cosigned By    Initials Name Provider Type    Sade Pardo, PT Physical Therapist               Clinical Impression     Row Name 12/10/21 1414          Pain    Additional Documentation Pain Scale: Numbers Pre/Post-Treatment (Group)  -JR     Row Name 12/10/21 1414          Pain Scale: Numbers Pre/Post-Treatment    Pretreatment Pain Rating 0/10 - no pain  -JR     Posttreatment Pain Rating 0/10 - no pain  -JR     Row Name 12/10/21 1414          Plan of Care Review    Plan of Care Reviewed With patient  -JR     Progress no change  -JR     Outcome Summary Patient participates well in PT evaluation and demonstrates deficits in strength, balance, transfers and gait.  He is able to perform gait x 130 feet with gait belt and CGA.  He has path deviaitons, usually to the right.  He is expected to benefit from additional PT services while hospitalized and upon discharge to home with home health care services.  -     Row Name 12/10/21 1414          Therapy Assessment/Plan (PT)    Patient/Family Therapy Goals Statement (PT) Patient is eager to go home  -JR     Rehab Potential (PT) good, to achieve stated therapy goals  -JR     Criteria for Skilled Interventions Met (PT) yes; meets criteria; skilled treatment is necessary  -JR     Row Name 12/10/21 1414          Positioning and Restraints    Pre-Treatment Position in bed  -JR     Post Treatment Position chair  -JR     In Chair reclined; call light within reach; encouraged  to call for assist; exit alarm on  -           User Key  (r) = Recorded By, (t) = Taken By, (c) = Cosigned By    Initials Name Provider Type    Sade Pardo, MIRA Physical Therapist               Outcome Measures     Row Name 12/10/21 1414          How much help from another person do you currently need...    Turning from your back to your side while in flat bed without using bedrails? 4  -JR     Moving from lying on back to sitting on the side of a flat bed without bedrails? 3  -JR     Moving to and from a bed to a chair (including a wheelchair)? 3  -JR     Standing up from a chair using your arms (e.g., wheelchair, bedside chair)? 3  -JR     Climbing 3-5 steps with a railing? 2  -JR     To walk in hospital room? 3  -JR     AM-PAC 6 Clicks Score (PT) 18  -     Row Name 12/10/21 1519 12/10/21 1414       Functional Assessment    Outcome Measure Options AM-PAC 6 Clicks Daily Activity (OT)  - AM-PAC 6 Clicks Basic Mobility (PT)  -          User Key  (r) = Recorded By, (t) = Taken By, (c) = Cosigned By    Initials Name Provider Type    Stephany Saeed Occupational Therapist    Sade Pardo, PT Physical Therapist                             Physical Therapy Education                 Title: PT OT SLP Therapies (In Progress)     Topic: Physical Therapy (In Progress)     Point: Mobility training (Done)     Learning Progress Summary           Patient Acceptance, E,TB, VU by  at 12/10/2021 4916    Comment: Role of PT and POC                   Point: Home exercise program (Not Started)     Learner Progress:  Not documented in this visit.          Point: Body mechanics (Not Started)     Learner Progress:  Not documented in this visit.          Point: Precautions (Not Started)     Learner Progress:  Not documented in this visit.                      User Key     Initials Effective Dates Name Provider Type Discipline     06/16/21 -  Sade Medina PT Physical Therapist PT              PT Recommendation and  Plan  Planned Therapy Interventions (PT): strengthening, balance training, bed mobility training, transfer training, gait training, home exercise program, patient/family education  Plan of Care Reviewed With: patient  Progress: no change  Outcome Summary: Patient participates well in PT evaluation and demonstrates deficits in strength, balance, transfers and gait.  He is able to perform gait x 130 feet with gait belt and CGA.  He has path deviaitons, usually to the right.  He is expected to benefit from additional PT services while hospitalized and upon discharge to home with home health care services.     Time Calculation:    PT Charges     Row Name 12/10/21 1627             Time Calculation    Start Time 1414  -JR      PT Received On 12/10/21  -JR      PT Goal Re-Cert Due Date 12/20/21  -JR              Untimed Charges    PT Eval/Re-eval Minutes 40  -JR              Total Minutes    Untimed Charges Total Minutes 40  -JR       Total Minutes 40  -JR            User Key  (r) = Recorded By, (t) = Taken By, (c) = Cosigned By    Initials Name Provider Type     Sade Medina, PT Physical Therapist              Therapy Charges for Today     Code Description Service Date Service Provider Modifiers Qty    [id number] HC PT EVAL LOW COMPLEXITY 3 12/10/2021 Sade Medina, PT GP 1          PT G-Codes  Outcome Measure Options: AM-PAC 6 Clicks Daily Activity (OT)  AM-PAC 6 Clicks Score (PT): 18  AM-PAC 6 Clicks Score (OT): 21    Sade Medina PT  12/10/2021

## 2021-12-10 NOTE — CASE MANAGEMENT/SOCIAL WORK
Continued Stay Note  [facility]     Patient Name: Andrew Hernandez  MRN: [id number]  Today's Date: 12/10/2021    Admit Date: 12/9/2021     Discharge Plan     Row Name 12/10/21 1255       Plan    Plan Discharge plan, patient to transfer to [facility].               Discharge Codes    No documentation.               Expected Discharge Date and Time     Expected Discharge Date Expected Discharge Time    Dec 14, 2021             Regine Villaseñor LCSW

## 2021-12-10 NOTE — CONSULTS
[facility]-Cardiology Consult Note    Referring Provider: Po  Reason for Consultation: Bradycardia    Patient Care Team:  Vermeesch, Marilyn K, MD as PCP - General (Internal Medicine & Pediatrics)  Dirk De Leon MD as Consulting Physician (Ophthalmology)  Oziel Mcnair MD as Consulting Physician (Cardiology)  Harsh Huerta MD as Consulting Physician (Urology)  Niru Thorpe, RN as Ambulatory  ([facility])    Chief complaint : Fatigue    Subjective:    History of present illness: This is an 87-year-old male patient with known tachycardia-bradycardia syndrome who presented with progressively worsening fatigue, malaise, generalized weakness and severe dizziness with near syncope.  The patient heart rate was noted to be in 30s with sinus bradycardia.  He was also hypotensive.  The patient's amiodarone, Toprol-XL and digoxin therapy were discontinued.  Digoxin level was low normal.  Kidney function was normal.  The patient was recently hospitalized for atrial fibrillation with rapid ventricular response but pharmacologically cardioverted with amiodarone therapy.  He denied having chest discomfort at rest or with activity.  Twelve-lead electrocardiogram showed sinus bradycardia with no ischemic ST-T wave changes or injury current.  An echocardiogram recently performed showed an ejection fraction of 40-45% with mild MR, TR and AI.  He denies having shortness of breath, orthopnea, PND or lower extremity edema.  Cardiac troponin was normal.  The patient's heart rate has improved to the mid 50s.  He is currently asymptomatic.    Review of Systems   Review of Systems   Constitutional: Positive for malaise/fatigue. Negative for chills, diaphoresis, fever, weight gain and weight loss.   HENT: Negative for ear discharge, hearing loss, hoarse voice and nosebleeds.    Eyes: Negative for discharge, double vision, pain and photophobia.   Cardiovascular: Positive for near-syncope. Negative for chest pain,  claudication, cyanosis, dyspnea on exertion, irregular heartbeat, leg swelling, orthopnea, palpitations, paroxysmal nocturnal dyspnea and syncope.   Respiratory: Negative for cough, hemoptysis, shortness of breath, sputum production and wheezing.    Endocrine: Negative for cold intolerance, heat intolerance, polydipsia, polyphagia and polyuria.   Hematologic/Lymphatic: Negative for adenopathy and bleeding problem. Does not bruise/bleed easily.   Skin: Negative for color change, flushing, itching and rash.   Musculoskeletal: Negative for muscle cramps, muscle weakness, myalgias and stiffness.   Gastrointestinal: Negative for abdominal pain, diarrhea, hematemesis, hematochezia, nausea and vomiting.   Genitourinary: Negative for dysuria, frequency and nocturia.   Neurological: Negative for focal weakness, loss of balance, numbness, paresthesias and seizures.   Psychiatric/Behavioral: Negative for altered mental status, hallucinations and suicidal ideas.   Allergic/Immunologic: Negative for HIV exposure, hives and persistent infections.       History  Past Medical History:   Diagnosis Date   • A-fib ([facility])    • Abnormal ECG    • Arrhythmia    • Arthritis    • Asthma Aug 2021   • Benign prostatic hyperplasia    • Cancer ([facility])     TONGUE   • Carotid stenosis     s/p right ICA stent x 2   • CHF (congestive heart failure) ([facility])    • Coronary artery disease    • COVID-19 09/2021   • Disease of thyroid gland    • Enlarged prostate    • Fracture    • Full dentures    • GERD (gastroesophageal reflux disease)    • Heart valve disease    • Hyperlipidemia    • Hypertension    • Hypothyroidism    • Impaired functional mobility, balance, gait, and endurance 09/03/2021   • Kidney infection    • Mitral regurgitation    • Myocardial infarction ([facility]) 8-16-21   • Nonrheumatic aortic valve insufficiency    • Renal calculi    • Stroke ([facility]) 03/2016    right retinal artery occlusion   • 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[facility])    • Wears glasses    ,   Past  Surgical History:   Procedure Laterality Date   • CAROTID ENDARTERECTOMY      X2-3/17,    • CAROTID STENT Right 2016    Carotid Wall Stent   • CAROTID STENT Right 2017    Zilver BANDAR for recurrent right ICA stenosis   • CATARACT EXTRACTION W/ INTRAOCULAR LENS IMPLANT Left 2018    Procedure: CATARACT PHACO EXTRACTION WITH INTRAOCULAR LENS IMPLANT LEFT, COMPLICATED WITH MALYUGIAN RING;  Surgeon: Paola Welch MD;  Location: [facility];  Service: Ophthalmology   • KIDNEY STONE SURGERY Right     Shafron - open   • PATELLA FRACTURE SURGERY Left    • OR TCAT IV STENT CRV CRTD ART EMBOLIC PROTECJ Right 3/4/2017    Placement of Zilver BANDAR right ICA 3/4/17   • TONGUE SURGERY  2021    cancer spot removed    ,   Family History   Problem Relation Age of Onset   • Heart disease Mother    • Hyperlipidemia Mother    • Hypertension Mother    • Prostate cancer Father    • Cancer Father    • Heart attack Brother    • Heart disease Brother    • Hyperlipidemia Brother    • Hypertension Brother    ,   Social History     Tobacco Use   • Smoking status: Former Smoker     Packs/day: 1.00     Years: 55.00     Pack years: 55.00     Types: Cigarettes     Start date:      Quit date:      Years since quittin.9   • Smokeless tobacco: Former User     Types: Chew     Quit date:    Vaping Use   • Vaping Use: Never used   Substance Use Topics   • Alcohol use: Not Currently     Alcohol/week: 0.0 standard drinks     Comment: Quit    • Drug use: No   ,   Medications Prior to Admission   Medication Sig Dispense Refill Last Dose   • amiodarone (PACERONE) 200 MG tablet Take 1 tablet by mouth Daily. 90 tablet 1 2021 at Unknown time   • apixaban (ELIQUIS) 2.5 MG tablet tablet Take 1 tablet by mouth Every 12 (Twelve) Hours. 30 tablet 5 2021 at Unknown time   • aspirin 81 MG EC tablet Take 81 mg by mouth Daily.   2021 at Unknown time   • atorvastatin (LIPITOR) 20 MG tablet Take 1  tablet by mouth Every Night. 90 tablet 3 12/8/2021 at Unknown time   • Famotidine (PEPCID PO) Take 20 mg by mouth 2 (Two) Times a Day.   12/9/2021 at Unknown time   • ferrous sulfate 140 (45 Fe) MG tablet controlled-release tablet Take  by mouth Daily With Breakfast. SLOW RELEASE IRON 160/50   12/9/2021 at Unknown time   • furosemide (LASIX) 40 MG tablet Alternate 20 mg every other day with 40 mg every other day. 90 tablet 1 12/9/2021 at Unknown time   • levothyroxine (SYNTHROID, LEVOTHROID) 100 MCG tablet Take 1 tablet by mouth Daily. 90 tablet 1 12/9/2021 at Unknown time   • metoprolol succinate XL (TOPROL-XL) 25 MG 24 hr tablet Take 1 tablet by mouth Daily. (Patient taking differently: Take 12.5 mg by mouth Daily.) 90 tablet 1 12/9/2021 at Unknown time   • multivitamin with minerals (CENTRUM SILVER PO) Take 1 tablet by mouth Daily.   12/9/2021 at Unknown time   • ondansetron (Zofran) 4 MG tablet Take 1 tablet by mouth Every 8 (Eight) Hours As Needed for Nausea or Vomiting. 30 tablet 2 12/9/2021 at Unknown time   • potassium chloride 10 MEQ CR tablet Take 1 tablet by mouth Daily. Taken with lasix 90 tablet 1 12/9/2021 at Unknown time   • sodium chloride 1 g tablet Take 1 tablet by mouth Daily 30 tablet 5 12/9/2021 at Unknown time   • tamsulosin (FLOMAX) 0.4 MG capsule 24 hr capsule Take 1 capsule by mouth Daily. (Patient taking differently: Take 1 capsule by mouth Every Night.) 90 capsule 3 Patient Taking Differently at Unknown time   • vitamin C (ASCORBIC ACID) 250 MG tablet Take 500 mg by mouth Daily.   12/9/2021 at Unknown time   • Calcium Carbonate Antacid (TUMS PO) Take  by mouth.   Unknown at Unknown time   • digoxin (LANOXIN) 125 MCG tablet Take 1 tablet by mouth Every 3 (Three) Days. 30 tablet 1 12/8/2021 at 0800   • sodium chloride 0.9 % nebulizer solution Take 3 mL by nebulization As Needed for Wheezing. (Patient not taking: Reported on 12/9/2021) 15 mL 0 Not Taking at Unknown time    and Allergies:   "Levaquin [levofloxacin], Amoxicillin, and Rocephin [ceftriaxone]    Objective:    Vital Sign Min/Max for last 24 hours  Temp  Min: 97.4 °F (36.3 °C)  Max: 98.4 °F (36.9 °C)   BP  Min: 96/43  Max: 146/42   Pulse  Min: 35  Max: 91   Resp  Min: 16  Max: 18   SpO2  Min: 75 %  Max: 100 %   No data recorded   Weight  Min: 65.3 kg (144 lb)  Max: 65.3 kg (144 lb)     Flowsheet Rows      First Filed Value   Admission Height 180.3 cm (71\") Documented at 12/09/2021 1254   Admission Weight 65.3 kg (144 lb) Documented at 12/09/2021 1254             Echo EF Estimated  Lab Results   Component Value Date    ECHOEFEST 50 09/09/2016       Physical Exam:   Vitals and nursing note reviewed.   Constitutional:       Appearance: Healthy appearance. Not in distress.   Neck:      Vascular: No JVR. JVD normal.   Pulmonary:      Effort: Pulmonary effort is normal.      Breath sounds: Normal breath sounds. No wheezing. No rhonchi. No rales.   Chest:      Chest wall: Not tender to palpatation.   Cardiovascular:      PMI at left midclavicular line. Normal rate. Regular rhythm. Normal S1. Normal S2.      Murmurs: There is no murmur.      No gallop. No click. No rub.   Pulses:     Intact distal pulses.   Edema:     Peripheral edema absent.   Abdominal:      General: Bowel sounds are normal.      Palpations: Abdomen is soft.      Tenderness: There is no abdominal tenderness.   Musculoskeletal: Normal range of motion.         General: No tenderness. Skin:     General: Skin is warm and dry.   Neurological:      General: No focal deficit present.      Mental Status: Alert and oriented to person, place and time.         Results Review:   I reviewed the patient's new clinical results.  Results from last 7 days   Lab Units 12/10/21  0619 12/09/21  1309   WBC 10*3/mm3 5.58 7.18   HEMOGLOBIN g/dL 9.6* 11.1*   HEMATOCRIT % 28.0* 33.5*   PLATELETS 10*3/mm3 218 270     Results from last 7 days   Lab Units 12/10/21  0619 12/09/21  1309 12/09/21  1309   SODIUM " "mmol/L 123*  --  123*   POTASSIUM mmol/L 4.4  --  4.6   CHLORIDE mmol/L 89*  --  88*   CO2 mmol/L 26.7  --  25.2   BUN mg/dL 13  --  12   CREATININE mg/dL 0.91  --  0.94   GLUCOSE mg/dL 91   < > 102*   CALCIUM mg/dL 8.3*  --  8.1*    < > = values in this interval not displayed.     Lab Results   Lab Value Date/Time    TROPONINT 0.018 12/09/2021 1309    TROPONINT 0.021 12/09/2021 1309    TROPONINT <0.010 10/07/2021 1841    TROPONINT <0.010 09/20/2021 1659    TROPONINT 0.018 09/02/2021 1601             Assessment/Plan:      Symptomatic bradycardia    Benign essential hypertension    Hypercholesterolemia    Acquired hypothyroidism    Acute on chronic diastolic (congestive) heart failure (HCC)    Acute on chronic combined systolic and diastolic congestive heart failure (HCC)    Chronic atrial fibrillation with RVR (HCC)    Physical deconditioning      The patient will require a permanent pacemaker.  Hopefully he will be a candidate for a \"leadless\" pacemaker.  I have spoken with Dr. Mcnair who is arranging for transfer to [facility] once a bed is available.  I have recommended holding his Eliquis and using Lovenox for the time being.  After pacemaker implantation, amiodarone, Toprol-XL and Eliquis therapy can be resumed.    I discussed the patients findings and my recommendations with patient and family    Ishmael Nieves MD  12/10/21  10:42 EST          "

## 2021-12-10 NOTE — NURSING NOTE
Andrew transferred to this unit from Med-Surg with plan to be on Cardizem drip. Upon arrival to this unit he converted to NSR with HR of 74. I called Dr. Nieves and the orders were cancelled for the Cardizem drip. Cardoliogist in [place] has confirmed to [place] (Andrew's daughter-i -law likely transfer to [facility] on Monday for pacemaker.

## 2021-12-10 NOTE — PROGRESS NOTES
[facility]    PROGRESS NOTE    Name:  Andrew Hernandez   Age:  87 y.o.  Sex:  male  :  1934  MRN:  [id number]   Visit Number:  [id number]  Admission Date:  2021  Date Of Service:  12/10/21  Primary Care Physician:  Vermeesch, Marilyn K, MD     LOS: 1 day :    Chief Complaint:      Follow up symptomatic bradycardia     Subjective:    Patient seen and examined this am. Patient is awake and alert resting in bed. He reports a decent night and has no symptoms to report this morning. He denies any complaints of chest pain, shortness of breath, or dizziness. He denies any nausea or vomiting. He states his lightheadedness has subsided for the time. He reports that he ate some breakfast this morning and tolerated well. We discussed the plan for today and that cardiology will see him for further treatment guidelines. No family presence at this time.     Hospital Course:    This is a pleasant and chronically ill 87-year-old  male who presents to the ER  with his daughter with complaints of generalized weakness and fatigue. The patient has a history of Atrial fibrillation, tongue cancer with partial removal, congestive heart failure, CAD, hypothyroidism, GERD, Heart valve disease, HTN, hyperlipidemia, and myocardial infarction, and stroke. His caregiver reports that she checked his HR at home and noted to be in the 20's and when she checked his blood pressure, the systolic reading was in the 70's. She brought him to the ER for evaluation. He has not had any CP or new shortness of breath. He denies cough. He denies fever. He reports no vomiting or diarrhea. He does complain of feeling lightheaded and has been ongoing for a few days. The daughter reports that in the last 6 weeks, the patient was started on Amiodarone and his last TSH was elevated which required an increase in his dosage of Synthroid.     The patient was brought to the ER and was bradycardic in the 40's. He was  mildly symptomatic with fatigue. Negative for dizziness. His labs were remarkable for hyponatremia, which is chronic for him, and noted to be 123. His chloride was 88, and his calcium was 8.1. His WBC is normal. H/H 11.1/33.5. Troponin level 0.018 and .2. Magnesium was 1.9. His dioxin level was 0.50. Cardiology was consulted by ER physician and it was recommended to admit patient for observation and to hold cardiac medications and antiarrythmics at this time. Hospital medicine was contacted for admission to floor for further evaluation and treatment.     Review of Systems:     All systems were reviewed and negative except as mentioned in subjective, assessment and plan.    Vital Signs:    Temp:  [97.4 °F (36.3 °C)-98.4 °F (36.9 °C)] 98.3 °F (36.8 °C)  Heart Rate:  [37-91] 62  Resp:  [18] 18  BP: ()/(29-46) 134/37    Intake and output:    I/O last 3 completed shifts:  In: -   Out: 800 [Urine:800]  I/O this shift:  In: 240 [P.O.:240]  Out: 600 [Urine:600]    Physical Examination:    General Appearance:  Alert and cooperative. Elderly and frail. Generalized weakness. Afebrile.    Head:  Atraumatic and normocephalic.   Eyes: Conjunctivae and sclerae normal, no icterus. No pallor.   Throat: No oral lesions, no thrush, oral mucosa moist.   Neck: Supple, trachea midline, no thyromegaly.   Lungs:   Breath sounds heard bilaterally equally. No crackles or wheezing. No Pleural rub or bronchial breathing. RR even, unlabored.    Heart:  Normal S1 and S2, no murmur, no gallop, no rub. No JVD.   Abdomen:   Normal bowel sounds, no masses, no organomegaly. Soft, nontender, nondistended, no rebound tenderness.   Extremities: Supple, no cyanosis, no clubbing. Mild edema noted to BLE (left foot).    Skin: No bleeding or rash. Skin is warm and dry.    Neurologic: Alert and oriented x 3. No facial asymmetry. Moves all four limbs. No tremors. Speech is clear.      Laboratory results:    Results from last 7 days   Lab Units  12/10/21  0619 12/09/21  1309   SODIUM mmol/L 123* 123*   POTASSIUM mmol/L 4.4 4.6   CHLORIDE mmol/L 89* 88*   CO2 mmol/L 26.7 25.2   BUN mg/dL 13 12   CREATININE mg/dL 0.91 0.94   CALCIUM mg/dL 8.3* 8.1*   BILIRUBIN mg/dL 0.4 0.3   ALK PHOS U/L 89 92   ALT (SGPT) U/L 8 10   AST (SGOT) U/L 15 17   GLUCOSE mg/dL 91 102*     Results from last 7 days   Lab Units 12/10/21  0619 12/09/21  1309   WBC 10*3/mm3 5.58 7.18   HEMOGLOBIN g/dL 9.6* 11.1*   HEMATOCRIT % 28.0* 33.5*   PLATELETS 10*3/mm3 218 270         Results from last 7 days   Lab Units 12/09/21  1309   TROPONIN T ng/mL 0.021  0.018             I have reviewed the patient's laboratory results.    Radiology results:    XR Chest 1 View    Result Date: 12/9/2021  PROCEDURE: XR CHEST 1 VW-  HISTORY: Weak/Dizzy/AMS triage protocol  COMPARISON:  11/12/2021  FINDINGS:  Portable view of the chest demonstrates moderate right pleural effusion increased from prior exam with small left pleural effusion. Chronic interstitial changes are present. Compressive atelectasis is seen of the right lung base. Left basilar atelectasis has improved. The mediastinum is unremarkable.  The heart size is normal.      Impression: Moderate size right pleural effusion increased with stable small left pleural effusion. Bibasilar atelectasis, worse on the right and improved on left.  This report was finalized on 12/9/2021 1:33 PM by Marin Dennis MD.    I have reviewed the patient's radiology reports.    Medication Review:     I have reviewed the patient's active and prn medications.     Problem List:      Symptomatic bradycardia    Benign essential hypertension    Hypercholesterolemia    Acquired hypothyroidism    Acute on chronic diastolic (congestive) heart failure (HCC)    Acute on chronic combined systolic and diastolic congestive heart failure (HCC)    Chronic atrial fibrillation with RVR (HCC)    Physical deconditioning      Assessment:    1. Symptomatic bradycardia  2. Acquired  hypothyroidism  3. Acute on chronic diastolic heart failure  4. Generalized weakness  5. Chronic atrial fibrillation  6. Near syncope  7. Chronic anticoagulation therapy    Plan:    Continue on telemetry monitoring. Awaiting cardiology recommendation for further treatment    Per cardiology, digoxin, Metoprolol XL, amiodarone discontinued. Also recommends discontinuing of Eliquis and starting on Lovenox.    Will continue to monitor labs and vitals. HR stable in 50-60's, sinus bradycardia    Troponin x2 negative.     Sodium level 123 again today. Patient's baseline is 124. Will continue current therapy and will monitor. Patient mentating at baseline.     PT/OT to see patient while in hospital for strength and mobility.     Speech to see patient today for re-evaluation of tolerated diet      Update 1115: Spoke with Dr. Nieves who recommends switching Eliquis to Lovenox and patient has been accepted as a transfer to [facility] pending bed availability. Patient will be scheduled for a pacemaker placement. He spoke and updated with family and patient. Will continue with current treatments here until transfer.       DVT Prophylaxis: Lovenox  Code Status: Full  Diet: pureed with thin liquids  [facility]  12/10/21  13:49 EST    Dictated utilizing Dragon dictation.

## 2021-12-10 NOTE — THERAPY EVALUATION
Acute Care - Speech Language Pathology   Swallow Initial Evaluation  Joao     Patient Name: Andrew Hernandez  : 1934  MRN: [id number]  Today's Date: 12/10/2021               Admit Date: 2021    Visit Dx:     ICD-10-CM ICD-9-CM   1. 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    • Carotid stenosis, symptomatic w/o infarct   • Hyperkalemia   • Hyponatremia   • Encounter for Medicare annual wellness exam   • Tongue lesion   • Abdominal bruit   • Edema due to hypervolemia   • Acute on chronic diastolic (congestive) heart failure ([facility])   • Acute on chronic combined systolic and diastolic congestive heart failure ([facility])   • COVID-19 virus detected   • Chronic atrial fibrillation with RVR ([facility])   • Late effects of CVA (cerebrovascular accident)   • Neoplasm, uncertain whether benign or malignant   • Chronic anticoagulation   • Acute and chronic respiratory failure with hypoxia ([facility])   • Blindness of right eye   • Pressure injury of coccygeal region, stage 2 ([facility])   • Physical deconditioning   • Impaired mobility and ADLs   • Acute on chronic congestive heart failure (HCC)   • Nausea and vomiting   • Symptomatic bradycardia     Past Medical History:   Diagnosis Date   • A-fib ([facility])    • Abnormal ECG    • Arrhythmia    • Arthritis    • Asthma Aug 2021   • Benign prostatic hyperplasia    • Cancer (HCC)     TONGUE   • Carotid stenosis     s/p right ICA stent x 2   • CHF (congestive heart  failure) ([facility])    • Coronary artery disease    • COVID-19 09/2021   • Disease of thyroid gland    • Enlarged prostate    • Fracture    • Full dentures    • GERD (gastroesophageal reflux disease)    • Heart valve disease    • Hyperlipidemia    • Hypertension    • Hypothyroidism    • Impaired functional mobility, balance, gait, and endurance 09/03/2021   • Kidney infection    • Mitral regurgitation    • Myocardial infarction ([facility]) 8-16-21   • Nonrheumatic aortic valve insufficiency    • Renal calculi    • Stroke ([facility]) 03/2016    right retinal artery occlusion   • 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[facility])    • Wears glasses      Past Surgical History:   Procedure Laterality Date   • CAROTID ENDARTERECTOMY      X2-3/17, 9/17   • CAROTID STENT Right 03/18/2016    Carotid Wall Stent   • CAROTID STENT Right 03/04/2017    Zilver BANDAR for recurrent right ICA stenosis   • CATARACT EXTRACTION W/ INTRAOCULAR LENS IMPLANT Left 9/4/2018    Procedure: CATARACT PHACO EXTRACTION WITH INTRAOCULAR LENS IMPLANT LEFT, COMPLICATED WITH MALYUGIAN RING;  Surgeon: Paola Welch MD;  Location: [facility];  Service: Ophthalmology   • KIDNEY STONE SURGERY Right 2011    [facility] - open   • PATELLA FRACTURE SURGERY Left 1986   • PA TCAT IV STENT CRV CRTD ART EMBOLIC PROTECJ Right 3/4/2017    Placement of Zilver BANDAR right ICA 3/4/17   • TONGUE SURGERY  08/16/2021    cancer spot removed        SLP Recommendation and Plan  SLP Swallowing Diagnosis: functional oral phase, functional pharyngeal phase, esophageal dysphagia (12/10/21 1400)  SLP Diet Recommendation: mechanical soft with no mixed consistencies, thin liquids (12/10/21 1400)  Recommended Precautions and Strategies: upright posture during/after eating, small bites of food and sips of liquid, general aspiration precautions, reflux precautions (12/10/21 1400)  SLP Rec. for Method of Medication Administration: meds whole, with pudding or applesauce, as tolerated (12/10/21 1400)     Monitor for Signs of  Aspiration: yes, cough, gurgly voice, throat clearing (12/10/21 1400)  Recommended Diagnostics: No further SLP services recommended (12/10/21 1400)     Anticipated Discharge Disposition (SLP): unknown (12/10/21 1400)     Therapy Frequency (Swallow): evaluation only (12/10/21 1400)                            Plan of Care Reviewed With: patient, family  Progress: no change  Outcome Summary: Bedside eval of swallow completed.  Pt. seated upright in bed for po trials.  Pt. was given trials of mechanical soft (due to prior functional impairment with modified diet), puree, and thin liquids.  Oral phase was WFL with trials presented.  No overt s/s aspiration or other pharyngeal phase dysphagia.  Pt. was seen for bedside eval on 10/13/2021 and recommended pureed diet with thin liquids.  However, pt. and family reported that he has been tolerating mechanical soft diet at home since then without any difficulty and [facility]  Pt. has dx of GERD which may increase his aspiration risk if not well managed.  Recommend:  1. mechanical soft diet with thin liq as norman,  2. meds whole with pudding/applesauce,  3. aspiration precautions,  4. reflux precautions, 5. small bites and sips.      SWALLOW EVALUATION (last 72 hours)     SLP Adult Swallow Evaluation     Row Name 12/10/21 1400                   Rehab Evaluation    Document Type evaluation  -TM        Subjective Information no complaints  -TM        Patient Observations alert; cooperative  -TM        Patient/Family/Caregiver Comments/Observations pt. family present  -TM        Patient Effort excellent  -TM                  General Information    Patient Profile Reviewed yes  -TM        Pertinent History Of Current Problem hx CVA, cardiac, GERD, tongue CA w/resection affecting L ROM  -TM        Current Method of Nutrition pureed; thin liquids  -TM        Precautions/Limitations, Vision WFL with corrective lenses  -TM        Precautions/Limitations, Hearing WFL;  for purposes of eval  -TM        Prior Level of Function-Communication WFL  -TM        Prior Level of Function-Swallowing mechanical soft textures; thin liquids; esophageal concerns  -TM        Plans/Goals Discussed with patient and family  -TM        Barriers to Rehab previous functional deficit  -TM        Patient's Goals for Discharge patient did not state  -TM        Family Goals for Discharge family did not state  -TM                  Pain    Additional Documentation Pain Scale: Numbers Pre/Post-Treatment (Group)  -TM                  Pain Scale: Numbers Pre/Post-Treatment    Pretreatment Pain Rating 0/10 - no pain  -TM        Posttreatment Pain Rating 0/10 - no pain  -TM                  Oral Motor Structure and Function    Oral Lesions or Structural Abnormalities and/or variants lingual limitations/impairment s/p resection  -TM        Dentition Assessment edentulous  -TM        Secretion Management WNL/WFL  -TM        Mucosal Quality moist, healthy  -TM        Volitional Swallow WFL  -TM        Volitional Cough WFL  -TM                  Oral Musculature and Cranial Nerve Assessment    Oral Motor General Assessment lingual impairment  -TM        Lingual Impairment, Detail. Cranial Nerves IX, XII (Glossopharyngeal and Hypoglossal) reduced lingual ROM  to L  -TM                  General Eating/Swallowing Observations    Respiratory Support Currently in Use room air  -TM        Eating/Swallowing Skills fed by SLP  -TM        Positioning During Eating upright in bed; upright 90 degree  -TM        Utensils Used spoon; straw  -TM        Consistencies Trialed mechanical soft, no mixed consistencies; pureed; thin liquids  -TM                  Respiratory    Respiratory Status room air; WFL  -TM                  Clinical Swallow Eval    Oral Prep Phase impaired  -TM        Oral Transit WFL  -TM        Oral Residue WFL  -TM        Pharyngeal Phase no overt signs/symptoms of pharyngeal impairment  -TM        Esophageal  Phase suspected esophageal impairment  GERD  -TM        Clinical Swallow Evaluation Summary Bedside eval of swallow completed.  Pt. seated upright in bed for po trials.  Pt. was given trials of mechanical soft (due to prior functional impairment with modified diet), puree, and thin liquids.  Oral phase was WFL with trials presented.  No overt s/s aspiration or other pharyngeal phase dysphagia.  Pt. was seen for bedside eval on 10/13/2021 and recommended pureed diet with thin liquids.  However, pt. and family reported that he has been tolerating mechanical soft diet at home since then without any difficulty and he prefers to be on [facility] soft.  Pt. has dx of GERD which may increase his aspiration risk if not well managed.  Recommend:  1. mechanical soft diet with thin liq as norman,  2. meds whole with pudding/applesauce,  3. aspiration precautions,  4. reflux precautions, 5. small bites and sips.  -                  Oral Prep Concerns    Oral Prep Concerns --  WFL considering previous deficit  -TM                  Esophageal Phase Concerns    Esophageal Phase Concerns other (see comments)  prior dx of GERD  -TM                  Clinical Impression    SLP Swallowing Diagnosis functional oral phase; functional pharyngeal phase; esophageal dysphagia  -TM        Functional Impact risk of aspiration/pneumonia  -                  SLP Treatment Clinical Impressions    Barriers to Overall Progress (SLP) Baseline deficits  -TM                  Recommendations    Therapy Frequency (Swallow) evaluation only  -TM        SLP Diet Recommendation mechanical soft with no mixed consistencies; thin liquids  -        Recommended Diagnostics No further SLP services recommended  -        Recommended Precautions and Strategies upright posture during/after eating; small bites of food and sips of liquid; general aspiration precautions; reflux precautions  -TM        Oral Care Recommendations Oral Care BID/PRN  -TM        SLP Rec. for  Method of Medication Administration meds whole; with pudding or applesauce; as tolerated  -        Monitor for Signs of Aspiration yes; cough; gurgly voice; throat clearing  -TM        Anticipated Discharge Disposition (SLP) unknown  -TM              User Key  (r) = Recorded By, (t) = Taken By, (c) = Cosigned By    Initials Name Effective Dates    TM Alexia Stern 06/16/21 -                 EDUCATION  The patient has been educated in the following areas:   Dysphagia (Swallowing Impairment) Oral Care/Hydration Modified Diet Instruction.              Time Calculation:    Time Calculation- SLP     Row Name 12/10/21 1527             Time Calculation- SLP    SLP Start Time 1400  -TM      SLP Received On 12/10/21  -              Untimed Charges    SLP Eval/Re-eval  ST Eval Oral Pharyng Swallow - 86800  -            User Key  (r) = Recorded By, (t) = Taken By, (c) = Cosigned By    Initials Name Provider Type     Alexia Stern Speech and Language Pathologist                Therapy Charges for Today     Code Description Service Date Service Provider Modifiers Qty    [id number]  ST EVAL ORAL PHARYNG SWALLOW 4 12/10/2021 Alexia Stern GN 1               Alexia Stern  12/10/2021

## 2021-12-10 NOTE — PLAN OF CARE
Goal Outcome Evaluation:  Plan of Care Reviewed With: patient, family        Progress: no change  Outcome Summary: Bedside eval of swallow completed.  Pt. seated upright in bed for po trials.  Pt. was given trials of mechanical soft (due to prior functional impairment with modified diet), puree, and thin liquids.  Oral phase was WFL with trials presented.  No overt s/s aspiration or other pharyngeal phase dysphagia.  Pt. was seen for bedside eval on 10/13/2021 and recommended pureed diet with thin liquids.  However, pt. and family reported that he has been tolerating mechanical soft diet at home since then without any difficulty and he prefers to be on [facility] soft.  Pt. has dx of GERD which may increase his aspiration risk if not well managed.  Recommend:  1. mechanical soft diet with thin liq as norman,  2. meds whole with pudding/applesauce,  3. aspiration precautions,  4. reflux precautions, 5. small bites and sips.

## 2021-12-10 NOTE — PLAN OF CARE
Goal Outcome Evaluation:  Plan of Care Reviewed With: patient        Progress: no change  Outcome Summary: Pt seen for OT evaluation today.  Pt presents with weakness and decreased independence with self care and functional mobility tasks.  Pt is expected to benefit from skilled OT to improve his strength and independence with ADL tasks.

## 2021-12-10 NOTE — PLAN OF CARE
Goal Outcome Evaluation:  Plan of Care Reviewed With: patient        Progress: no change  Outcome Summary: Patient participates well in PT evaluation and demonstrates deficits in strength, balance, transfers and gait.  He is able to perform gait x 130 feet with gait belt and CGA.  He has path deviaitons, usually to the right.  He is expected to benefit from additional PT services while hospitalized and upon discharge to home with home health care services.

## 2021-12-10 NOTE — PROGRESS NOTES
Enter Query Response Below      Query Response:       The patient currently has diagnoses of acute on chronic diastolic heart failure and acute on chronic combined systolic and diastolic congestive heart failure.        If applicable, please update the problem list.                Patient: Andrew Hernandez        : 1934  Account: [id number]           Admit Date:          Options to Respond to Query:    1. Access the Encounter     a. From the To-Do Side bar, click Respond With Note.     b. Click New Note     c. Answer query within the yellow box.                d. Update the Problem List if applicable.     RUBEN Price:        Patient admitted with noted history of CHF.  ECHO completed 10/11/2021: LVEF 40-45%. Home medications include Lasix, amiodarone, eliquis, aspirin, Lipitor, toprol XL.      After study, can the CHF be specified as:    >chronic systolic congestive heart failure  >compensated systolic congestive heart failure  >other (please specify)____________  >unable to determine    By submitting this query, we are merely seeking further clarification of documentation to accurately reflect all conditions that you are monitoring, evaluating, treating or that extend the hospitalization or utilize additional resources of care. Please utilize your independent clinical judgment when addressing the question(s) above.     This query and your response, once completed, will be entered into the legal medical record.    Sincerely,  Katya Avila  Clinical Documentation Integrity Program   [email or web address]

## 2021-12-10 NOTE — THERAPY EVALUATION
Patient Name: Andrew Hernandez  : 1934    MRN: [id number]                              Today's Date: 12/10/2021       Admit Date: 2021    Visit Dx:     ICD-10-CM ICD-9-CM   1. 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    • Carotid stenosis, symptomatic w/o infarct   • Hyperkalemia   • Hyponatremia   • Encounter for Medicare annual wellness exam   • Tongue lesion   • Abdominal bruit   • Edema due to hypervolemia   • Acute on chronic diastolic (congestive) heart failure (HCC)   • Acute on chronic combined systolic and diastolic congestive heart failure (HCC)   • COVID-19 virus detected   • Chronic atrial fibrillation with RVR ([facility])   • Late effects of CVA (cerebrovascular accident)   • Neoplasm, uncertain whether benign or malignant   • Chronic anticoagulation   • Acute and chronic respiratory failure with hypoxia ([facility])   • Blindness of right eye   • Pressure injury of coccygeal region, stage 2 ([facility])   • Physical deconditioning   • Impaired mobility and ADLs   • Acute on chronic congestive heart failure (HCC)   • Nausea and vomiting   • Symptomatic bradycardia     Past Medical History:   Diagnosis Date   • A-fib (HCC)    • Abnormal ECG    • Arrhythmia    • Arthritis    • Asthma Aug 2021   • Benign prostatic hyperplasia    • Cancer (HCC)     TONGUE   • Carotid stenosis     s/p right ICA stent x 2   • CHF (congestive heart failure) ([facility])    • Coronary artery disease    • COVID-19  09/2021   • Disease of thyroid gland    • Enlarged prostate    • Fracture    • Full dentures    • GERD (gastroesophageal reflux disease)    • Heart valve disease    • Hyperlipidemia    • Hypertension    • Hypothyroidism    • Impaired functional mobility, balance, gait, and endurance 09/03/2021   • Kidney infection    • Mitral regurgitation    • Myocardial infarction (HCC) 8-16-21   • Nonrheumatic aortic valve insufficiency    • Renal calculi    • Stroke (HCC) 03/2016    right retinal artery occlusion   • 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HCC)    • Wears glasses      Past Surgical History:   Procedure Laterality Date   • CAROTID ENDARTERECTOMY      X2-3/17, 9/17   • CAROTID STENT Right 03/18/2016    Carotid Wall Stent   • CAROTID STENT Right 03/04/2017    Zilver BANDAR for recurrent right ICA stenosis   • CATARACT EXTRACTION W/ INTRAOCULAR LENS IMPLANT Left 9/4/2018    Procedure: CATARACT PHACO EXTRACTION WITH INTRAOCULAR LENS IMPLANT LEFT, COMPLICATED WITH MALYUGIAN RING;  Surgeon: Paola Welch MD;  Location: [facility];  Service: Ophthalmology   • KIDNEY STONE SURGERY Right 2011    [place] - open   • PATELLA FRACTURE SURGERY Left 1986   • MT TCAT IV STENT CRV CRTD ART EMBOLIC PROTECJ Right 3/4/2017    Placement of Zilver BANDAR right ICA 3/4/17   • TONGUE SURGERY  08/16/2021    cancer spot removed       General Information     Row Name 12/10/21 1429          OT Time and Intention    Document Type evaluation  -     Mode of Treatment occupational therapy  -     Row Name 12/10/21 1429          General Information    Patient Profile Reviewed yes  -AH     Prior Level of Function independent:; ADL's; all household mobility  -     Existing Precautions/Restrictions fall  -     Row Name 12/10/21 1429          Occupational Profile    Reason for Services/Referral (Occupational Profile) ADL decline  -     Row Name 12/10/21 1429          Living Environment    Lives With alone  pt lives in an apartment over a garage on his son's  property  -[facility] Name 12/10/21 1429          Home Main Entrance    Number of Stairs, Main Entrance other (see comments)  16 steps  -     Stair Railings, Main Entrance railings on both sides of stairs  -[facility] Name 12/10/21 1429          Cognition    Orientation Status (Cognition) oriented x 4  -[facility] Name 12/10/21 1429          Safety Issues, Functional Mobility    Safety Issues Affecting Function (Mobility) awareness of need for assistance  -     Impairments Affecting Function (Mobility) balance; endurance/activity tolerance; strength  -           User Key  (r) = Recorded By, (t) = Taken By, (c) = Cosigned By    Initials Name Provider Type     Stephany Byers Occupational Therapist                 Mobility/ADL's     [facility] Name 12/10/21 1441          Bed Mobility    Bed Mobility supine-sit  -     Supine-Sit [place] (Bed Mobility) modified independence  -     Assistive Device (Bed Mobility) head of bed elevated  -[facility] Name 12/10/21 1441          Transfers    Transfers sit-stand transfer  -     Sit-Stand [place] (Transfers) contact guard  -[facility] Name 12/10/21 1441          Functional Mobility    Functional Mobility- Ind. Level minimum assist (75% patient effort)  -     Functional Mobility-Distance (Feet) 130  -     Functional Mobility- Comment may benefit from use of RW for balance  -[facility] Name 12/10/21 1441          Activities of Daily Living    BADL Assessment/Intervention bathing; upper body dressing; lower body dressing; grooming; feeding; toileting  -[facility] Name 12/10/21 1441          Bathing Assessment/Intervention    [place] Level (Bathing) contact guard assist  -[facility] Name 12/10/21 1441          Upper Body Dressing Assessment/Training    [place] Level (Upper Body Dressing) independent  -[facility] Name 12/10/21 1441          Lower Body Dressing Assessment/Training    [place] Level (Lower Body Dressing) contact guard assist  -      [facility] Name 12/10/21 1441          Grooming Assessment/Training    [place] Level (Grooming) set up  -[facility] Name 12/10/21 1441          Self-Feeding Assessment/Training    [place] Level (Feeding) set up  -AH     Row Name 12/10/21 1441          Toileting Assessment/Training    [place] Level (Toileting) supervision  -           User Key  (r) = Recorded By, (t) = Taken By, (c) = Cosigned By    Initials Name Provider Type     Stephany Byers Occupational Therapist               Obj/Interventions     Row Name 12/10/21 1507          Sensory Assessment (Somatosensory)    Sensory Assessment (Somatosensory) sensation intact  -AH     Row Name 12/10/21 1507          Vision Assessment/Intervention    Visual Impairment/Limitations WFL  [facility] Name 12/10/21 1507          Range of Motion Comprehensive    General Range of Motion bilateral upper extremity ROM L  -AH     Row Name 12/10/21 1507          Strength Comprehensive (MMT)    Comment, General Manual Muscle Testing [facility]MMT) [facility]           User Key  (r) = Recorded By, (t) = Taken By, (c) = Cosigned By    Initials Name Provider Type     Stephany Byers Occupational Therapist               Goals/Plan     Row Name 12/10/21 1514          Transfer Goal 1 (OT)    Activity/Assistive Device (Transfer Goal 1, OT) sit-to-stand/stand-to-sit; walker, rolling  -     [place] Level/Cues Needed (Transfer Goal 1, OT) modified independence  -     Time Frame (Transfer Goal 1, OT) by discharge  -     Progress/Outcome (Transfer Goal 1, OT) goal ongoing  -AH     Row Name 12/10/21 1514          Dressing Goal 1 (OT)    Activity/Device (Dressing Goal 1, OT) dressing skills, all  -     [place]/Cues Needed (Dressing Goal 1, OT) set-up required  -     Time Frame (Dressing Goal 1, OT) long term goal (LTG); 5 days  -     Progress/Outcome (Dressing Goal 1, OT) goal ongoing  -AH     Row Name 12/10/21 1514          Toileting Goal 1 (OT)     Activity/Device (Toileting Goal 1, OT) adjust/manage clothing; perform perineal hygiene  -     [place] Level/Cues Needed (Toileting Goal 1, OT) supervision required  -     Time Frame (Toileting Goal 1, OT) by discharge  -     Progress/Outcome (Toileting Goal 1, OT) goal ongoing  -[facility] Name 12/10/21 1515          Strength Goal 1 (OT)    Strength Goal 1 (OT) Pt will perform UB strengthening ex using theraband for resistance.  -[facility] Name 12/10/21 6483          Therapy Assessment/Plan (OT)    Planned Therapy Interventions (OT) activity tolerance training; BADL retraining; patient/caregiver education/training; transfer/mobility retraining; strengthening exercise  -           User Key  (r) = Recorded By, (t) = Taken By, (c) = Cosigned By    Initials Name Provider Type    Stephany Saeed Occupational Therapist               Clinical Impression     [facility] Name 12/10/21 1506          Pain Assessment    Additional Documentation Pain Scale: Numbers Pre/Post-Treatment (Group)  -[facility] Name 12/10/21 1502          Pain Scale: Numbers Pre/Post-Treatment    Pretreatment Pain Rating 0/10 - no pain  -     Posttreatment Pain Rating 0/10 - no pain  -[facility] Name 12/10/21 1502          Plan of Care Review    Plan of Care Reviewed With patient  -     Progress no change  -     Outcome Summary Pt seen for OT evaluation today.  Pt presents with weakness and decreased independence with self care and functional mobility tasks.  Pt is expected to benefit from skilled OT to improve his strength and independence with ADL tasks.  -[facility] Name 12/10/21 1501          Therapy Assessment/Plan (OT)    Patient/Family Therapy Goal Statement (OT) d/c home  -     Rehab Potential (OT) good, to achieve stated therapy goals  -     Criteria for Skilled Therapeutic Interventions Met (OT) yes; skilled treatment is necessary  -     Therapy Frequency (OT) 3 times/wk  -[facility] Name 12/10/21 1109          Therapy  Plan Review/Discharge Plan (OT)    Anticipated Discharge Disposition (OT) home with home health  -     Row Name 12/10/21 1508          Positioning and Restraints    Pre-Treatment Position in bed  -     Post Treatment Position chair  -     In Chair reclined; call light within reach; encouraged to call for assist; exit alarm on  -           User Key  (r) = Recorded By, (t) = Taken By, (c) = Cosigned By    Initials Name Provider Type    Stephany Saeed Occupational Therapist               Outcome Measures     Row Name 12/10/21 1519          How much help from another is currently needed...    Putting on and taking off regular lower body clothing? 3  -AH     Bathing (including washing, rinsing, and drying) 3  -AH     Toileting (which includes using toilet bed pan or urinal) 3  -AH     Putting on and taking off regular upper body clothing 4  -AH     Taking care of personal grooming (such as brushing teeth) 4  -AH     Eating meals 4  -AH     AM-PAC 6 Clicks Score (OT) 21  -     Row Name 12/10/21 1519          Functional Assessment    Outcome Measure Options AM-PAC 6 Clicks Daily Activity (OT)  -           User Key  (r) = Recorded By, (t) = Taken By, (c) = Cosigned By    Initials Name Provider Type    Stephany Saeed Occupational Therapist                Occupational Therapy Education                 Title: PT OT SLP Therapies (In Progress)     Topic: Occupational Therapy (In Progress)     Point: ADL training (Done)     Description:   Instruct learner(s) on proper safety adaptation and remediation techniques during self care or transfers.   Instruct in proper use of assistive devices.              Learning Progress Summary           Patient Acceptance, E,TB, VU by  at 12/10/2021 1520    Comment: Role of OT/POC                   Point: Home exercise program (Not Started)     Description:   Instruct learner(s) on appropriate technique for monitoring, assisting and/or progressing therapeutic  exercises/activities.              Learner Progress:  Not documented in this visit.          Point: Precautions (Not Started)     Description:   Instruct learner(s) on prescribed precautions during self-care and functional transfers.              Learner Progress:  Not documented in this visit.          Point: Body mechanics (Not Started)     Description:   Instruct learner(s) on proper positioning and spine alignment during self-care, functional mobility activities and/or exercises.              Learner Progress:  Not documented in this visit.                      User Key     Initials Effective Dates Name Provider Type [facility] 06/16/21 -  Stephany Byers Occupational Therapist OT              OT Recommendation and Plan  Planned Therapy Interventions (OT): activity tolerance training, BADL retraining, patient/caregiver education/training, transfer/mobility retraining, strengthening exercise  Therapy Frequency (OT): 3 times/wk  Plan of Care Review  Plan of Care Reviewed With: patient  Progress: no change  Outcome Summary: Pt seen for OT evaluation today.  Pt presents with weakness and decreased independence with self care and functional mobility tasks.  Pt is expected to benefit from skilled OT to improve his strength and independence with ADL tasks.     Time Calculation:    Time Calculation- OT     Row Name 12/10/21 1520             Time Calculation- OT    OT Start Time 1411  -      OT Received On 12/10/21  -      OT Goal Re-Cert Due Date 12/20/21  -              Untimed Charges    OT Eval/Re-eval Minutes 40  -              Total Minutes    Untimed Charges Total Minutes 40  -       Total Minutes 40  -AH            User Key  (r) = Recorded By, (t) = Taken By, (c) = Cosigned By    Initials Name Provider Type     Stephany Byers Occupational Therapist              Therapy Charges for Today     Code Description Service Date Service Provider Modifiers Qty    13280403707 HC OT EVAL LOW COMPLEXITY 3  12/10/2021 Stephany Byers 1               Stephany Byers  12/10/2021

## 2021-12-11 NOTE — PLAN OF CARE
Goal Outcome Evaluation:  Plan of Care Reviewed With: patient           Outcome Summary: Pt reported feeling dizzy with attempt to amb x2 with pt having episodes of dizziness when attempting to amb. Pt agreeable to perform B LE ex in bed and performed sit <->stand x2 reps. Con't with PT POC and progress as tolerated

## 2021-12-11 NOTE — PLAN OF CARE
Goal Outcome Evaluation:  Plan of Care Reviewed With: patient, family, son        Progress: no change   Andrew is A+O x 4. He is on room air and denies pain. He remains on the Cardizem infusion. He was in SR this morning and then A-Fib for rest of this shift. We are awaiting a bed at [facility] for pacemaker.

## 2021-12-11 NOTE — PROGRESS NOTES
[facility]    Name:  Andrew Hernandez   Age:  87 y.o.  Sex:  male  :  1934  MRN:  [id number]   Visit Number:  [id number]  Admission Date:  2021  Date Of Service:  21  Primary Care Physician:  Vermeesch, Marilyn K, MD     LOS: 2 days :  Patient Care Team:  Vermeesch, Marilyn K, MD as PCP - General (Internal Medicine & Pediatrics)  Dirk De Leon MD as Consulting Physician (Ophthalmology)  Oziel Mcnair MD as Consulting Physician (Cardiology)  Harsh Huerta MD as Consulting Physician (Urology)  Niru Thorpe, RN as Ambulatory  ([facility]):    Chief Complaint:      Generalized fatigue and weakness    Subjective / Interval History:     87-year-old male with complaint of general weakness and fatigue had been on amiodarone for history of A. fib.  Was noted to be symptomatically bradycardic and hyponatremic.  Was admitted for further management of his symptoms.  At that time antiarrhythmics and cardiac medications were placed on hold.  He has been taken off Eliquis and switched over to Lovenox awaiting transfer to [facility] for pacemaker placement.  Awaiting bed for transfer    Review of Systems:   Review of Systems   Constitutional: Positive for fatigue. Negative for activity change, appetite change and fever.   HENT: Negative for congestion, ear discharge, ear pain and trouble swallowing.    Eyes: Negative for photophobia and visual disturbance.   Respiratory: Negative for cough and shortness of breath.    Cardiovascular: Negative for chest pain and palpitations.   Gastrointestinal: Negative for abdominal distention, abdominal pain, constipation, diarrhea, nausea and vomiting.   Endocrine: Negative.    Genitourinary: Negative for dysuria, hematuria and urgency.   Musculoskeletal: Positive for arthralgias. Negative for back pain, joint swelling and myalgias.   Skin: Negative for color change and rash.    Allergic/Immunologic: Negative.    Neurological: Negative for dizziness, weakness, light-headedness and headaches.   Hematological: Negative for adenopathy. Does not bruise/bleed easily.   Psychiatric/Behavioral: Positive for sleep disturbance. Negative for agitation, confusion and dysphoric mood. The patient is not nervous/anxious.          Vital Signs:    Temp:  [97.9 °F (36.6 °C)-98.3 °F (36.8 °C)] 98 °F (36.7 °C)  Heart Rate:  [] 90  Resp:  [17-18] 17  BP: ()/(37-72) 126/58    Intake and output:      Intake/Output Summary (Last 24 hours) at 12/11/2021 0952  Last data filed at 12/11/2021 0900  Gross per 24 hour   Intake 580 ml   Output 2225 ml   Net -1645 ml     I/O this shift:  In: 240 [P.O.:240]  Out: -     Physical Examination:  Physical Exam  Constitutional:       General: He is not in acute distress.     Appearance: He is well-developed.   HENT:      Nose: Nose normal.   Eyes:      General: No scleral icterus.     Conjunctiva/sclera: Conjunctivae normal.   Neck:      Thyroid: No thyromegaly.      Trachea: No tracheal deviation.   Cardiovascular:      Rate and Rhythm: Normal rate and regular rhythm.      Heart sounds: No murmur heard.  No friction rub.   Pulmonary:      Effort: No respiratory distress.      Breath sounds: No wheezing or rales.   Abdominal:      General: There is no distension.      Palpations: Abdomen is soft. There is no mass.      Tenderness: There is no abdominal tenderness. There is no guarding.   Musculoskeletal:         General: Deformity present. Normal range of motion.   Lymphadenopathy:      Cervical: No cervical adenopathy.   Skin:     General: Skin is warm and dry.      Findings: No erythema or rash.   Neurological:      Mental Status: He is alert and oriented to person, place, and time.      Cranial Nerves: No cranial nerve deficit.      Coordination: Coordination normal.      Deep Tendon Reflexes: Reflexes are normal and symmetric.   Psychiatric:         Behavior:  Behavior normal.         Thought Content: Thought content normal.         Judgment: Judgment normal.           Laboratory results:  Results from last 7 days   Lab Units 12/11/21  0532 12/10/21  0619 12/09/21  1309   SODIUM mmol/L 126* 123* 123*   POTASSIUM mmol/L 4.1 4.4 4.6   CHLORIDE mmol/L 90* 89* 88*   CO2 mmol/L 26.2 26.7 25.2   BUN mg/dL 17 13 12   CREATININE mg/dL 0.86 0.91 0.94   CALCIUM mg/dL 8.3* 8.3* 8.1*   ALBUMIN g/dL  --  3.20* 3.30*   BILIRUBIN mg/dL  --  0.4 0.3   ALK PHOS U/L  --  89 92   ALT (SGPT) U/L  --  8 10   AST (SGOT) U/L  --  15 17   GLUCOSE mg/dL 96 91 102*     Estimated Creatinine Clearance: 55.9 mL/min (by C-G formula based on SCr of 0.86 mg/dL).  Results from last 7 days   Lab Units 12/09/21  1309   MAGNESIUM mg/dL 1.9         Results from last 7 days   Lab Units 12/11/21  0532 12/10/21  0619 12/09/21  1309   WBC 10*3/mm3 4.81 5.58 7.18   HEMOGLOBIN g/dL 9.8* 9.6* 11.1*   PLATELETS 10*3/mm3 229 218 270         Brief Urine Lab Results  (Last result in the past 365 days)      Color   Clarity   Blood   Leuk Est   Nitrite   Protein   CREAT   Urine HCG        12/02/21 1020 Yellow   Cloudy   Negative   Negative   Negative   Negative                   I have reviewed the patient's laboratory results.    Radiology results:    Imaging Results (Last 24 Hours)     ** No results found for the last 24 hours. **          I have reviewed the patient's radiology reports.    Medication Review:     I have reviewed the patient's active and prn medications.       Assessment & Plan:    Symptomatic bradycardia stable significantly better history of A. fib on diltiazem drip now for better titration    Benign essential hypertension stable    Hypercholesterolemia    Acquired hypothyroidism TSH okay on supplement    Acute on chronic diastolic (congestive) heart failure (HCC)    Acute on chronic combined systolic and diastolic congestive heart failure (HCC)    Chronic atrial fibrillation with RVR ([facility])  anticoagulant therapy now with Lovenox because of plans for pacemaker placement    Physical deconditioning          Heri Zamudio MD  12/11/21  09:52 EST

## 2021-12-11 NOTE — THERAPY TREATMENT NOTE
Patient Name: Andrew Hernandez  : 1934    MRN: [id number]                              Today's Date: 2021       Admit Date: 2021    Visit Dx:     ICD-10-CM ICD-9-CM   1. 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    • Carotid stenosis, symptomatic w/o infarct   • Hyperkalemia   • Hyponatremia   • Encounter for Medicare annual wellness exam   • Tongue lesion   • Abdominal bruit   • Edema due to hypervolemia   • Acute on chronic diastolic (congestive) heart failure (HCC)   • Acute on chronic combined systolic and diastolic congestive heart failure (HCC)   • COVID-19 virus detected   • Chronic atrial fibrillation with RVR ([facility])   • Late effects of CVA (cerebrovascular accident)   • Neoplasm, uncertain whether benign or malignant   • Chronic anticoagulation   • Acute and chronic respiratory failure with hypoxia ([facility])   • Blindness of right eye   • Pressure injury of coccygeal region, stage 2 ([facility])   • Physical deconditioning   • Impaired mobility and ADLs   • Acute on chronic congestive heart failure (HCC)   • Nausea and vomiting   • Symptomatic bradycardia     Past Medical History:   Diagnosis Date   • A-fib (HCC)    • Abnormal ECG    • Arrhythmia    • Arthritis    • Asthma Aug 2021   • Benign prostatic hyperplasia    • Cancer (HCC)     TONGUE   • Carotid stenosis     s/p right ICA stent x 2   • CHF (congestive heart failure) ([facility])    • Coronary artery disease    • COVID-19  09/2021   • Disease of thyroid gland    • Enlarged prostate    • Fracture    • Full dentures    • GERD (gastroesophageal reflux disease)    • Heart valve disease    • Hyperlipidemia    • Hypertension    • Hypothyroidism    • Impaired functional mobility, balance, gait, and endurance 09/03/2021   • Kidney infection    • Mitral regurgitation    • Myocardial infarction (HCC) 8-16-21   • Nonrheumatic aortic valve insufficiency    • Renal calculi    • Stroke (HCC) 03/2016    right retinal artery occlusion   • 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HCC)    • Wears glasses      Past Surgical History:   Procedure Laterality Date   • CAROTID ENDARTERECTOMY      X2-3/17, 9/17   • CAROTID STENT Right 03/18/2016    Carotid Wall Stent   • CAROTID STENT Right 03/04/2017    [place] BANDAR for recurrent right ICA stenosis   • CATARACT EXTRACTION W/ INTRAOCULAR LENS IMPLANT Left 9/4/2018    Procedure: CATARACT PHACO EXTRACTION WITH INTRAOCULAR LENS IMPLANT LEFT, COMPLICATED WITH MALYUGIAN RING;  Surgeon: Paola Welch MD;  Location: [facility];  Service: Ophthalmology   • KIDNEY STONE SURGERY Right 2011    [facility] - open   • PATELLA FRACTURE SURGERY Left 1986   • ME TCAT IV STENT CRV CRTD ART EMBOLIC PROTECJ Right 3/4/2017    Placement of [place] BANDAR right ICA 3/4/17   • TONGUE SURGERY  08/16/2021    cancer spot removed       General Information     Row Name 12/11/21 1234          Physical Therapy Time and Intention    Document Type therapy note (daily note)  -CC     Mode of Treatment physical therapy  -CC     Row Name 12/11/21 1234          General Information    Patient Profile Reviewed yes  -CC     Existing Precautions/Restrictions fall  -CC     Row Name 12/11/21 1234          Cognition    Orientation Status (Cognition) oriented x 4  -CC     Row Name 12/11/21 1234          Safety Issues, Functional Mobility    Safety Issues Affecting Function (Mobility) awareness of need for assistance  -CC     Impairments Affecting Function (Mobility) balance;  endurance/activity tolerance; strength  -CC           User Key  (r) = Recorded By, (t) = Taken By, (c) = Cosigned By    Initials Name Provider Type    Cele Sarkar PTA Physical Therapy Assistant               Mobility     Row Name 12/11/21 1235          Bed Mobility    Bed Mobility supine-sit  -CC     Supine-Sit [place] (Bed Mobility) modified independence  -CC     Assistive Device (Bed Mobility) head of bed elevated  -CC     Comment (Bed Mobility) x2 reps  -     Row Name 12/11/21 1235          Sit-Stand Transfer    Sit-Stand [place] (Transfers) standby assist  -CC           User Key  (r) = Recorded By, (t) = Taken By, (c) = Cosigned By    Initials Name Provider Type    Cele Sarkar PTA Physical Therapy Assistant               Obj/Interventions     Row Name 12/11/21 1236          Strength Comprehensive (MMT)    General Manual Muscle Testing (MMT) Assessment lower extremity strength deficits identified  -     Comment, General Manual Muscle Testing (MMT) Assessment Pt performed B LE ex in supine AP, heelslides, hip abd, QS, glut sets, [id number] reps each  -CC           User Key  (r) = Recorded By, (t) = Taken By, (c) = Cosigned By    Initials Name Provider Type    Cele Sarkar, DIANE Physical Therapy Assistant               Goals/Plan    No documentation.                Clinical Impression     Row Name 12/11/21 1239          Pain    Additional Documentation Pain Scale: Numbers Pre/Post-Treatment (Group)  -     Row Name 12/11/21 1239          Pain Scale: Numbers Pre/Post-Treatment    Pretreatment Pain Rating 0/10 - no pain  -     Posttreatment Pain Rating 0/10 - no pain  -     Row Name 12/11/21 1234          Plan of Care Review    Plan of Care Reviewed With patient  -CC     Outcome Summary Pt reported feeling dizzy with attempt to amb x2 with pt having episodes of dizziness when attempting to amb. Pt agreeable to perform B LE ex in bed and performed sit <->stand x2 reps.  Con't with PT POC and progress as tolerated  -     Row Name 12/11/21 1239          Positioning and Restraints    Pre-Treatment Position in bed  -CC     Post Treatment Position bed  -CC     In Bed fowlers; call light within reach; encouraged to call for assist; with family/caregiver  -           User Key  (r) = Recorded By, (t) = Taken By, (c) = Cosigned By    Initials Name Provider Type     Cele Pastrana, DIANE Physical Therapy Assistant               Outcome Measures     Row Name 12/11/21 1244 12/11/21 0956       How much help from another person do you currently need...    Turning from your back to your side while in flat bed without using bedrails? 4  -CC 4  -MT    Moving from lying on back to sitting on the side of a flat bed without bedrails? 4  -CC 3  -MT    Moving to and from a bed to a chair (including a wheelchair)? 3  -CC 3  -MT    Standing up from a chair using your arms (e.g., wheelchair, bedside chair)? 4  -CC 3  -MT    Climbing 3-5 steps with a railing? 2  -CC 2  -MT    To walk in hospital room? 3  -CC 3  -MT    AM-PAC 6 Clicks Score (PT) 20  -CC 18  -MT    Row Name 12/11/21 1244          Functional Assessment    Outcome Measure Options AM-PAC 6 Clicks Basic Mobility (PT)  -           User Key  (r) = Recorded By, (t) = Taken By, (c) = Cosigned By    Initials Name Provider Type     Cele Pastrana PTA Physical Therapy Assistant    Teresa Macario RN Registered Nurse                             Physical Therapy Education                 Title: PT OT SLP Therapies (In Progress)     Topic: Physical Therapy (In Progress)     Point: Mobility training (Done)     Learning Progress Summary           Patient Acceptance, E,TB, VU by  at 12/10/2021 1626    Comment: Role of PT and POC                   Point: Home exercise program (Done)     Learning Progress Summary           Patient Acceptance, E,TB, VU by  at 12/11/2021 1244    Comment: Performing ex at home upon d/c                    Point: Body mechanics (Not Started)     Learner Progress:  Not documented in this visit.          Point: Precautions (Not Started)     Learner Progress:  Not documented in this visit.                      User Key     Initials Effective Dates Name Provider Type Discipline     06/16/21 -  Sade Medina, PT Physical Therapist PT     06/16/21 -  Cele Pastrana PTA Physical Therapy Assistant PT              PT Recommendation and Plan     Plan of Care Reviewed With: patient  Outcome Summary: Pt reported feeling dizzy with attempt to amb x2 with pt having episodes of dizziness when attempting to amb. Pt agreeable to perform B LE ex in bed and performed sit <->stand x2 reps. Con't with PT POC and progress as tolerated     Time Calculation:    PT Charges     Row Name 12/11/21 1246             Time Calculation    PT Received On 12/11/21  -CC      PT Goal Re-Cert Due Date 12/20/21  -CC              Time Calculation- PT    Total Timed Code Minutes- PT 38 minute(s)  -CC              Timed Charges    81932 - PT Therapeutic Exercise Minutes 23  -CC      58707 - PT Therapeutic Activity Minutes 15  -CC              Total Minutes    Timed Charges Total Minutes 38  -CC       Total Minutes 38  -CC            User Key  (r) = Recorded By, (t) = Taken By, (c) = Cosigned By    Initials Name Provider Type    CC Cele Pastrana PTA Physical Therapy Assistant              Therapy Charges for Today     Code Description Service Date Service Provider Modifiers Qty    07579928987 HC PT THER PROC EA 15 MIN 12/11/2021 Cele Pastrana PTA GP 2    98317072459 HC PT THERAPEUTIC ACT EA 15 MIN 12/11/2021 Cele Pastrana PTA GP 1          PT G-Codes  Outcome Measure Options: AM-PAC 6 Clicks Basic Mobility (PT)  AM-PAC 6 Clicks Score (PT): 20  AM-PAC 6 Clicks Score (OT): 21    Cele Pastrana PTA  12/11/2021

## 2021-12-11 NOTE — PLAN OF CARE
Goal Outcome Evaluation:  Plan of Care Reviewed With: patient        Progress: no change  Outcome Summary: RHYTHM CHANGED TO ATRIAL FIB WITH RVR,  CARDIZEM GTT STARTED WITH IMPROVED RATE.   MONITORING BP.   PT REPORTS FEELING BETTER.

## 2021-12-12 NOTE — NURSING NOTE
At 0915am, pt requested to go to the bathroom to have a bowel movement.. RN ambulated pt to bathroom with a gait belt. After the bowel movement, pt had a syncope episode, heart rate was low and he became weak. Pt was gently lowered to the floor of the bathroom. Rapid response was called. Dr Zamudio was at bedside to evaluate pt. Pt's blood sugar was 184. See vitals. Pt was returned to room in a recliner.

## 2021-12-12 NOTE — PROGRESS NOTES
[facility]    Name:  Andrew Hernandez   Age:  87 y.o.  Sex:  male  :  1934  MRN:  [id number]   Visit Number:  [id number]  Admission Date:  2021  Date Of Service:  21  Primary Care Physician:  Vermeesch, Marilyn K, MD     LOS: 3 days :  Patient Care Team:  Vermeesch, Marilyn K, MD as PCP - General (Internal Medicine & Pediatrics)  Dirk De Leon MD as Consulting Physician (Ophthalmology)  Oziel Mcnair MD as Consulting Physician (Cardiology)  Harsh Huerta MD as Consulting Physician (Urology)  Niru Thorpe, RN as Ambulatory  ([facility]):    Chief Complaint:      Rapid response called before I got a chance to evaluate patient.  He had just had a bowel movement and became severely lightheaded.  Had a brief episode of loss of consciousness.  He was taken off the commode and gradually made to lay in a supine position rapid response was ordered.  No incontinence or seizure activity noted.  His heart rate remained between 60 and 90 in that duration.  Noted to have initial systolic blood pressure of 90.  He was given a bolus of normal saline of 500 mL.    Subjective / Interval History:     He is now resting comfortably on the bedside recliner he denies lightheadedness or dizziness  Overnight no new complaints has reasonably good oral intake    Review of Systems:   Review of Systems   Constitutional: Positive for fatigue. Negative for activity change, appetite change and fever.   HENT: Negative for congestion, ear discharge, ear pain and trouble swallowing.    Eyes: Negative for photophobia and visual disturbance.   Respiratory: Negative for cough and shortness of breath.    Cardiovascular: Negative for chest pain and palpitations.   Gastrointestinal: Negative for abdominal distention, abdominal pain, constipation, diarrhea, nausea and vomiting.   Endocrine: Negative.    Genitourinary: Negative for dysuria, hematuria and  urgency.   Musculoskeletal: Positive for arthralgias. Negative for back pain, joint swelling and myalgias.   Skin: Negative for color change and rash.   Allergic/Immunologic: Negative.    Neurological: Positive for light-headedness. Negative for dizziness, weakness and headaches.   Hematological: Negative for adenopathy. Does not bruise/bleed easily.   Psychiatric/Behavioral: Positive for sleep disturbance. Negative for agitation, confusion and dysphoric mood. The patient is not nervous/anxious.          Vital Signs:    Temp:  [96.3 °F (35.7 °C)-98.6 °F (37 °C)] 98.3 °F (36.8 °C)  Heart Rate:  [] 109  Resp:  [16-18] 16  BP: ()/(46-70) 90/57    Intake and output:      Intake/Output Summary (Last 24 hours) at 12/12/2021 1016  Last data filed at 12/12/2021 0500  Gross per 24 hour   Intake 460 ml   Output 1900 ml   Net -1440 ml     No intake/output data recorded.    Physical Examination:  Physical Exam  Constitutional:       General: He is not in acute distress.     Appearance: He is well-developed.   HENT:      Nose: Nose normal.   Eyes:      General: No scleral icterus.     Conjunctiva/sclera: Conjunctivae normal.   Neck:      Thyroid: No thyromegaly.      Trachea: No tracheal deviation.   Cardiovascular:      Rate and Rhythm: Normal rate. Rhythm irregular.      Heart sounds: No murmur heard.  No friction rub.   Pulmonary:      Effort: No respiratory distress.      Breath sounds: No wheezing or rales.   Abdominal:      General: There is no distension.      Palpations: Abdomen is soft. There is no mass.      Tenderness: There is no abdominal tenderness. There is no guarding.   Musculoskeletal:         General: Deformity present. Normal range of motion.   Lymphadenopathy:      Cervical: No cervical adenopathy.   Skin:     General: Skin is warm and dry.      Findings: No erythema or rash.   Neurological:      Mental Status: He is alert and oriented to person, place, and time.      Cranial Nerves: No cranial  nerve deficit.      Coordination: Coordination normal.      Deep Tendon Reflexes: Reflexes are normal and symmetric.   Psychiatric:         Behavior: Behavior normal.         Thought Content: Thought content normal.         Judgment: Judgment normal.           Laboratory results:  Results from last 7 days   Lab Units 12/11/21  0532 12/10/21  0619 12/09/21  1309   SODIUM mmol/L 126* 123* 123*   POTASSIUM mmol/L 4.1 4.4 4.6   CHLORIDE mmol/L 90* 89* 88*   CO2 mmol/L 26.2 26.7 25.2   BUN mg/dL 17 13 12   CREATININE mg/dL 0.86 0.91 0.94   CALCIUM mg/dL 8.3* 8.3* 8.1*   ALBUMIN g/dL  --  3.20* 3.30*   BILIRUBIN mg/dL  --  0.4 0.3   ALK PHOS U/L  --  89 92   ALT (SGPT) U/L  --  8 10   AST (SGOT) U/L  --  15 17   GLUCOSE mg/dL 96 91 102*     Estimated Creatinine Clearance: 56.7 mL/min (by C-G formula based on SCr of 0.86 mg/dL).  Results from last 7 days   Lab Units 12/09/21  1309   MAGNESIUM mg/dL 1.9         Results from last 7 days   Lab Units 12/11/21  0532 12/10/21  0619 12/09/21  1309   WBC 10*3/mm3 4.81 5.58 7.18   HEMOGLOBIN g/dL 9.8* 9.6* 11.1*   PLATELETS 10*3/mm3 229 218 270         Brief Urine Lab Results  (Last result in the past 365 days)      Color   Clarity   Blood   Leuk Est   Nitrite   Protein   CREAT   Urine HCG        12/02/21 1020 Yellow   Cloudy   Negative   Negative   Negative   Negative                   I have reviewed the patient's laboratory results.    Radiology results:    Imaging Results (Last 24 Hours)     ** No results found for the last 24 hours. **          I have reviewed the patient's radiology reports.    Medication Review:     I have reviewed the patient's active and prn medications.       Assessment & Plan:    Symptomatic bradycardia currently stable awaiting pacemaker placement tomorrow.  On diltiazem drip as per cardiology    Benign essential hypertension stable with current meds    Hypercholesterolemia    Acquired hypothyroidism TSH okay continue with supplement    Acute on chronic  diastolic (congestive) heart failure (HCC) without evidence of fluid overload.  Lasix is being discontinued due to suspected dehydration and hypotension repeat BMP today    Acute on chronic combined systolic and diastolic congestive heart failure (HCC)    Chronic atrial fibrillation with RVR (HCC) off anticoagulant oral therapy switched to Lovenox for planned pacemaker placement at [facility] tomorrow    Physical deconditioning continue with adequate nutrition, physical therapy    Discussed plan with patient and daughter-in-law who is at bedside      Heri Zamudio MD  12/12/21  10:16 EST

## 2021-12-12 NOTE — PLAN OF CARE
Goal Outcome Evaluation:  Plan of Care Reviewed With: patient        Progress: improving  Outcome Summary: HR STABLE,  TELE MONITORING WITH RHYTHM SR TO ATRIAL FIB,   CARDIZEM GTT ONGOING.

## 2021-12-12 NOTE — PLAN OF CARE
Goal Outcome Evaluation:  Plan of Care Reviewed With: patient        Progress: no change  Outcome Summary: VSS. Pt had a syncope episode. Possible transfer to [facility] tomorrow moring for a pacemaker.

## 2021-12-13 NOTE — PLAN OF CARE
Goal Outcome Evaluation:  Plan of Care Reviewed With: patient        Progress: improving  Outcome Summary: BP AND HR STABLE,  NO ACUTE EVENTS OVERNIGHT.

## 2021-12-13 NOTE — SIGNIFICANT NOTE
0508:  RHYTHM CHANGED TO ATRIAL FIB WITH RVR.  PT RESTING IN BED,  NO SOA OR PAIN NOTED.    0531  DR. WILL NOTIFIED OF CHANGE IN RHYTHM WITH INCREASED HR.   0533:  RESUMED CARDIZEM GTT AT 5ML/HR  0537.   CONVERTED TO NSR WITH HR 80'S.   BP STABLE.

## 2021-12-13 NOTE — DISCHARGE SUMMARY
[facility]   DISCHARGE SUMMARY      Name:  Andrew Hernandez   Age:  87 y.o.  Sex:  male  :  1934  MRN:  [id number]   Visit Number:  [id number]    Admission Date:  2021  Date of Discharge:  2021  Primary Care Physician:  Vermeesch, Marilyn K, MD    Important issues to note:    1.  Patient is currently n.p.o. for possible pacemaker placement in the morning.  2.  Eliquis has been on hold since admission and he is currently on therapeutic Lovenox.  3.  Home medications especially amiodarone and Toprol-XL are currently on hold due to bradycardia.    Discharge Diagnoses:     1.  Symptomatic bradycardia secondary to sick sinus syndrome-awaiting pacemaker placement.  2.  Essential hypertension.  3.  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  4.  Acquired hypothyroidism.  5.  Paroxysmal atrial fibrillation on Eliquis which is currently on hold.  6.  Coronary artery disease status post stents.    Problem List:     Active Hospital Problems    Diagnosis  POA   • **Symptomatic bradycardia [R00.1]  Yes   • Physical deconditioning [R53.81]  Yes   • Chronic atrial fibrillation with RVR (HCC) [I48.20]  Yes   • Acute on chronic combined systolic and diastolic congestive heart failure (HCC) [I50.43]  Yes   • Acute on chronic diastolic (congestive) heart failure (HCC) [I50.33]  Yes   • Hypercholesterolemia [E78.00]  Yes   • Acquired hypothyroidism [E03.9]  Yes   • Benign essential hypertension [I10]  Yes      Resolved Hospital Problems   No resolved problems to display.     Presenting Problem:    Chief Complaint   Patient presents with   • Weakness - Generalized      Consults:     Consulting Physician(s)  Chat With All Active Members    Provider Relationship Ishmael Farmer MD  Consulting Physician Cardiology        Procedures Performed:    None.    History of presenting illness/Hospital Course:    Mr. Hernandez is a 87-year-old  male  who presented to the ER  with his daughter with complaints of generalized weakness and fatigue. The patient has a history of Atrial fibrillation, tongue cancer with partial removal, congestive heart failure, CAD, hypothyroidism, GERD, Heart valve disease, HTN, hyperlipidemia, and myocardial infarction, and stroke. His caregiver reported that when she checked his HR at home and it was noted to be in the 20's and when she checked his blood pressure, the systolic reading was in the 70's. She brought him to the ER for evaluation. He has not had any chest pains or new shortness of breath. He denied cough or fever. He did complain of feeling lightheaded and has been ongoing for a few days. The daughter reported that in the last 6 weeks, the patient was started on Amiodarone and his last TSH was elevated which required an increase in his dosage of Synthroid.     In the emergency room, he was bradycardic in the 40's. He was mildly symptomatic with fatigue. His labs were remarkable for hyponatremia, which is chronic for him, and noted to be 123. His chloride was 88, and his calcium was 8.1. His WBC is normal. H/H 11.1/33.5. Troponin level 0.018 and .2. Magnesium was 1.9. His dioxin level was 0.50.  Dr. Nieves was called from the emergency room and the patient was admitted to the medical floor with telemetry.  Dr. Nieves felt that the patient has sick sinus syndrome and felt he would need a permanent pacemaker.  He felt that the patient may be able to get the leadless pacemaker and discussed the patient's condition with cardiology at [facility].  Patient was accepted for transfer pending bed availability.    Patient was continued on his home medications except for holding the amiodarone, digoxin and Toprol XL.  He was started on physical and occupational therapy.  He was also seen by speech therapy who recommended mechanical soft diet with thin liquids as tolerated.  His Eliquis was placed on hold due  to possibility of pacemaker implantation and was placed on therapeutic Lovenox.  During the hospital stay, patient did require atrial fibrillation with rapid ventricular response and required Cardizem drip.  However, he subsequently developed dizziness with low heart rates and blood pressure and his Cardizem drip was placed on hold.  He was given 100 mL of normal saline bolus on 12/12/2021 with improvement in his symptoms.    Vital Signs:    Temp:  [97.8 °F (36.6 °C)-98.6 °F (37 °C)] 97.9 °F (36.6 °C)  Heart Rate:  [] 75  Resp:  [16-18] 18  BP: ()/(38-70) 136/46    Physical Exam:    General Appearance:  Alert and cooperative.    Head:  Atraumatic and normocephalic.   Eyes: Conjunctivae and sclerae normal, no icterus. No pallor.   Ears:  Ears with no abnormalities noted.   Throat: No oral lesions, no thrush, oral mucosa moist.   Neck: Supple, trachea midline, no thyromegaly.   Back:   No kyphoscoliosis present. No tenderness to palpation.   Lungs:   Breath sounds heard bilaterally equally.  No crackles or wheezing. No Pleural rub or bronchial breathing.   Heart:  Normal S1 and S2, no murmur, no gallop, no rub. No JVD.   Abdomen:   Normal bowel sounds, no masses, no organomegaly. Soft, nontender, nondistended, no rebound tenderness.   Extremities: Supple, no edema, no cyanosis, no clubbing.   Pulses: Pulses palpable bilaterally.   Skin: No bleeding or rash.   Neurologic: Alert and oriented x 3. No facial asymmetry. Moves all four limbs. No tremors.     Pertinent Lab Results:     Results from last 7 days   Lab Units 12/12/21  1028 12/11/21  0532 12/10/21  0619 12/09/21  1309 12/09/21  1309   SODIUM mmol/L 123* 126* 123*   < > 123*   POTASSIUM mmol/L 4.1 4.1 4.4   < > 4.6   CHLORIDE mmol/L 89* 90* 89*   < > 88*   CO2 mmol/L 25.3 26.2 26.7   < > 25.2   BUN mg/dL 12 17 13   < > 12   CREATININE mg/dL 0.92 0.86 0.91   < > 0.94   CALCIUM mg/dL 8.2* 8.3* 8.3*   < > 8.1*   BILIRUBIN mg/dL  --   --  0.4  --  0.3    ALK PHOS U/L  --   --  89  --  92   ALT (SGPT) U/L  --   --  8  --  10   AST (SGOT) U/L  --   --  15  --  17   GLUCOSE mg/dL 149* 96 91   < > 102*    < > = values in this interval not displayed.     Results from last 7 days   Lab Units 12/11/21  0532 12/10/21  0619 12/09/21  1309   WBC 10*3/mm3 4.81 5.58 7.18   HEMOGLOBIN g/dL 9.8* 9.6* 11.1*   HEMATOCRIT % 29.7* 28.0* 33.5*   PLATELETS 10*3/mm3 229 218 270         Results from last 7 days   Lab Units 12/09/21  1309   TROPONIN T ng/mL 0.021  0.018     Pertinent Radiology Results:    Imaging Results (All)     Procedure Component Value Units Date/Time    XR Chest 1 View [[id number]] Collected: 12/09/21 1332     Updated: 12/09/21 1335    Narrative:      PROCEDURE: XR CHEST 1 VW-     HISTORY: Weak/Dizzy/AMS triage protocol     COMPARISON:  11/12/2021     FINDINGS:  Portable view of the chest demonstrates moderate right  pleural effusion increased from prior exam with small left pleural  effusion. Chronic interstitial changes are present. Compressive  atelectasis is seen of the right lung base. Left basilar atelectasis has  improved. The mediastinum is unremarkable.     The heart size is normal.       Impression:      Moderate size right pleural effusion increased with stable  small left pleural effusion. Bibasilar atelectasis, worse on the right  and improved on left.      This report was finalized on 12/9/2021 1:33 PM by Marin Dennis MD.        Echo:    Results for orders placed during the hospital encounter of 10/07/21    Adult Transthoracic Echo Complete W/ Cont if Necessary Per Protocol    Interpretation Summary  1.  Normal left ventricular size with mildly reduced LV systolic function, LVEF 40-45%.  2.  Moderate hypokinesis of the inferior wall.  3.  Grade 1 diastolic dysfunction.  4.  Normal right ventricular size and systolic function.  5.  Mildly increased left atrial volume index.  6.  Mild AI, MR, and TR.  7.  Trace PI.  8.  Moderate left pleural  effusion.    Condition on Discharge:      Stable.    Code status during the hospital stay:    Code Status and Medical Interventions:   Ordered at: 12/09/21 1607     Level Of Support Discussed With:    Patient     Code Status (Patient has no pulse and is not breathing):    CPR (Attempt to Resuscitate)     Medical Interventions (Patient has pulse or is breathing):    Full Support     Discharge Disposition:    Transfer to tertiary care facility for pacemaker placement.    Discharge Medications:       Discharge Medications      Changes to Medications      Instructions Start Date   metoprolol succinate XL 25 MG 24 hr tablet  Commonly known as: TOPROL-XL  What changed: how much to take   25 mg, Oral, Every 24 Hours Scheduled      tamsulosin 0.4 MG capsule 24 hr capsule  Commonly known as: FLOMAX  What changed:   · when to take this  · additional instructions   0.4 mg, Oral, Daily         Continue These Medications      Instructions Start Date   amiodarone 200 MG tablet  Commonly known as: PACERONE   200 mg, Oral, Every 24 Hours Scheduled      apixaban 2.5 MG tablet tablet  Commonly known as: ELIQUIS   2.5 mg, Oral, Every 12 Hours Scheduled      aspirin 81 MG EC tablet   81 mg, Oral, Daily      atorvastatin 20 MG tablet  Commonly known as: LIPITOR   20 mg, Oral, Nightly      digoxin 125 MCG tablet  Commonly known as: LANOXIN   125 mcg, Oral, Every 3 Days      ferrous sulfate 140 (45 Fe) MG tablet controlled-release tablet   Oral, Daily With Breakfast, SLOW RELEASE IRON 160/50       furosemide 40 MG tablet  Commonly known as: LASIX   Alternate 20 mg every other day with 40 mg every other day.      levothyroxine 100 MCG tablet  Commonly known as: SYNTHROID, LEVOTHROID   100 mcg, Oral, Daily      multivitamin with minerals tablet tablet   1 tablet, Oral, Daily      ondansetron 4 MG tablet  Commonly known as: Zofran   4 mg, Oral, Every 8 Hours PRN      PEPCID PO   20 mg, Oral, 2 Times Daily      potassium chloride 10 MEQ CR  tablet   10 mEq, Oral, Daily, Taken with lasix      sodium chloride 1 g tablet   Take 1 tablet by mouth Daily      TUMS PO   Oral      vitamin C 250 MG tablet  Commonly known as: ASCORBIC ACID   500 mg, Oral, Daily         Stop These Medications    sodium chloride 0.9 % nebulizer solution          Discharge Diet:     Diet Instructions     Diet: Cardiac; Thin      Discharge Diet: Cardiac    Fluid Consistency: Thin        Activity at Discharge:     Activity Instructions     Activity as Tolerated          Follow-up Appointments:    Future Appointments   Date Time Provider Department Center   12/15/2021 10:00 AM Oziel Mcnair MD [facility]   1/10/2022  8:00 AM Vermeesch, Marilyn K, MD [facility]     Test Results Pending at Discharge:    None.       Abdirashid Fontenot MD  12/12/21  20:24 EST    Time: I spent 25 minutes on this discharge activity which included: face-to-face encounter with the patient, reviewing the data in the system, coordination of the care with the nursing staff as well as consultants, documentation, and entering orders.     Dictated utilizing Dragon dictation.

## 2021-12-13 NOTE — CASE MANAGEMENT/SOCIAL WORK
Case Management Discharge Note                Selected Continued Care - Discharged on 12/13/2021 Admission date: 12/9/2021 - Discharge disposition: Short Term Hospital (DC - External)    Destination Coordination complete.    Service Provider Selected Services Address Phone Fax Patient Preferred    [address] [address] [phone] -- --          Durable Medical Equipment    No services have been selected for the patient.              Dialysis/Infusion    No services have been selected for the patient.              Home Medical Care    No services have been selected for the patient.              Therapy    No services have been selected for the patient.              Community Resources    No services have been selected for the patient.              Community & DME    No services have been selected for the patient.                Selected Continued Care - Episodes Includes selections from active Coordinated Care Management episodes    High Risk Care Management Episode start date: 11/11/2021   There are no active outsourced providers for this episode.             Selected Continued Care - Prior Encounters Includes selections from prior encounters from 9/10/2021 to 12/13/2021    Discharged on 10/16/2021 Admission date: 10/7/2021 - Discharge disposition: Home-Health Care Svc    Durable Medical Equipment     Service Provider Selected Services Address Phone Fax Patient Preferred    [facility]  Durable Medical Equipment [address] [address] [phone] --       Internal Comment last updated by Cathy Macario RN 10/8/2021 1915    Current oxygen provider 3-4L ATC per [place].                     Home Medical Care     Service Provider Selected Services Address Phone Fax Patient Preferred    [address]  Home Health Services [facility] [address] [id number] [phone] --        Internal Comment last updated by Cathy Macairo, RN 10/8/2021 1915    Current  provider                           Discharged on 9/26/2021 Admission date: 9/20/2021 - Discharge disposition: Home or Self Care    Home Medical Care     Service Provider Selected Services Address Phone Fax Patient Preferred    [facility] [address] [facility] [address] [id number] [phone] --                    Transportation Services  Ambulance: [facility]    Final Discharge Disposition Code: 02 - short term [facility] for [facility]

## 2021-12-13 NOTE — NURSING NOTE
ACC REVIEW REPORT: [facility]        PATIENT NAME: Andrew Hernandez    PATIENT ID: [id number]        COVID-19 ACC SCREENING       DOES THE PATIENT HAVE A FEVER GREATER THAN OR EQUAL .4: no    IS THE PATIENT EXPERIENCING SHORTNESS OF BREATH: no    DOES THE PATIENT HAVE A COUGH: no  DOES THE PATIENT HAVE ANY OF THE FOLLOWING RISK FACTORS:    EXPOSURE TO SUSPECTED OR KNOWN COVID-19: no    RECENT TRAVEL HISTORY TO ENDEMIC AREA (DOMESTIC/LOCAL): no    IS THE PATIENT A HEALTHCARE WORKER: no    HAS THE PATIENT EXPERIENCED A LOSS OF SENSE OF TASTE OR SMELL: no    HAS THE PATIENT BEEN TESTED FOR COVID-19: yes    DATE TESTED: 12/12     LAB TESTING SENT TO: in house lab at [facility] - negative results          BED: 9801    BED TYPE: [facility]    BED GIVEN TO: Fay Palencia RN    TIME BED GIVEN: 0645    TODAY'S DATE: 12/13/2021    TRANSFER DATE: 12/13/21    ETA: 0830    TRANSFERRING FACILITY: [facility]    TRANSFERRING FACILITY PHONE # : [phone]    TRANSFERRING MD: Dr. Howe    DATE/TIME REQUEST RECEIVED: 12/10 @ 1121    [facility] RN: Tricia Cisneros RN    REPORT FROM: fay palencia RN     TIME REPORT TAKEN: 0630    DIAGNOSIS: tachy margarito syndrome    REASON FOR TRANSFER TO [facility]: Pacemaker placement    TRANSPORTATION: local ground    CLINICAL REASON FOR TRANSFER TO [facility]: higher level of care      CLINICAL INFORMATION    HEIGHT: 71 inches    WEIGHT: 66.2 kg    ALLERGIES: levaquin, amoxicillin, rocephin    INFECTIOUS DISEASE: no    ISOLATION: no    VITAL SIGNS:   TIME: 0600  TEMP: 97.9  PULSE: 84  B/P: 119/50  RESP: 18    LAB INFORMATION: unremarkable    CULTURE INFORMATION: COVID - negative results    MEDS/IV FLUIDS: #20 right forearm placed on 12/10 - Cardizem drip started at 0530      CARDIAC SYSTEM:    CHEST PAIN: no    RATE:     SCALE:     RHYTHM: NSR/afib    Is patient taking or has patient been given any drugs that could increase bleeding? Eliquis has been on hold since admission and he is  currently on therapeutic Lovenox.    CARDIAC NOTES: 0500 this morning went into afib with RVR - asymptomatic      RESPIRATORY SYSTEM:    LUNG SOUNDS: clear    OXYGEN: yes    O2 SAT: 99% on 2L [place]    IMAGING FINDINGS: n/a    RESPIRATORY STATUS: stable      CNS/MUSCULOSKELETAL    ALERT AND ORIENTED:    PERSON: y  PLACE: y  TIME: y    ÁNGEL COMA SCALE:  : 15    CNS/MUSCULOSKELETAL NOTES: stable, using walker or standby assist for unsteadiness      GI//GY      ABDOMINAL PAIN: no    VOMITING: no    DIARRHEA: no    NAUSEA: no    BOWEL SOUNDS: +    GI//GY NOTES: Last BM was 12/12    PEACOCK: no    PAST MEDICAL HISTORY: Patient  has a past medical history of A-fib (HCC), Abnormal ECG, Arrhythmia, Arthritis, Asthma (Aug 2021), Benign prostatic hyperplasia, Cancer ([facility]), Carotid stenosis, CHF (congestive heart failure) ([facility]), Coronary artery disease, COVID-19 (09/2021), Disease of thyroid gland, Enlarged prostate, Fracture, Full dentures, GERD (gastroesophageal reflux disease), Heart valve disease, Hyperlipidemia, Hypertension, Hypothyroidism, Impaired functional mobility, balance, gait, and endurance (09/03/2021), Kidney infection, Mitral regurgitation, Myocardial infarction (HCC) (8-16-21), Nonrheumatic aortic valve insufficiency, Renal calculi, Stroke (HCC) (03/2016), 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[facility]), and Wears glasses. pr tcat iv stent crv crtd art embolic protecj (Right, 3/4/2017); Carotid stent (Right, 03/18/2016); Carotid stent (Right, 03/04/2017); Carotid endarterectomy; Cataract extraction w/ intraocular lens implant (Left, 9/4/2018); and Tongue surgery (08/16/2021).    OTHER SYMPTOM NOTES: Patient is currently n.p.o. for possible pacemaker placement in the morning. mechanical soft diet with thin liquids as tolerated.    ADDITIONAL NOTES: Mr. Hernandez is a 87-year-old  male who presented to the ER  12/9 with his daughter with complaints of generalized weakness and fatigue. The patient has a history of Atrial fibrillation,  tongue cancer with partial removal, congestive heart failure, CAD, hypothyroidism, GERD, Heart valve disease, HTN, hyperlipidemia, and myocardial infarction, and stroke. His caregiver reported that when she checked his HR at home and it was noted to be in the 20's and when she checked his blood pressure, the systolic reading was in the 70's. She brought him to the ER for evaluation. He has not had any chest pains or new shortness of breath. He denied cough or fever. He did complain of feeling lightheaded and has been ongoing for a few days. The daughter reported that in the last 6 weeks, the patient was started on Amiodarone and his last TSH was elevated which required an increase in his dosage of Synthroid.     In the emergency room, he was bradycardic in the 40's. He was mildly symptomatic with fatigue. His labs were remarkable for hyponatremia, which is chronic for him, and noted to be 123. His chloride was 88, and his calcium was 8.1. His WBC is normal. H/H 11.1/33.5. Troponin level 0.018 and .2. Magnesium was 1.9. His dioxin level was 0.50.  Dr. Nieves was called from the emergency room and the patient was admitted to the medical floor with telemetry.  Dr. Nieves felt that the patient has sick sinus syndrome and felt he would need a permanent pacemaker.  He felt that the patient may be able to get the leadless pacemaker and discussed the patient's condition with cardiology at [facility].  Patient was accepted for transfer pending bed availability.     Patient was continued on his home medications except for holding the amiodarone, digoxin and Toprol XL.  He was started on physical and occupational therapy.  He was also seen by speech therapy who recommended mechanical soft diet with thin liquids as tolerated.  His Eliquis was placed on hold due to possibility of pacemaker implantation and was placed on therapeutic Lovenox.  During the hospital stay, patient did require atrial  fibrillation with rapid ventricular response and required Cardizem drip.  However, he subsequently developed dizziness with low heart rates and blood pressure and his Cardizem drip was placed on hold.  He was given 100 mL of normal saline bolus on 12/12/2021 with improvement in his symptoms.    SANJUANITA [phone]        Teressa Cisneros RN  12/13/2021  06:19 EST

## 2021-12-14 NOTE — TELEPHONE ENCOUNTER
----- Message from Andrew Hernandez sent at 12/14/2021  1:39 PM EST -----  Regarding: Appt  PhillipAndrew (my father in law) had an appt with Dr Mcnair tomorrow Dec 15th. It’s been canceled.   Andrew had a pacemaker inserted yesterday by   Dr Howe. The wound check is Dec 21st.  I just wanted to see when He needs to follow up with Dr. Mcnair.     Thank you  Roshni

## 2021-12-14 NOTE — PROGRESS NOTES
" [place] [facility] - Progress Note    Andrew Hernandez  1934  S210/1    12/14/21, 07:53 EST      Chief Complaint: Following for tachybrady S/P dual chamber pacemaker    Subjective:   Did well overnight - some elevated heart rates but overall controlled currently.  Pt off floor for XRay.  DIL present - concerned over dizziness and low BP readings.    Dr. Howe has reviewed CXR - no PTX.      Review of Systems:  Pertinent positives are listed above and in physical exam.  All others have been reviewed and are negative.    amiodarone, 200 mg, Oral, Q24H  aspirin, 81 mg, Oral, Daily  atorvastatin, 20 mg, Oral, Nightly  famotidine, 20 mg, Oral, BID  ferrous sulfate, 325 mg, Oral, Daily With Breakfast  levothyroxine, 100 mcg, Oral, Q AM  metoprolol succinate XL, 25 mg, Oral, Q24H  multivitamin with minerals, 1 tablet, Oral, Daily  psyllium, 1 packet, Oral, Daily  sodium chloride, 3 mL, Intravenous, Q12H  tamsulosin, 0.4 mg, Oral, Daily        Objective:  Vitals:   height is 180.3 cm (71\") and weight is 63.9 kg (140 lb 14 oz). His oral temperature is 98.1 °F (36.7 °C). His blood pressure is 147/48 and his pulse is 65. His respiration is 16 and oxygen saturation is 97%.       Intake/Output Summary (Last 24 hours) at 12/14/2021 0753  Last data filed at 12/14/2021 0624  Gross per 24 hour   Intake 700 ml   Output 1275 ml   Net -575 ml       Physical Exam:  General:  WN, NAD, A and O x3.  CV:  Normal S1,S2. No murmur, rub, or gallop.  Resp:  CTA Jose, equal, nonlabored.  Abd:  Soft, + BS, no organomegaly. Nontender to palpation.  Extrem:  No edema BLE, 2+ pedal/PT pulses.            Results from last 7 days   Lab Units 12/13/21  1021   WBC 10*3/mm3 4.04   HEMOGLOBIN g/dL 10.6*   HEMATOCRIT % 31.0*   PLATELETS 10*3/mm3 228     Results from last 7 days   Lab Units 12/13/21  1021 12/11/21  0532 12/10/21  0619   SODIUM mmol/L 125*   < > 123*   POTASSIUM mmol/L 4.4   < > 4.4   CHLORIDE mmol/L 90*   < > 89* "   CO2 mmol/L 25.0   < > 26.7   BUN mg/dL 9   < > 13   CREATININE mg/dL 0.77   < > 0.91   CALCIUM mg/dL 8.3*   < > 8.3*   BILIRUBIN mg/dL  --   --  0.4   ALK PHOS U/L  --   --  89   ALT (SGPT) U/L  --   --  8   AST (SGOT) U/L  --   --  15   GLUCOSE mg/dL 86   < > 91    < > = values in this interval not displayed.         Results from last 7 days   Lab Units 12/09/21  1309   TSH uIU/mL 5.030*               Tele:  NSR    Assessment and Plan:  1.  Tachy Venu Syndrome   -  S/P dual chamber pacemaker (BSC)   -  CXR reviewed by Dr. Howe.  No PTX, leads well placed.   -  Oral amio restarted along with Toprol XL 25mg.  May even be related to Amio in a fraile 88 yo.              -  Consider AVN ablation in future.    2.  Paroxysmal AFib   -  CHADS2 VASC = 7.  On low dose Eliquis   -  Continue Amiodarone and BB for now.  Dizziness may improve with better rate control - will have to monitor as outpatient.    3. Essential HTN   -  Fairly controlled   -  Consider increasing Beta Blocker if needed      I discussed the patient's findings and my recommendations with the patient, any present family members, and the nursing staff.  Destin Howe MD saw and examined patient, verified hx and PE, read all radiographic studies, reviewed labs and micro data, and formulated dx, plan for treatment and all medical decision making.      Mary Jane Johnson PA-C  12/14/21, 07:53 EST

## 2021-12-14 NOTE — PLAN OF CARE
Problem: Adult Inpatient Plan of Care  Goal: Plan of Care Review  Outcome: Ongoing, Progressing  Flowsheets  Taken 12/14/2021 1340 by Emily Almaguer RN  Outcome Summary: Pt. being discharged  Taken 12/13/2021 1806 by Radha Corcoran RN  Plan of Care Reviewed With: patient  Goal: Patient-Specific Goal (Individualized)  Outcome: Ongoing, Progressing  Goal: Absence of Hospital-Acquired Illness or Injury  Outcome: Ongoing, Progressing  Intervention: Identify and Manage Fall Risk  Recent Flowsheet Documentation  Taken 12/14/2021 1200 by Emily Almaguer RN  Safety Promotion/Fall Prevention: safety round/check completed  Taken 12/14/2021 1000 by Emily Almaguer RN  Safety Promotion/Fall Prevention: safety round/check completed  Taken 12/14/2021 0800 by Emily Almaguer RN  Safety Promotion/Fall Prevention:   activity supervised   assistive device/personal items within reach   clutter free environment maintained   fall prevention program maintained   mobility aid in reach   nonskid shoes/slippers when out of bed   muscle strengthening facilitated   room organization consistent   safety round/check completed   toileting scheduled  Intervention: Prevent Skin Injury  Recent Flowsheet Documentation  Taken 12/14/2021 0800 by Emily Almaguer RN  Skin Protection:   adhesive use limited   incontinence pads utilized  Intervention: Prevent and Manage VTE (venous thromboembolism) Risk  Recent Flowsheet Documentation  Taken 12/14/2021 0800 by Emily Almaguer RN  VTE Prevention/Management:   bilateral   dorsiflexion/plantar flexion performed   bleeding risk factor(s) identified  Goal: Optimal Comfort and Wellbeing  Outcome: Ongoing, Progressing  Intervention: Provide Person-Centered Care  Recent Flowsheet Documentation  Taken 12/14/2021 0800 by Emily Almaguer RN  Trust Relationship/Rapport:   care explained   choices provided  Goal: Readiness for Transition of Care  Outcome: Ongoing, Progressing     Problem: Fall Injury Risk  Goal: Absence  of Fall and Fall-Related Injury  Outcome: Ongoing, Progressing  Intervention: Identify and Manage Contributors to Fall Injury Risk  Recent Flowsheet Documentation  Taken 12/14/2021 1200 by Emily Almaguer, RN  Medication Review/Management: medications reviewed  Taken 12/14/2021 1000 by Emily Almaguer, RN  Medication Review/Management: medications reviewed  Taken 12/14/2021 0800 by Emily Almaguer, RN  Medication Review/Management: medications reviewed  Intervention: Promote Injury-Free Environment  Recent Flowsheet Documentation  Taken 12/14/2021 1200 by Emily Almaguer, RN  Safety Promotion/Fall Prevention: safety round/check completed  Taken 12/14/2021 1000 by Emily Almaguer RN  Safety Promotion/Fall Prevention: safety round/check completed  Taken 12/14/2021 0800 by Emily Almaguer, RN  Safety Promotion/Fall Prevention:   activity supervised   assistive device/personal items within reach   clutter free environment maintained   fall prevention program maintained   mobility aid in reach   nonskid shoes/slippers when out of bed   muscle strengthening facilitated   room organization consistent   safety round/check completed   toileting scheduled   Goal Outcome Evaluation:              Outcome Summary: Pt. being discharged

## 2021-12-14 NOTE — CASE MANAGEMENT/SOCIAL WORK
Discharge Planning Assessment  [facility]     Patient Name: Andrew Hernandez  MRN: [id number]  Today's Date: 12/14/2021    Admit Date: 12/13/2021     Discharge Needs Assessment     Row Name 12/14/21 1255       Living Environment    Lives With alone  Pt resides in [facility]    Unique Family Situation Pt has an apartment at his sons home in a detached garage. Pt has family with him 24/7    Current Living Arrangements home/apartment/condo    Primary Care Provided by child(noah)    Provides Primary Care For no one, unable/limited ability to care for self    Family Caregiver if Needed child(noah), adult; grandchild(noah), adult    Family Caregiver Names POROSY Son Solomon and daughter in law Roshni    Quality of Family Relationships helpful; involved; supportive    Able to Return to Prior Arrangements yes       Resource/Environmental Concerns    Resource/Environmental Concerns none       Transition Planning    Patient/Family Anticipates Transition to home with family       Discharge Needs Assessment    Readmission Within the Last 30 Days previous discharge plan unsuccessful  pt has had multiple admissions to the hospital over the last 3 months    Equipment Currently Used at Home walker, standard; shower chair; wheelchair; oxygen; commode; cane, straight; pulse ox  pt has home oxygen and portables through [employer]    Concerns to be Addressed discharge planning    Anticipated Changes Related to Illness none    Equipment Needed After Discharge none    Provided Post Acute Provider List? N/A    Provided Post Acute Provider Quality & Resource List? N/A               Discharge Plan     Row Name 12/14/21 4382       Plan    Plan home    Patient/Family in Agreement with Plan yes    Plan Comments CM spoke with pt and daughter in Law Kevina at bedside. Pt resides in an apartment in a detached garage on his son Solomons property. Pt has family with him 24/7 for assistance. Pt has a walker, cane, wheelchair, shower chair, bsc, pulso  oximeter and home oxygen. Oxygen is provided through [employer] and he also has portable tanks for travel. Pt has used [facility] in the past and pt and daughter in law deny needs for additional home health services at this time. Pt is not current with outpatient medical services. Pt has not received his covid vaccination. Pt;s POA papers are on file.Pt confirms she has [place] medicaid, denies concerns or disruption in coverage. Pt has prescription drug coverage and denies issues obtaining or affording current medications. Pt plans to discharge home with family assistance and denies needs at this time. Pt will have private transportation home.    Final Discharge Disposition Code 01 - home or self-care              Continued Care and Services - Admitted Since 12/13/2021    Coordination has not been started for this encounter.     Selected Continued Care - Episodes Includes selections from active Coordinated Care Management episodes    High Risk Care Management Episode start date: 11/11/2021   There are no active outsourced providers for this episode.             Selected Continued Care - Prior Encounters Includes selections from prior encounters from 9/14/2021 to 12/14/2021    Discharged on 12/13/2021 Admission date: 12/9/2021 - Discharge disposition: Short Term Hospital (DC - External)    Destination     Service Provider Selected Services Address Phone Fax Patient Preferred    [place] [id number] [facility] [address] [phone] -- --                Discharged on 10/16/2021 Admission date: 10/7/2021 - Discharge disposition: Home-Health Care c    Durable Medical Equipment     Service Provider Selected Services Address Phone Fax Patient Preferred    [facility]  Durable Medical Equipment [facility] [address] [place] [id number] [phone] --       Internal Comment last updated by Cathy Macario, RN 10/8/2021 1915    Current oxygen provider  3-4L ATC per [place].                     Home Medical Care     Service Provider Selected Services Address Phone Fax Patient Preferred    [facility] [address], [facility] [address] [phone] --       Internal Comment last updated by Cathy Macario RN 10/8/2021 1915    Current  provider                           Discharged on 9/26/2021 Admission date: 9/20/2021 - Discharge disposition: Home or Self Care    Home Medical Care     Service Provider Selected Services Address Phone Fax Patient Preferred    [facility] [address], [facility] [address] [phone] --                    Expected Discharge Date and Time     Expected Discharge Date Expected Discharge Time    Dec 14, 2021          Demographic Summary     Row Name 12/14/21 1254       General Information    Arrived From hospital    Referral Source admission list    Preferred Language English     Used During This Interaction no    General Information Comments PCP- Marilyn Vermeesch       Contact Information    Permission Granted to Share Info With     Contact Information Comments [phone]               Functional Status     Row Name 12/14/21 1254       Functional Status    Usual Activity Tolerance moderate    Current Activity Tolerance fair       Functional Status, IADL    Medications assistive person    Meal Preparation completely dependent    Housekeeping completely dependent    Laundry completely dependent    Shopping completely dependent    IADL Comments pt able to use restroom on his own, sons help with bathing and dressing       Mental Status    General Appearance WDL WDL       Mental Status Summary    Recent Changes in Mental Status/Cognitive Functioning no changes       Employment/    Employment/ Comments Pt has Medicare, denies concerns or disruption in coverage. Pt does not  have prescription drug coverage and family denies issues obtaining or affording current medications               Psychosocial    No documentation.                Abuse/Neglect    No documentation.                Legal    No documentation.                Substance Abuse    No documentation.                Patient Forms    No documentation.                   Sabiha Mosher RN

## 2021-12-14 NOTE — PLAN OF CARE
Goal Outcome Evaluation:           Progress: improving  Outcome Summary: Pt in afib at start of shift, short periods of high heart rate. Converted to normal sinus a little after midnight. No complaints of pain at this time. Will continue to monitor. 0300  12/14/2021

## 2021-12-21 NOTE — PROGRESS NOTES
2021    Andrew Hernandez, : 1934    WOUND CHECK    B/P:  (Sitting)  (Standing)     Pulse:     Patient has fever: [] Temperature if indicated:     Wound Location:     Dressing Removed [x]        Old Dressing Appearance:  Clean, dry [x]                 Old, bloody drainage []                            Moist, serous drainage []                Moist, thick yellow/green drainage []       Wound Appearance: Redness []                  Drainage []                  Culture obtained []        Color: Clear     Consistency: n/a     Amount: none         Gloves used, wound cleansed with sterile 4x4 and peroxide [x]       MD notified [] MD orders:   Antibiotic started []  If checked, type   Other:     Appointment for follow-up scheduled for 3 months post procedure [x]    Future Appointments   Date Time Provider Department Center   1/10/2022  8:00 AM Vermeesch, Marilyn K, MD [facility] RI MR LITA   2022  9:00 AM Oziel Mcnair MD [facility] JIM JIM   3/14/2022  1:15 PM Harsh Baer PA E [facility] LITA LITA           Marcella Bowie MA, 21      MD Signature:______________________________ Completed By/Date:

## 2021-12-23 NOTE — PROGRESS NOTES
Andrew Hernandez      Discharge Summary      PROBLEM LIST/PMHx:  1.  Tachy Venu Syndrome   A.  Dr. Howe:  Implantation of [facility] dual chamber pacemaker, 21  2.  NSTEMI              A.  2021 perioperative tongue surgery [facility]. Peak at bedtime troponin greater than 300. Patient deferred ischemic evaluation.  Echocardiogram EF 45%.  3.  Chronic Mixed systolic/diastolic congestive heart failure              A.  10/2021 2D echo: LVEF =40-45%.  Moderate hypokinesis of inferior wall.  Grade 1 diastolic dysfunction.  Normal right ventricular size and systolic function.  Mildly increased LA volume.  Mild AI, mild MR, mild TR.  Trace PI.  Moderate left pleural effusion.  4.  Paroxysmal Atrial Fibrillation              A.  Holter 2020:  2% afib 56/75/178              B.  2021 recurrent periop tongue flap sx [facility]              C. CHADS2 Vasc = 7.  On low dose Eliquis.              D.  Started on Amiodarone 10/2021 while hospitalized for CHF  5.  Essential HTN  6.  Carotid Artery Disease              A.  3/2016 Bilateral carotid artery stenosis.  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               B.  3/2017 PRITESH restented- Given              C.  2020 Carotid US: PRITESH patent.  Residual left nonobstructive internal carotid artery stenosis 50-69%              D.   Carotid US: Right stent imagin-49%. Left ICA 50-69%.  7.  Abdominal Bruit              A.  : Aortic diameter 1.8 cm. NO evidence of AAA.  8.  Hypothyroidism  9.  Gastroesophageal reflux disease  10.  Hypercholesterolemia  11. Chronic Hyponatremia    12.  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              A.  Partial removal              B.  Soft mechanical/thickened diet  13.   COVID-19  Hospitalized/concomitant CHF [facility]           Admit history: This is an 87-year-old  male with a note above history was transferred from [facility] [place]  [facility] for tachybradycardia syndrome.  He presented to [facility] in the [facility] area and was evaluated.  He was in sinus rhythm upon arrival but was noted to have paroxysmal A. fib with RVR prior to procedure.  He was taken to the EP lab and underwent successful implantation of a permanent pacemaker, [employer] by Dr. Howe.  There were no complications during the procedure and patient was able to be discharged home in a stable condition.  Prior to discharge, discussion regarding AV node ablation was had with the patient and family and this will be reevaluated at a future appointment.    Discharge medications are as follows:  amiodarone, 200 mg, Oral, Q24H  aspirin, 81 mg, Oral, Daily  atorvastatin, 20 mg, Oral, Nightly  famotidine, 20 mg, Oral, BID  ferrous sulfate, 325 mg, Oral, Daily With Breakfast  levothyroxine, 100 mcg, Oral, Q AM  metoprolol succinate XL, 25 mg, Oral, Q24H  multivitamin with minerals, 1 tablet, Oral, Daily  psyllium, 1 packet, Oral, Daily  sodium chloride, 3 mL, Intravenous, Q12H  tamsulosin, 0.4 mg, Oral, Daily    Patient will follow up in 7 to 10 days for wound check in 3 months with a [place] interrogation.  This ends the dictation on Andrew Hernandez.  Please see chart for further details.    ARDHA Lloyd

## 2022-01-01 ENCOUNTER — APPOINTMENT (OUTPATIENT)
Dept: CT IMAGING | Facility: HOSPITAL | Age: 87
End: 2022-01-01

## 2022-01-01 ENCOUNTER — TELEPHONE (OUTPATIENT)
Dept: CARDIOLOGY | Facility: CLINIC | Age: 87
End: 2022-01-01

## 2022-01-01 ENCOUNTER — TELEPHONE (OUTPATIENT)
Dept: INTERNAL MEDICINE | Facility: CLINIC | Age: 87
End: 2022-01-01

## 2022-01-01 ENCOUNTER — OFFICE VISIT (OUTPATIENT)
Dept: INTERNAL MEDICINE | Facility: CLINIC | Age: 87
End: 2022-01-01

## 2022-01-01 ENCOUNTER — TRANSCRIBE ORDERS (OUTPATIENT)
Dept: LAB | Facility: HOSPITAL | Age: 87
End: 2022-01-01

## 2022-01-01 ENCOUNTER — PATIENT MESSAGE (OUTPATIENT)
Dept: INTERNAL MEDICINE | Facility: CLINIC | Age: 87
End: 2022-01-01

## 2022-01-01 ENCOUNTER — OFFICE VISIT (OUTPATIENT)
Dept: CARDIOLOGY | Facility: CLINIC | Age: 87
End: 2022-01-01

## 2022-01-01 ENCOUNTER — HOSPITAL ENCOUNTER (OUTPATIENT)
Dept: CT IMAGING | Facility: HOSPITAL | Age: 87
Discharge: HOME | End: 2022-04-27
Admitting: INTERNAL MEDICINE

## 2022-01-01 ENCOUNTER — HOSPITAL ENCOUNTER (OUTPATIENT)
Dept: CT IMAGING | Facility: HOSPITAL | Age: 87
Discharge: HOME | End: 2022-05-19

## 2022-01-01 ENCOUNTER — APPOINTMENT (OUTPATIENT)
Dept: GENERAL RADIOLOGY | Facility: HOSPITAL | Age: 87
End: 2022-01-01

## 2022-01-01 ENCOUNTER — TELEMEDICINE (OUTPATIENT)
Dept: INTERNAL MEDICINE | Facility: CLINIC | Age: 87
End: 2022-01-01

## 2022-01-01 ENCOUNTER — TRANSCRIBE ORDERS (OUTPATIENT)
Dept: ADMINISTRATIVE | Facility: HOSPITAL | Age: 87
End: 2022-01-01

## 2022-01-01 ENCOUNTER — LAB (OUTPATIENT)
Dept: LAB | Facility: HOSPITAL | Age: 87
End: 2022-01-01

## 2022-01-01 ENCOUNTER — CLINICAL SUPPORT (OUTPATIENT)
Dept: INTERNAL MEDICINE | Facility: CLINIC | Age: 87
End: 2022-01-01

## 2022-01-01 ENCOUNTER — HOSPITAL ENCOUNTER (OUTPATIENT)
Dept: GENERAL RADIOLOGY | Facility: HOSPITAL | Age: 87
Discharge: HOME | End: 2022-04-19
Admitting: INTERNAL MEDICINE

## 2022-01-01 ENCOUNTER — HOSPITAL ENCOUNTER (INPATIENT)
Facility: HOSPITAL | Age: 87
LOS: 11 days | Discharge: HOSPICE | End: 2022-07-01
Attending: EMERGENCY MEDICINE | Admitting: FAMILY MEDICINE

## 2022-01-01 VITALS
OXYGEN SATURATION: 94 % | HEIGHT: 71 IN | SYSTOLIC BLOOD PRESSURE: 144 MMHG | DIASTOLIC BLOOD PRESSURE: 55 MMHG | TEMPERATURE: 97.8 F | RESPIRATION RATE: 16 BRPM | HEART RATE: 64 BPM | BODY MASS INDEX: 20.58 KG/M2 | WEIGHT: 147 LBS

## 2022-01-01 VITALS — SYSTOLIC BLOOD PRESSURE: 114 MMHG | HEART RATE: 63 BPM | OXYGEN SATURATION: 97 % | DIASTOLIC BLOOD PRESSURE: 44 MMHG

## 2022-01-01 VITALS
TEMPERATURE: 97 F | DIASTOLIC BLOOD PRESSURE: 54 MMHG | HEART RATE: 84 BPM | OXYGEN SATURATION: 98 % | WEIGHT: 145 LBS | SYSTOLIC BLOOD PRESSURE: 118 MMHG | BODY MASS INDEX: 20.3 KG/M2 | HEIGHT: 71 IN

## 2022-01-01 VITALS
DIASTOLIC BLOOD PRESSURE: 50 MMHG | HEIGHT: 71 IN | WEIGHT: 139 LBS | BODY MASS INDEX: 19.46 KG/M2 | OXYGEN SATURATION: 93 % | HEART RATE: 69 BPM | SYSTOLIC BLOOD PRESSURE: 162 MMHG

## 2022-01-01 VITALS
WEIGHT: 142 LBS | DIASTOLIC BLOOD PRESSURE: 52 MMHG | HEART RATE: 55 BPM | BODY MASS INDEX: 19.88 KG/M2 | HEIGHT: 71 IN | SYSTOLIC BLOOD PRESSURE: 144 MMHG | OXYGEN SATURATION: 98 %

## 2022-01-01 VITALS
OXYGEN SATURATION: 99 % | HEIGHT: 71 IN | SYSTOLIC BLOOD PRESSURE: 130 MMHG | WEIGHT: 146 LBS | BODY MASS INDEX: 20.44 KG/M2 | DIASTOLIC BLOOD PRESSURE: 62 MMHG | HEART RATE: 64 BPM | TEMPERATURE: 97.3 F

## 2022-01-01 DIAGNOSIS — I48.0 PAROXYSMAL ATRIAL FIBRILLATION: ICD-10-CM

## 2022-01-01 DIAGNOSIS — E87.1 HYPONATREMIA: Primary | ICD-10-CM

## 2022-01-01 DIAGNOSIS — N30.00 ACUTE CYSTITIS WITHOUT HEMATURIA: Primary | ICD-10-CM

## 2022-01-01 DIAGNOSIS — C02.9 CANCER OF TONGUE: ICD-10-CM

## 2022-01-01 DIAGNOSIS — E03.8 OTHER SPECIFIED HYPOTHYROIDISM: ICD-10-CM

## 2022-01-01 DIAGNOSIS — I67.9 CVD (CEREBROVASCULAR DISEASE): ICD-10-CM

## 2022-01-01 DIAGNOSIS — M27.8 JAW MASS: ICD-10-CM

## 2022-01-01 DIAGNOSIS — K14.8 TONGUE LESION: ICD-10-CM

## 2022-01-01 DIAGNOSIS — C02.9 MALIGNANT NEOPLASM OF TONGUE, UNSPECIFIED: Primary | ICD-10-CM

## 2022-01-01 DIAGNOSIS — I48.20 CHRONIC ATRIAL FIBRILLATION WITH RVR: Primary | ICD-10-CM

## 2022-01-01 DIAGNOSIS — N30.00 ACUTE CYSTITIS WITHOUT HEMATURIA: ICD-10-CM

## 2022-01-01 DIAGNOSIS — I48.20 CHRONIC ATRIAL FIBRILLATION WITH RVR: ICD-10-CM

## 2022-01-01 DIAGNOSIS — E03.9 ACQUIRED HYPOTHYROIDISM: Chronic | ICD-10-CM

## 2022-01-01 DIAGNOSIS — E78.00 HYPERCHOLESTEROLEMIA: ICD-10-CM

## 2022-01-01 DIAGNOSIS — C02.9 MALIGNANT NEOPLASM OF TONGUE: Primary | ICD-10-CM

## 2022-01-01 DIAGNOSIS — Z79.01 CHRONIC ANTICOAGULATION: Chronic | ICD-10-CM

## 2022-01-01 DIAGNOSIS — R35.0 FREQUENCY OF URINATION: Primary | ICD-10-CM

## 2022-01-01 DIAGNOSIS — I10 BENIGN ESSENTIAL HYPERTENSION: Primary | ICD-10-CM

## 2022-01-01 DIAGNOSIS — E43 SEVERE MALNUTRITION: ICD-10-CM

## 2022-01-01 DIAGNOSIS — I65.21 OCCLUSION AND STENOSIS OF RIGHT CAROTID ARTERY: ICD-10-CM

## 2022-01-01 DIAGNOSIS — M27.8 JAW MASS: Primary | ICD-10-CM

## 2022-01-01 DIAGNOSIS — I50.33 ACUTE ON CHRONIC DIASTOLIC (CONGESTIVE) HEART FAILURE: ICD-10-CM

## 2022-01-01 DIAGNOSIS — D48.9 NEOPLASM, UNCERTAIN WHETHER BENIGN OR MALIGNANT: Chronic | ICD-10-CM

## 2022-01-01 DIAGNOSIS — R53.1 WEAKNESS: ICD-10-CM

## 2022-01-01 DIAGNOSIS — I10 PRIMARY HYPERTENSION: ICD-10-CM

## 2022-01-01 DIAGNOSIS — C02.9 MALIGNANT NEOPLASM OF TONGUE, UNSPECIFIED: ICD-10-CM

## 2022-01-01 DIAGNOSIS — I25.10 CORONARY ARTERY DISEASE INVOLVING NATIVE CORONARY ARTERY OF NATIVE HEART WITHOUT ANGINA PECTORIS: Primary | ICD-10-CM

## 2022-01-01 DIAGNOSIS — I10 PRIMARY HYPERTENSION: Primary | ICD-10-CM

## 2022-01-01 DIAGNOSIS — C02.9 SQUAMOUS CELL CARCINOMA OF TONGUE: Primary | ICD-10-CM

## 2022-01-01 DIAGNOSIS — E78.2 MIXED HYPERLIPIDEMIA: ICD-10-CM

## 2022-01-01 DIAGNOSIS — I50.43 ACUTE ON CHRONIC COMBINED SYSTOLIC AND DIASTOLIC CONGESTIVE HEART FAILURE: ICD-10-CM

## 2022-01-01 LAB
ALBUMIN SERPL-MCNC: 3.7 G/DL (ref 3.5–5.2)
ALBUMIN SERPL-MCNC: 4.1 G/DL (ref 3.5–5.2)
ALBUMIN/GLOB SERPL: 1.5 G/DL
ALBUMIN/GLOB SERPL: 2 G/DL
ALP SERPL-CCNC: 73 U/L (ref 39–117)
ALP SERPL-CCNC: 84 U/L (ref 39–117)
ALT SERPL W P-5'-P-CCNC: 10 U/L (ref 1–41)
ALT SERPL W P-5'-P-CCNC: 7 U/L (ref 1–41)
ANION GAP SERPL CALCULATED.3IONS-SCNC: 10 MMOL/L (ref 5–15)
ANION GAP SERPL CALCULATED.3IONS-SCNC: 10.2 MMOL/L (ref 5–15)
ANION GAP SERPL CALCULATED.3IONS-SCNC: 10.3 MMOL/L (ref 5–15)
ANION GAP SERPL CALCULATED.3IONS-SCNC: 10.7 MMOL/L (ref 5–15)
ANION GAP SERPL CALCULATED.3IONS-SCNC: 10.8 MMOL/L (ref 5–15)
ANION GAP SERPL CALCULATED.3IONS-SCNC: 10.8 MMOL/L (ref 5–15)
ANION GAP SERPL CALCULATED.3IONS-SCNC: 11.1 MMOL/L (ref 5–15)
ANION GAP SERPL CALCULATED.3IONS-SCNC: 11.1 MMOL/L (ref 5–15)
ANION GAP SERPL CALCULATED.3IONS-SCNC: 11.2 MMOL/L (ref 5–15)
ANION GAP SERPL CALCULATED.3IONS-SCNC: 12.2 MMOL/L (ref 5–15)
ANION GAP SERPL CALCULATED.3IONS-SCNC: 12.3 MMOL/L (ref 5–15)
ANION GAP SERPL CALCULATED.3IONS-SCNC: 12.5 MMOL/L (ref 5–15)
ANION GAP SERPL CALCULATED.3IONS-SCNC: 12.6 MMOL/L (ref 5–15)
ANION GAP SERPL CALCULATED.3IONS-SCNC: 13.2 MMOL/L (ref 5–15)
ANION GAP SERPL CALCULATED.3IONS-SCNC: 13.2 MMOL/L (ref 5–15)
ANION GAP SERPL CALCULATED.3IONS-SCNC: 13.3 MMOL/L (ref 5–15)
ANION GAP SERPL CALCULATED.3IONS-SCNC: 13.4 MMOL/L (ref 5–15)
ANION GAP SERPL CALCULATED.3IONS-SCNC: 13.5 MMOL/L (ref 5–15)
ANION GAP SERPL CALCULATED.3IONS-SCNC: 8.2 MMOL/L (ref 5–15)
ANION GAP SERPL CALCULATED.3IONS-SCNC: 9 MMOL/L (ref 5–15)
ANION GAP SERPL CALCULATED.3IONS-SCNC: 9.1 MMOL/L (ref 5–15)
AST SERPL-CCNC: 16 U/L (ref 1–40)
AST SERPL-CCNC: 19 U/L (ref 1–40)
BACTERIA SPEC AEROBE CULT: ABNORMAL
BACTERIA UR CULT: ABNORMAL
BACTERIA UR CULT: ABNORMAL
BACTERIA UR QL AUTO: ABNORMAL /HPF
BASOPHILS # BLD AUTO: 0.06 10*3/MM3 (ref 0–0.2)
BASOPHILS # BLD AUTO: 0.07 10*3/MM3 (ref 0–0.2)
BASOPHILS # BLD AUTO: 0.08 10*3/MM3 (ref 0–0.2)
BASOPHILS # BLD AUTO: 0.1 10*3/MM3 (ref 0–0.2)
BASOPHILS # BLD AUTO: 0.1 X10E3/UL (ref 0–0.2)
BASOPHILS NFR BLD AUTO: 0.8 % (ref 0–1.5)
BASOPHILS NFR BLD AUTO: 0.9 % (ref 0–1.5)
BASOPHILS NFR BLD AUTO: 1 %
BASOPHILS NFR BLD AUTO: 1 % (ref 0–1.5)
BASOPHILS NFR BLD AUTO: 1.1 % (ref 0–1.5)
BASOPHILS NFR BLD AUTO: 1.1 % (ref 0–1.5)
BASOPHILS NFR BLD AUTO: 1.6 % (ref 0–1.5)
BILIRUB BLD-MCNC: NEGATIVE MG/DL
BILIRUB SERPL-MCNC: 0.3 MG/DL (ref 0–1.2)
BILIRUB SERPL-MCNC: 0.4 MG/DL (ref 0–1.2)
BILIRUB UR QL STRIP: NEGATIVE
BNP SERPL-MCNC: 209 PG/ML (ref 0–100)
BUN SERPL-MCNC: 13 MG/DL (ref 8–23)
BUN SERPL-MCNC: 14 MG/DL (ref 8–23)
BUN SERPL-MCNC: 16 MG/DL (ref 8–23)
BUN SERPL-MCNC: 17 MG/DL (ref 8–23)
BUN SERPL-MCNC: 17 MG/DL (ref 8–23)
BUN SERPL-MCNC: 18 MG/DL (ref 8–23)
BUN SERPL-MCNC: 18 MG/DL (ref 8–23)
BUN SERPL-MCNC: 19 MG/DL (ref 8–23)
BUN SERPL-MCNC: 20 MG/DL (ref 8–27)
BUN SERPL-MCNC: 21 MG/DL (ref 8–23)
BUN SERPL-MCNC: 21 MG/DL (ref 8–23)
BUN SERPL-MCNC: 22 MG/DL (ref 8–23)
BUN SERPL-MCNC: 23 MG/DL (ref 8–23)
BUN SERPL-MCNC: 23 MG/DL (ref 8–23)
BUN SERPL-MCNC: 24 MG/DL (ref 8–23)
BUN SERPL-MCNC: 25 MG/DL (ref 8–23)
BUN/CREAT SERPL: 12.4 (ref 7–25)
BUN/CREAT SERPL: 15.9 (ref 7–25)
BUN/CREAT SERPL: 16.3 (ref 7–25)
BUN/CREAT SERPL: 16.5 (ref 7–25)
BUN/CREAT SERPL: 16.7 (ref 7–25)
BUN/CREAT SERPL: 17.8 (ref 7–25)
BUN/CREAT SERPL: 18.1 (ref 7–25)
BUN/CREAT SERPL: 18.3 (ref 7–25)
BUN/CREAT SERPL: 20.4 (ref 7–25)
BUN/CREAT SERPL: 22 (ref 10–24)
BUN/CREAT SERPL: 22.1 (ref 7–25)
BUN/CREAT SERPL: 22.5 (ref 7–25)
BUN/CREAT SERPL: 23.1 (ref 7–25)
BUN/CREAT SERPL: 23.5 (ref 7–25)
BUN/CREAT SERPL: 23.5 (ref 7–25)
BUN/CREAT SERPL: 24.2 (ref 7–25)
BUN/CREAT SERPL: 25 (ref 7–25)
BUN/CREAT SERPL: 25.3 (ref 7–25)
BUN/CREAT SERPL: 25.3 (ref 7–25)
BUN/CREAT SERPL: 26.2 (ref 7–25)
BUN/CREAT SERPL: 27.1 (ref 7–25)
CALCIUM SERPL-MCNC: 9 MG/DL (ref 8.6–10.2)
CALCIUM SERPL-MCNC: 9.3 MG/DL (ref 8.6–10.5)
CALCIUM SPEC-SCNC: 8 MG/DL (ref 8.6–10.5)
CALCIUM SPEC-SCNC: 8.1 MG/DL (ref 8.6–10.5)
CALCIUM SPEC-SCNC: 8.2 MG/DL (ref 8.6–10.5)
CALCIUM SPEC-SCNC: 8.2 MG/DL (ref 8.6–10.5)
CALCIUM SPEC-SCNC: 8.3 MG/DL (ref 8.6–10.5)
CALCIUM SPEC-SCNC: 8.4 MG/DL (ref 8.6–10.5)
CALCIUM SPEC-SCNC: 8.5 MG/DL (ref 8.6–10.5)
CALCIUM SPEC-SCNC: 8.5 MG/DL (ref 8.6–10.5)
CALCIUM SPEC-SCNC: 8.6 MG/DL (ref 8.6–10.5)
CALCIUM SPEC-SCNC: 8.7 MG/DL (ref 8.6–10.5)
CALCIUM SPEC-SCNC: 8.8 MG/DL (ref 8.6–10.5)
CALCIUM SPEC-SCNC: 8.8 MG/DL (ref 8.6–10.5)
CALCIUM SPEC-SCNC: 8.9 MG/DL (ref 8.6–10.5)
CALCIUM SPEC-SCNC: 9 MG/DL (ref 8.6–10.5)
CHLORIDE SERPL-SCNC: 75 MMOL/L (ref 98–107)
CHLORIDE SERPL-SCNC: 79 MMOL/L (ref 98–107)
CHLORIDE SERPL-SCNC: 79 MMOL/L (ref 98–107)
CHLORIDE SERPL-SCNC: 80 MMOL/L (ref 98–107)
CHLORIDE SERPL-SCNC: 80 MMOL/L (ref 98–107)
CHLORIDE SERPL-SCNC: 81 MMOL/L (ref 98–107)
CHLORIDE SERPL-SCNC: 82 MMOL/L (ref 98–107)
CHLORIDE SERPL-SCNC: 83 MMOL/L (ref 98–107)
CHLORIDE SERPL-SCNC: 84 MMOL/L (ref 98–107)
CHLORIDE SERPL-SCNC: 84 MMOL/L (ref 98–107)
CHLORIDE SERPL-SCNC: 85 MMOL/L (ref 98–107)
CHLORIDE SERPL-SCNC: 86 MMOL/L (ref 98–107)
CHLORIDE SERPL-SCNC: 86 MMOL/L (ref 98–107)
CHLORIDE SERPL-SCNC: 87 MMOL/L (ref 96–106)
CHLORIDE SERPL-SCNC: 87 MMOL/L (ref 98–107)
CHLORIDE SERPL-SCNC: 88 MMOL/L (ref 98–107)
CHLORIDE SERPL-SCNC: 88 MMOL/L (ref 98–107)
CHLORIDE SERPL-SCNC: 89 MMOL/L (ref 98–107)
CHLORIDE SERPL-SCNC: 90 MMOL/L (ref 98–107)
CLARITY UR: ABNORMAL
CLARITY, POC: ABNORMAL
CO2 SERPL-SCNC: 21.7 MMOL/L (ref 22–29)
CO2 SERPL-SCNC: 21.8 MMOL/L (ref 22–29)
CO2 SERPL-SCNC: 21.8 MMOL/L (ref 22–29)
CO2 SERPL-SCNC: 22.5 MMOL/L (ref 22–29)
CO2 SERPL-SCNC: 22.8 MMOL/L (ref 22–29)
CO2 SERPL-SCNC: 23 MMOL/L (ref 22–29)
CO2 SERPL-SCNC: 23.4 MMOL/L (ref 22–29)
CO2 SERPL-SCNC: 23.9 MMOL/L (ref 22–29)
CO2 SERPL-SCNC: 23.9 MMOL/L (ref 22–29)
CO2 SERPL-SCNC: 24.7 MMOL/L (ref 22–29)
CO2 SERPL-SCNC: 24.8 MMOL/L (ref 22–29)
CO2 SERPL-SCNC: 24.9 MMOL/L (ref 22–29)
CO2 SERPL-SCNC: 25 MMOL/L (ref 20–29)
CO2 SERPL-SCNC: 25.2 MMOL/L (ref 22–29)
CO2 SERPL-SCNC: 25.2 MMOL/L (ref 22–29)
CO2 SERPL-SCNC: 25.5 MMOL/L (ref 22–29)
CO2 SERPL-SCNC: 25.6 MMOL/L (ref 22–29)
CO2 SERPL-SCNC: 26.7 MMOL/L (ref 22–29)
CO2 SERPL-SCNC: 27.3 MMOL/L (ref 22–29)
CO2 SERPL-SCNC: 28.8 MMOL/L (ref 22–29)
CO2 SERPL-SCNC: 29 MMOL/L (ref 22–29)
CO2 SERPL-SCNC: 29.2 MMOL/L (ref 22–29)
CO2 SERPL-SCNC: 31.8 MMOL/L (ref 22–29)
COLOR UR: YELLOW
COLOR UR: YELLOW
CREAT SERPL-MCNC: 0.8 MG/DL (ref 0.76–1.27)
CREAT SERPL-MCNC: 0.81 MG/DL (ref 0.76–1.27)
CREAT SERPL-MCNC: 0.81 MG/DL (ref 0.76–1.27)
CREAT SERPL-MCNC: 0.84 MG/DL (ref 0.76–1.27)
CREAT SERPL-MCNC: 0.85 MG/DL (ref 0.76–1.27)
CREAT SERPL-MCNC: 0.86 MG/DL (ref 0.76–1.27)
CREAT SERPL-MCNC: 0.91 MG/DL (ref 0.76–1.27)
CREAT SERPL-MCNC: 0.92 MG/DL (ref 0.76–1.27)
CREAT SERPL-MCNC: 0.93 MG/DL (ref 0.76–1.27)
CREAT SERPL-MCNC: 0.95 MG/DL (ref 0.76–1.27)
CREAT SERPL-MCNC: 0.95 MG/DL (ref 0.76–1.27)
CREAT SERPL-MCNC: 0.97 MG/DL (ref 0.76–1.27)
CREAT SERPL-MCNC: 0.99 MG/DL (ref 0.76–1.27)
CREAT SERPL-MCNC: 1.01 MG/DL (ref 0.76–1.27)
CREAT SERPL-MCNC: 1.04 MG/DL (ref 0.76–1.27)
CREAT SERPL-MCNC: 1.04 MG/DL (ref 0.76–1.27)
CREAT SERPL-MCNC: 1.05 MG/DL (ref 0.76–1.27)
CREAT SERPL-MCNC: 1.05 MG/DL (ref 0.76–1.27)
CREAT SERPL-MCNC: 1.07 MG/DL (ref 0.76–1.27)
DEPRECATED RDW RBC AUTO: 41 FL (ref 37–54)
DEPRECATED RDW RBC AUTO: 41.5 FL (ref 37–54)
DEPRECATED RDW RBC AUTO: 45.1 FL (ref 37–54)
DEPRECATED RDW RBC AUTO: 45.1 FL (ref 37–54)
DEPRECATED RDW RBC AUTO: 45.8 FL (ref 37–54)
EGFRCR SERPLBLD CKD-EPI 2021: 66.7 ML/MIN/1.73
EGFRCR SERPLBLD CKD-EPI 2021: 68.3 ML/MIN/1.73
EGFRCR SERPLBLD CKD-EPI 2021: 68.3 ML/MIN/1.73
EGFRCR SERPLBLD CKD-EPI 2021: 69.1 ML/MIN/1.73
EGFRCR SERPLBLD CKD-EPI 2021: 69.1 ML/MIN/1.73
EGFRCR SERPLBLD CKD-EPI 2021: 71.5 ML/MIN/1.73
EGFRCR SERPLBLD CKD-EPI 2021: 73.3 ML/MIN/1.73
EGFRCR SERPLBLD CKD-EPI 2021: 75.1 ML/MIN/1.73
EGFRCR SERPLBLD CKD-EPI 2021: 77 ML/MIN/1.73
EGFRCR SERPLBLD CKD-EPI 2021: 77 ML/MIN/1.73
EGFRCR SERPLBLD CKD-EPI 2021: 79 ML/MIN/1.73
EGFRCR SERPLBLD CKD-EPI 2021: 81.1 ML/MIN/1.73
EGFRCR SERPLBLD CKD-EPI 2021: 83.3 ML/MIN/1.73
EGFRCR SERPLBLD CKD-EPI 2021: 83.6 ML/MIN/1.73
EGFRCR SERPLBLD CKD-EPI 2021: 83.9 ML/MIN/1.73
EGFRCR SERPLBLD CKD-EPI 2021: 84.8 ML/MIN/1.73
EGFRCR SERPLBLD CKD-EPI 2021: 84.8 ML/MIN/1.73
EGFRCR SERPLBLD CKD-EPI 2021: 85.1 ML/MIN/1.73
EOSINOPHIL # BLD AUTO: 0.41 10*3/MM3 (ref 0–0.4)
EOSINOPHIL # BLD AUTO: 0.44 10*3/MM3 (ref 0–0.4)
EOSINOPHIL # BLD AUTO: 0.44 10*3/MM3 (ref 0–0.4)
EOSINOPHIL # BLD AUTO: 0.62 10*3/MM3 (ref 0–0.4)
EOSINOPHIL # BLD AUTO: 0.78 10*3/MM3 (ref 0–0.4)
EOSINOPHIL # BLD AUTO: 0.84 10*3/MM3 (ref 0–0.4)
EOSINOPHIL # BLD AUTO: 1.1 X10E3/UL (ref 0–0.4)
EOSINOPHIL NFR BLD AUTO: 11.1 % (ref 0.3–6.2)
EOSINOPHIL NFR BLD AUTO: 12.2 % (ref 0.3–6.2)
EOSINOPHIL NFR BLD AUTO: 16 %
EOSINOPHIL NFR BLD AUTO: 6.1 % (ref 0.3–6.2)
EOSINOPHIL NFR BLD AUTO: 6.8 % (ref 0.3–6.2)
EOSINOPHIL NFR BLD AUTO: 7.3 % (ref 0.3–6.2)
EOSINOPHIL NFR BLD AUTO: 8.3 % (ref 0.3–6.2)
ERYTHROCYTE [DISTWIDTH] IN BLOOD BY AUTOMATED COUNT: 13.1 % (ref 12.3–15.4)
ERYTHROCYTE [DISTWIDTH] IN BLOOD BY AUTOMATED COUNT: 13.1 % (ref 12.3–15.4)
ERYTHROCYTE [DISTWIDTH] IN BLOOD BY AUTOMATED COUNT: 13.5 % (ref 12.3–15.4)
ERYTHROCYTE [DISTWIDTH] IN BLOOD BY AUTOMATED COUNT: 13.6 % (ref 12.3–15.4)
ERYTHROCYTE [DISTWIDTH] IN BLOOD BY AUTOMATED COUNT: 13.6 % (ref 12.3–15.4)
ERYTHROCYTE [DISTWIDTH] IN BLOOD BY AUTOMATED COUNT: 14.3 % (ref 12.3–15.4)
ERYTHROCYTE [DISTWIDTH] IN BLOOD BY AUTOMATED COUNT: 15.5 % (ref 11.6–15.4)
EXPIRATION DATE: ABNORMAL
GENTAMICIN PEAK SERPL-MCNC: 6 MCG/ML (ref 5–10)
GENTAMICIN TROUGH SERPL-MCNC: 2 MCG/ML (ref 0.5–2)
GLOBULIN UR ELPH-MCNC: 2.1 GM/DL
GLOBULIN UR ELPH-MCNC: 2.4 GM/DL
GLUCOSE SERPL-MCNC: 100 MG/DL (ref 65–99)
GLUCOSE SERPL-MCNC: 103 MG/DL (ref 65–99)
GLUCOSE SERPL-MCNC: 105 MG/DL (ref 65–99)
GLUCOSE SERPL-MCNC: 106 MG/DL (ref 65–99)
GLUCOSE SERPL-MCNC: 110 MG/DL (ref 65–99)
GLUCOSE SERPL-MCNC: 112 MG/DL (ref 65–99)
GLUCOSE SERPL-MCNC: 113 MG/DL (ref 65–99)
GLUCOSE SERPL-MCNC: 115 MG/DL (ref 65–99)
GLUCOSE SERPL-MCNC: 119 MG/DL (ref 65–99)
GLUCOSE SERPL-MCNC: 131 MG/DL (ref 65–99)
GLUCOSE SERPL-MCNC: 159 MG/DL (ref 65–99)
GLUCOSE SERPL-MCNC: 179 MG/DL (ref 65–99)
GLUCOSE SERPL-MCNC: 87 MG/DL (ref 65–99)
GLUCOSE SERPL-MCNC: 91 MG/DL (ref 65–99)
GLUCOSE SERPL-MCNC: 92 MG/DL (ref 65–99)
GLUCOSE SERPL-MCNC: 93 MG/DL (ref 65–99)
GLUCOSE SERPL-MCNC: 93 MG/DL (ref 65–99)
GLUCOSE SERPL-MCNC: 94 MG/DL (ref 65–99)
GLUCOSE SERPL-MCNC: 95 MG/DL (ref 65–99)
GLUCOSE SERPL-MCNC: 97 MG/DL (ref 65–99)
GLUCOSE SERPL-MCNC: 98 MG/DL (ref 65–99)
GLUCOSE UR STRIP-MCNC: NEGATIVE MG/DL
GLUCOSE UR STRIP-MCNC: NEGATIVE MG/DL
HCT VFR BLD AUTO: 26.1 % (ref 37.5–51)
HCT VFR BLD AUTO: 26.1 % (ref 37.5–51)
HCT VFR BLD AUTO: 26.5 % (ref 37.5–51)
HCT VFR BLD AUTO: 26.7 % (ref 37.5–51)
HCT VFR BLD AUTO: 27.1 % (ref 37.5–51)
HCT VFR BLD AUTO: 27.7 % (ref 37.5–51)
HCT VFR BLD AUTO: 28.4 % (ref 37.5–51)
HCT VFR BLD AUTO: 29.4 % (ref 37.5–51)
HCT VFR BLD AUTO: 32.2 % (ref 37.5–51)
HCT VFR BLD AUTO: 33.6 % (ref 37.5–51)
HCT VFR BLD AUTO: 36.2 % (ref 37.5–51)
HGB BLD-MCNC: 10.5 G/DL (ref 13–17.7)
HGB BLD-MCNC: 10.7 G/DL (ref 13–17.7)
HGB BLD-MCNC: 11 G/DL (ref 13–17.7)
HGB BLD-MCNC: 11.4 G/DL (ref 13–17.7)
HGB BLD-MCNC: 12.7 G/DL (ref 13–17.7)
HGB BLD-MCNC: 9.2 G/DL (ref 13–17.7)
HGB BLD-MCNC: 9.3 G/DL (ref 13–17.7)
HGB BLD-MCNC: 9.4 G/DL (ref 13–17.7)
HGB BLD-MCNC: 9.4 G/DL (ref 13–17.7)
HGB BLD-MCNC: 9.7 G/DL (ref 13–17.7)
HGB BLD-MCNC: 9.9 G/DL (ref 13–17.7)
HGB UR QL STRIP.AUTO: ABNORMAL
HOLD SPECIMEN: NORMAL
HOLD SPECIMEN: NORMAL
HYALINE CASTS UR QL AUTO: ABNORMAL /LPF
IMM GRANULOCYTES # BLD AUTO: 0 X10E3/UL (ref 0–0.1)
IMM GRANULOCYTES # BLD AUTO: 0.02 10*3/MM3 (ref 0–0.05)
IMM GRANULOCYTES # BLD AUTO: 0.03 10*3/MM3 (ref 0–0.05)
IMM GRANULOCYTES # BLD AUTO: 0.04 10*3/MM3 (ref 0–0.05)
IMM GRANULOCYTES # BLD AUTO: 0.04 10*3/MM3 (ref 0–0.05)
IMM GRANULOCYTES NFR BLD AUTO: 0 %
IMM GRANULOCYTES NFR BLD AUTO: 0.3 % (ref 0–0.5)
IMM GRANULOCYTES NFR BLD AUTO: 0.3 % (ref 0–0.5)
IMM GRANULOCYTES NFR BLD AUTO: 0.4 % (ref 0–0.5)
IMM GRANULOCYTES NFR BLD AUTO: 0.4 % (ref 0–0.5)
IMM GRANULOCYTES NFR BLD AUTO: 0.6 % (ref 0–0.5)
IMM GRANULOCYTES NFR BLD AUTO: 0.6 % (ref 0–0.5)
KETONES UR QL STRIP: NEGATIVE
KETONES UR QL: ABNORMAL
LEUKOCYTE EST, POC: ABNORMAL
LEUKOCYTE ESTERASE UR QL STRIP.AUTO: ABNORMAL
LIPASE SERPL-CCNC: 25 U/L (ref 13–60)
LYMPHOCYTES # BLD AUTO: 0.99 10*3/MM3 (ref 0.7–3.1)
LYMPHOCYTES # BLD AUTO: 0.99 10*3/MM3 (ref 0.7–3.1)
LYMPHOCYTES # BLD AUTO: 1.02 10*3/MM3 (ref 0.7–3.1)
LYMPHOCYTES # BLD AUTO: 1.29 10*3/MM3 (ref 0.7–3.1)
LYMPHOCYTES # BLD AUTO: 1.61 10*3/MM3 (ref 0.7–3.1)
LYMPHOCYTES # BLD AUTO: 1.78 10*3/MM3 (ref 0.7–3.1)
LYMPHOCYTES # BLD AUTO: 1.9 X10E3/UL (ref 0.7–3.1)
LYMPHOCYTES NFR BLD AUTO: 14 % (ref 19.6–45.3)
LYMPHOCYTES NFR BLD AUTO: 14.4 % (ref 19.6–45.3)
LYMPHOCYTES NFR BLD AUTO: 15.8 % (ref 19.6–45.3)
LYMPHOCYTES NFR BLD AUTO: 17.8 % (ref 19.6–45.3)
LYMPHOCYTES NFR BLD AUTO: 21.7 % (ref 19.6–45.3)
LYMPHOCYTES NFR BLD AUTO: 26 %
LYMPHOCYTES NFR BLD AUTO: 31.6 % (ref 19.6–45.3)
Lab: ABNORMAL
MAGNESIUM SERPL-MCNC: 1.7 MG/DL (ref 1.6–2.4)
MCH RBC QN AUTO: 28.9 PG (ref 26.6–33)
MCH RBC QN AUTO: 31.1 PG (ref 26.6–33)
MCH RBC QN AUTO: 31.5 PG (ref 26.6–33)
MCH RBC QN AUTO: 31.8 PG (ref 26.6–33)
MCH RBC QN AUTO: 31.8 PG (ref 26.6–33)
MCH RBC QN AUTO: 32.1 PG (ref 26.6–33)
MCH RBC QN AUTO: 32.1 PG (ref 26.6–33)
MCHC RBC AUTO-ENTMCNC: 32.7 G/DL (ref 31.5–35.7)
MCHC RBC AUTO-ENTMCNC: 34.7 G/DL (ref 31.5–35.7)
MCHC RBC AUTO-ENTMCNC: 35.1 G/DL (ref 31.5–35.7)
MCHC RBC AUTO-ENTMCNC: 35.2 G/DL (ref 31.5–35.7)
MCHC RBC AUTO-ENTMCNC: 35.4 G/DL (ref 31.5–35.7)
MCHC RBC AUTO-ENTMCNC: 36.4 G/DL (ref 31.5–35.7)
MCHC RBC AUTO-ENTMCNC: 37 G/DL (ref 31.5–35.7)
MCV RBC AUTO: 86.5 FL (ref 79–97)
MCV RBC AUTO: 86.9 FL (ref 79–97)
MCV RBC AUTO: 88 FL (ref 79–97)
MCV RBC AUTO: 88.5 FL (ref 79–97)
MCV RBC AUTO: 89.9 FL (ref 79–97)
MCV RBC AUTO: 91.1 FL (ref 79–97)
MCV RBC AUTO: 91.6 FL (ref 79–97)
MONOCYTES # BLD AUTO: 0.51 10*3/MM3 (ref 0.1–0.9)
MONOCYTES # BLD AUTO: 0.59 10*3/MM3 (ref 0.1–0.9)
MONOCYTES # BLD AUTO: 0.64 10*3/MM3 (ref 0.1–0.9)
MONOCYTES # BLD AUTO: 0.68 10*3/MM3 (ref 0.1–0.9)
MONOCYTES # BLD AUTO: 0.72 10*3/MM3 (ref 0.1–0.9)
MONOCYTES # BLD AUTO: 0.74 10*3/MM3 (ref 0.1–0.9)
MONOCYTES # BLD AUTO: 0.8 X10E3/UL (ref 0.1–0.9)
MONOCYTES NFR BLD AUTO: 10.2 % (ref 5–12)
MONOCYTES NFR BLD AUTO: 11 %
MONOCYTES NFR BLD AUTO: 8.6 % (ref 5–12)
MONOCYTES NFR BLD AUTO: 9.1 % (ref 5–12)
MONOCYTES NFR BLD AUTO: 9.6 % (ref 5–12)
MONOCYTES NFR BLD AUTO: 9.7 % (ref 5–12)
MONOCYTES NFR BLD AUTO: 9.9 % (ref 5–12)
NEUTROPHILS # BLD AUTO: 2.85 10*3/MM3 (ref 1.7–7)
NEUTROPHILS # BLD AUTO: 3.2 X10E3/UL (ref 1.4–7)
NEUTROPHILS NFR BLD AUTO: 4.2 10*3/MM3 (ref 1.7–7)
NEUTROPHILS NFR BLD AUTO: 4.33 10*3/MM3 (ref 1.7–7)
NEUTROPHILS NFR BLD AUTO: 4.38 10*3/MM3 (ref 1.7–7)
NEUTROPHILS NFR BLD AUTO: 4.52 10*3/MM3 (ref 1.7–7)
NEUTROPHILS NFR BLD AUTO: 4.7 10*3/MM3 (ref 1.7–7)
NEUTROPHILS NFR BLD AUTO: 46 %
NEUTROPHILS NFR BLD AUTO: 50.5 % (ref 42.7–76)
NEUTROPHILS NFR BLD AUTO: 58.9 % (ref 42.7–76)
NEUTROPHILS NFR BLD AUTO: 63.2 % (ref 42.7–76)
NEUTROPHILS NFR BLD AUTO: 64.1 % (ref 42.7–76)
NEUTROPHILS NFR BLD AUTO: 64.7 % (ref 42.7–76)
NEUTROPHILS NFR BLD AUTO: 65.3 % (ref 42.7–76)
NITRITE UR QL STRIP: NEGATIVE
NITRITE UR-MCNC: NEGATIVE MG/ML
NRBC BLD AUTO-RTO: 0 /100 WBC (ref 0–0.2)
NT-PROBNP SERPL-MCNC: 2230 PG/ML (ref 0–1800)
OSMOLALITY SERPL: 240 MOSM/KG (ref 280–301)
OSMOLALITY UR: 255 MOSM/KG
OTHER ANTIBIOTIC SUSC ISLT: ABNORMAL
PH UR STRIP.AUTO: 7.5 [PH] (ref 5–8)
PH UR: 7 [PH] (ref 5–8)
PLATELET # BLD AUTO: 205 10*3/MM3 (ref 140–450)
PLATELET # BLD AUTO: 221 10*3/MM3 (ref 140–450)
PLATELET # BLD AUTO: 234 10*3/MM3 (ref 140–450)
PLATELET # BLD AUTO: 240 10*3/MM3 (ref 140–450)
PLATELET # BLD AUTO: 263 10*3/MM3 (ref 140–450)
PLATELET # BLD AUTO: 265 X10E3/UL (ref 150–450)
PLATELET # BLD AUTO: 276 10*3/MM3 (ref 140–450)
PMV BLD AUTO: 9.2 FL (ref 6–12)
PMV BLD AUTO: 9.4 FL (ref 6–12)
PMV BLD AUTO: 9.4 FL (ref 6–12)
PMV BLD AUTO: 9.6 FL (ref 6–12)
PMV BLD AUTO: 9.8 FL (ref 6–12)
POTASSIUM SERPL-SCNC: 3.8 MMOL/L (ref 3.5–5.2)
POTASSIUM SERPL-SCNC: 3.8 MMOL/L (ref 3.5–5.2)
POTASSIUM SERPL-SCNC: 3.9 MMOL/L (ref 3.5–5.2)
POTASSIUM SERPL-SCNC: 4 MMOL/L (ref 3.5–5.2)
POTASSIUM SERPL-SCNC: 4.1 MMOL/L (ref 3.5–5.2)
POTASSIUM SERPL-SCNC: 4.1 MMOL/L (ref 3.5–5.2)
POTASSIUM SERPL-SCNC: 4.2 MMOL/L (ref 3.5–5.2)
POTASSIUM SERPL-SCNC: 4.2 MMOL/L (ref 3.5–5.2)
POTASSIUM SERPL-SCNC: 4.3 MMOL/L (ref 3.5–5.2)
POTASSIUM SERPL-SCNC: 4.3 MMOL/L (ref 3.5–5.2)
POTASSIUM SERPL-SCNC: 4.4 MMOL/L (ref 3.5–5.2)
POTASSIUM SERPL-SCNC: 4.5 MMOL/L (ref 3.5–5.2)
POTASSIUM SERPL-SCNC: 4.5 MMOL/L (ref 3.5–5.2)
POTASSIUM SERPL-SCNC: 4.6 MMOL/L (ref 3.5–5.2)
POTASSIUM SERPL-SCNC: 4.6 MMOL/L (ref 3.5–5.2)
POTASSIUM SERPL-SCNC: 4.7 MMOL/L (ref 3.5–5.2)
POTASSIUM SERPL-SCNC: 5.1 MMOL/L (ref 3.5–5.2)
PROT SERPL-MCNC: 6.1 G/DL (ref 6–8.5)
PROT SERPL-MCNC: 6.2 G/DL (ref 6–8.5)
PROT UR QL STRIP: NEGATIVE
PROT UR STRIP-MCNC: ABNORMAL MG/DL
RBC # BLD AUTO: 2.93 10*6/MM3 (ref 4.14–5.8)
RBC # BLD AUTO: 2.96 10*6/MM3 (ref 4.14–5.8)
RBC # BLD AUTO: 3.27 10*6/MM3 (ref 4.14–5.8)
RBC # BLD AUTO: 3.4 10*6/MM3 (ref 4.14–5.8)
RBC # BLD AUTO: 3.58 10*6/MM3 (ref 4.14–5.8)
RBC # BLD AUTO: 3.81 X10E6/UL (ref 4.14–5.8)
RBC # BLD AUTO: 4.09 10*6/MM3 (ref 4.14–5.8)
RBC # UR STRIP: ABNORMAL /HPF
RBC # UR STRIP: ABNORMAL /UL
REF LAB TEST METHOD: ABNORMAL
SARS-COV-2 RNA PNL SPEC NAA+PROBE: NOT DETECTED
SARS-COV-2 RNA PNL SPEC NAA+PROBE: NOT DETECTED
SODIUM SERPL-SCNC: 111 MMOL/L (ref 136–145)
SODIUM SERPL-SCNC: 113 MMOL/L (ref 136–145)
SODIUM SERPL-SCNC: 114 MMOL/L (ref 136–145)
SODIUM SERPL-SCNC: 114 MMOL/L (ref 136–145)
SODIUM SERPL-SCNC: 115 MMOL/L (ref 136–145)
SODIUM SERPL-SCNC: 118 MMOL/L (ref 136–145)
SODIUM SERPL-SCNC: 119 MMOL/L (ref 136–145)
SODIUM SERPL-SCNC: 121 MMOL/L (ref 136–145)
SODIUM SERPL-SCNC: 122 MMOL/L (ref 136–145)
SODIUM SERPL-SCNC: 124 MMOL/L (ref 136–145)
SODIUM SERPL-SCNC: 124 MMOL/L (ref 136–145)
SODIUM SERPL-SCNC: 125 MMOL/L (ref 134–144)
SODIUM SERPL-SCNC: 125 MMOL/L (ref 136–145)
SODIUM SERPL-SCNC: 126 MMOL/L (ref 136–145)
SODIUM SERPL-SCNC: 128 MMOL/L (ref 136–145)
SODIUM SERPL-SCNC: 128 MMOL/L (ref 136–145)
SODIUM SERPL-SCNC: 130 MMOL/L (ref 136–145)
SODIUM UR-SCNC: 65 MMOL/L
SP GR UR STRIP: 1.01 (ref 1–1.03)
SP GR UR: 1.01 (ref 1–1.03)
SQUAMOUS #/AREA URNS HPF: ABNORMAL /HPF
T4 FREE SERPL-MCNC: 1.61 NG/DL (ref 0.93–1.7)
T4 FREE SERPL-MCNC: 1.88 NG/DL (ref 0.93–1.7)
T4 FREE SERPL-MCNC: 1.93 NG/DL (ref 0.82–1.77)
TSH SERPL DL<=0.005 MIU/L-ACNC: 4.56 UIU/ML (ref 0.45–4.5)
TSH SERPL DL<=0.005 MIU/L-ACNC: 8.58 UIU/ML (ref 0.27–4.2)
TSH SERPL DL<=0.05 MIU/L-ACNC: 9.51 UIU/ML (ref 0.27–4.2)
URATE SERPL-MCNC: 3.2 MG/DL (ref 3.4–7)
UROBILINOGEN UR QL STRIP: ABNORMAL
UROBILINOGEN UR QL: NORMAL
VIT B12 BLD-MCNC: 598 PG/ML (ref 211–946)
WBC # BLD AUTO: 5.63 10*3/MM3 (ref 3.4–10.8)
WBC # BLD AUTO: 7.1 X10E3/UL (ref 3.4–10.8)
WBC # UR STRIP: ABNORMAL /HPF
WBC NRBC COR # BLD: 6.44 10*3/MM3 (ref 3.4–10.8)
WBC NRBC COR # BLD: 6.86 10*3/MM3 (ref 3.4–10.8)
WBC NRBC COR # BLD: 7.05 10*3/MM3 (ref 3.4–10.8)
WBC NRBC COR # BLD: 7.26 10*3/MM3 (ref 3.4–10.8)
WBC NRBC COR # BLD: 7.43 10*3/MM3 (ref 3.4–10.8)
WHOLE BLOOD HOLD COAG: NORMAL
WHOLE BLOOD HOLD SPECIMEN: NORMAL

## 2022-01-01 PROCEDURE — 25010000002 MORPHINE PER 10 MG: Performed by: INTERNAL MEDICINE

## 2022-01-01 PROCEDURE — 99232 SBSQ HOSP IP/OBS MODERATE 35: CPT | Performed by: STUDENT IN AN ORGANIZED HEALTH CARE EDUCATION/TRAINING PROGRAM

## 2022-01-01 PROCEDURE — 70110 X-RAY EXAM OF JAW 4/> VIEWS: CPT

## 2022-01-01 PROCEDURE — 25010000002 FUROSEMIDE PER 20 MG: Performed by: STUDENT IN AN ORGANIZED HEALTH CARE EDUCATION/TRAINING PROGRAM

## 2022-01-01 PROCEDURE — 99222 1ST HOSP IP/OBS MODERATE 55: CPT | Performed by: PHYSICIAN ASSISTANT

## 2022-01-01 PROCEDURE — 80048 BASIC METABOLIC PNL TOTAL CA: CPT | Performed by: INTERNAL MEDICINE

## 2022-01-01 PROCEDURE — 97116 GAIT TRAINING THERAPY: CPT

## 2022-01-01 PROCEDURE — 99233 SBSQ HOSP IP/OBS HIGH 50: CPT | Performed by: STUDENT IN AN ORGANIZED HEALTH CARE EDUCATION/TRAINING PROGRAM

## 2022-01-01 PROCEDURE — 36415 COLL VENOUS BLD VENIPUNCTURE: CPT

## 2022-01-01 PROCEDURE — 83690 ASSAY OF LIPASE: CPT | Performed by: PHYSICIAN ASSISTANT

## 2022-01-01 PROCEDURE — 74230 X-RAY XM SWLNG FUNCJ C+: CPT

## 2022-01-01 PROCEDURE — 25010000002 FUROSEMIDE PER 20 MG: Performed by: INTERNAL MEDICINE

## 2022-01-01 PROCEDURE — 25010000002 PROCHLORPERAZINE 10 MG/2ML SOLUTION: Performed by: STUDENT IN AN ORGANIZED HEALTH CARE EDUCATION/TRAINING PROGRAM

## 2022-01-01 PROCEDURE — 80170 ASSAY OF GENTAMICIN: CPT

## 2022-01-01 PROCEDURE — 82607 VITAMIN B-12: CPT

## 2022-01-01 PROCEDURE — 87635 SARS-COV-2 COVID-19 AMP PRB: CPT | Performed by: FAMILY MEDICINE

## 2022-01-01 PROCEDURE — 87635 SARS-COV-2 COVID-19 AMP PRB: CPT | Performed by: STUDENT IN AN ORGANIZED HEALTH CARE EDUCATION/TRAINING PROGRAM

## 2022-01-01 PROCEDURE — 84295 ASSAY OF SERUM SODIUM: CPT | Performed by: STUDENT IN AN ORGANIZED HEALTH CARE EDUCATION/TRAINING PROGRAM

## 2022-01-01 PROCEDURE — 99214 OFFICE O/P EST MOD 30 MIN: CPT | Performed by: INTERNAL MEDICINE

## 2022-01-01 PROCEDURE — 71260 CT THORAX DX C+: CPT

## 2022-01-01 PROCEDURE — 85014 HEMATOCRIT: CPT | Performed by: FAMILY MEDICINE

## 2022-01-01 PROCEDURE — 84300 ASSAY OF URINE SODIUM: CPT | Performed by: STUDENT IN AN ORGANIZED HEALTH CARE EDUCATION/TRAINING PROGRAM

## 2022-01-01 PROCEDURE — 93296 REM INTERROG EVL PM/IDS: CPT | Performed by: STUDENT IN AN ORGANIZED HEALTH CARE EDUCATION/TRAINING PROGRAM

## 2022-01-01 PROCEDURE — 63710000001 PROMETHAZINE PER 12.5 MG: Performed by: STUDENT IN AN ORGANIZED HEALTH CARE EDUCATION/TRAINING PROGRAM

## 2022-01-01 PROCEDURE — 97165 OT EVAL LOW COMPLEX 30 MIN: CPT

## 2022-01-01 PROCEDURE — 87186 SC STD MICRODIL/AGAR DIL: CPT | Performed by: PHYSICIAN ASSISTANT

## 2022-01-01 PROCEDURE — 25010000002 LORAZEPAM PER 2 MG: Performed by: UROLOGY

## 2022-01-01 PROCEDURE — 81003 URINALYSIS AUTO W/O SCOPE: CPT | Performed by: INTERNAL MEDICINE

## 2022-01-01 PROCEDURE — 97530 THERAPEUTIC ACTIVITIES: CPT

## 2022-01-01 PROCEDURE — 83930 ASSAY OF BLOOD OSMOLALITY: CPT | Performed by: STUDENT IN AN ORGANIZED HEALTH CARE EDUCATION/TRAINING PROGRAM

## 2022-01-01 PROCEDURE — 97161 PT EVAL LOW COMPLEX 20 MIN: CPT

## 2022-01-01 PROCEDURE — 99232 SBSQ HOSP IP/OBS MODERATE 35: CPT | Performed by: FAMILY MEDICINE

## 2022-01-01 PROCEDURE — 99213 OFFICE O/P EST LOW 20 MIN: CPT | Performed by: PHYSICIAN ASSISTANT

## 2022-01-01 PROCEDURE — 99239 HOSP IP/OBS DSCHRG MGMT >30: CPT | Performed by: STUDENT IN AN ORGANIZED HEALTH CARE EDUCATION/TRAINING PROGRAM

## 2022-01-01 PROCEDURE — 84443 ASSAY THYROID STIM HORMONE: CPT

## 2022-01-01 PROCEDURE — 99497 ADVNCD CARE PLAN 30 MIN: CPT | Performed by: INTERNAL MEDICINE

## 2022-01-01 PROCEDURE — 25010000002 ONDANSETRON PER 1 MG: Performed by: PHYSICIAN ASSISTANT

## 2022-01-01 PROCEDURE — 25010000002 GENTAMICIN PER 80 MG: Performed by: PHYSICIAN ASSISTANT

## 2022-01-01 PROCEDURE — 80048 BASIC METABOLIC PNL TOTAL CA: CPT | Performed by: STUDENT IN AN ORGANIZED HEALTH CARE EDUCATION/TRAINING PROGRAM

## 2022-01-01 PROCEDURE — 83880 ASSAY OF NATRIURETIC PEPTIDE: CPT | Performed by: STUDENT IN AN ORGANIZED HEALTH CARE EDUCATION/TRAINING PROGRAM

## 2022-01-01 PROCEDURE — 93000 ELECTROCARDIOGRAM COMPLETE: CPT | Performed by: PHYSICIAN ASSISTANT

## 2022-01-01 PROCEDURE — 93294 REM INTERROG EVL PM/LDLS PM: CPT | Performed by: STUDENT IN AN ORGANIZED HEALTH CARE EDUCATION/TRAINING PROGRAM

## 2022-01-01 PROCEDURE — 25010000002 HYDROMORPHONE 1 MG/ML SOLUTION: Performed by: UROLOGY

## 2022-01-01 PROCEDURE — 97110 THERAPEUTIC EXERCISES: CPT

## 2022-01-01 PROCEDURE — 74176 CT ABD & PELVIS W/O CONTRAST: CPT

## 2022-01-01 PROCEDURE — 85018 HEMOGLOBIN: CPT | Performed by: FAMILY MEDICINE

## 2022-01-01 PROCEDURE — 99498 ADVNCD CARE PLAN ADDL 30 MIN: CPT | Performed by: INTERNAL MEDICINE

## 2022-01-01 PROCEDURE — 99223 1ST HOSP IP/OBS HIGH 75: CPT | Performed by: STUDENT IN AN ORGANIZED HEALTH CARE EDUCATION/TRAINING PROGRAM

## 2022-01-01 PROCEDURE — 85025 COMPLETE CBC W/AUTO DIFF WBC: CPT | Performed by: STUDENT IN AN ORGANIZED HEALTH CARE EDUCATION/TRAINING PROGRAM

## 2022-01-01 PROCEDURE — 87086 URINE CULTURE/COLONY COUNT: CPT | Performed by: PHYSICIAN ASSISTANT

## 2022-01-01 PROCEDURE — 25010000002 GENTAMICIN PER 80 MG: Performed by: STUDENT IN AN ORGANIZED HEALTH CARE EDUCATION/TRAINING PROGRAM

## 2022-01-01 PROCEDURE — 97535 SELF CARE MNGMENT TRAINING: CPT

## 2022-01-01 PROCEDURE — 25010000002 IOPAMIDOL 61 % SOLUTION: Performed by: OTOLARYNGOLOGY

## 2022-01-01 PROCEDURE — 84550 ASSAY OF BLOOD/URIC ACID: CPT | Performed by: INTERNAL MEDICINE

## 2022-01-01 PROCEDURE — 80053 COMPREHEN METABOLIC PANEL: CPT | Performed by: PHYSICIAN ASSISTANT

## 2022-01-01 PROCEDURE — 0 IOPAMIDOL PER 1 ML: Performed by: INTERNAL MEDICINE

## 2022-01-01 PROCEDURE — 70491 CT SOFT TISSUE NECK W/DYE: CPT

## 2022-01-01 PROCEDURE — 80053 COMPREHEN METABOLIC PANEL: CPT

## 2022-01-01 PROCEDURE — 92611 MOTION FLUOROSCOPY/SWALLOW: CPT

## 2022-01-01 PROCEDURE — 71045 X-RAY EXAM CHEST 1 VIEW: CPT

## 2022-01-01 PROCEDURE — 83935 ASSAY OF URINE OSMOLALITY: CPT | Performed by: STUDENT IN AN ORGANIZED HEALTH CARE EDUCATION/TRAINING PROGRAM

## 2022-01-01 PROCEDURE — 85025 COMPLETE CBC W/AUTO DIFF WBC: CPT | Performed by: PHYSICIAN ASSISTANT

## 2022-01-01 PROCEDURE — 85025 COMPLETE CBC W/AUTO DIFF WBC: CPT

## 2022-01-01 PROCEDURE — 99284 EMERGENCY DEPT VISIT MOD MDM: CPT

## 2022-01-01 PROCEDURE — 83735 ASSAY OF MAGNESIUM: CPT | Performed by: PHYSICIAN ASSISTANT

## 2022-01-01 PROCEDURE — 85025 COMPLETE CBC W/AUTO DIFF WBC: CPT | Performed by: INTERNAL MEDICINE

## 2022-01-01 PROCEDURE — 81001 URINALYSIS AUTO W/SCOPE: CPT | Performed by: PHYSICIAN ASSISTANT

## 2022-01-01 PROCEDURE — 70488 CT MAXILLOFACIAL W/O & W/DYE: CPT

## 2022-01-01 PROCEDURE — 87077 CULTURE AEROBIC IDENTIFY: CPT | Performed by: PHYSICIAN ASSISTANT

## 2022-01-01 PROCEDURE — 84439 ASSAY OF FREE THYROXINE: CPT

## 2022-01-01 RX ORDER — SODIUM CHLORIDE 9 MG/ML
50 INJECTION, SOLUTION INTRAVENOUS CONTINUOUS
Status: ACTIVE | OUTPATIENT
Start: 2022-01-01 | End: 2022-01-01

## 2022-01-01 RX ORDER — POLYETHYLENE GLYCOL 3350 17 G/17G
17 POWDER, FOR SOLUTION ORAL DAILY PRN
Status: DISCONTINUED | OUTPATIENT
Start: 2022-01-01 | End: 2022-01-01 | Stop reason: HOSPADM

## 2022-01-01 RX ORDER — SENNA PLUS 8.6 MG/1
1 TABLET ORAL 2 TIMES DAILY
COMMUNITY

## 2022-01-01 RX ORDER — ACETAMINOPHEN 650 MG/1
650 SUPPOSITORY RECTAL EVERY 4 HOURS PRN
Status: DISCONTINUED | OUTPATIENT
Start: 2022-01-01 | End: 2022-01-01

## 2022-01-01 RX ORDER — PROCHLORPERAZINE MALEATE 10 MG
10 TABLET ORAL EVERY 6 HOURS PRN
Qty: 40 TABLET | Refills: 0 | Status: SHIPPED | OUTPATIENT
Start: 2022-01-01

## 2022-01-01 RX ORDER — TOLVAPTAN 15 MG/1
7.5 TABLET ORAL ONCE
Status: COMPLETED | OUTPATIENT
Start: 2022-01-01 | End: 2022-01-01

## 2022-01-01 RX ORDER — METOPROLOL SUCCINATE 25 MG/1
25 TABLET, EXTENDED RELEASE ORAL
Status: DISCONTINUED | OUTPATIENT
Start: 2022-01-01 | End: 2022-01-01 | Stop reason: HOSPADM

## 2022-01-01 RX ORDER — POTASSIUM CHLORIDE 750 MG/1
10 TABLET, FILM COATED, EXTENDED RELEASE ORAL DAILY
Qty: 90 TABLET | Refills: 1 | Status: CANCELLED | OUTPATIENT
Start: 2022-01-01

## 2022-01-01 RX ORDER — ACETAMINOPHEN 325 MG/1
650 TABLET ORAL EVERY 4 HOURS PRN
Status: DISCONTINUED | OUTPATIENT
Start: 2022-01-01 | End: 2022-01-01

## 2022-01-01 RX ORDER — SODIUM CHLORIDE 0.9 % (FLUSH) 0.9 %
10 SYRINGE (ML) INJECTION AS NEEDED
Status: DISCONTINUED | OUTPATIENT
Start: 2022-01-01 | End: 2022-01-01 | Stop reason: HOSPADM

## 2022-01-01 RX ORDER — LISINOPRIL 5 MG/1
5 TABLET ORAL DAILY
Status: DISCONTINUED | OUTPATIENT
Start: 2022-01-01 | End: 2022-01-01

## 2022-01-01 RX ORDER — FUROSEMIDE 40 MG/1
40 TABLET ORAL
Status: DISCONTINUED | OUTPATIENT
Start: 2022-01-01 | End: 2022-01-01

## 2022-01-01 RX ORDER — LORAZEPAM 2 MG/ML
0.5 INJECTION INTRAMUSCULAR ONCE AS NEEDED
Status: COMPLETED | OUTPATIENT
Start: 2022-01-01 | End: 2022-01-01

## 2022-01-01 RX ORDER — BISACODYL 10 MG
10 SUPPOSITORY, RECTAL RECTAL DAILY PRN
Status: DISCONTINUED | OUTPATIENT
Start: 2022-01-01 | End: 2022-01-01 | Stop reason: HOSPADM

## 2022-01-01 RX ORDER — IBUPROFEN 600 MG/1
600 TABLET ORAL EVERY 6 HOURS PRN
COMMUNITY
End: 2022-01-01 | Stop reason: HOSPADM

## 2022-01-01 RX ORDER — ACETAMINOPHEN 650 MG/1
650 SUPPOSITORY RECTAL EVERY 4 HOURS PRN
Status: DISCONTINUED | OUTPATIENT
Start: 2022-01-01 | End: 2022-01-01 | Stop reason: HOSPADM

## 2022-01-01 RX ORDER — SODIUM CHLORIDE 1000 MG
1 TABLET, SOLUBLE MISCELLANEOUS DAILY
Status: DISCONTINUED | OUTPATIENT
Start: 2022-01-01 | End: 2022-01-01

## 2022-01-01 RX ORDER — BUTALBITAL, ACETAMINOPHEN AND CAFFEINE 50; 325; 40 MG/1; MG/1; MG/1
1 TABLET ORAL ONCE
Status: COMPLETED | OUTPATIENT
Start: 2022-01-01 | End: 2022-01-01

## 2022-01-01 RX ORDER — FUROSEMIDE 20 MG/1
20 TABLET ORAL DAILY
Status: DISCONTINUED | OUTPATIENT
Start: 2022-01-01 | End: 2022-01-01

## 2022-01-01 RX ORDER — IBUPROFEN 200 MG
600 TABLET ORAL EVERY 8 HOURS SCHEDULED
Status: DISCONTINUED | OUTPATIENT
Start: 2022-01-01 | End: 2022-01-01

## 2022-01-01 RX ORDER — SODIUM CHLORIDE 0.9 % (FLUSH) 0.9 %
10 SYRINGE (ML) INJECTION AS NEEDED
Status: DISCONTINUED | OUTPATIENT
Start: 2022-01-01 | End: 2022-01-01 | Stop reason: SDUPTHER

## 2022-01-01 RX ORDER — AMIODARONE HYDROCHLORIDE 200 MG/1
200 TABLET ORAL
Qty: 90 TABLET | Refills: 1 | Status: SHIPPED | OUTPATIENT
Start: 2022-01-01

## 2022-01-01 RX ORDER — MULTIPLE VITAMINS W/ MINERALS TAB 9MG-400MCG
1 TAB ORAL DAILY
Status: DISCONTINUED | OUTPATIENT
Start: 2022-01-01 | End: 2022-01-01 | Stop reason: HOSPADM

## 2022-01-01 RX ORDER — SODIUM CHLORIDE 0.9 % (FLUSH) 0.9 %
10 SYRINGE (ML) INJECTION EVERY 12 HOURS SCHEDULED
Status: DISCONTINUED | OUTPATIENT
Start: 2022-01-01 | End: 2022-01-01 | Stop reason: HOSPADM

## 2022-01-01 RX ORDER — GENTAMICIN SULFATE 100 MG/100ML
1.5 INJECTION, SOLUTION INTRAVENOUS EVERY 12 HOURS
Status: DISCONTINUED | OUTPATIENT
Start: 2022-01-01 | End: 2022-01-01

## 2022-01-01 RX ORDER — ONDANSETRON 2 MG/ML
4 INJECTION INTRAMUSCULAR; INTRAVENOUS ONCE
Status: COMPLETED | OUTPATIENT
Start: 2022-01-01 | End: 2022-01-01

## 2022-01-01 RX ORDER — ASPIRIN 81 MG/1
81 TABLET ORAL DAILY
Status: DISCONTINUED | OUTPATIENT
Start: 2022-01-01 | End: 2022-01-01

## 2022-01-01 RX ORDER — FUROSEMIDE 40 MG/1
40 TABLET ORAL DAILY
Status: DISCONTINUED | OUTPATIENT
Start: 2022-01-01 | End: 2022-01-01 | Stop reason: HOSPADM

## 2022-01-01 RX ORDER — HYDROCODONE BITARTRATE AND ACETAMINOPHEN 5; 325 MG/1; MG/1
1 TABLET ORAL EVERY 6 HOURS PRN
Status: DISPENSED | OUTPATIENT
Start: 2022-01-01 | End: 2022-01-01

## 2022-01-01 RX ORDER — ACETAMINOPHEN 500 MG
1000 TABLET ORAL EVERY 4 HOURS PRN
Status: DISCONTINUED | OUTPATIENT
Start: 2022-01-01 | End: 2022-01-01 | Stop reason: HOSPADM

## 2022-01-01 RX ORDER — FUROSEMIDE 10 MG/ML
40 INJECTION INTRAMUSCULAR; INTRAVENOUS EVERY 8 HOURS
Status: COMPLETED | OUTPATIENT
Start: 2022-01-01 | End: 2022-01-01

## 2022-01-01 RX ORDER — ONDANSETRON 4 MG/1
4 TABLET, FILM COATED ORAL EVERY 8 HOURS PRN
Qty: 30 TABLET | Refills: 6 | Status: SHIPPED | OUTPATIENT
Start: 2022-01-01

## 2022-01-01 RX ORDER — LORAZEPAM 0.5 MG/1
0.5 TABLET ORAL EVERY 6 HOURS PRN
Qty: 20 TABLET | Refills: 0 | Status: SHIPPED | OUTPATIENT
Start: 2022-01-01

## 2022-01-01 RX ORDER — TAMSULOSIN HYDROCHLORIDE 0.4 MG/1
0.4 CAPSULE ORAL NIGHTLY
Status: DISCONTINUED | OUTPATIENT
Start: 2022-01-01 | End: 2022-01-01

## 2022-01-01 RX ORDER — SODIUM CHLORIDE 1000 MG
1 TABLET, SOLUBLE MISCELLANEOUS
Status: DISCONTINUED | OUTPATIENT
Start: 2022-01-01 | End: 2022-01-01

## 2022-01-01 RX ORDER — AMOXICILLIN 250 MG
2 CAPSULE ORAL 2 TIMES DAILY
Status: DISCONTINUED | OUTPATIENT
Start: 2022-01-01 | End: 2022-01-01 | Stop reason: HOSPADM

## 2022-01-01 RX ORDER — HYDROCODONE BITARTRATE AND ACETAMINOPHEN 5; 325 MG/1; MG/1
1 TABLET ORAL EVERY 6 HOURS PRN
Qty: 20 TABLET | Refills: 0 | Status: SHIPPED | OUTPATIENT
Start: 2022-01-01

## 2022-01-01 RX ORDER — FUROSEMIDE 10 MG/ML
20 INJECTION INTRAMUSCULAR; INTRAVENOUS EVERY 12 HOURS
Status: DISCONTINUED | OUTPATIENT
Start: 2022-01-01 | End: 2022-01-01

## 2022-01-01 RX ORDER — LEVOTHYROXINE SODIUM 88 UG/1
88 TABLET ORAL DAILY
Qty: 90 TABLET | Refills: 1 | Status: SHIPPED | OUTPATIENT
Start: 2022-01-01

## 2022-01-01 RX ORDER — PROCHLORPERAZINE EDISYLATE 5 MG/ML
5 INJECTION INTRAMUSCULAR; INTRAVENOUS EVERY 6 HOURS PRN
Status: DISCONTINUED | OUTPATIENT
Start: 2022-01-01 | End: 2022-01-01 | Stop reason: HOSPADM

## 2022-01-01 RX ORDER — ACETAMINOPHEN 500 MG
1000 TABLET ORAL EVERY 6 HOURS PRN
Status: DISCONTINUED | OUTPATIENT
Start: 2022-01-01 | End: 2022-01-01 | Stop reason: HOSPADM

## 2022-01-01 RX ORDER — HYDROCODONE BITARTRATE AND ACETAMINOPHEN 5; 325 MG/1; MG/1
1 TABLET ORAL EVERY 6 HOURS PRN
Status: DISCONTINUED | OUTPATIENT
Start: 2022-01-01 | End: 2022-01-01 | Stop reason: HOSPADM

## 2022-01-01 RX ORDER — ASCORBIC ACID 500 MG
500 TABLET ORAL DAILY
COMMUNITY
End: 2022-01-01

## 2022-01-01 RX ORDER — SODIUM CHLORIDE 9 MG/ML
30 INJECTION, SOLUTION INTRAVENOUS CONTINUOUS
Status: DISCONTINUED | OUTPATIENT
Start: 2022-01-01 | End: 2022-01-01

## 2022-01-01 RX ORDER — MORPHINE SULFATE 20 MG/ML
20 SOLUTION ORAL EVERY 4 HOURS PRN
Qty: 30 ML | Refills: 0 | Status: SHIPPED | OUTPATIENT
Start: 2022-01-01

## 2022-01-01 RX ORDER — GENTAMICIN SULFATE 100 MG/100ML
100 INJECTION, SOLUTION INTRAVENOUS EVERY 12 HOURS
Status: DISCONTINUED | OUTPATIENT
Start: 2022-01-01 | End: 2022-01-01

## 2022-01-01 RX ORDER — FUROSEMIDE 10 MG/ML
20 INJECTION INTRAMUSCULAR; INTRAVENOUS EVERY 12 HOURS
Status: COMPLETED | OUTPATIENT
Start: 2022-01-01 | End: 2022-01-01

## 2022-01-01 RX ORDER — ACETAMINOPHEN 160 MG/5ML
650 SOLUTION ORAL EVERY 4 HOURS PRN
Status: DISCONTINUED | OUTPATIENT
Start: 2022-01-01 | End: 2022-01-01

## 2022-01-01 RX ORDER — SODIUM CHLORIDE 9 MG/ML
75 INJECTION, SOLUTION INTRAVENOUS CONTINUOUS
Status: ACTIVE | OUTPATIENT
Start: 2022-01-01 | End: 2022-01-01

## 2022-01-01 RX ORDER — ACETAMINOPHEN 500 MG
1000 TABLET ORAL EVERY 6 HOURS PRN
COMMUNITY

## 2022-01-01 RX ORDER — PROMETHAZINE HYDROCHLORIDE 12.5 MG/1
12.5 TABLET ORAL EVERY 8 HOURS PRN
Qty: 20 TABLET | Refills: 3 | Status: SHIPPED | OUTPATIENT
Start: 2022-01-01

## 2022-01-01 RX ORDER — FUROSEMIDE 40 MG/1
40 TABLET ORAL DAILY
Qty: 90 TABLET | Refills: 1 | Status: SHIPPED | OUTPATIENT
Start: 2022-01-01

## 2022-01-01 RX ORDER — FUROSEMIDE 40 MG/1
40 TABLET ORAL DAILY
Status: DISCONTINUED | OUTPATIENT
Start: 2022-01-01 | End: 2022-01-01

## 2022-01-01 RX ORDER — POTASSIUM CHLORIDE 750 MG/1
10 TABLET, FILM COATED, EXTENDED RELEASE ORAL DAILY
Qty: 90 TABLET | Refills: 3 | Status: SHIPPED | OUTPATIENT
Start: 2022-01-01

## 2022-01-01 RX ORDER — IBUPROFEN 200 MG
600 TABLET ORAL EVERY 6 HOURS PRN
Status: DISCONTINUED | OUTPATIENT
Start: 2022-01-01 | End: 2022-01-01

## 2022-01-01 RX ORDER — PROMETHAZINE HYDROCHLORIDE 12.5 MG/1
12.5 TABLET ORAL EVERY 8 HOURS PRN
Status: DISCONTINUED | OUTPATIENT
Start: 2022-01-01 | End: 2022-01-01 | Stop reason: HOSPADM

## 2022-01-01 RX ORDER — ATORVASTATIN CALCIUM 20 MG/1
20 TABLET, FILM COATED ORAL NIGHTLY
Status: DISCONTINUED | OUTPATIENT
Start: 2022-01-01 | End: 2022-01-01 | Stop reason: HOSPADM

## 2022-01-01 RX ORDER — AMIODARONE HYDROCHLORIDE 200 MG/1
200 TABLET ORAL
Status: DISCONTINUED | OUTPATIENT
Start: 2022-01-01 | End: 2022-01-01 | Stop reason: HOSPADM

## 2022-01-01 RX ORDER — SODIUM CHLORIDE 1000 MG
1 TABLET, SOLUBLE MISCELLANEOUS 2 TIMES DAILY WITH MEALS
Status: DISCONTINUED | OUTPATIENT
Start: 2022-01-01 | End: 2022-01-01 | Stop reason: HOSPADM

## 2022-01-01 RX ORDER — FUROSEMIDE 20 MG/1
20 TABLET ORAL
Status: DISCONTINUED | OUTPATIENT
Start: 2022-01-01 | End: 2022-01-01

## 2022-01-01 RX ORDER — SODIUM CHLORIDE 1000 MG
1 TABLET, SOLUBLE MISCELLANEOUS 2 TIMES DAILY WITH MEALS
Status: DISCONTINUED | OUTPATIENT
Start: 2022-01-01 | End: 2022-01-01

## 2022-01-01 RX ORDER — SODIUM CHLORIDE 1000 MG
1 TABLET, SOLUBLE MISCELLANEOUS DAILY
Qty: 90 TABLET | Refills: 3 | Status: SHIPPED | OUTPATIENT
Start: 2022-01-01

## 2022-01-01 RX ORDER — FUROSEMIDE 10 MG/ML
80 INJECTION INTRAMUSCULAR; INTRAVENOUS EVERY 6 HOURS
Status: COMPLETED | OUTPATIENT
Start: 2022-01-01 | End: 2022-01-01

## 2022-01-01 RX ORDER — CARBOXYMETHYLCELLULOSE SODIUM 5 MG/ML
2 SOLUTION/ DROPS OPHTHALMIC 3 TIMES DAILY PRN
Status: DISCONTINUED | OUTPATIENT
Start: 2022-01-01 | End: 2022-01-01 | Stop reason: HOSPADM

## 2022-01-01 RX ORDER — LEVOTHYROXINE SODIUM 88 UG/1
88 TABLET ORAL
Status: DISCONTINUED | OUTPATIENT
Start: 2022-01-01 | End: 2022-01-01 | Stop reason: HOSPADM

## 2022-01-01 RX ORDER — NITROFURANTOIN 25; 75 MG/1; MG/1
100 CAPSULE ORAL 2 TIMES DAILY
Qty: 14 CAPSULE | Refills: 0 | Status: SHIPPED | OUTPATIENT
Start: 2022-01-01 | End: 2022-01-01 | Stop reason: HOSPADM

## 2022-01-01 RX ORDER — FUROSEMIDE 10 MG/ML
40 INJECTION INTRAMUSCULAR; INTRAVENOUS ONCE
Status: COMPLETED | OUTPATIENT
Start: 2022-01-01 | End: 2022-01-01

## 2022-01-01 RX ORDER — FAMOTIDINE 20 MG/1
20 TABLET, FILM COATED ORAL 2 TIMES DAILY
Status: DISCONTINUED | OUTPATIENT
Start: 2022-01-01 | End: 2022-01-01 | Stop reason: HOSPADM

## 2022-01-01 RX ORDER — ACETAMINOPHEN 160 MG/5ML
650 SOLUTION ORAL EVERY 4 HOURS PRN
Status: DISCONTINUED | OUTPATIENT
Start: 2022-01-01 | End: 2022-01-01 | Stop reason: HOSPADM

## 2022-01-01 RX ORDER — BISACODYL 5 MG/1
5 TABLET, DELAYED RELEASE ORAL DAILY PRN
Status: DISCONTINUED | OUTPATIENT
Start: 2022-01-01 | End: 2022-01-01 | Stop reason: HOSPADM

## 2022-01-01 RX ORDER — TAMSULOSIN HYDROCHLORIDE 0.4 MG/1
0.4 CAPSULE ORAL
Status: DISCONTINUED | OUTPATIENT
Start: 2022-01-01 | End: 2022-01-01 | Stop reason: HOSPADM

## 2022-01-01 RX ORDER — POTASSIUM CHLORIDE 750 MG/1
10 CAPSULE, EXTENDED RELEASE ORAL DAILY
Status: DISCONTINUED | OUTPATIENT
Start: 2022-01-01 | End: 2022-01-01 | Stop reason: HOSPADM

## 2022-01-01 RX ORDER — MORPHINE SULFATE 4 MG/ML
4 INJECTION, SOLUTION INTRAMUSCULAR; INTRAVENOUS ONCE
Status: COMPLETED | OUTPATIENT
Start: 2022-01-01 | End: 2022-01-01

## 2022-01-01 RX ADMIN — LEVOTHYROXINE SODIUM 88 MCG: 88 TABLET ORAL at 06:17

## 2022-01-01 RX ADMIN — SENNOSIDES AND DOCUSATE SODIUM 2 TABLET: 50; 8.6 TABLET ORAL at 20:37

## 2022-01-01 RX ADMIN — LORAZEPAM 0.5 MG: 2 INJECTION INTRAMUSCULAR; INTRAVENOUS at 14:47

## 2022-01-01 RX ADMIN — TAMSULOSIN HYDROCHLORIDE 0.4 MG: 0.4 CAPSULE ORAL at 20:20

## 2022-01-01 RX ADMIN — ATORVASTATIN CALCIUM 20 MG: 20 TABLET, FILM COATED ORAL at 20:43

## 2022-01-01 RX ADMIN — DICLOFENAC 2 G: 10 GEL TOPICAL at 21:51

## 2022-01-01 RX ADMIN — HYDROCODONE BITARTRATE AND ACETAMINOPHEN 1 TABLET: 5; 325 TABLET ORAL at 08:50

## 2022-01-01 RX ADMIN — FAMOTIDINE 20 MG: 20 TABLET ORAL at 08:09

## 2022-01-01 RX ADMIN — FAMOTIDINE 20 MG: 20 TABLET ORAL at 08:51

## 2022-01-01 RX ADMIN — Medication 10 ML: at 08:05

## 2022-01-01 RX ADMIN — HYDROCODONE BITARTRATE AND ACETAMINOPHEN 1 TABLET: 5; 325 TABLET ORAL at 14:41

## 2022-01-01 RX ADMIN — PROCHLORPERAZINE EDISYLATE 5 MG: 5 INJECTION INTRAMUSCULAR; INTRAVENOUS at 10:05

## 2022-01-01 RX ADMIN — ATORVASTATIN CALCIUM 20 MG: 20 TABLET, FILM COATED ORAL at 20:37

## 2022-01-01 RX ADMIN — LEVOTHYROXINE SODIUM 88 MCG: 88 TABLET ORAL at 05:18

## 2022-01-01 RX ADMIN — DICLOFENAC 2 G: 10 GEL TOPICAL at 17:49

## 2022-01-01 RX ADMIN — DICLOFENAC 2 G: 10 GEL TOPICAL at 20:33

## 2022-01-01 RX ADMIN — SODIUM CHLORIDE 75 ML/HR: 9 INJECTION, SOLUTION INTRAVENOUS at 09:58

## 2022-01-01 RX ADMIN — DICLOFENAC 2 G: 10 GEL TOPICAL at 08:03

## 2022-01-01 RX ADMIN — FUROSEMIDE 20 MG: 20 TABLET ORAL at 08:23

## 2022-01-01 RX ADMIN — IOPAMIDOL 100 ML: 612 INJECTION, SOLUTION INTRAVENOUS at 15:19

## 2022-01-01 RX ADMIN — MULTIPLE VITAMINS W/ MINERALS TAB 1 TABLET: TAB at 08:39

## 2022-01-01 RX ADMIN — MULTIPLE VITAMINS W/ MINERALS TAB 1 TABLET: TAB at 09:37

## 2022-01-01 RX ADMIN — HYDROCODONE BITARTRATE AND ACETAMINOPHEN 1 TABLET: 5; 325 TABLET ORAL at 14:28

## 2022-01-01 RX ADMIN — IBUPROFEN 600 MG: 200 TABLET, FILM COATED ORAL at 08:34

## 2022-01-01 RX ADMIN — ACETAMINOPHEN 325MG 650 MG: 325 TABLET ORAL at 04:38

## 2022-01-01 RX ADMIN — HYDROCODONE BITARTRATE AND ACETAMINOPHEN 1 TABLET: 5; 325 TABLET ORAL at 05:17

## 2022-01-01 RX ADMIN — POTASSIUM CHLORIDE 10 MEQ: 10 CAPSULE, COATED, EXTENDED RELEASE ORAL at 08:24

## 2022-01-01 RX ADMIN — POTASSIUM CHLORIDE 10 MEQ: 10 CAPSULE, COATED, EXTENDED RELEASE ORAL at 08:09

## 2022-01-01 RX ADMIN — POTASSIUM CHLORIDE 10 MEQ: 10 CAPSULE, COATED, EXTENDED RELEASE ORAL at 08:39

## 2022-01-01 RX ADMIN — SENNOSIDES AND DOCUSATE SODIUM 2 TABLET: 50; 8.6 TABLET ORAL at 08:36

## 2022-01-01 RX ADMIN — SODIUM CHLORIDE TAB 1 GM 1 G: 1 TAB at 18:15

## 2022-01-01 RX ADMIN — DICLOFENAC 2 G: 10 GEL TOPICAL at 09:46

## 2022-01-01 RX ADMIN — ATORVASTATIN CALCIUM 20 MG: 20 TABLET, FILM COATED ORAL at 20:33

## 2022-01-01 RX ADMIN — Medication 10 ML: at 21:12

## 2022-01-01 RX ADMIN — SENNOSIDES AND DOCUSATE SODIUM 2 TABLET: 50; 8.6 TABLET ORAL at 09:36

## 2022-01-01 RX ADMIN — Medication 10 ML: at 09:28

## 2022-01-01 RX ADMIN — AMIODARONE HYDROCHLORIDE 200 MG: 200 TABLET ORAL at 08:05

## 2022-01-01 RX ADMIN — HYDROCODONE BITARTRATE AND ACETAMINOPHEN 1 TABLET: 5; 325 TABLET ORAL at 12:19

## 2022-01-01 RX ADMIN — METOPROLOL SUCCINATE 25 MG: 25 TABLET, EXTENDED RELEASE ORAL at 08:37

## 2022-01-01 RX ADMIN — SODIUM CHLORIDE TAB 1 GM 1 G: 1 TAB at 08:36

## 2022-01-01 RX ADMIN — AMIODARONE HYDROCHLORIDE 200 MG: 200 TABLET ORAL at 08:09

## 2022-01-01 RX ADMIN — FAMOTIDINE 20 MG: 20 TABLET ORAL at 08:39

## 2022-01-01 RX ADMIN — LEVOTHYROXINE SODIUM 88 MCG: 88 TABLET ORAL at 06:41

## 2022-01-01 RX ADMIN — HYDROMORPHONE HYDROCHLORIDE 0.5 MG: 1 INJECTION, SOLUTION INTRAMUSCULAR; INTRAVENOUS; SUBCUTANEOUS at 14:47

## 2022-01-01 RX ADMIN — ACETAMINOPHEN 650 MG: 650 SOLUTION ORAL at 10:07

## 2022-01-01 RX ADMIN — ACETAMINOPHEN 1000 MG: 500 TABLET ORAL at 15:18

## 2022-01-01 RX ADMIN — DICLOFENAC 2 G: 10 GEL TOPICAL at 20:58

## 2022-01-01 RX ADMIN — FUROSEMIDE 40 MG: 10 INJECTION, SOLUTION INTRAVENOUS at 17:04

## 2022-01-01 RX ADMIN — METOPROLOL SUCCINATE 25 MG: 25 TABLET, EXTENDED RELEASE ORAL at 09:28

## 2022-01-01 RX ADMIN — ATORVASTATIN CALCIUM 20 MG: 20 TABLET, FILM COATED ORAL at 20:20

## 2022-01-01 RX ADMIN — MULTIPLE VITAMINS W/ MINERALS TAB 1 TABLET: TAB at 09:32

## 2022-01-01 RX ADMIN — ASPIRIN 81 MG: 81 TABLET, COATED ORAL at 09:32

## 2022-01-01 RX ADMIN — FUROSEMIDE 20 MG: 10 INJECTION, SOLUTION INTRAMUSCULAR; INTRAVENOUS at 01:56

## 2022-01-01 RX ADMIN — SODIUM CHLORIDE TAB 1 GM 1 G: 1 TAB at 17:47

## 2022-01-01 RX ADMIN — POTASSIUM CHLORIDE 10 MEQ: 10 CAPSULE, COATED, EXTENDED RELEASE ORAL at 09:32

## 2022-01-01 RX ADMIN — Medication 10 ML: at 10:34

## 2022-01-01 RX ADMIN — DICLOFENAC 2 G: 10 GEL TOPICAL at 08:09

## 2022-01-01 RX ADMIN — DICLOFENAC 2 G: 10 GEL TOPICAL at 17:47

## 2022-01-01 RX ADMIN — TOLVAPTAN 7.5 MG: 15 TABLET ORAL at 10:04

## 2022-01-01 RX ADMIN — SENNOSIDES AND DOCUSATE SODIUM 2 TABLET: 50; 8.6 TABLET ORAL at 21:51

## 2022-01-01 RX ADMIN — SODIUM CHLORIDE 80 MG: 9 INJECTION, SOLUTION INTRAVENOUS at 15:23

## 2022-01-01 RX ADMIN — MULTIPLE VITAMINS W/ MINERALS TAB 1 TABLET: TAB at 08:09

## 2022-01-01 RX ADMIN — BUTALBITAL, ACETAMINOPHEN, AND CAFFEINE 1 TABLET: 50; 325; 40 TABLET ORAL at 17:14

## 2022-01-01 RX ADMIN — IBUPROFEN 600 MG: 200 TABLET, FILM COATED ORAL at 05:18

## 2022-01-01 RX ADMIN — METOPROLOL SUCCINATE 25 MG: 25 TABLET, EXTENDED RELEASE ORAL at 09:33

## 2022-01-01 RX ADMIN — SODIUM CHLORIDE TAB 1 GM 1 G: 1 TAB at 17:27

## 2022-01-01 RX ADMIN — FAMOTIDINE 20 MG: 20 TABLET ORAL at 20:33

## 2022-01-01 RX ADMIN — FAMOTIDINE 20 MG: 20 TABLET ORAL at 20:13

## 2022-01-01 RX ADMIN — DICLOFENAC 2 G: 10 GEL TOPICAL at 09:03

## 2022-01-01 RX ADMIN — POTASSIUM CHLORIDE 10 MEQ: 10 CAPSULE, COATED, EXTENDED RELEASE ORAL at 09:37

## 2022-01-01 RX ADMIN — MULTIPLE VITAMINS W/ MINERALS TAB 1 TABLET: TAB at 08:24

## 2022-01-01 RX ADMIN — LEVOTHYROXINE SODIUM 88 MCG: 88 TABLET ORAL at 05:00

## 2022-01-01 RX ADMIN — DICLOFENAC 2 G: 10 GEL TOPICAL at 08:24

## 2022-01-01 RX ADMIN — FUROSEMIDE 40 MG: 40 TABLET ORAL at 08:51

## 2022-01-01 RX ADMIN — Medication 10 ML: at 20:58

## 2022-01-01 RX ADMIN — MULTIPLE VITAMINS W/ MINERALS TAB 1 TABLET: TAB at 08:48

## 2022-01-01 RX ADMIN — Medication 10 ML: at 08:10

## 2022-01-01 RX ADMIN — TAMSULOSIN HYDROCHLORIDE 0.4 MG: 0.4 CAPSULE ORAL at 20:13

## 2022-01-01 RX ADMIN — SODIUM CHLORIDE TAB 1 GM 1 G: 1 TAB at 09:37

## 2022-01-01 RX ADMIN — SODIUM CHLORIDE TAB 1 GM 1 G: 1 TAB at 08:23

## 2022-01-01 RX ADMIN — FAMOTIDINE 20 MG: 20 TABLET ORAL at 21:11

## 2022-01-01 RX ADMIN — IBUPROFEN 600 MG: 200 TABLET, FILM COATED ORAL at 06:16

## 2022-01-01 RX ADMIN — FAMOTIDINE 20 MG: 20 TABLET ORAL at 08:48

## 2022-01-01 RX ADMIN — SENNOSIDES AND DOCUSATE SODIUM 2 TABLET: 50; 8.6 TABLET ORAL at 08:50

## 2022-01-01 RX ADMIN — FAMOTIDINE 20 MG: 20 TABLET ORAL at 08:34

## 2022-01-01 RX ADMIN — TAMSULOSIN HYDROCHLORIDE 0.4 MG: 0.4 CAPSULE ORAL at 21:57

## 2022-01-01 RX ADMIN — TOLVAPTAN 7.5 MG: 15 TABLET ORAL at 09:53

## 2022-01-01 RX ADMIN — SODIUM CHLORIDE 75 ML/HR: 9 INJECTION, SOLUTION INTRAVENOUS at 00:14

## 2022-01-01 RX ADMIN — SENNOSIDES AND DOCUSATE SODIUM 2 TABLET: 50; 8.6 TABLET ORAL at 21:57

## 2022-01-01 RX ADMIN — HYDROCODONE BITARTRATE AND ACETAMINOPHEN 1 TABLET: 5; 325 TABLET ORAL at 20:17

## 2022-01-01 RX ADMIN — APIXABAN 2.5 MG: 2.5 TABLET, FILM COATED ORAL at 20:43

## 2022-01-01 RX ADMIN — ATORVASTATIN CALCIUM 20 MG: 20 TABLET, FILM COATED ORAL at 20:08

## 2022-01-01 RX ADMIN — Medication 10 ML: at 08:38

## 2022-01-01 RX ADMIN — ASPIRIN 81 MG: 81 TABLET, COATED ORAL at 08:34

## 2022-01-01 RX ADMIN — SODIUM CHLORIDE TAB 1 GM 1 G: 1 TAB at 08:39

## 2022-01-01 RX ADMIN — DICLOFENAC 2 G: 10 GEL TOPICAL at 18:26

## 2022-01-01 RX ADMIN — POTASSIUM CHLORIDE 10 MEQ: 10 CAPSULE, COATED, EXTENDED RELEASE ORAL at 09:28

## 2022-01-01 RX ADMIN — SENNOSIDES AND DOCUSATE SODIUM 2 TABLET: 50; 8.6 TABLET ORAL at 08:24

## 2022-01-01 RX ADMIN — ATORVASTATIN CALCIUM 20 MG: 20 TABLET, FILM COATED ORAL at 21:43

## 2022-01-01 RX ADMIN — FAMOTIDINE 20 MG: 20 TABLET ORAL at 21:57

## 2022-01-01 RX ADMIN — AMIODARONE HYDROCHLORIDE 200 MG: 200 TABLET ORAL at 08:39

## 2022-01-01 RX ADMIN — Medication 10 ML: at 08:22

## 2022-01-01 RX ADMIN — ACETAMINOPHEN 1000 MG: 500 TABLET ORAL at 10:46

## 2022-01-01 RX ADMIN — DICLOFENAC 2 G: 10 GEL TOPICAL at 17:14

## 2022-01-01 RX ADMIN — METOPROLOL SUCCINATE 25 MG: 25 TABLET, EXTENDED RELEASE ORAL at 08:34

## 2022-01-01 RX ADMIN — FAMOTIDINE 20 MG: 20 TABLET ORAL at 20:17

## 2022-01-01 RX ADMIN — PROCHLORPERAZINE EDISYLATE 5 MG: 5 INJECTION INTRAMUSCULAR; INTRAVENOUS at 08:30

## 2022-01-01 RX ADMIN — FUROSEMIDE 40 MG: 10 INJECTION, SOLUTION INTRAMUSCULAR; INTRAVENOUS at 19:11

## 2022-01-01 RX ADMIN — DICLOFENAC 2 G: 10 GEL TOPICAL at 21:43

## 2022-01-01 RX ADMIN — HYDROCODONE BITARTRATE AND ACETAMINOPHEN 1 TABLET: 5; 325 TABLET ORAL at 01:58

## 2022-01-01 RX ADMIN — LEVOTHYROXINE SODIUM 88 MCG: 88 TABLET ORAL at 06:00

## 2022-01-01 RX ADMIN — DICLOFENAC 2 G: 10 GEL TOPICAL at 12:14

## 2022-01-01 RX ADMIN — FUROSEMIDE 40 MG: 40 TABLET ORAL at 09:28

## 2022-01-01 RX ADMIN — PROCHLORPERAZINE EDISYLATE 5 MG: 5 INJECTION INTRAMUSCULAR; INTRAVENOUS at 12:07

## 2022-01-01 RX ADMIN — FUROSEMIDE 20 MG: 20 TABLET ORAL at 11:35

## 2022-01-01 RX ADMIN — Medication 10 ML: at 08:48

## 2022-01-01 RX ADMIN — IBUPROFEN 600 MG: 200 TABLET, FILM COATED ORAL at 21:14

## 2022-01-01 RX ADMIN — POTASSIUM CHLORIDE 10 MEQ: 10 CAPSULE, COATED, EXTENDED RELEASE ORAL at 08:37

## 2022-01-01 RX ADMIN — ATORVASTATIN CALCIUM 20 MG: 20 TABLET, FILM COATED ORAL at 21:11

## 2022-01-01 RX ADMIN — HYDROCODONE BITARTRATE AND ACETAMINOPHEN 1 TABLET: 5; 325 TABLET ORAL at 06:17

## 2022-01-01 RX ADMIN — DICLOFENAC 2 G: 10 GEL TOPICAL at 11:20

## 2022-01-01 RX ADMIN — HYDROCODONE BITARTRATE AND ACETAMINOPHEN 1 TABLET: 5; 325 TABLET ORAL at 08:36

## 2022-01-01 RX ADMIN — MULTIPLE VITAMINS W/ MINERALS TAB 1 TABLET: TAB at 08:37

## 2022-01-01 RX ADMIN — HYDROCODONE BITARTRATE AND ACETAMINOPHEN 1 TABLET: 5; 325 TABLET ORAL at 08:39

## 2022-01-01 RX ADMIN — FAMOTIDINE 20 MG: 20 TABLET ORAL at 09:27

## 2022-01-01 RX ADMIN — HYDROCODONE BITARTRATE AND ACETAMINOPHEN 1 TABLET: 5; 325 TABLET ORAL at 20:13

## 2022-01-01 RX ADMIN — APIXABAN 2.5 MG: 2.5 TABLET, FILM COATED ORAL at 20:13

## 2022-01-01 RX ADMIN — PROMETHAZINE HYDROCHLORIDE 12.5 MG: 12.5 TABLET ORAL at 17:47

## 2022-01-01 RX ADMIN — AMIODARONE HYDROCHLORIDE 200 MG: 200 TABLET ORAL at 09:37

## 2022-01-01 RX ADMIN — FUROSEMIDE 80 MG: 10 INJECTION, SOLUTION INTRAMUSCULAR; INTRAVENOUS at 10:33

## 2022-01-01 RX ADMIN — POTASSIUM CHLORIDE 10 MEQ: 10 CAPSULE, COATED, EXTENDED RELEASE ORAL at 08:48

## 2022-01-01 RX ADMIN — FUROSEMIDE 80 MG: 10 INJECTION, SOLUTION INTRAMUSCULAR; INTRAVENOUS at 16:48

## 2022-01-01 RX ADMIN — SODIUM CHLORIDE TAB 1 GM 1 G: 1 TAB at 17:49

## 2022-01-01 RX ADMIN — FAMOTIDINE 20 MG: 20 TABLET ORAL at 20:20

## 2022-01-01 RX ADMIN — Medication 10 ML: at 20:38

## 2022-01-01 RX ADMIN — MULTIPLE VITAMINS W/ MINERALS TAB 1 TABLET: TAB at 08:50

## 2022-01-01 RX ADMIN — POTASSIUM CHLORIDE 10 MEQ: 10 CAPSULE, COATED, EXTENDED RELEASE ORAL at 08:34

## 2022-01-01 RX ADMIN — METOPROLOL SUCCINATE 25 MG: 25 TABLET, EXTENDED RELEASE ORAL at 08:51

## 2022-01-01 RX ADMIN — SENNOSIDES AND DOCUSATE SODIUM 2 TABLET: 50; 8.6 TABLET ORAL at 08:48

## 2022-01-01 RX ADMIN — TAMSULOSIN HYDROCHLORIDE 0.4 MG: 0.4 CAPSULE ORAL at 20:17

## 2022-01-01 RX ADMIN — FUROSEMIDE 20 MG: 20 TABLET ORAL at 17:49

## 2022-01-01 RX ADMIN — SENNOSIDES AND DOCUSATE SODIUM 2 TABLET: 50; 8.6 TABLET ORAL at 20:33

## 2022-01-01 RX ADMIN — AMIODARONE HYDROCHLORIDE 200 MG: 200 TABLET ORAL at 09:28

## 2022-01-01 RX ADMIN — METOPROLOL SUCCINATE 25 MG: 25 TABLET, EXTENDED RELEASE ORAL at 08:09

## 2022-01-01 RX ADMIN — FUROSEMIDE 40 MG: 40 TABLET ORAL at 17:40

## 2022-01-01 RX ADMIN — LISINOPRIL 5 MG: 5 TABLET ORAL at 08:04

## 2022-01-01 RX ADMIN — HYDROCODONE BITARTRATE AND ACETAMINOPHEN 1 TABLET: 5; 325 TABLET ORAL at 20:08

## 2022-01-01 RX ADMIN — DICLOFENAC 2 G: 10 GEL TOPICAL at 11:41

## 2022-01-01 RX ADMIN — DICLOFENAC 2 G: 10 GEL TOPICAL at 11:35

## 2022-01-01 RX ADMIN — DICLOFENAC 2 G: 10 GEL TOPICAL at 17:22

## 2022-01-01 RX ADMIN — AMIODARONE HYDROCHLORIDE 200 MG: 200 TABLET ORAL at 08:24

## 2022-01-01 RX ADMIN — DICLOFENAC 2 G: 10 GEL TOPICAL at 20:08

## 2022-01-01 RX ADMIN — DICLOFENAC 2 G: 10 GEL TOPICAL at 16:51

## 2022-01-01 RX ADMIN — SODIUM CHLORIDE 100 MG: 9 INJECTION, SOLUTION INTRAVENOUS at 04:45

## 2022-01-01 RX ADMIN — FUROSEMIDE 40 MG: 40 TABLET ORAL at 08:34

## 2022-01-01 RX ADMIN — DICLOFENAC 2 G: 10 GEL TOPICAL at 20:16

## 2022-01-01 RX ADMIN — FUROSEMIDE 20 MG: 20 TABLET ORAL at 12:07

## 2022-01-01 RX ADMIN — LISINOPRIL 5 MG: 5 TABLET ORAL at 08:31

## 2022-01-01 RX ADMIN — Medication 10 ML: at 21:51

## 2022-01-01 RX ADMIN — DICLOFENAC 2 G: 10 GEL TOPICAL at 08:52

## 2022-01-01 RX ADMIN — FUROSEMIDE 20 MG: 20 TABLET ORAL at 17:14

## 2022-01-01 RX ADMIN — SODIUM CHLORIDE 50 ML/HR: 9 INJECTION, SOLUTION INTRAVENOUS at 10:41

## 2022-01-01 RX ADMIN — BISACODYL 5 MG: 5 TABLET, COATED ORAL at 14:41

## 2022-01-01 RX ADMIN — SODIUM CHLORIDE 100 MG: 9 INJECTION, SOLUTION INTRAVENOUS at 15:53

## 2022-01-01 RX ADMIN — FAMOTIDINE 20 MG: 20 TABLET ORAL at 21:51

## 2022-01-01 RX ADMIN — POLYETHYLENE GLYCOL 3350 17 G: 17 POWDER, FOR SOLUTION ORAL at 05:03

## 2022-01-01 RX ADMIN — HYDROCODONE BITARTRATE AND ACETAMINOPHEN 1 TABLET: 5; 325 TABLET ORAL at 12:20

## 2022-01-01 RX ADMIN — DICLOFENAC 2 G: 10 GEL TOPICAL at 18:16

## 2022-01-01 RX ADMIN — IOPAMIDOL 200 ML: 510 INJECTION, SOLUTION INTRAVASCULAR at 15:06

## 2022-01-01 RX ADMIN — FAMOTIDINE 20 MG: 20 TABLET ORAL at 09:33

## 2022-01-01 RX ADMIN — SODIUM CHLORIDE TAB 1 GM 1 G: 1 TAB at 08:34

## 2022-01-01 RX ADMIN — FUROSEMIDE 40 MG: 40 TABLET ORAL at 08:37

## 2022-01-01 RX ADMIN — PROCHLORPERAZINE EDISYLATE 5 MG: 5 INJECTION INTRAMUSCULAR; INTRAVENOUS at 17:50

## 2022-01-01 RX ADMIN — SODIUM CHLORIDE TAB 1 GM 1 G: 1 TAB at 17:40

## 2022-01-01 RX ADMIN — AMIODARONE HYDROCHLORIDE 200 MG: 200 TABLET ORAL at 10:03

## 2022-01-01 RX ADMIN — METOPROLOL SUCCINATE 25 MG: 25 TABLET, EXTENDED RELEASE ORAL at 09:37

## 2022-01-01 RX ADMIN — PROCHLORPERAZINE EDISYLATE 5 MG: 5 INJECTION INTRAMUSCULAR; INTRAVENOUS at 18:27

## 2022-01-01 RX ADMIN — DICLOFENAC 2 G: 10 GEL TOPICAL at 11:42

## 2022-01-01 RX ADMIN — SODIUM CHLORIDE TAB 1 GM 1 G: 1 TAB at 16:54

## 2022-01-01 RX ADMIN — FAMOTIDINE 20 MG: 20 TABLET ORAL at 20:08

## 2022-01-01 RX ADMIN — FUROSEMIDE 40 MG: 40 TABLET ORAL at 10:46

## 2022-01-01 RX ADMIN — ASPIRIN 81 MG: 81 TABLET, COATED ORAL at 08:04

## 2022-01-01 RX ADMIN — BUTALBITAL, ACETAMINOPHEN, AND CAFFEINE 1 TABLET: 50; 325; 40 TABLET ORAL at 01:24

## 2022-01-01 RX ADMIN — AMIODARONE HYDROCHLORIDE 200 MG: 200 TABLET ORAL at 08:37

## 2022-01-01 RX ADMIN — HYDROCODONE BITARTRATE AND ACETAMINOPHEN 1 TABLET: 5; 325 TABLET ORAL at 10:56

## 2022-01-01 RX ADMIN — HYDROCODONE BITARTRATE AND ACETAMINOPHEN 1 TABLET: 5; 325 TABLET ORAL at 08:04

## 2022-01-01 RX ADMIN — DICLOFENAC 2 G: 10 GEL TOPICAL at 11:28

## 2022-01-01 RX ADMIN — SENNOSIDES AND DOCUSATE SODIUM 2 TABLET: 50; 8.6 TABLET ORAL at 21:43

## 2022-01-01 RX ADMIN — LEVOTHYROXINE SODIUM 88 MCG: 88 TABLET ORAL at 06:35

## 2022-01-01 RX ADMIN — TAMSULOSIN HYDROCHLORIDE 0.4 MG: 0.4 CAPSULE ORAL at 20:08

## 2022-01-01 RX ADMIN — LEVOTHYROXINE SODIUM 88 MCG: 88 TABLET ORAL at 05:03

## 2022-01-01 RX ADMIN — LEVOTHYROXINE SODIUM 88 MCG: 88 TABLET ORAL at 06:16

## 2022-01-01 RX ADMIN — FUROSEMIDE 40 MG: 10 INJECTION, SOLUTION INTRAVENOUS at 09:32

## 2022-01-01 RX ADMIN — HYDROCODONE BITARTRATE AND ACETAMINOPHEN 1 TABLET: 5; 325 TABLET ORAL at 16:28

## 2022-01-01 RX ADMIN — DICLOFENAC 2 G: 10 GEL TOPICAL at 20:18

## 2022-01-01 RX ADMIN — METOPROLOL SUCCINATE 25 MG: 25 TABLET, EXTENDED RELEASE ORAL at 08:24

## 2022-01-01 RX ADMIN — SODIUM CHLORIDE TAB 1 GM 1 G: 1 TAB at 08:51

## 2022-01-01 RX ADMIN — PROMETHAZINE HYDROCHLORIDE 12.5 MG: 12.5 TABLET ORAL at 11:12

## 2022-01-01 RX ADMIN — AMIODARONE HYDROCHLORIDE 200 MG: 200 TABLET ORAL at 08:48

## 2022-01-01 RX ADMIN — IBUPROFEN 600 MG: 200 TABLET, FILM COATED ORAL at 23:48

## 2022-01-01 RX ADMIN — POTASSIUM CHLORIDE 10 MEQ: 10 CAPSULE, COATED, EXTENDED RELEASE ORAL at 08:51

## 2022-01-01 RX ADMIN — ATORVASTATIN CALCIUM 20 MG: 20 TABLET, FILM COATED ORAL at 20:13

## 2022-01-01 RX ADMIN — FAMOTIDINE 20 MG: 20 TABLET ORAL at 08:37

## 2022-01-01 RX ADMIN — GENTAMICIN SULFATE 100 MG: 100 INJECTION, SOLUTION INTRAVENOUS at 04:30

## 2022-01-01 RX ADMIN — AMIODARONE HYDROCHLORIDE 200 MG: 200 TABLET ORAL at 08:34

## 2022-01-01 RX ADMIN — LEVOTHYROXINE SODIUM 88 MCG: 88 TABLET ORAL at 05:10

## 2022-01-01 RX ADMIN — FAMOTIDINE 20 MG: 20 TABLET ORAL at 20:43

## 2022-01-01 RX ADMIN — APIXABAN 2.5 MG: 2.5 TABLET, FILM COATED ORAL at 08:04

## 2022-01-01 RX ADMIN — FUROSEMIDE 40 MG: 40 TABLET ORAL at 17:47

## 2022-01-01 RX ADMIN — IBUPROFEN 600 MG: 200 TABLET, FILM COATED ORAL at 14:15

## 2022-01-01 RX ADMIN — TAMSULOSIN HYDROCHLORIDE 0.4 MG: 0.4 CAPSULE ORAL at 21:51

## 2022-01-01 RX ADMIN — ONDANSETRON 4 MG: 2 INJECTION INTRAMUSCULAR; INTRAVENOUS at 16:29

## 2022-01-01 RX ADMIN — HYDROCODONE BITARTRATE AND ACETAMINOPHEN 1 TABLET: 5; 325 TABLET ORAL at 15:41

## 2022-01-01 RX ADMIN — ACETAMINOPHEN 1000 MG: 500 TABLET ORAL at 14:10

## 2022-01-01 RX ADMIN — FAMOTIDINE 20 MG: 20 TABLET ORAL at 20:37

## 2022-01-01 RX ADMIN — HYDROCODONE BITARTRATE AND ACETAMINOPHEN 1 TABLET: 5; 325 TABLET ORAL at 02:31

## 2022-01-01 RX ADMIN — ATORVASTATIN CALCIUM 20 MG: 20 TABLET, FILM COATED ORAL at 21:57

## 2022-01-01 RX ADMIN — Medication 10 ML: at 20:16

## 2022-01-01 RX ADMIN — METOPROLOL SUCCINATE 25 MG: 25 TABLET, EXTENDED RELEASE ORAL at 08:39

## 2022-01-01 RX ADMIN — IBUPROFEN 600 MG: 200 TABLET, FILM COATED ORAL at 14:29

## 2022-01-01 RX ADMIN — ATORVASTATIN CALCIUM 20 MG: 20 TABLET, FILM COATED ORAL at 20:17

## 2022-01-01 RX ADMIN — PROCHLORPERAZINE EDISYLATE 5 MG: 5 INJECTION INTRAMUSCULAR; INTRAVENOUS at 17:03

## 2022-01-01 RX ADMIN — DICLOFENAC 2 G: 10 GEL TOPICAL at 17:04

## 2022-01-01 RX ADMIN — TAMSULOSIN HYDROCHLORIDE 0.4 MG: 0.4 CAPSULE ORAL at 21:43

## 2022-01-01 RX ADMIN — FAMOTIDINE 20 MG: 20 TABLET ORAL at 21:43

## 2022-01-01 RX ADMIN — MULTIPLE VITAMINS W/ MINERALS TAB 1 TABLET: TAB at 08:04

## 2022-01-01 RX ADMIN — SENNOSIDES AND DOCUSATE SODIUM 2 TABLET: 50; 8.6 TABLET ORAL at 08:09

## 2022-01-01 RX ADMIN — POTASSIUM CHLORIDE 10 MEQ: 10 CAPSULE, COATED, EXTENDED RELEASE ORAL at 08:05

## 2022-01-01 RX ADMIN — HYDROCODONE BITARTRATE AND ACETAMINOPHEN 1 TABLET: 5; 325 TABLET ORAL at 05:54

## 2022-01-01 RX ADMIN — APIXABAN 2.5 MG: 2.5 TABLET, FILM COATED ORAL at 09:33

## 2022-01-01 RX ADMIN — METOPROLOL SUCCINATE 25 MG: 25 TABLET, EXTENDED RELEASE ORAL at 08:04

## 2022-01-01 RX ADMIN — Medication 10 ML: at 08:51

## 2022-01-01 RX ADMIN — HYDROCODONE BITARTRATE AND ACETAMINOPHEN 1 TABLET: 5; 325 TABLET ORAL at 01:54

## 2022-01-01 RX ADMIN — FAMOTIDINE 20 MG: 20 TABLET ORAL at 08:04

## 2022-01-01 RX ADMIN — IBUPROFEN 600 MG: 200 TABLET, FILM COATED ORAL at 20:19

## 2022-01-01 RX ADMIN — METOPROLOL SUCCINATE 25 MG: 25 TABLET, EXTENDED RELEASE ORAL at 08:48

## 2022-01-01 RX ADMIN — DICLOFENAC 2 G: 10 GEL TOPICAL at 08:37

## 2022-01-01 RX ADMIN — DICLOFENAC 2 G: 10 GEL TOPICAL at 20:20

## 2022-01-01 RX ADMIN — Medication 10 ML: at 21:43

## 2022-01-01 RX ADMIN — DICLOFENAC 2 G: 10 GEL TOPICAL at 09:36

## 2022-01-01 RX ADMIN — MORPHINE SULFATE 4 MG: 4 INJECTION, SOLUTION INTRAMUSCULAR; INTRAVENOUS at 04:40

## 2022-01-01 RX ADMIN — SODIUM CHLORIDE TAB 1 GM 1 G: 1 TAB at 11:20

## 2022-01-01 RX ADMIN — AMIODARONE HYDROCHLORIDE 200 MG: 200 TABLET ORAL at 09:33

## 2022-01-01 RX ADMIN — FUROSEMIDE 20 MG: 20 TABLET ORAL at 18:15

## 2022-01-01 RX ADMIN — HYDROCODONE BITARTRATE AND ACETAMINOPHEN 1 TABLET: 5; 325 TABLET ORAL at 20:42

## 2022-01-01 RX ADMIN — LEVOTHYROXINE SODIUM 88 MCG: 88 TABLET ORAL at 05:54

## 2022-01-01 RX ADMIN — DICLOFENAC 2 G: 10 GEL TOPICAL at 11:17

## 2022-01-01 RX ADMIN — PROMETHAZINE HYDROCHLORIDE 12.5 MG: 12.5 TABLET ORAL at 01:57

## 2022-01-01 RX ADMIN — HYDROCODONE BITARTRATE AND ACETAMINOPHEN 1 TABLET: 5; 325 TABLET ORAL at 20:33

## 2022-01-01 RX ADMIN — SODIUM CHLORIDE 100 MG: 9 INJECTION, SOLUTION INTRAVENOUS at 04:47

## 2022-01-01 RX ADMIN — FAMOTIDINE 20 MG: 20 TABLET ORAL at 09:37

## 2022-01-01 RX ADMIN — SENNOSIDES AND DOCUSATE SODIUM 2 TABLET: 50; 8.6 TABLET ORAL at 21:11

## 2022-01-01 RX ADMIN — SODIUM CHLORIDE TAB 1 GM 1 G: 1 TAB at 11:51

## 2022-01-01 RX ADMIN — FUROSEMIDE 20 MG: 20 TABLET ORAL at 09:37

## 2022-01-01 RX ADMIN — DICLOFENAC 2 G: 10 GEL TOPICAL at 08:48

## 2022-01-01 RX ADMIN — DICLOFENAC 2 G: 10 GEL TOPICAL at 12:34

## 2022-01-01 RX ADMIN — ACETAMINOPHEN 1000 MG: 500 TABLET ORAL at 14:40

## 2022-01-01 RX ADMIN — Medication 10 ML: at 20:44

## 2022-01-01 RX ADMIN — Medication 10 ML: at 09:03

## 2022-01-01 RX ADMIN — FAMOTIDINE 20 MG: 20 TABLET ORAL at 08:24

## 2022-01-01 RX ADMIN — Medication 10 ML: at 20:34

## 2022-01-01 RX ADMIN — SODIUM CHLORIDE TAB 1 GM 1 G: 1 TAB at 09:33

## 2022-01-01 RX ADMIN — DICLOFENAC 2 G: 10 GEL TOPICAL at 20:38

## 2022-01-01 RX ADMIN — DICLOFENAC 2 G: 10 GEL TOPICAL at 17:40

## 2022-01-01 RX ADMIN — SODIUM CHLORIDE 100 MG: 9 INJECTION, SOLUTION INTRAVENOUS at 16:35

## 2022-01-01 RX ADMIN — SODIUM CHLORIDE TAB 1 GM 1 G: 1 TAB at 17:14

## 2022-01-01 RX ADMIN — DICLOFENAC 2 G: 10 GEL TOPICAL at 21:12

## 2022-01-01 RX ADMIN — SODIUM CHLORIDE TAB 1 GM 1 G: 1 TAB at 08:48

## 2022-01-01 RX ADMIN — HYDROCODONE BITARTRATE AND ACETAMINOPHEN 1 TABLET: 5; 325 TABLET ORAL at 12:42

## 2022-01-01 RX ADMIN — FUROSEMIDE 20 MG: 20 TABLET ORAL at 08:48

## 2022-01-01 RX ADMIN — APIXABAN 2.5 MG: 2.5 TABLET, FILM COATED ORAL at 08:35

## 2022-01-01 RX ADMIN — MULTIPLE VITAMINS W/ MINERALS TAB 1 TABLET: TAB at 09:28

## 2022-01-01 RX ADMIN — ACETAMINOPHEN 325MG 650 MG: 325 TABLET ORAL at 21:00

## 2022-01-01 RX ADMIN — SODIUM CHLORIDE TAB 1 GM 1 G: 1 TAB at 09:28

## 2022-01-01 RX ADMIN — FUROSEMIDE 20 MG: 10 INJECTION, SOLUTION INTRAMUSCULAR; INTRAVENOUS at 14:15

## 2022-01-01 RX ADMIN — SODIUM CHLORIDE TAB 1 GM 1 G: 1 TAB at 17:07

## 2022-01-01 RX ADMIN — SENNOSIDES AND DOCUSATE SODIUM 2 TABLET: 50; 8.6 TABLET ORAL at 20:17

## 2022-01-01 RX ADMIN — DICLOFENAC 2 G: 10 GEL TOPICAL at 18:44

## 2022-01-01 RX ADMIN — ATORVASTATIN CALCIUM 20 MG: 20 TABLET, FILM COATED ORAL at 21:51

## 2022-01-01 RX ADMIN — HYDROCODONE BITARTRATE AND ACETAMINOPHEN 1 TABLET: 5; 325 TABLET ORAL at 02:50

## 2022-01-01 RX ADMIN — SENNOSIDES AND DOCUSATE SODIUM 2 TABLET: 50; 8.6 TABLET ORAL at 09:52

## 2022-01-01 RX ADMIN — HYDROCODONE BITARTRATE AND ACETAMINOPHEN 1 TABLET: 5; 325 TABLET ORAL at 11:12

## 2022-01-01 RX ADMIN — DICLOFENAC 2 G: 10 GEL TOPICAL at 09:29

## 2022-01-01 RX ADMIN — MULTIPLE VITAMINS W/ MINERALS TAB 1 TABLET: TAB at 08:34

## 2022-01-01 RX ADMIN — HYDROCODONE BITARTRATE AND ACETAMINOPHEN 1 TABLET: 5; 325 TABLET ORAL at 20:19

## 2022-01-01 RX ADMIN — APIXABAN 2.5 MG: 2.5 TABLET, FILM COATED ORAL at 20:19

## 2022-01-01 NOTE — PLAN OF CARE
Goal Outcome Evaluation:  Plan of Care Reviewed With: (P) patient        Progress: (P) no change  Outcome Summary: (P) PT evaluation completed this date. Patient was supervision for bed mobility, min asssit for 4 sit> stand transfers, and min assist for 12ft gait HHA. Patient was able to stand for 10 minuted but required two sitting rests. Patient demonstrated deficits in strength, endurance, gait, and functional mobility. Patient would benefit from skilled PT interventions to address deficits.   PROVIDER:[TOKEN:[[id number]],FOLLOWUP:[1-3 days]]

## 2022-01-13 NOTE — PROGRESS NOTES
Chief Complaint   Patient presents with   • Follow-up     for HTN, GERD, Acquired hypothyroidism, and CHF     Subjective   Andrew Hernandez is a 87 y.o. male.     Patient is here today for follow-up of HTN, CAD, HLD, A. fib, carotid stenosis, tongue cancer, hypothyroidism, history of CVA, GERD, hyponatremia and overall weakness.  HTN/HLD/CAD/CHF- his BP is well controlled. His BP has been well controlled. He denies that he is dizzy.  He denies any CP, heaviness or tightness in chest.  He has not required oxygen for about 3 weeks and sats are staying above 93%.   His home weight is staying between 137 and 140 lbs.  His I/O is running 8045-9462/4288-7888, I/O are equal.  He walks in house twice a day and he gets leg exercises twice a day.  He is getting a super smoothie twice a day.  He is alternating water and pedialyte.  He has a good appetite.  BP is running /40-60.  His elevated BP is due to ambulating.  Remains on amiodarone daily.  On metoprolol 25 mg daily.  Alternates lasix 40 mg with 20 mg every other day.  If BP is low he gets lisinopril 2.5 mg that day in AM.   Tongue cancer- the area from surgery has healed, there is an area that is painful when he wears his dentures, otherwise no pain  Hypothyroid- takes med daily.  He skin is dry and hands are cold.  No changes in voice, swallow, constipation. No depression.  He is sleeping well.   BPH- he takes flomax every third day.  He is urinating well.   GERD- remains on pepcid BID, no GERD.   There was a thick yellow plaque on vertex of scalp that recently fell off, now there is a large ulcerated area that is well-healed and nonpainful.  Patient does not want to see anyone for this.       The following portions of the patient's history were reviewed and updated as appropriate: allergies, current medications, past family history, past medical history, past social history, past surgical history and problem list.    Review of Systems   Constitutional: Positive  "for fatigue. Negative for activity change, appetite change and unexpected weight change.   HENT: Positive for trouble swallowing and voice change.         Intermittent tongue pain depending on what he eats   Eyes: Negative for visual disturbance.   Respiratory: Negative for shortness of breath.    Cardiovascular: Negative for chest pain, palpitations and leg swelling.   Gastrointestinal: Negative for abdominal pain and constipation.   Endocrine: Negative for cold intolerance and heat intolerance.   Genitourinary: Negative for hematuria.   Musculoskeletal: Positive for gait problem. Negative for back pain.   Neurological: Positive for weakness. Negative for headaches.   Psychiatric/Behavioral: Negative for dysphoric mood and sleep disturbance. The patient is not nervous/anxious.        Objective   /54   Pulse 84   Temp 97 °F (36.1 °C)   Ht 180.3 cm (71\")   Wt 65.8 kg (145 lb)   SpO2 98%   BMI 20.22 kg/m²   Body mass index is 20.22 kg/m².  Physical Exam  Vitals and nursing note reviewed.   Constitutional:       General: He is not in acute distress.     Appearance: Normal appearance. He is well-developed. He is not ill-appearing.      Comments: Kind and pleasant elderly man, appears stated age and in NAD today   HENT:      Head: Normocephalic and atraumatic.      Right Ear: External ear normal.      Left Ear: External ear normal.      Mouth/Throat:      Comments: Patient is edentulous, right lateral tongue is healing well  Eyes:      General:         Right eye: No discharge.         Left eye: No discharge.      Extraocular Movements: Extraocular movements intact.      Conjunctiva/sclera: Conjunctivae normal.   Neck:      Thyroid: No thyromegaly.      Vascular: No carotid bruit.      Comments: No thyromegaly or mass  Cardiovascular:      Rate and Rhythm: Normal rate and regular rhythm.      Pulses: Normal pulses.      Heart sounds: Murmur heard.        Comments: Systolic murmur grade 1/6 over " precordium  Pulmonary:      Effort: Pulmonary effort is normal. No respiratory distress.      Breath sounds: Normal breath sounds. No wheezing.   Abdominal:      General: Bowel sounds are normal. There is no distension.      Palpations: Abdomen is soft.      Tenderness: There is no abdominal tenderness.   Musculoskeletal:         General: Normal range of motion.      Cervical back: Normal range of motion and neck supple.      Right lower leg: No edema.      Left lower leg: No edema.   Lymphadenopathy:      Cervical: No cervical adenopathy.   Skin:     General: Skin is warm.      Findings: No rash.      Comments: Vertex of scalp with approximate 2.5 cm ulcerated lesion with no surrounding redness and no discharge, thick granulation tissue noted at base of ulceration   Neurological:      General: No focal deficit present.      Mental Status: He is alert and oriented to person, place, and time. Mental status is at baseline.      Cranial Nerves: No cranial nerve deficit.      Motor: Weakness present.      Coordination: Coordination normal.      Gait: Gait abnormal.      Comments: Shuffling gait noted with weakness upon standing   Psychiatric:         Mood and Affect: Mood normal.         Behavior: Behavior normal.         Thought Content: Thought content normal.         Judgment: Judgment normal.         Assessment/Plan   Andrew Hernandez is here today and the following problems have been addressed:      Diagnoses and all orders for this visit:    1. Benign essential hypertension (Primary)  -     CBC & Differential  -     Basic Metabolic Panel    2. Chronic atrial fibrillation with RVR (HCC)    3. Acquired hypothyroidism  -     T4, Free  -     TSH    4. Acute on chronic combined systolic and diastolic congestive heart failure (HCC)  -     BNP      Labs as noted  Follow heart healthy/low salt diet, continue dietary supplements to maintain weight, continue good hydration with primarily water and Pedialyte to help maintain  sodium levels  Avoid processed foods  Monitor blood pressure and input and output  No changes in current medication dose unless based on lab results as above  Follow-up with cardiologist as scheduled  Encouraged his daughter-in-law to make follow-up appointment with surgeon that did tongue cancer surgery as he has not had any recent follow-up  Encouragement and reassurance provided to patient today, he is doing much better in regard to strength and overall affect/mood    Return in about 3 months (around 4/13/2022) for Next scheduled follow up.      Marilyn K. Vermeesch, MD      Please note that portions of this note were completed with a voice recognition program.  Efforts were made to edit dictation, but occasionally words are mistranscribed.

## 2022-01-14 NOTE — PROGRESS NOTES
Please call family with lab results. Complete blood count reveals his anemia is improving and I would like them to continue with their power smoothies as I feel his dietary changes are helping his anemia. Kidney function is normal, but hyponatremia and low chloride levels remain unchanged. His thyroid function test suggest that he is overstimulated and I have reduced his levothyroxine back to 88 mcg daily. His free T4 was in normal range and TSH was minimally elevated on that dose and I believe this will be proper dose for him. I have sent in 90-day supply with 1 refill of levothyroxine 88 mcg. I will repeat his levels on next office visit in 3 months. His BNP level is still pending and we will contact them if this is worrisome, however, he appeared to be doing well and I suspect this will be in good range. [facility]

## 2022-01-14 NOTE — PROGRESS NOTES
Please let family know BNP level is 209 and in normal range.  I feel Andrew is doing extremely well on current doses of medication other than change in levothyroxine.  Please tell her it was nice to see both of them yesterday.

## 2022-02-14 NOTE — PROGRESS NOTES
[facility] Cardiology  Office Progress Note  Andrew Hernandez  1934  [address] [facility] [address]       Visit Date: 22    PCP: Vermeesch, Marilyn K, MD  [address]  [facility] [address]    IDENTIFICATION: A 87 y.o. male retired . Roshni Hernandez's URIEL.    PROBLEM LIST:   1.  Tachy Venu Syndrome   A. 21   [facility] dual chamber pacemaker Dr. Howe   2.  NSTEMI              A.  2021 perioperative tongue surgery [facility]. Peak at bedtime troponin greater than 300. Patient deferred ischemic evaluation.  Echocardiogram EF 45%.  3.  Chronic Mixed systolic/diastolic congestive heart failure              A.  10/2021 2D echo: LVEF =40-45%.  Moderate hypokinesis of inferior wall.  Grade 1 diastolic dysfunction.  Normal right ventricular size and systolic function.  Mildly increased LA volume.  Mild AI, mild MR, mild TR.  Trace PI.  Moderate left pleural effusion.  4.  Paroxysmal Atrial Fibrillation              A.  Holter 2020:  2% afib 56/75/178              B.  2021 recurrent periop tongue flap sx [facility]              C. CHADS2 Vasc = 7.  On low dose Eliquis.              D.  Started on Amiodarone 10/2021 while hospitalized for CHF  5.  Essential HTN  6.  Carotid Artery Disease              A.  3/2016 Bilateral carotid artery stenosis.  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               B.  3/2017 PRITESH restented- Given              C.  2020 Carotid US: PRITESH patent.  Residual left nonobstructive internal carotid artery stenosis 50-69%              D.   Carotid US: Right stent imagin-49%. Left ICA 50-69%.  7.  Abdominal Bruit              A.  : Aortic diameter 1.8 cm. NO evidence of AAA.  8.  Hypothyroidism  9.  Gastroesophageal reflux disease  10.  Hypercholesterolemia  11. Chronic Hyponatremia    12.  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  Partial removal              B.   Soft mechanical/thickened diet  13.  9/21 COVID-19  Hospitalized/concomitant CHF [facility]      CC:   Chief Complaint   Patient presents with   • Chronic systolic congestive heart failure (HCC)       Allergies  Allergies   Allergen Reactions   • Levaquin [Levofloxacin] Shortness Of Breath   • Amoxicillin Rash   • Rocephin [Ceftriaxone] Hives       Current Medications    Current Outpatient Medications:   •  amiodarone (PACERONE) 200 MG tablet, Take 1 tablet by mouth Daily., Disp: 90 tablet, Rfl: 1  •  apixaban (ELIQUIS) 2.5 MG tablet tablet, Take 1 tablet by mouth Every 12 (Twelve) Hours., Disp: 30 tablet, Rfl: 5  •  Ascorbic Acid (VITAMIN C PO), Take 500 mg by mouth Daily., Disp: , Rfl:   •  aspirin 81 MG EC tablet, Take 81 mg by mouth Daily., Disp: , Rfl:   •  atorvastatin (LIPITOR) 20 MG tablet, Take 1 tablet by mouth Every Night., Disp: 90 tablet, Rfl: 3  •  Famotidine (PEPCID PO), Take 20 mg by mouth 2 (Two) Times a Day., Disp: , Rfl:   •  ferrous sulfate 140 (45 Fe) MG tablet controlled-release tablet, Take  by mouth Daily With Breakfast. SLOW RELEASE IRON 160/50, Disp: , Rfl:   •  furosemide (LASIX) 40 MG tablet, Alternate 20 mg every other day with 40 mg every other day., Disp: 90 tablet, Rfl: 1  •  levothyroxine (SYNTHROID, LEVOTHROID) 88 MCG tablet, Take 1 tablet by mouth Daily., Disp: 90 tablet, Rfl: 1  •  lisinopril (PRINIVIL,ZESTRIL) 5 MG tablet, Take 1 tablet by mouth Daily., Disp: 30 tablet, Rfl: 5  •  metoprolol succinate XL (TOPROL-XL) 25 MG 24 hr tablet, Take 1 tablet by mouth Daily., Disp: 90 tablet, Rfl: 1  •  multivitamin with minerals (CENTRUM SILVER PO), Take 1 tablet by mouth Daily., Disp: , Rfl:   •  ondansetron (Zofran) 4 MG tablet, Take 1 tablet by mouth Every 8 (Eight) Hours As Needed for Nausea or Vomiting., Disp: 30 tablet, Rfl: 2  •  potassium chloride 10 MEQ CR tablet, Take 1 tablet by mouth Daily. Taken with lasix, Disp: 90 tablet, Rfl: 1  •  Psyllium (VEGETABLE LAXATIVE  "PO), Take 2 tablets by mouth 2 (Two) Times a Day., Disp: , Rfl:   •  sodium chloride 1 g tablet, Take 1 tablet by mouth Daily, Disp: 30 tablet, Rfl: 5  •  tamsulosin (FLOMAX) 0.4 MG capsule 24 hr capsule, Take 1 capsule by mouth Daily. (Patient taking differently: Take 1 capsule by mouth See Admin Instructions. One capsule every three days.), Disp: 90 capsule, Rfl: 3      History of Present Illness   Andrew Hernandez is a 87 y.o. year old male here for follow up.    Pt denies any new chest pain, dyspnea, dyspnea on exertion, orthopnea, PND, palpitations, lower extremity edema, or claudication.  Accompanied by his daughter-in-law.  She keeps precise records on him and on occasion he will have labile blood pressures.  She is found that this is the days that he takes his Flomax.  She states that even though he is taking it every third day it is necessary for his urination.  He has no chest symptoms otherwise.      OBJECTIVE:  Vitals:    02/14/22 0857   BP: 144/52   BP Location: Right arm   Patient Position: Sitting   Pulse: 55   SpO2: 98%   Weight: 64.4 kg (142 lb)   Height: 180.3 cm (71\")     Body mass index is 19.8 kg/m².    Constitutional:       Appearance: Healthy appearance. Not in distress.   Neck:      Vascular: No JVR. JVD normal.   Pulmonary:      Effort: Pulmonary effort is normal.      Breath sounds: Normal breath sounds. No wheezing. No rhonchi. No rales.   Chest:      Chest wall: Not tender to palpatation.   Cardiovascular:      PMI at left midclavicular line. Normal rate. Regular rhythm. Normal S1. Normal S2.      Murmurs: There is no murmur.      No gallop. No click. No rub.   Pulses:     Intact distal pulses.   Edema:     Peripheral edema absent.   Abdominal:      General: Bowel sounds are normal.      Palpations: Abdomen is soft.      Tenderness: There is no abdominal tenderness.   Musculoskeletal: Normal range of motion.         General: No tenderness. Skin:     General: Skin is warm and dry. "   Neurological:      General: No focal deficit present.      Mental Status: Alert and oriented to person, place and time.         Diagnostic Data:  Procedures      ASSESSMENT:   Diagnosis Plan   1. Coronary artery disease involving native coronary artery of native heart without angina pectoris     2. CVD (cerebrovascular disease)     3. Primary hypertension     4. Mixed hyperlipidemia     5. Paroxysmal atrial fibrillation (HCC)         PLAN:  CAD post remote non-ST MI setting of perioperative stress.  Mild impairment of LV function.  Continued aggressive medical management as he is asymptomatic    CVD status post R ICA stenting remotely for follow-up carotid duplex this fall    Hypertension labile he will take half dose furosemide days that he takes Flomax continued metoprolol lisinopril otherwise is recommended    Mixed dyslipidemia controlled on statin therapy    Paroxysmal A. fib [id number] continued anticoagulation amiodarone metoprolol.  Low frequency of A. fib post pacemaker that allows for metoprolol utilization          Oziel Mcnair MD, [facility]

## 2022-03-14 NOTE — PROGRESS NOTES
[place]   OFFICE NOTE      Andrew Hernandez  1934  PCP: Vermeesch, Marilyn K, MD    SUBJECTIVE:   Andrew Hernandez is a 87 y.o. male seen for a follow up visit regarding the following:     CC:Tachybrady Syndrome    HPI:   Pleasant 87 year old presents for follow up regarding CHF, Afib, HTN, and BSC pacemaker. He recently had a generator change. Since then he is doing well. He denies any symptomatic AF episodes, worsening SOB, CP, LH, and dizziness. Denies any hospitalizations, ER visits, bleeding, or TIA/CVA symptoms. Overall feels well.    Cardiac PMH: (Old records have been reviewed and summarized below)  1. Tachybradycardia syndrome  a. Positive dual-chamber pacemaker Dr. Howe December 13, 2021  2. Coronary artery disease  a. Non-ST elevation microinfarction August 2021 postop tongue surgery patient treated medical therapy, EF 45%  3. Mixed diastolic/systolic heart failure echocardiogram October 2001 EF 45%.  Continue beta-blocker, ACE therapy  4. Paroxysmal atrial fibrillation  a. Amiodarone therapy  b. Chads Vascor equal to 7, on Eliquis therapy tolerating well  5. Chronic class II congestive heart failure  6. Hypertension  7. Hyperlipidemia  8. GERD  9. Hypothyroidism  10. Tongue cancer  a. Status post invasive squamous cell removal August 2021  b. On mechanical/thickened diet  11. COVID-19 September 2021 hospitalized with concomitant heart failure [facility]      Past Medical History, Past Surgical History, Family history, Social History, and Medications were all reviewed with the patient today and updated as necessary.       Current Outpatient Medications:   •  amiodarone (PACERONE) 200 MG tablet, Take 1 tablet by mouth Daily., Disp: 90 tablet, Rfl: 1  •  apixaban (ELIQUIS) 2.5 MG tablet tablet, Take 1 tablet by mouth Every 12 (Twelve) Hours., Disp: 30 tablet, Rfl: 5  •  Ascorbic Acid (VITAMIN C PO), Take 500 mg by mouth Daily., Disp: , Rfl:   •   "aspirin 81 MG EC tablet, Take 81 mg by mouth Daily., Disp: , Rfl:   •  atorvastatin (LIPITOR) 20 MG tablet, Take 1 tablet by mouth Every Night., Disp: 90 tablet, Rfl: 3  •  Famotidine (PEPCID PO), Take 20 mg by mouth 2 (Two) Times a Day., Disp: , Rfl:   •  ferrous sulfate 140 (45 Fe) MG tablet controlled-release tablet, Take  by mouth Daily With Breakfast. SLOW RELEASE IRON 160/50, Disp: , Rfl:   •  furosemide (LASIX) 40 MG tablet, Take 1 tablet by mouth Daily. (Patient taking differently: Take 40 mg by mouth Daily. Pt takes 20mg every other day), Disp: 90 tablet, Rfl: 1  •  levothyroxine (SYNTHROID, LEVOTHROID) 88 MCG tablet, Take 1 tablet by mouth Daily., Disp: 90 tablet, Rfl: 1  •  lisinopril (PRINIVIL,ZESTRIL) 5 MG tablet, Take 1 tablet by mouth Daily., Disp: 30 tablet, Rfl: 5  •  metoprolol succinate XL (TOPROL-XL) 25 MG 24 hr tablet, Take 1 tablet by mouth Daily., Disp: 90 tablet, Rfl: 1  •  multivitamin with minerals tablet tablet, Take 1 tablet by mouth Daily., Disp: , Rfl:   •  ondansetron (Zofran) 4 MG tablet, Take 1 tablet by mouth Every 8 (Eight) Hours As Needed for Nausea or Vomiting., Disp: 30 tablet, Rfl: 2  •  potassium chloride 10 MEQ CR tablet, Take 1 tablet by mouth Daily. Taken with lasix, Disp: 90 tablet, Rfl: 1  •  Psyllium (VEGETABLE LAXATIVE PO), Take 2 tablets by mouth 2 (Two) Times a Day., Disp: , Rfl:   •  sodium chloride 1 g tablet, Take 1 tablet by mouth Daily, Disp: 90 tablet, Rfl: 3  •  tamsulosin (FLOMAX) 0.4 MG capsule 24 hr capsule, Take 1 capsule by mouth Daily. (Patient taking differently: Take 1 capsule by mouth See Admin Instructions. One capsule every three days.), Disp: 90 capsule, Rfl: 3      Allergies   Allergen Reactions   • Levaquin [Levofloxacin] Shortness Of Breath   • Amoxicillin Rash   • Rocephin [Ceftriaxone] Hives         PHYSICAL EXAM:    /50 (BP Location: Right arm, Patient Position: Sitting)   Pulse 69   Ht 180.3 cm (71\")   Wt 63 kg (139 lb)   SpO2 93%  "  BMI 19.39 kg/m²        Wt Readings from Last 5 Encounters:   03/14/22 63 kg (139 lb)   02/14/22 64.4 kg (142 lb)   01/13/22 65.8 kg (145 lb)   12/13/21 63.9 kg (140 lb 14 oz)   12/12/21 66.2 kg (145 lb 15.1 oz)       BP Readings from Last 5 Encounters:   03/14/22 162/50   02/14/22 144/52   01/13/22 118/54   12/14/21 137/51   12/13/21 125/53       General appearance - Alert, well appearing, and in no distress   Mental status - Affect appropriate to mood.  Eyes - Sclerae anicteric,  ENMT - Hearing grossly normal bilaterally, Dental hygiene good.  Neck - Carotids upstroke normal bilaterally, no bruits, no JVD.  Resp - Clear to auscultation, no wheezes, rales or rhonchi, symmetric air entry.  Heart - Normal rate, regular rhythm, normal S1, S2, no murmurs, rubs, clicks or gallops.  GI - Soft, nontender, nondistended, no masses or organomegaly.  Neurological - Grossly intact - normal speech, no focal findings  Extremities - Peripheral pulses normal, no pedal edema, no clubbing or cyanosis.  Skin - Normal coloration and turgor.  Psych -  oriented to person, place, and time.    Medical problems and test results were reviewed with the patient today.     No results found for this or any previous visit (from the past 672 hour(s)).      EKG: (EKG has been independently visualized by me and summarized below)    ECG 12 Lead    Date/Time: 3/14/2022 1:34 PM  Performed by: Harsh Baer PA  Authorized by: Harsh Baer PA   Rhythm: sinus rhythm  Rate: normal  Conduction: conduction normal  ST Segments: ST segments normal  QRS axis: normal  Other: no other findings              Device Interrogation:  Please see device interrogation form which has been signed and updated for full details.BSC DDDR 60bpm. A paced 53%, 1% . Normal thresholds and impedances.  1% mode switch.  Battery voltage 9.5 years.     ASSESSMENT   1. PAF/tachybradycardia syndrome: 1% atrial fibrillation, normal pacemaker function.    Chadsvasc =7, On  Eliquis 2.5mg BID.2. 2. CAD/Carotid scan: No angina symptoms continue aspirin statin  3. HTN: Labile BP,   4. HLD: Statin   5. Chronic CHF symptoms class II, Echocardiogram EF 45%, Moderate HK, Inferior wall. Mild AI, MR. Babb daily.     PLAN  · Continue current medical therapy.  Follow-up amiodarone surveillance labs in 6 months TSH, free T4 and CMP.  · Return follow-up 6 months or sooner as needed    3/14/2022  13:43 EDT     Will Buffy LOONEY

## 2022-03-25 NOTE — TELEPHONE ENCOUNTER
Called and left message for Roshni to return my call regarding Mr Hernandez's latitude monitor not connecting.

## 2022-04-14 PROBLEM — M27.8 JAW MASS: Status: ACTIVE | Noted: 2022-01-01

## 2022-04-14 NOTE — PROGRESS NOTES
Chief Complaint   Patient presents with   • Follow-up     For hypothyroidism, HTN, and GERD.      Subjective   Andrew Hernandez is a 88 y.o. male.     Patient is here today for follow-up of HTN, CAD, HLD, A. fib, carotid stenosis, tongue cancer, hypothyroidism, history of CVA, GERD, hyponatremia.  HTN/HLD/CAD/CHF- his BP is well controlled. He denies that he is dizzy except after taking Flomax.  He denies any CP, heaviness or tightness in chest.  Weight is stable over past 3 months.  Recently saw cardiologist and had pacemaker generator change-no other changes made in any medications.  He remains on super smoothies, maintaining weight.   Tongue cancer- the area from surgery has healed.  He has a sore spot on the outside of jaw.  A hard mass.   Hypothyroid- takes med daily.  Medication change made 3 months ago and labs due today.  No changes in voice, swallow, constipation. No depression.  He is sleeping well.   BPH- he takes flomax every third day.  He has low BP, dizziness and weakness a few hours after he takes it.  No difficulty urinating.  He has tried to go without the flomax and cannot urinate well without it.   GERD- remains on pepcid BID, no GERD.        The following portions of the patient's history were reviewed and updated as appropriate: allergies, current medications, past family history, past medical history, past social history, past surgical history and problem list.    Review of Systems   Constitutional: Negative for activity change, appetite change and unexpected weight change.   HENT:        Right mandibular mass   Eyes: Negative for visual disturbance.   Respiratory: Negative for shortness of breath.    Cardiovascular: Negative for chest pain, palpitations and leg swelling.   Gastrointestinal: Negative for abdominal pain.   Genitourinary: Negative for decreased urine volume, difficulty urinating and hematuria.        See HPI   Musculoskeletal: Negative for back pain.   Neurological: Positive for  "dizziness. Negative for headaches.   Psychiatric/Behavioral: Negative for dysphoric mood and sleep disturbance. The patient is not nervous/anxious.        Objective   /62   Pulse 64   Temp 97.3 °F (36.3 °C)   Ht 180.3 cm (71\")   Wt 66.2 kg (146 lb)   SpO2 99%   BMI 20.36 kg/m²   Body mass index is 20.36 kg/m².  Physical Exam  Vitals and nursing note reviewed.   Constitutional:       General: He is not in acute distress.     Appearance: Normal appearance. He is well-developed. He is not ill-appearing.      Comments: Kind and pleasant , appears stated age and in NAD today   HENT:      Head: Normocephalic and atraumatic.      Right Ear: External ear normal.      Left Ear: External ear normal.      Mouth/Throat:      Comments: Edentulous  Right posterior tongue is well-healed after excision of tongue lesion, there is hair growth over right lateral tongue noted  Right mandible with approximate 2.5 cm hard lesion noted that is somewhat tender to touch, no overlying warmth or erythema  Eyes:      General:         Right eye: No discharge.         Left eye: No discharge.      Extraocular Movements: Extraocular movements intact.   Neck:      Thyroid: No thyromegaly.      Vascular: No carotid bruit.      Comments: No thyromegaly or mass  Cardiovascular:      Rate and Rhythm: Normal rate and regular rhythm.      Pulses: Normal pulses.      Heart sounds: Murmur heard.      Comments: Pacemaker noted in left upper chest  Systolic murmur grade 2/6 over precordium  Pulmonary:      Effort: Pulmonary effort is normal. No respiratory distress.      Breath sounds: Normal breath sounds. No wheezing.   Abdominal:      General: Bowel sounds are normal. There is no distension.      Palpations: Abdomen is soft.      Tenderness: There is no abdominal tenderness.   Musculoskeletal:         General: Normal range of motion.      Cervical back: Normal range of motion and neck supple.      Right lower leg: No edema.      Left lower " leg: No edema.      Comments: Compression stockings in place   Lymphadenopathy:      Cervical: No cervical adenopathy.   Skin:     General: Skin is warm.      Findings: No rash.   Neurological:      General: No focal deficit present.      Mental Status: He is alert and oriented to person, place, and time. Mental status is at baseline.      Cranial Nerves: No cranial nerve deficit.      Motor: No weakness.      Coordination: Coordination normal.      Gait: Gait normal.      Comments: Patient able to ambulate back to clinic without cane, fairly good balance noted   Psychiatric:         Mood and Affect: Mood normal.         Behavior: Behavior normal.         Thought Content: Thought content normal.         Judgment: Judgment normal.         Assessment/Plan   Andrew Hernandez is here today and the following problems have been addressed:      Diagnoses and all orders for this visit:    1. Benign essential hypertension (Primary)  -     CBC & Differential  -     Basic Metabolic Panel    2. Chronic atrial fibrillation with RVR (HCC)    3. Hypercholesterolemia    4. Acquired hypothyroidism  -     T4, Free  -     TSH    5. Jaw mass  -     XR mandible 4+ vw        Follow heart healthy/low salt diet  Avoid processed foods, continue super smoothies once daily  Monitor blood pressure and heart rate regularly  Take all medications as prescribed  Continue Lasix 20 mg alternating with 40 mg every other day  Currently on Flomax every third day, recommend a trial of over-the-counter super beta prostate complex daily.  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      Marilyn K. Vermeesch, MD      Please note that portions of this note were completed with a voice recognition program.  Efforts were made to edit dictation, but occasionally words are mistranscribed.  Answers for HPI/ROS submitted by the patient on 4/13/2022  What is the primary reason for your visit?: Other  Please describe your symptoms.: 3 mo fu  Have you had these symptoms before?: Yes  How long have you been having these symptoms?: Greater than 2 weeks

## 2022-04-19 NOTE — PROGRESS NOTES
Please call daughter-in-law with x-ray result.  X-ray does not reveal abnormality on mandible that we can feel on palpation or exam.  Radiologist recommends a CT scan if we are concerned which I am.  Please ask her if Andrew is agreeable to a CT scan.  He states that he does not want anything done if there is spread of cancer.  Please ask her to discuss this with him and if she wants me to order CT scan let me know.

## 2022-04-20 NOTE — PROGRESS NOTES
Please call daughter with lab results.  Complete blood count reveals that his anemia is improving with dietary changes that they are making.  Kidney function is normal.  His sodium and chloride levels are also improved compared to 3 months ago.  Thyroid function tests are abnormal due to use of amiodarone, however, I believe may be also abnormal due to some of the supplements he is receiving in his smoothies.  If he is on a multivitamin with any biotin in any supplements this will cause free T4 to be elevated.  He does not complain of any symptoms of hypothyroid, and I do not want to make change in medication at this time since he is doing quite well.  Please find out from family if they would like me to order a CT scan of mandible as x-ray did not show any abnormality in bone of mandible.

## 2022-05-04 NOTE — TELEPHONE ENCOUNTER
----- Message from Marilyn K Vermeesch, MD sent at 5/2/2022  9:42 AM EDT -----  Regarding: FW: CT Results  Mr. Jose has a soft tissue mass of right jaw that is likely recurrence of tongue cancer.  He was seen at  for this.  He is agreeable to have biopsy done but does not want to go back to [place].  Could you please find out if Dr. Portillo would be ag  reeable to do a biopsy of soft tissue mass in right mandible area?  If so I will make referral.  Thank you    ----- Message -----  From: Sal Otero MA  Sent: 5/2/2022   9:40 AM EDT  To: Marilyn K Vermeesch, MD  Subject: FW: CT Results                                     ----- Message -----  From: Andrew Hernandez  Sent: 5/2/2022   9:32 AM EDT  To: [facility]  Subject: CT Results                                       He has agreed to see someone about the biopsy but he wants to do it in [place], so could He see Tania Portillo?     Roshni

## 2022-05-04 NOTE — TELEPHONE ENCOUNTER
"Per Dr. Portillo's MA - \"Spoke with Dr. Portillo, she stated it would be best if patient went to ENT here in [place] if he was not willing to go back to . It is not something she will do. \"        "

## 2022-05-05 NOTE — TELEPHONE ENCOUNTER
Would you please contact our ENT surgeons here in [place] to see if any of them are comfortable doing this biopsy?  Thank you

## 2022-05-05 NOTE — TELEPHONE ENCOUNTER
Called Mr. Hernandez's POA to see about getting his home monitor working.There was no answer and the voicemail was full.

## 2022-05-17 NOTE — TELEPHONE ENCOUNTER
From: Andrew Hernandez  To: Marilyn K. Vermeesch, MD  Sent: 5/17/2022 9:56 AM EDT  Subject: Refill    Potassium 10 meq needs refills also   [facility]   Thank you Roshni

## 2022-05-17 NOTE — TELEPHONE ENCOUNTER
Rx Refill Note  Requested Prescriptions     Pending Prescriptions Disp Refills   • amiodarone (PACERONE) 200 MG tablet 90 tablet 1     Sig: Take 1 tablet by mouth Daily.   • potassium chloride 10 MEQ CR tablet 90 tablet 1     Sig: Take 1 tablet by mouth Daily. Taken with lasix      Last office visit with prescribing clinician: 4/14/2022      Next office visit with prescribing clinician: 7/15/2022            Baylee Patel LPN  05/17/22, 10:01 EDT

## 2022-06-03 NOTE — TELEPHONE ENCOUNTER
Pt’s POA, Roshni Hernandez, called to report that pt begins radiation treatment Monday, June, 6, 2022 @ [facility]. Ms. Hernandez reports that pt’s radiation oncologist, Dr. Powell, requests a remote device down load today prior to starting radiation (Rt mandible jaw for recurrent CA).    Ass[place] Ms. Hernandez[place] [facility] [place]normal device function, no alerts-see MURJ.     Please fax read report to [phone], attention Alfredo Munguia

## 2022-06-13 NOTE — TELEPHONE ENCOUNTER
Signed [facility] remote cardiac device transmission faxed today to [facility] @ [phone].

## 2022-06-14 NOTE — PROGRESS NOTES
Please call daughter-in-law and tell her urine is positive and sent for culture.  I have sent in Macrobid 100 mg to be taken twice daily for 7 days.

## 2022-06-20 NOTE — PROGRESS NOTES
Please call stepdaughter and tell her I realize that Andrew is in the emergency room with UTI.  Please tell her I am sorry it took so long for culture to come back but he has an odd organism that is resistant to multiple antibiotics and culture just returned.  Please tell her to ask emergency room to check urine culture now.  This is sensitive to primarily Cipro and Levaquin as well as Bactrim which I would prefer he not take.  There several other IV antibiotics that they can use.

## 2022-06-21 PROBLEM — E43 SEVERE MALNUTRITION (HCC): Status: ACTIVE | Noted: 2022-01-01

## 2022-07-01 NOTE — PLAN OF CARE
Goal Outcome Evaluation:         Discharged to home with Hospice referral via [facility] 7/1/2022 @ 1430.  Lor RN/HCP called right as pt leaving and notified of his discharging at that time.  Confirmed pt had Comfort medication RX filled prior to DC

## 2022-07-01 NOTE — DISCHARGE SUMMARY
[facility]      Name:  Andrew Hernandez   Age:  88 y.o.  Sex:  male  :  1934  MRN:  [id number]   Visit Number:  [id number]    Admission Date:  2022  Date of Discharge:  2022  Primary Care Physician:  Vermeesch, Marilyn K, MD    Important issues to note:    -Patient discharged home with hospice care  -Comfort medications prescribed at discharge    Discharge Diagnoses:     1. Intractable Nausea, Vomiting, POA  2. Acute Hyponatremia in setting of Chronic Hyponatremia, POA  3. Acute Morganella morganii Urinary Tract Infection, POA  4. Squamous Cell Carcinoma of Mandible  5. Chronic Systolic and Diastolic Heart Failure  6. Sick Sinus Syndrome s/p Pacemaker  7. Hypothyroidism  8. Atrial Fibrillation, on Eliquis  9. Iron Deficiency Anemia  10. Dysphagia  11. Acute Hypoxia  12. Hematuria  13. Impaired Mobility and ADLs    Problem List:     Active Hospital Problems    Diagnosis  POA   • **Hyponatremia [E87.1]  Yes   • Severe malnutrition (HCC) [E43]  Yes   • Jaw mass [M27.8]  Yes   • Chronic anticoagulation [Z79.01]  Not Applicable   • Chronic atrial fibrillation with RVR (HCC) [I48.20]  Yes   • Cerebrovascular accident (HCC) [I63.9]  Yes      Resolved Hospital Problems   No resolved problems to display.     Presenting Problem:    Chief Complaint   Patient presents with   • Nausea   • Weakness - Generalized   • Vomiting   • Urinary Frequency     Pt has had N/V  for two weeks, being treated for UTI, generally getting weaker. 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 Ems stated pt was hypotensive.        Consults:     Consulting Physician(s)  Chat With All Active Members    Provider Relationship Specialty    Estuardo Winchester MD  Consulting Physician Nephrology    Vikas Robles MD  Consulting Physician Internal Medicine    Luiz Castellon MD  Consulting Physician Urology        Procedures Performed:        History of  "presenting illness/Hospital Course:    Patient is an 88-year-old male with significant past medical history of chronic systolic and diastolic heart failure, symptomatic bradycardia s/p PPM and most notably for recently diagnosed squamous cell carcinoma of the mandible undergoing palliative radiation that presents to the hospital with complaints of nausea, vomiting, and generalized weakness. Patient is accompanied by his daughter-in-law who states that patient was recently diagnosed with squamous cell carcinoma of the mandible and began palliative radiation on 6/14/22. Unfortunately, he has been unable to undergo any further treatments due to his fatigue, nausea, and vomiting. On the same day of his initial radiation treatment, he was feeling fatigued and nauseous prompting a UA to be obtained. Patient was then started on Nitrofurantoin. Despite taking this antibiotic, the patient has continued to become more fatigued, with increasing nausea and vomiting. Daughter-in-law states that he has been able to tolerate Boost three times daily, a smoothie that her  makes him twice daily, and water, just unable to tolerate food intake. This morning, the patient was so fatigued, that he \"fainted\" upon standing, prompting him to be brought to the ED for further evaluation and treatment.     Upon arrival to the ED, patient with a blood pressure of 161/63, temp 97.7, pulse 69, and oxygen saturation 100% on room air. Labs revealed a sodium of 111 (baseline 122-125). Hemoglobin 10.5. Urinalysis cloudy with 3+ leukocytes, 13-20 WBC, and 1+ bacteria. Patient received 500mL bolus of normal saline, 4mg Zofran, and Gentamicin in the ED. Hospitalist called for admission.    Patient was continued on Gentamicin to complete antibiotic therapy. Nephrology was consulted given patient's hyponatremia. He was continued on IV diuretics and sodium did continue to increase appropriately.     Patient did develop hematuria for which urology was " consulted. His CT scan of his abdomen pelvis was showing effusions, as well as significant marked bladder distention and severe prostate enlargement.  A Rivero catheter was placed by Dr. Castellon on 6/25/2022.  He has had adequate urine output since.  Diuretics being adjusted by nephrology and sodium tablets being increased.    Despite appropriate treatment of urinary tract infection and resolution of hyponatremia, patient continued to have nausea. Palliative care was consulted and met with family. Initially they were in agreement with proceeding with palliative care, but given patient's continued pain from squamous cell carcinoma of mandible and continued decline, family ultimately decided to proceed with hospice care at home.     Comfort medications were ordered at time of discharge. Hospice care will be taken over the care of the patient at discharge.       Vital Signs:    Temp:  [97.6 °F (36.4 °C)-98.3 °F (36.8 °C)] 97.8 °F (36.6 °C)  Heart Rate:  [61-68] 64  Resp:  [16-18] 16  BP: (126-148)/(43-55) 144/55    Physical Exam:    General Appearance:  Alert and cooperative. In no acute distress.   Head:  Atraumatic and normocephalic. Right sided edema of mandible, tenderness to palpation.   Eyes: Conjunctivae and sclerae normal, no icterus. No pallor.                   Lungs:   Breath sounds heard bilaterally equally.  No crackles or wheezing.    Heart:  Normal S1 and S2, no murmur, no gallop, no rub.    Abdomen:   Normal bowel sounds, no masses, no organomegaly. .   Extremities: Supple, no edema, no cyanosis, no clubbing.   Pulses: Pulses palpable bilaterally.   Skin: No bleeding or rash.   Neurologic: Alert and oriented x 3. Generalized weakness.     Pertinent Lab Results:     Results from last 7 days   Lab Units 07/01/22  0546 06/30/22  0616 06/29/22  0651   SODIUM mmol/L 126* 130* 128*   POTASSIUM mmol/L 4.3 4.0 4.0   CHLORIDE mmol/L 86* 88* 89*   CO2 mmol/L 29.2* 31.8* 29.0   BUN mg/dL 22 23 19   CREATININE mg/dL  0.84 0.85 0.81   CALCIUM mg/dL 8.8 8.9 8.7   GLUCOSE mg/dL 95 98 93     Results from last 7 days   Lab Units 06/29/22  0651 06/28/22  0629 06/27/22  0614   WBC 10*3/mm3 6.86 7.05  --    HEMOGLOBIN g/dL 9.4* 9.4* 9.3*   HEMATOCRIT % 26.7* 27.1* 26.1*   PLATELETS 10*3/mm3 221 205  --                                    Pertinent Radiology Results:    Imaging Results (All)     Procedure Component Value Units Date/Time    FL Video Swallow With Speech Single Contrast [[id number]] Collected: 06/28/22 1500     Updated: 06/28/22 1507    Narrative:      FLUOROSCOPIC VIDEO SWALLOW WITH SPEECH SINGLE-CONTRAST     HISTORY:  Aspiration; E87.5-Qjbn-gecgzepszp and hyponatremia;  N30.00-Acute cystitis without hematuria; R53.1-Weakness.      FINDINGS:  Fluoroscopy was provided for the speech pathologist to  evaluate the swallowing mechanism. The patient was given several  different consistencies of barium while the swallow was visualized  fluoroscopically. Across all consistencies there was no evidence of  penetration or aspiration. The report of the speech pathologist should  be consulted prior to making dietary decisions.       Impression:      Modified barium swallow under fluoroscopic guidance.     No significant episodes of penetration or aspiration across any  consistency.     Please see speech pathologist's report for further details and dietary  recommendations.        FLUOROSCOPY TIME: Eighty-eight seconds.     NUMBER OF CINE SEQUENCES:  Ten     This report was signed and finalized on 6/28/2022 3:05 PM by Marin Dennis MD.    XR Chest 1 View [[id number]] Collected: 06/28/22 1503     Updated: 06/28/22 1507    Narrative:      XR CHEST 1 VIEW-     HISTORY: Hypoxia; E87.1-Hypoosmolality and hyponatremia; N30.00-Acute  cystitis without hematuria; R53.1-Weakness.     COMPARISON:  12/09/2021     FINDINGS:  Portable view of the chest demonstrates moderate size right  pleural effusion with right basilar atelectasis. Pneumonia cannot  be  excluded. Trace left pleural effusion is noted. Left-sided pacer is  present. The mediastinum is unremarkable.     The heart size is normal.       Impression:      Bibasilar opacities compatible with pleural effusions  greater on right side. Right basilar pneumonia not excluded.      This report was signed and finalized on 6/28/2022 3:05 PM by Marin Dennis MD.    CT Abdomen Pelvis Without Contrast [[id number]] Collected: 06/24/22 1312     Updated: 06/24/22 1324    Narrative:      PROCEDURE: CT ABDOMEN PELVIS WO CONTRAST-     HISTORY: Flank pain, kidney stone suspected     COMPARISON:None     Note: Noncontrast exam is less sensitive for assessment of the bowel and  solid abdominal organs     TECHNIQUE: Noncontrast exam      CT ABDOMEN:  Moderate bilateral pleural effusions are present.  Significant atelectasis is noted. Moderate renal scarring and atrophy is  present. Multiple hypodense renal lesions are seen compatible with  cysts. No obstructing stone disease is seen. Remaining solid organs are  unremarkable. The bowel shows no evidence of obstruction. There is no  free air or free fluid within the abdomen.      Cholelithiasis is present.     Moderate fecal impaction is noted.     CT PELVIS: No bowel dilatation is seen. There is no free fluid. Appendix  is normal. Bladder is distended with bladder diverticuli. A density  level seen within the bladder diverticulum arising from the bladder dome  which could represent tiny stones or debris. Severe prostate enlargement  is noted. There is lobulation along the superior prostate with a  possible mass measuring up to 2.7 cm.       Impression:      1. Moderate size bilateral pleural effusions.  2. Cholelithiasis   3. Severe prostate enlargement with markedly distended bladder.     This study was performed with techniques to keep radiation doses as low  as reasonably achievable (ALARA). Individualized dose reduction  techniques using automated exposure control or  adjustment of vA and/or  kV according to the patient size were employed.      This report was signed and finalized on 6/24/2022 1:22 PM by Marin Dennis MD.          Echo:    Results for orders placed during the hospital encounter of 10/07/21    Adult Transthoracic Echo Complete W/ Cont if Necessary Per Protocol    Interpretation Summary  1.  Normal left ventricular size with mildly reduced LV systolic function, LVEF 40-45%.  2.  Moderate hypokinesis of the inferior wall.  3.  Grade 1 diastolic dysfunction.  4.  Normal right ventricular size and systolic function.  5.  Mildly increased left atrial volume index.  6.  Mild AI, MR, and TR.  7.  Trace PI.  8.  Moderate left pleural effusion.    Condition on Discharge:      Stable.    Code status during the hospital stay:    Code Status and Medical Interventions:   Ordered at: 06/30/22 3329     Medical Intervention Limits:    NO intubation (DNI)     Level Of Support Discussed With:    Patient     Code Status (Patient has no pulse and is not breathing):    No CPR (Do Not Attempt to Resuscitate)     Medical Interventions (Patient has pulse or is breathing):    Limited Support     Comments:    No intubation, discussed with family and POA     Discharge Disposition:    Hospice/Home    Discharge Medications:       Discharge Medications      New Medications      Instructions Start Date   LORazepam 0.5 MG tablet  Commonly known as: Ativan   0.5 mg, Oral, Every 6 Hours PRN      morphine 20 MG/ML concentrated solution   20 mg, Oral, Every 4 Hours PRN      prochlorperazine 10 MG tablet  Commonly known as: COMPAZINE   10 mg, Oral, Every 6 Hours PRN         Changes to Medications      Instructions Start Date   furosemide 40 MG tablet  Commonly known as: LASIX  What changed: additional instructions   40 mg, Oral, Daily      ibuprofen 600 MG tablet  Commonly known as: ADVIL,MOTRIN  What changed: Another medication with the same name was removed. Continue taking this medication, and  follow the directions you see here.   Take 1 tablet by mouth Every 6 (Six) Hours As Needed For Moderate Pain      tamsulosin 0.4 MG capsule 24 hr capsule  Commonly known as: FLOMAX  What changed:   · when to take this  · additional instructions   0.4 mg, Oral, Daily         Continue These Medications      Instructions Start Date   acetaminophen 500 MG tablet  Commonly known as: TYLENOL   1,000 mg, Oral, Every 6 Hours PRN      amiodarone 200 MG tablet  Commonly known as: PACERONE   200 mg, Oral, Every 24 Hours Scheduled      ascorbic acid 500 MG tablet  Commonly known as: VITAMIN C   500 mg, Oral, Daily      aspirin 81 MG EC tablet   81 mg, Oral, Daily      atorvastatin 20 MG tablet  Commonly known as: LIPITOR   20 mg, Oral, Nightly      ferrous sulfate 140 (45 Fe) MG tablet controlled-release tablet   Oral, Daily With Breakfast, SLOW RELEASE IRON 160/50       levothyroxine 88 MCG tablet  Commonly known as: SYNTHROID, LEVOTHROID   88 mcg, Oral, Daily      metoprolol succinate XL 25 MG 24 hr tablet  Commonly known as: TOPROL-XL   25 mg, Oral, Every 24 Hours Scheduled      NON FORMULARY   1 tablet, Oral, 2 Times Daily, Super Beta prostate vitamin      ondansetron 4 MG tablet  Commonly known as: Zofran   4 mg, Oral, Every 8 Hours PRN      PEPCID PO   20 mg, Oral, 2 Times Daily      potassium chloride 10 MEQ CR tablet   Take 1 tablet by mouth Daily. Taken with Lasix      promethazine 12.5 MG tablet  Commonly known as: PHENERGAN   12.5 mg, Oral, Every 8 Hours PRN      senna 8.6 MG tablet  Commonly known as: SENOKOT   1 tablet, Oral, 2 Times Daily      sodium chloride 1 g tablet   Take 1 tablet by mouth Daily         Stop These Medications    apixaban 2.5 MG tablet tablet  Commonly known as: ELIQUIS     lisinopril 5 MG tablet  Commonly known as: PRINIVIL,ZESTRIL     nitrofurantoin (macrocrystal-monohydrate) 100 MG capsule  Commonly known as: Macrobid          Discharge Diet:     Pureed with some mashed, thin liquids, Boost  supplements 3 times daily    Activity at Discharge:     As tolerated    Follow-up Appointments:     Contact information for follow-up providers     Vermeesch, Marilyn K, MD .    Specialty: Internal Medicine  Contact information:  [address]  [facility] [place] [address] [phone]                   Contact information for after-discharge care     Home Medical Care     [facility]    Service: [facility] Services  Contact information:  [address] [facility] [address] [phone]                           Future Appointments   Date Time Provider Department Center   7/7/2022  3:15 PM Vermeesch, Marilyn K, MD [facility]   8/22/2022 10:00 AM [facility] CARD [facility] ECHO/VAS 9 BH [facility]   8/22/2022  1:15 PM Oziel Mcnair MD [facility]IM     Test Results Pending at Discharge:           Whitney Rivera DO  07/01/22  10:00 EDT    Time: I spent 33 minutes on this discharge activity which included: face-to-face encounter with the patient, reviewing the data in the system, coordination of the care with the nursing staff as well as consultants, documentation, and entering orders.     Dictated utilizing Dragon dictation.

## 2022-07-01 NOTE — PROGRESS NOTES
[facility] Progress Note  [facility]. [place]        Patient Name: Andrew Hernandez  : 1934  MRN: [id number]   LOS: 11 days    Patient Care Team:  Vermeesch, Marilyn K, MD as PCP - General (Internal Medicine & Pediatrics)  Dirk De Leon MD as Consulting Physician (Ophthalmology)  Oziel Mcnair MD as Consulting Physician (Cardiology)  Harsh Huerta MD as Consulting Physician (Urology)    Chief Complaint:    Chief Complaint   Patient presents with   • Nausea   • Weakness - Generalized   • Vomiting   • Urinary Frequency     Pt has had N/V  for two weeks, being treated for UTI, generally getting weaker. 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 Ems stated pt was hypotensive.       Primary Care Physician:  Vermeesch, Marilyn K, MD  Date of admission: 2022    Subjective     Interval History:   Follow-up hyponatremia.  Events noted from last 24 hours. He is about his baseline, He does complain of pain in the jaw.  Still has fairly poor appetite.  It does appear patient has finally decided to go home with hospice. No significant change noted.  No chest pain or shortness of breath.    Review of Systems:   As noted above.    Objective     Vitals:   Temp:  [97.6 °F (36.4 °C)-98.3 °F (36.8 °C)] 98.1 °F (36.7 °C)  Heart Rate:  [61-68] 66  Resp:  [16-18] 16  BP: (126-148)/(43-48) 148/47  Flow (L/min):  [2] 2    Intake/Output Summary (Last 24 hours) at 2022 0710  Last data filed at 2022 0400  Gross per 24 hour   Intake 480 ml   Output 1250 ml   Net -770 ml       Physical Exam:    General Appearance: alert, no acute distress   Skin: warm and dry  HEENT: oral mucosa normal, nonicteric sclera  Neck: supple, no JVD  Lungs: CTA, coarse breath sounds.  Heart: IRRR,   Abdomen: soft, nontender, nondistended  : palpable bladder  Extremities: Trace edema, no cyanosis or clubbing  Neuro: normal speech and mental status     Scheduled Meds:      Current Facility-Administered Medications   Medication Dose Route Frequency Provider Last Rate Last Admin   • acetaminophen (TYLENOL) tablet 1,000 mg  1,000 mg Oral Q4H PRN Jeane Perez, DO   1,000 mg at 06/28/22 1440    Or   • acetaminophen (TYLENOL) 160 MG/5ML solution 650 mg  650 mg Oral Q4H PRN Jeane Perez DO        Or   • acetaminophen (TYLENOL) suppository 650 mg  650 mg Rectal Q4H PRN Jeane Perez, DO       • acetaminophen (TYLENOL) tablet 1,000 mg  1,000 mg Oral Q6H PRN Whitney Rivera, DO   1,000 mg at 06/21/22 1518   • amiodarone (PACERONE) tablet 200 mg  200 mg Oral Q24H Whitney Rivera, DO   200 mg at 06/30/22 0809   • atorvastatin (LIPITOR) tablet 20 mg  20 mg Oral Nightly Whitney Rivera, DO   20 mg at 06/30/22 2033   • sennosides-docusate (PERICOLACE) 8.6-50 MG per tablet 2 tablet  2 tablet Oral BID Jeane Perez DO   2 tablet at 06/30/22 2033    And   • polyethylene glycol (MIRALAX) packet 17 g  17 g Oral Daily PRN Jeane Perez DO   17 g at 06/26/22 0503    And   • bisacodyl (DULCOLAX) EC tablet 5 mg  5 mg Oral Daily PRN Jeane Perez DO   5 mg at 06/24/22 1441    And   • bisacodyl (DULCOLAX) suppository 10 mg  10 mg Rectal Daily PRN Jeane Perez DO       • carboxymethylcellulose (REFRESH PLUS) 0.5 % ophthalmic solution 2 drop  2 drop Both Eyes TID PRN Jeane Perez DO       • Diclofenac Sodium (VOLTAREN) 1 % gel 2 g  2 g Topical 4x Daily Jeane Perez, DO   2 g at 06/30/22 2033   • famotidine (PEPCID) tablet 20 mg  20 mg Oral BID Whitney Rivera, DO   20 mg at 06/30/22 2033   • furosemide (LASIX) tablet 40 mg  40 mg Oral Daily Estuardo Winchester MD   40 mg at 06/30/22 1046   • HYDROcodone-acetaminophen (NORCO) 5-325 MG per tablet 1 tablet  1 tablet Oral Q6H PRN Whitney iRvera, DO   1 tablet at 07/01/22 0554   • levothyroxine (SYNTHROID, LEVOTHROID) tablet 88 mcg  88 mcg Oral Q AM Whitney Rivera,  DO   88 mcg at 07/01/22 0554   • metoprolol succinate XL (TOPROL-XL) 24 hr tablet 25 mg  25 mg Oral Q24H Whitney Rivera, DO   25 mg at 06/30/22 0809   • multivitamin with minerals 1 tablet  1 tablet Oral Daily Whitney Rivera, DO   1 tablet at 06/30/22 0809   • potassium chloride (MICRO-K) CR capsule 10 mEq  10 mEq Oral Daily Whitney Rivera, DO   10 mEq at 06/30/22 0809   • prochlorperazine (COMPAZINE) injection 5 mg  5 mg Intravenous Q6H PRN Whitney Rivera, DO   5 mg at 06/30/22 0830   • promethazine (PHENERGAN) tablet 12.5 mg  12.5 mg Oral Q8H PRN Whitney Rivera, DO   12.5 mg at 06/27/22 1747   • sodium chloride 0.9 % flush 10 mL  10 mL Intravenous Q12H Whitney Rivera, DO   10 mL at 06/30/22 2034   • sodium chloride 0.9 % flush 10 mL  10 mL Intravenous PRN Whitney Rivera, DO   10 mL at 06/29/22 1034   • sodium chloride tablet 1 g  1 g Oral BID With Meals Estuardo Winchester MD   1 g at 06/30/22 1727   • tamsulosin (FLOMAX) 24 hr capsule 0.4 mg  0.4 mg Oral Once per day on Mon Fri Whitney Rivera, DO           amiodarone, 200 mg, Oral, Q24H  atorvastatin, 20 mg, Oral, Nightly  Diclofenac Sodium, 2 g, Topical, 4x Daily  famotidine, 20 mg, Oral, BID  furosemide, 40 mg, Oral, Daily  levothyroxine, 88 mcg, Oral, Q AM  metoprolol succinate XL, 25 mg, Oral, Q24H  multivitamin with minerals, 1 tablet, Oral, Daily  potassium chloride, 10 mEq, Oral, Daily  senna-docusate sodium, 2 tablet, Oral, BID  sodium chloride, 10 mL, Intravenous, Q12H  sodium chloride, 1 g, Oral, BID With Meals  tamsulosin, 0.4 mg, Oral, Once per day on Mon Fri        IV Meds:        Results Reviewed:   I have personally reviewed the results from the time of this admission to 7/1/2022 07:10 EDT     Results from last 7 days   Lab Units 07/01/22  0546 06/30/22  0616 06/29/22  0651   SODIUM mmol/L 126* 130* 128*   POTASSIUM mmol/L 4.3 4.0 4.0   CHLORIDE mmol/L 86* 88* 89*   CO2 mmol/L 29.2* 31.8* 29.0   BUN mg/dL 22 23 19   CREATININE  mg/dL 0.84 0.85 0.81   CALCIUM mg/dL 8.8 8.9 8.7   GLUCOSE mg/dL 95 98 93       Estimated Creatinine Clearance: 57.3 mL/min (by C-G formula based on SCr of 0.84 mg/dL).                Results from last 7 days   Lab Units 06/29/22  0651 06/28/22  0629 06/27/22  0614 06/25/22  0845 06/24/22  0834   WBC 10*3/mm3 6.86 7.05  --   --   --    HEMOGLOBIN g/dL 9.4* 9.4* 9.3* 9.2* 9.7*   PLATELETS 10*3/mm3 221 205  --   --   --              Brief Urine Lab Results  (Last result in the past 365 days)      Color   Clarity   Blood   Leuk Est   Nitrite   Protein   CREAT   Urine HCG        06/20/22 1358 Yellow   Cloudy   Small (1+)   Large (3+)   Negative   Negative                 No results found for: UTPCR    Imaging Results (Last 24 Hours)     ** No results found for the last 24 hours. **              Assessment / Plan     ASSESSMENT:  1. Hyponatremia: On admission patient had some work-up, serum osmolality was 240 and the urine osmolality at 255 urine sodium was 65, most recent TSH was 9.510, no uric acid was done, to me it appears that it is likely decreased intake of salt is likely the cause, does not appear to have SIADH. I will check uric acid for the volume.  2. Intractable nausea and vomiting: Currently patient denies any issues, will have to be reassessed after breakfast to see how he will do with it.  3. UTI: Treated  4. Squamous cell carcinoma of the mandible: Only palliative treatment recommended.  5. Chronic systolic and diastolic heart failure: Optimize medications and will add diuretics.  6. Sick sinus syndrome status post pacemaker placement:  7. Atrial fibrillation: Rate controlled and anticoagulated.    8. Iron deficiency anemia: Hemoglobin is fairly stable.  9. Hypothyroidism: TSH is slightly elevated may need to adjust the Synthroid dose.    PLAN:  · Serum sodium has increased to 126 today.  I will go ahead and switch him to 40 mg of p.o. Lasix daily with 1 g of sodium chloride tablet twice a day.  Continue  to watch serum sodium.  If he goes home he may need to check serum sodium/BMP once every week to 2 weeks.  I will go ahead and give him 1 dose of tolvaptan 7.5 mg again today.  Roshni daughter-in-law told me that patient has decided to go home with hospice, arrangements are being made.  Most likely will be able to go home today.  He can be discharged with the current medications.  · Details were also discussed with the hospitalist service. Poor prognosis.  Ongoing family discussions about goals of care, has led to hospice care.  · Continue with rest of the current treatment plan, and monitor with surveillance labs.  · Further recommendations will depend on clinical course of the patient during the current hospitalization.     Thank you for involving us in the care of Andrew Hernandez.  Please feel free to call with any questions.    Estuardo Winchester MD  07/01/22  07:10 EDT    [facility]  [phone]      Part of this note may be an electronic transcription/translation of spoken language to printed text using the Dragon Dictation System.

## 2022-07-01 NOTE — PLAN OF CARE
Goal Outcome Evaluation:  Plan of Care Reviewed With: patient, family         See new orders for Palliative care Consult. Possible discharge tomorrow, home with hospice. Continue to monitor.

## 2022-07-01 NOTE — PLAN OF CARE
Goal Outcome Evaluation:  Plan of Care Reviewed With: patient, caregiver            Stable for discharge home with hospice per MD.

## 2022-07-01 NOTE — PLAN OF CARE
"Goal Outcome Evaluation:      Received call from Lor RN/Hospice admission nurse requesting update.   Informed planned discharge to home with Hospice services today.  Confirmed pt had repeat COVID 19 test 6/30/2022 with result: \"Not Detected\".    Informed Lor plan for pt to return home via ambulance.  EMS/DNR was signed yesterday per POA.  Lor reported would call pt's daughter in law, Roshni, to discuss equipment needs.      Visited pt to find him awake and alert with O2 per NC in place.  No family present initially. He reported feeling \"OK\".  We discussed the plan for him to return home today.  Pt's daughter in law, Roshni, arrived while we were talking.  Confirmed with Roshni we could use the \"Meds to Beds\" program to secure 3-4 days of PRN meds until Hospice can provide.    Spoke with Dr Shrestha after visit.  She confirmed plan to DC to home today.           "

## 2022-07-01 NOTE — CASE MANAGEMENT/SOCIAL WORK
Continued Stay Note   Joao     Patient Name: Andrew Hernandez  MRN: [id number]  Today's Date: 7/1/2022    Admit Date: 6/20/2022     Discharge Plan     Row Name 07/01/22 1125       Plan    Final Discharge Disposition Code 50 - home with hospice               Discharge Codes    No documentation.               Expected Discharge Date and Time     Expected Discharge Date Expected Discharge Time    Jul 1, 2022             Jackelyn Hunt RN

## 2022-07-01 NOTE — PLAN OF CARE
Goal Outcome Evaluation:  Plan of Care Reviewed With: patient        Progress: improving  Outcome Evaluation: Pt resting well. VSS. PRN pain control.

## 2022-07-07 PROBLEM — C02.9 SQUAMOUS CELL CARCINOMA OF TONGUE (HCC): Status: ACTIVE | Noted: 2021-01-01

## 2022-07-07 NOTE — PROGRESS NOTES
Chief Complaint   Patient presents with   • Follow-up     Pt went to [facility] ER on 6/20 with complaints of nausea, vomiting, and generalized weakness. Upon arrival to the ED, Pt had a blood pressure of 161/63, temp 97.7, pulse 69, and oxygen saturation 100% on room air. Labs revealed a sodium of 111. Hemoglobin 10.5. Urinalysis cloudy with 3+ leukocytes, 13-20 WBC, and 1+ bacteria. Patient received 500mL bolus of normal saline, 4mg Zofran, and Gentamicin in the ED. Hospitalist called for admission. Patient was discharged home with Hospice on 7/1.     Subjective   Andrew Hernandez is a 88 y.o. male.     Telemedicine video visit with patient from his home today.  He is lying in his hospital bed.    Patient is here today for hospital follow-up, see discharge summary and hospital course below.    History of presenting illness/Hospital Course:     Patient is an 88-year-old male with significant past medical history of chronic systolic and diastolic heart failure, symptomatic bradycardia s/p PPM and most notably for recently diagnosed squamous cell carcinoma of the mandible undergoing palliative radiation that presents to the hospital with complaints of nausea, vomiting, and generalized weakness. Patient is accompanied by his daughter-in-law who states that patient was recently diagnosed with squamous cell carcinoma of the mandible and began palliative radiation on 6/14/22. Unfortunately, he has been unable to undergo any further treatments due to his fatigue, nausea, and vomiting. On the same day of his initial radiation treatment, he was feeling fatigued and nauseous prompting a UA to be obtained. Patient was then started on Nitrofurantoin. Despite taking this antibiotic, the patient has continued to become more fatigued, with increasing nausea and vomiting. Daughter-in-law states that he has been able to tolerate Boost three times daily, a smoothie that her  makes him twice daily, and water, just unable to tolerate  "food intake. This morning, the patient was so fatigued, that he \"fainted\" upon standing, prompting him to be brought to the ED for further evaluation and treatment.     Upon arrival to the ED, patient with a blood pressure of 161/63, temp 97.7, pulse 69, and oxygen saturation 100% on room air. Labs revealed a sodium of 111 (baseline 122-125). Hemoglobin 10.5. Urinalysis cloudy with 3+ leukocytes, 13-20 WBC, and 1+ bacteria. Patient received 500mL bolus of normal saline, 4mg Zofran, and Gentamicin in the ED. Hospitalist called for admission.     Patient was continued on Gentamicin to complete antibiotic therapy. Nephrology was consulted given patient's hyponatremia. He was continued on IV diuretics and sodium did continue to increase appropriately.      Patient did develop hematuria for which urology was consulted. His CT scan of his abdomen pelvis was showing effusions, as well as significant marked bladder distention and severe prostate enlargement.  A Rivero catheter was placed by Dr. Castellon on 6/25/2022.  He has had adequate urine output since.  Diuretics being adjusted by nephrology and sodium tablets being increased.     Despite appropriate treatment of urinary tract infection and resolution of hyponatremia, patient continued to have nausea. Palliative care was consulted and met with family. Initially they were in agreement with proceeding with palliative care, but given patient's continued pain from squamous cell carcinoma of mandible and continued decline, family ultimately decided to proceed with hospice care at home.      Comfort medications were ordered at time of discharge. Hospice care will be taken over the care of the patient at discharge.    Since home the cough is getting worse.  His oxygen sats are running 92-97 on oxygen at 2 liters.  If off oxygen he drops to 88.  He urine is darkening, but he does not want to drink.  He has a poor appetite. He has compazine for nausea.  He is complaining of right " jaw pain.  Currently on Norco for pain but has morphine if needed.  Hospice is coming to see him as needed, aide was there today to bathe him.  Nurse was there yesterday.  Nurse is calling his daughter-in-law daily to check on him.  Family has discontinued all supplements or medications that are not necessary per hospice recommendations.  A family member who is a nurse listen to his lungs today and has noted a rub in his right lower lobe.           The following portions of the patient's history were reviewed and updated as appropriate: allergies, current medications, past family history, past medical history, past social history, past surgical history and problem list.    Review of Systems   Constitutional: Positive for activity change, appetite change and fatigue.   Respiratory: Positive for cough and shortness of breath.    Gastrointestinal: Positive for nausea.   Musculoskeletal:        Right jaw pain   Neurological: Positive for weakness.       Objective   /44   Pulse 63   SpO2 97%   There is no height or weight on file to calculate BMI.  Physical Exam  Vitals and nursing note reviewed.   Constitutional:       Appearance: He is ill-appearing.      Comments: Patient is lying in his bed, he appears weak and tired   HENT:      Head: Normocephalic.   Eyes:      General:         Right eye: No discharge.         Left eye: No discharge.      Extraocular Movements: Extraocular movements intact.   Pulmonary:      Effort: Pulmonary effort is normal. No respiratory distress.      Comments: Patient is on 2 L of oxygen and appears in no obvious respiratory distress, he says a few words but is not speaking much today  Neurological:      General: No focal deficit present.      Mental Status: He is alert and oriented to person, place, and time. Mental status is at baseline.      Motor: Weakness present.   Psychiatric:         Behavior: Behavior normal.         Thought Content: Thought content normal.         Judgment:  Judgment normal.         Assessment & Plan   Andrew Hernandez is here today and the following problems have been addressed:      Diagnoses and all orders for this visit:    1. 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 Severe malnutrition (HCC)    Patient is currently on hospice  He has been taken off all supplements and medications that are not necessary  Currently on Norco or morphine for pain  On Ativan as needed for anxiety or muscle spasms  He has Zofran or Compazine as needed for nausea  He also has Phenergan cough syrup to use as needed for cough  Encouraged family to contact me for anything they may need in the near future    Return to clinic as needed or call for any needs    Time devoted to visit: 20 minutes including time to review previous record, with 75% of time devoted to direct discussion  Patient presents during COVID-19 pandemic/federally declared [facility] of public health emergency.  This service was conducted via telemedicine video visit via [employer]        Please note that portions of this note were completed with a voice recognition program.  Efforts were made to edit dictation, but occasionally words are mistranscribed.You have chosen to receive care through a telehealth visit.  Do you consent to use a video/audio connection for your medical care today? Yes  Active Parties: Marilyn Vermeesch (PCP), Romy Sparrow (CMA), Cuco Morales (Pt's daughter in law).  Video visit done via [email or web address].

## 2022-07-20 NOTE — TELEPHONE ENCOUNTER
Dr. Vermeesch,     Roshni Hernandez called to notify the office that Andrew Hernandez passed away today. Ms. David wanted you to know and states to feel free to call her if you like.

## 2022-07-20 NOTE — TELEPHONE ENCOUNTER
I spoke w/Roshni Hernandez, pt passed away today. Ms. Hernandez wanted you to know.     Regarding home monitoring, I instructed Ms. Hernandez to dispose of the [facility] home monitor.

## 2022-08-22 ENCOUNTER — APPOINTMENT (OUTPATIENT)
Dept: CARDIOLOGY | Facility: HOSPITAL | Age: 87
End: 2022-08-22

## 2024-01-26 NOTE — PLAN OF CARE
Goal Outcome Evaluation:  Plan of Care Reviewed With: patient, son        Progress: improving  Outcome Summary: Pt tolerates treatment well.  Pt performed bed mobility and transfers with cga min a. Pt also required cga/min a to ambulate 3' with rw.  Pt was left uic reclined with call light at hand. See flowsheet for details   Detail Level: Detailed

## (undated) DEVICE — DRSNG TELFA PAD NONADH STR 1S 3X8IN

## (undated) DEVICE — ST INF PRI SMRTSTE 20DRP 2VLV 24ML 117

## (undated) DEVICE — INTRO SHEATH ART/FEM ENGAGE .038 5F12CM

## (undated) DEVICE — DRSNG SURESITE123 4X4.8IN

## (undated) DEVICE — ANGIO-SEAL EVOLUTION VASCULAR CLOSURE DEVICE: Brand: ANGIO-SEAL

## (undated) DEVICE — ST EXT IV SMARTSITE 2VLV SP M LL 5ML IV1

## (undated) DEVICE — 2.0MM SHARPTOME™ CRESCENT KNIFE ANGLED, BEVEL UP: Brand: SHARPOINT

## (undated) DEVICE — 0.8MM CLEARPORT PARA KNIFE: Brand: SHARPOINT

## (undated) DEVICE — ADULT, W/LG. BACK PAD, RADIOTRANSPARENT ELEMENT AND LEAD WIRE: Brand: DEFIBRILLATION ELECTRODES

## (undated) DEVICE — EMBOSHIELD NAV6 EMBOLIC PROTECTION SYSTEM 7.2 MM X 190 CM: Brand: EMBOSHIELD  NAV6

## (undated) DEVICE — PENCL ES MEGADINE EZ/CLEAN BUTN W/HOLSTR 10FT

## (undated) DEVICE — INTRO TEAR AWAY/LVD W/SD PRT 7F 13CM

## (undated) DEVICE — VIATRAC 14 PLUS PERIPHERAL DILATATION CATHETER 4.0 MM X 30 MM X 135 CM: Brand: VIATRAC

## (undated) DEVICE — LIMB HOLDERS: Brand: DEROYAL

## (undated) DEVICE — Device

## (undated) DEVICE — CAUTERY TIP POLISHER: Brand: DEVON

## (undated) DEVICE — LEX ELECTRO PHYSIOLOGY: Brand: MEDLINE INDUSTRIES, INC.

## (undated) DEVICE — CLEAR CORNEAL KNIFE 2.4MM ANG: Brand: SHARPOINT

## (undated) DEVICE — KT POSTOP CATARACT PT CARE

## (undated) DEVICE — LEX NEURO ANGIOGRAPHY: Brand: MEDLINE INDUSTRIES, INC.

## (undated) DEVICE — SOL IRRIG H2O 1000ML STRL

## (undated) DEVICE — HP CONCL INTREPID COAX I/A CRV .3MM

## (undated) DEVICE — RADIFOCUS GLIDEWIRE: Brand: GLIDEWIRE

## (undated) DEVICE — DECANT BG O JET

## (undated) DEVICE — SET PRIMARY GRVTY 10DP MALE LL 104IN

## (undated) DEVICE — GLV SURG SENSICARE W/ALOE PF LF 6.5 STRL

## (undated) DEVICE — LIMB HOLDER, WRIST/ANKLE: Brand: DEROYAL

## (undated) DEVICE — Device: Brand: MALYUGIN RING SYSTEM 6.25MM

## (undated) DEVICE — CATH TEMPO 5F BER 100CM: Brand: TEMPO

## (undated) DEVICE — DEV INFL MONARCH 25W

## (undated) DEVICE — VIATRAC 14 PLUS PERIPHERAL DILATATION CATHETER 5.0 MM X 30 MM X 135 CM: Brand: VIATRAC

## (undated) DEVICE — SOL NACL 0.9PCT 1000ML

## (undated) DEVICE — ST ACC MICROPUNCTURE .018 TRANSLSS/SS/TP 5F/10CM 21G/7CM

## (undated) DEVICE — TUBING, SUCTION, 1/4" X 10', STRAIGHT: Brand: MEDLINE

## (undated) DEVICE — MEDI-VAC YANKAUER SUCTION HANDLE W/BULBOUS TIP: Brand: CARDINAL HEALTH

## (undated) DEVICE — GLV SURG SENSICARE W/ALOE PF LF 7 STRL

## (undated) DEVICE — CANN NASL CO2 DIVIDED A/

## (undated) DEVICE — INTRO SHEATH FLX 7F80CM